# Patient Record
Sex: MALE | Race: BLACK OR AFRICAN AMERICAN | NOT HISPANIC OR LATINO | URBAN - METROPOLITAN AREA
[De-identification: names, ages, dates, MRNs, and addresses within clinical notes are randomized per-mention and may not be internally consistent; named-entity substitution may affect disease eponyms.]

---

## 2017-02-28 ENCOUNTER — OUTPATIENT (OUTPATIENT)
Dept: OUTPATIENT SERVICES | Facility: HOSPITAL | Age: 72
LOS: 1 days | Discharge: HOME | End: 2017-02-28

## 2017-02-28 DIAGNOSIS — Z98.89 OTHER SPECIFIED POSTPROCEDURAL STATES: Chronic | ICD-10-CM

## 2017-02-28 DIAGNOSIS — Z00.00 ENCOUNTER FOR GENERAL ADULT MEDICAL EXAMINATION WITHOUT ABNORMAL FINDINGS: ICD-10-CM

## 2017-02-28 LAB
ALBUMIN SERPL ELPH-MCNC: 3.9 G/DL — SIGNIFICANT CHANGE UP (ref 3.3–5)
ALP SERPL-CCNC: 82 U/L — SIGNIFICANT CHANGE UP (ref 40–120)
ALT FLD-CCNC: 25 U/L — SIGNIFICANT CHANGE UP (ref 12–78)
ANION GAP SERPL CALC-SCNC: 4 MMOL/L — LOW (ref 5–17)
AST SERPL-CCNC: 32 U/L — SIGNIFICANT CHANGE UP (ref 15–37)
BILIRUB SERPL-MCNC: 0.6 MG/DL — SIGNIFICANT CHANGE UP (ref 0.2–1.2)
BUN SERPL-MCNC: 11 MG/DL — SIGNIFICANT CHANGE UP (ref 7–23)
CALCIUM SERPL-MCNC: 8.8 MG/DL — SIGNIFICANT CHANGE UP (ref 8.5–10.1)
CHLORIDE SERPL-SCNC: 103 MMOL/L — SIGNIFICANT CHANGE UP (ref 96–108)
CHOLEST SERPL-MCNC: 315 MG/DL — HIGH (ref 10–199)
CO2 SERPL-SCNC: 31 MMOL/L — SIGNIFICANT CHANGE UP (ref 22–31)
CREAT SERPL-MCNC: 1.41 MG/DL — HIGH (ref 0.5–1.3)
GLUCOSE SERPL-MCNC: 83 MG/DL — SIGNIFICANT CHANGE UP (ref 70–99)
HBA1C BLD-MCNC: 6 % — HIGH (ref 4–5.6)
HCT VFR BLD CALC: 34.3 % — LOW (ref 39–50)
HDLC SERPL-MCNC: 100 MG/DL — SIGNIFICANT CHANGE UP (ref 40–125)
HGB BLD-MCNC: 11.9 G/DL — LOW (ref 13–17)
LIPID PNL WITH DIRECT LDL SERPL: 182 MG/DL — HIGH
MCHC RBC-ENTMCNC: 33.9 PG — SIGNIFICANT CHANGE UP (ref 27–34)
MCHC RBC-ENTMCNC: 34.8 GM/DL — SIGNIFICANT CHANGE UP (ref 32–36)
MCV RBC AUTO: 97.3 FL — SIGNIFICANT CHANGE UP (ref 80–100)
PLATELET # BLD AUTO: 172 K/UL — SIGNIFICANT CHANGE UP (ref 150–400)
POTASSIUM SERPL-MCNC: 3.9 MMOL/L — SIGNIFICANT CHANGE UP (ref 3.5–5.3)
POTASSIUM SERPL-SCNC: 3.9 MMOL/L — SIGNIFICANT CHANGE UP (ref 3.5–5.3)
PROT SERPL-MCNC: 8.1 GM/DL — SIGNIFICANT CHANGE UP (ref 6–8.3)
RBC # BLD: 3.52 M/UL — LOW (ref 4.2–5.8)
RBC # FLD: 14 % — SIGNIFICANT CHANGE UP (ref 11–15)
SODIUM SERPL-SCNC: 138 MMOL/L — SIGNIFICANT CHANGE UP (ref 135–145)
TOTAL CHOLESTEROL/HDL RATIO MEASUREMENT: 3.2 RATIO — LOW (ref 3.4–9.6)
TRIGL SERPL-MCNC: 166 MG/DL — HIGH (ref 10–149)
TSH SERPL-MCNC: 87.9 UU/ML — HIGH (ref 0.36–3.74)
WBC # BLD: 4.9 K/UL — SIGNIFICANT CHANGE UP (ref 3.8–10.5)
WBC # FLD AUTO: 4.9 K/UL — SIGNIFICANT CHANGE UP (ref 3.8–10.5)

## 2017-03-03 ENCOUNTER — OUTPATIENT (OUTPATIENT)
Dept: OUTPATIENT SERVICES | Facility: HOSPITAL | Age: 72
LOS: 1 days | Discharge: HOME | End: 2017-03-03

## 2017-03-03 DIAGNOSIS — Z98.89 OTHER SPECIFIED POSTPROCEDURAL STATES: Chronic | ICD-10-CM

## 2017-03-03 DIAGNOSIS — E04.9 NONTOXIC GOITER, UNSPECIFIED: ICD-10-CM

## 2017-03-03 LAB
T3 SERPL-MCNC: <20 NG/DL — LOW (ref 80–200)
T4 AB SER-ACNC: 0.8 UG/DL — LOW (ref 4.6–12)
TSH SERPL-MCNC: 81.6 UU/ML — HIGH (ref 0.36–3.74)

## 2017-06-09 ENCOUNTER — OUTPATIENT (OUTPATIENT)
Dept: OUTPATIENT SERVICES | Facility: HOSPITAL | Age: 72
LOS: 1 days | Discharge: HOME | End: 2017-06-09

## 2017-06-09 DIAGNOSIS — Z98.89 OTHER SPECIFIED POSTPROCEDURAL STATES: Chronic | ICD-10-CM

## 2017-06-09 DIAGNOSIS — R79.1 ABNORMAL COAGULATION PROFILE: ICD-10-CM

## 2017-06-09 LAB
HCT VFR BLD CALC: 38.4 % — LOW (ref 39–50)
HGB BLD-MCNC: 12.9 G/DL — LOW (ref 13–17)
MCHC RBC-ENTMCNC: 31.3 PG — SIGNIFICANT CHANGE UP (ref 27–34)
MCHC RBC-ENTMCNC: 33.5 GM/DL — SIGNIFICANT CHANGE UP (ref 32–36)
MCV RBC AUTO: 93.3 FL — SIGNIFICANT CHANGE UP (ref 80–100)
PLATELET # BLD AUTO: 180 K/UL — SIGNIFICANT CHANGE UP (ref 150–400)
RBC # BLD: 4.12 M/UL — LOW (ref 4.2–5.8)
RBC # FLD: 12.5 % — SIGNIFICANT CHANGE UP (ref 11–15)
T3 SERPL-MCNC: 85 NG/DL — SIGNIFICANT CHANGE UP (ref 80–200)
T4 FREE SERPL-MCNC: 1.1 NG/DL — SIGNIFICANT CHANGE UP (ref 0.9–1.8)
TSH SERPL-MCNC: 19.4 UU/ML — HIGH (ref 0.36–3.74)
WBC # BLD: 5.9 K/UL — SIGNIFICANT CHANGE UP (ref 3.8–10.5)
WBC # FLD AUTO: 5.9 K/UL — SIGNIFICANT CHANGE UP (ref 3.8–10.5)

## 2018-01-08 ENCOUNTER — EMERGENCY (EMERGENCY)
Facility: HOSPITAL | Age: 73
LOS: 0 days | Discharge: HOME | End: 2018-01-09
Attending: EMERGENCY MEDICINE
Payer: MEDICARE

## 2018-01-08 VITALS
SYSTOLIC BLOOD PRESSURE: 186 MMHG | OXYGEN SATURATION: 97 % | HEIGHT: 68 IN | DIASTOLIC BLOOD PRESSURE: 99 MMHG | RESPIRATION RATE: 20 BRPM | WEIGHT: 154.98 LBS | HEART RATE: 93 BPM | TEMPERATURE: 98 F

## 2018-01-08 DIAGNOSIS — G40.909 EPILEPSY, UNSPECIFIED, NOT INTRACTABLE, WITHOUT STATUS EPILEPTICUS: ICD-10-CM

## 2018-01-08 DIAGNOSIS — N30.90 CYSTITIS, UNSPECIFIED WITHOUT HEMATURIA: ICD-10-CM

## 2018-01-08 DIAGNOSIS — F10.10 ALCOHOL ABUSE, UNCOMPLICATED: ICD-10-CM

## 2018-01-08 DIAGNOSIS — N40.0 BENIGN PROSTATIC HYPERPLASIA WITHOUT LOWER URINARY TRACT SYMPTOMS: ICD-10-CM

## 2018-01-08 DIAGNOSIS — Z98.89 OTHER SPECIFIED POSTPROCEDURAL STATES: Chronic | ICD-10-CM

## 2018-01-08 DIAGNOSIS — R30.0 DYSURIA: ICD-10-CM

## 2018-01-08 DIAGNOSIS — Z85.46 PERSONAL HISTORY OF MALIGNANT NEOPLASM OF PROSTATE: ICD-10-CM

## 2018-01-08 DIAGNOSIS — Z79.2 LONG TERM (CURRENT) USE OF ANTIBIOTICS: ICD-10-CM

## 2018-01-08 DIAGNOSIS — R31.9 HEMATURIA, UNSPECIFIED: ICD-10-CM

## 2018-01-08 DIAGNOSIS — I10 ESSENTIAL (PRIMARY) HYPERTENSION: ICD-10-CM

## 2018-01-08 LAB
ALBUMIN SERPL ELPH-MCNC: 3.6 G/DL — SIGNIFICANT CHANGE UP (ref 3.3–5)
ALP SERPL-CCNC: 140 U/L — HIGH (ref 40–120)
ALT FLD-CCNC: 14 U/L — SIGNIFICANT CHANGE UP (ref 12–78)
ANION GAP SERPL CALC-SCNC: 8 MMOL/L — SIGNIFICANT CHANGE UP (ref 5–17)
APPEARANCE UR: CLEAR — SIGNIFICANT CHANGE UP
AST SERPL-CCNC: 14 U/L — LOW (ref 15–37)
BILIRUB SERPL-MCNC: 0.4 MG/DL — SIGNIFICANT CHANGE UP (ref 0.2–1.2)
BILIRUB UR-MCNC: NEGATIVE — SIGNIFICANT CHANGE UP
BUN SERPL-MCNC: 12 MG/DL — SIGNIFICANT CHANGE UP (ref 7–23)
CALCIUM SERPL-MCNC: 8.8 MG/DL — SIGNIFICANT CHANGE UP (ref 8.5–10.1)
CHLORIDE SERPL-SCNC: 110 MMOL/L — HIGH (ref 96–108)
CO2 SERPL-SCNC: 25 MMOL/L — SIGNIFICANT CHANGE UP (ref 22–31)
COLOR SPEC: ABNORMAL
CREAT SERPL-MCNC: 0.87 MG/DL — SIGNIFICANT CHANGE UP (ref 0.5–1.3)
DIFF PNL FLD: ABNORMAL
GLUCOSE SERPL-MCNC: 85 MG/DL — SIGNIFICANT CHANGE UP (ref 70–99)
GLUCOSE UR QL: NEGATIVE MG/DL — SIGNIFICANT CHANGE UP
HCT VFR BLD CALC: 38.3 % — LOW (ref 39–50)
HGB BLD-MCNC: 12.8 G/DL — LOW (ref 13–17)
KETONES UR-MCNC: ABNORMAL
LEUKOCYTE ESTERASE UR-ACNC: ABNORMAL
MCHC RBC-ENTMCNC: 30.5 PG — SIGNIFICANT CHANGE UP (ref 27–34)
MCHC RBC-ENTMCNC: 33.3 GM/DL — SIGNIFICANT CHANGE UP (ref 32–36)
MCV RBC AUTO: 91.5 FL — SIGNIFICANT CHANGE UP (ref 80–100)
NITRITE UR-MCNC: NEGATIVE — SIGNIFICANT CHANGE UP
PH UR: 5 — SIGNIFICANT CHANGE UP (ref 5–8)
PLATELET # BLD AUTO: 232 K/UL — SIGNIFICANT CHANGE UP (ref 150–400)
POTASSIUM SERPL-MCNC: 3.8 MMOL/L — SIGNIFICANT CHANGE UP (ref 3.5–5.3)
POTASSIUM SERPL-SCNC: 3.8 MMOL/L — SIGNIFICANT CHANGE UP (ref 3.5–5.3)
PROT SERPL-MCNC: 7.9 GM/DL — SIGNIFICANT CHANGE UP (ref 6–8.3)
PROT UR-MCNC: 30 MG/DL
RBC # BLD: 4.18 M/UL — LOW (ref 4.2–5.8)
RBC # FLD: 12.6 % — SIGNIFICANT CHANGE UP (ref 11–15)
SODIUM SERPL-SCNC: 143 MMOL/L — SIGNIFICANT CHANGE UP (ref 135–145)
SP GR SPEC: 1.01 — SIGNIFICANT CHANGE UP (ref 1.01–1.02)
UROBILINOGEN FLD QL: NEGATIVE MG/DL — SIGNIFICANT CHANGE UP
WBC # BLD: 6.4 K/UL — SIGNIFICANT CHANGE UP (ref 3.8–10.5)
WBC # FLD AUTO: 6.4 K/UL — SIGNIFICANT CHANGE UP (ref 3.8–10.5)

## 2018-01-08 PROCEDURE — 99285 EMERGENCY DEPT VISIT HI MDM: CPT

## 2018-01-08 RX ORDER — CIPROFLOXACIN LACTATE 400MG/40ML
500 VIAL (ML) INTRAVENOUS ONCE
Qty: 0 | Refills: 0 | Status: COMPLETED | OUTPATIENT
Start: 2018-01-08 | End: 2018-01-08

## 2018-01-08 RX ADMIN — Medication 500 MILLIGRAM(S): at 22:41

## 2018-01-08 NOTE — ED PROVIDER NOTE - MEDICAL DECISION MAKING DETAILS
painless hematuria, hx prostate cancer, no obstruction. will send urine, labs, ct abd/pel r/o large mass, infection, likely f/u urology.

## 2018-01-08 NOTE — ED PROVIDER NOTE - PMH
BPH (benign prostatic hyperplasia)    ETOH abuse    HTN (hypertension)    Seizure disorder  last seizure one and half year ago

## 2018-01-08 NOTE — ED PROVIDER NOTE - PROGRESS NOTE DETAILS
Results reported to patient--grossly benign, UTI, bladder thickening, likely cystitis   Pt. reports feeling better after meds, will give cipro and have pt. f/u with uro outpt.   pt. agrees to f/u with primary care and urology outpt.  pt. understands to return to ED if symptoms worsen; will d/c

## 2018-01-08 NOTE — ED PROVIDER NOTE - PHYSICAL EXAMINATION
Gen: No acute distress, non toxic  HEENT: Mucous membranes moist, pink conjunctivae, EOMI  CV: RRR, nl s1/s2.  Resp: CTAB, normal rate and effort  GI: Abdomen soft, NT, ND. No rebound, no guarding  : No CVAT  Neuro: A&O x 3, moving all 4 extremities  MSK: No spine or joint tenderness to palpation  Skin: No rashes. intact and perfused.

## 2018-01-08 NOTE — ED PROVIDER NOTE - OBJECTIVE STATEMENT
71 y/o male hx prostate cancer now in remission, htn, seizure disorder c/o 5 days of hematuria with occasional small clots. States no difficulty urinating, mild dysuria at first that resolved, no back/flank pain, no abd pain, no fever, no dizzienss or cp. Denies testicular pain or other symptoms.     ROS: No fever/chills. No eye pain/changes in vision, No ear pain/sore throat/dysphagia, No chest pain/palpitations. No SOB/cough/. No abdominal pain, N/V/D, no black/bloody bm. No dysuria/frequency/discharge, No headache. No Dizziness.    No rashes or breaks in skin. No numbness/tingling/weakness.

## 2018-01-08 NOTE — ED ADULT NURSE REASSESSMENT NOTE - NS ED NURSE REASSESS COMMENT FT1
pt made aware of waiting times, denies any changes to initial assessment, pt vital signs taken see flow sheet

## 2018-01-08 NOTE — ED ADULT NURSE REASSESSMENT NOTE - NS ED NURSE REASSESS COMMENT FT1
pt made aware of waiting time, spoke with patient and he denies any change in initial triage assessment

## 2018-01-08 NOTE — ED ADULT TRIAGE NOTE - CHIEF COMPLAINT QUOTE
pt states, " I have blood in my urine from last Wednesday , my doctor told me if it has not stop to come to hospital " denies dizziness, abdominal pain

## 2018-01-09 VITALS
TEMPERATURE: 98 F | OXYGEN SATURATION: 98 % | SYSTOLIC BLOOD PRESSURE: 157 MMHG | RESPIRATION RATE: 18 BRPM | DIASTOLIC BLOOD PRESSURE: 80 MMHG | HEART RATE: 72 BPM

## 2018-01-09 PROCEDURE — 74176 CT ABD & PELVIS W/O CONTRAST: CPT | Mod: 26

## 2018-01-09 RX ORDER — MOXIFLOXACIN HYDROCHLORIDE TABLETS, 400 MG 400 MG/1
1 TABLET, FILM COATED ORAL
Qty: 14 | Refills: 0 | OUTPATIENT
Start: 2018-01-09 | End: 2018-01-15

## 2018-01-09 RX ORDER — PHENAZOPYRIDINE HCL 100 MG
1 TABLET ORAL
Qty: 6 | Refills: 0 | OUTPATIENT
Start: 2018-01-09 | End: 2018-01-10

## 2018-01-10 LAB
CULTURE RESULTS: SIGNIFICANT CHANGE UP
SPECIMEN SOURCE: SIGNIFICANT CHANGE UP

## 2018-02-02 PROBLEM — Z00.00 ENCOUNTER FOR PREVENTIVE HEALTH EXAMINATION: Status: ACTIVE | Noted: 2018-02-02

## 2018-02-06 ENCOUNTER — APPOINTMENT (OUTPATIENT)
Dept: ULTRASOUND IMAGING | Facility: HOSPITAL | Age: 73
End: 2018-02-06

## 2018-02-06 ENCOUNTER — OUTPATIENT (OUTPATIENT)
Dept: OUTPATIENT SERVICES | Facility: HOSPITAL | Age: 73
LOS: 1 days | Discharge: HOME | End: 2018-02-06
Payer: MEDICARE

## 2018-02-06 DIAGNOSIS — N28.89 OTHER SPECIFIED DISORDERS OF KIDNEY AND URETER: ICD-10-CM

## 2018-02-06 DIAGNOSIS — Z98.89 OTHER SPECIFIED POSTPROCEDURAL STATES: Chronic | ICD-10-CM

## 2018-02-06 PROCEDURE — 76775 US EXAM ABDO BACK WALL LIM: CPT | Mod: 26

## 2018-03-23 ENCOUNTER — OUTPATIENT (OUTPATIENT)
Dept: OUTPATIENT SERVICES | Facility: HOSPITAL | Age: 73
LOS: 1 days | End: 2018-03-23
Payer: MEDICARE

## 2018-03-23 VITALS
TEMPERATURE: 97 F | RESPIRATION RATE: 16 BRPM | HEART RATE: 44 BPM | SYSTOLIC BLOOD PRESSURE: 140 MMHG | WEIGHT: 149.91 LBS | DIASTOLIC BLOOD PRESSURE: 90 MMHG | HEIGHT: 67 IN

## 2018-03-23 DIAGNOSIS — N35.9 URETHRAL STRICTURE, UNSPECIFIED: ICD-10-CM

## 2018-03-23 DIAGNOSIS — E03.9 HYPOTHYROIDISM, UNSPECIFIED: ICD-10-CM

## 2018-03-23 DIAGNOSIS — Z98.89 OTHER SPECIFIED POSTPROCEDURAL STATES: Chronic | ICD-10-CM

## 2018-03-23 DIAGNOSIS — Z85.46 PERSONAL HISTORY OF MALIGNANT NEOPLASM OF PROSTATE: Chronic | ICD-10-CM

## 2018-03-23 DIAGNOSIS — I10 ESSENTIAL (PRIMARY) HYPERTENSION: ICD-10-CM

## 2018-03-23 LAB
ALBUMIN SERPL ELPH-MCNC: 4.2 G/DL — SIGNIFICANT CHANGE UP (ref 3.3–5)
ALP SERPL-CCNC: 113 U/L — SIGNIFICANT CHANGE UP (ref 40–120)
ALT FLD-CCNC: 12 U/L — SIGNIFICANT CHANGE UP (ref 4–41)
APPEARANCE UR: SIGNIFICANT CHANGE UP
AST SERPL-CCNC: 17 U/L — SIGNIFICANT CHANGE UP (ref 4–40)
BACTERIA # UR AUTO: SIGNIFICANT CHANGE UP
BILIRUB SERPL-MCNC: 0.6 MG/DL — SIGNIFICANT CHANGE UP (ref 0.2–1.2)
BILIRUB UR-MCNC: NEGATIVE — SIGNIFICANT CHANGE UP
BLOOD UR QL VISUAL: NEGATIVE — SIGNIFICANT CHANGE UP
BUN SERPL-MCNC: 16 MG/DL — SIGNIFICANT CHANGE UP (ref 7–23)
CALCIUM SERPL-MCNC: 9.6 MG/DL — SIGNIFICANT CHANGE UP (ref 8.4–10.5)
CHLORIDE SERPL-SCNC: 105 MMOL/L — SIGNIFICANT CHANGE UP (ref 98–107)
CO2 SERPL-SCNC: 25 MMOL/L — SIGNIFICANT CHANGE UP (ref 22–31)
COLOR SPEC: SIGNIFICANT CHANGE UP
CREAT SERPL-MCNC: 1.01 MG/DL — SIGNIFICANT CHANGE UP (ref 0.5–1.3)
GLUCOSE SERPL-MCNC: 77 MG/DL — SIGNIFICANT CHANGE UP (ref 70–99)
GLUCOSE UR-MCNC: NEGATIVE — SIGNIFICANT CHANGE UP
HCT VFR BLD CALC: 41.1 % — SIGNIFICANT CHANGE UP (ref 39–50)
HGB BLD-MCNC: 13.4 G/DL — SIGNIFICANT CHANGE UP (ref 13–17)
KETONES UR-MCNC: NEGATIVE — SIGNIFICANT CHANGE UP
LEUKOCYTE ESTERASE UR-ACNC: HIGH
MCHC RBC-ENTMCNC: 29.6 PG — SIGNIFICANT CHANGE UP (ref 27–34)
MCHC RBC-ENTMCNC: 32.6 % — SIGNIFICANT CHANGE UP (ref 32–36)
MCV RBC AUTO: 90.9 FL — SIGNIFICANT CHANGE UP (ref 80–100)
MUCOUS THREADS # UR AUTO: SIGNIFICANT CHANGE UP
NITRITE UR-MCNC: POSITIVE — HIGH
NRBC # FLD: 0 — SIGNIFICANT CHANGE UP
PH UR: 6.5 — SIGNIFICANT CHANGE UP (ref 4.6–8)
PLATELET # BLD AUTO: 215 K/UL — SIGNIFICANT CHANGE UP (ref 150–400)
PMV BLD: 12.2 FL — SIGNIFICANT CHANGE UP (ref 7–13)
POTASSIUM SERPL-MCNC: 3.7 MMOL/L — SIGNIFICANT CHANGE UP (ref 3.5–5.3)
POTASSIUM SERPL-SCNC: 3.7 MMOL/L — SIGNIFICANT CHANGE UP (ref 3.5–5.3)
PROT SERPL-MCNC: 7.9 G/DL — SIGNIFICANT CHANGE UP (ref 6–8.3)
PROT UR-MCNC: 10 MG/DL — SIGNIFICANT CHANGE UP
RBC # BLD: 4.52 M/UL — SIGNIFICANT CHANGE UP (ref 4.2–5.8)
RBC # FLD: 13.4 % — SIGNIFICANT CHANGE UP (ref 10.3–14.5)
RBC CASTS # UR COMP ASSIST: HIGH (ref 0–?)
SODIUM SERPL-SCNC: 145 MMOL/L — SIGNIFICANT CHANGE UP (ref 135–145)
SP GR SPEC: 1.02 — SIGNIFICANT CHANGE UP (ref 1–1.04)
SQUAMOUS # UR AUTO: SIGNIFICANT CHANGE UP
UROBILINOGEN FLD QL: NORMAL MG/DL — SIGNIFICANT CHANGE UP
WBC # BLD: 7.42 K/UL — SIGNIFICANT CHANGE UP (ref 3.8–10.5)
WBC # FLD AUTO: 7.42 K/UL — SIGNIFICANT CHANGE UP (ref 3.8–10.5)
WBC UR QL: >50 — HIGH (ref 0–?)

## 2018-03-23 PROCEDURE — 93010 ELECTROCARDIOGRAM REPORT: CPT

## 2018-03-23 NOTE — H&P PST ADULT - PMH
BPH (benign prostatic hyperplasia)    ETOH abuse    HTN (hypertension)    Seizure disorder  last seizure 2015 BPH (benign prostatic hyperplasia)    ETOH abuse    HTN (hypertension)    Prostate CA    Seizure disorder  last seizure 2015

## 2018-03-23 NOTE — H&P PST ADULT - PROBLEM SELECTOR PLAN 1
Pt given pre-op instructions and Famotidine and verbalized understanding  Pt to see PCP for MC - confirmed with surgeon office and sister - forms given.   OR Booking notified of past hx of Seizures and STEPHANIE precautions and Jehovah Witness affiliation

## 2018-03-23 NOTE — H&P PST ADULT - HISTORY OF PRESENT ILLNESS
72 yo male for pst stating having urinaring problems- had suprapubic cath- leg bag, scheduled for green light procedure. pt blood pressure 220/104- 212/107- noncompliant with meds. sent to ER with family members at 815 .  1310- patient returned from the ER to complete his PST- B/P 140/90. [facility] not [facility]ills for the patient for his meds and the last time patient was prescribed was in july. 73 yo male scheduled for Cystoscopy [facility] Urethrotomy with Dr Cummings 4/4/18.. Pt is poor historian and seen alone today in PST. Pt has had several Cysto is past but unsure when last one was. Pt denies pain, dysuria or hematuria at present. Pt admits to ETOH abuse but has not drank for at least 2 yrs. - Past hx of seizure - none in last 3 years but pt is not sure etiology. Spoke with sister Amirah Penn who confirms pt has not had seizure since he stopped drinking over 3 yrs ago - she is not sure of past hx prior to that

## 2018-03-23 NOTE — H&P PST ADULT - NEUROLOGICAL COMMENTS
Pt denies CVA but admits to some hx of seizures - poss with ETOH abuse - states last seizure was possible 2 years ago but denies meds Pt denies CVA but admits to some hx of seizures - poss with ETOH abuse - states last seizure was possible 2-3  years ago but denies meds. Confirmed with sister

## 2018-03-23 NOTE — H&P PST ADULT - PSH
S/P hernia repair  many years ago H/O prostate cancer  10- yrs ago  S/P hernia repair  many years ago

## 2018-03-23 NOTE — H&P PST ADULT - NEGATIVE ENMT SYMPTOMS
no hearing difficulty/no vertigo/no sinus symptoms/no nasal congestion/no throat pain/no dysphagia/no ear pain/no tinnitus/no nasal discharge

## 2018-03-23 NOTE — H&P PST ADULT - GENITOURINARY COMMENTS
Hx of urethral stricture Hx of urethral stricture - past Cysto but pt not sure when - pt denies dysuria or hematuria

## 2018-03-23 NOTE — H&P PST ADULT - MALLAMPATI CLASS
Class I (easy) - visualization of the soft palate, fauces, uvula, and both anterior and posterior pillars with phonation/Class II - visualization of the soft palate, fauces, and uvula

## 2018-03-23 NOTE — H&P PST ADULT - BLOOD AVOIDANCE/RESTRICTIONS, PROFILE
Evangelical beliefs/Jehovah witness/none none/pt occasionally goes to [employer] [place] - sister not sure if he would object to transfusion - pt did not acknowledge during PST - told sister to have patient in form on DOS

## 2018-03-24 LAB — SPECIMEN SOURCE: SIGNIFICANT CHANGE UP

## 2018-03-25 LAB — BACTERIA UR CULT: SIGNIFICANT CHANGE UP

## 2018-04-03 ENCOUNTER — TRANSCRIPTION ENCOUNTER (OUTPATIENT)
Age: 73
End: 2018-04-03

## 2018-04-04 ENCOUNTER — OUTPATIENT (OUTPATIENT)
Dept: OUTPATIENT SERVICES | Facility: HOSPITAL | Age: 73
LOS: 1 days | Discharge: HOME | End: 2018-04-04

## 2018-04-04 VITALS
HEART RATE: 80 BPM | WEIGHT: 149.91 LBS | SYSTOLIC BLOOD PRESSURE: 177 MMHG | RESPIRATION RATE: 18 BRPM | OXYGEN SATURATION: 99 % | DIASTOLIC BLOOD PRESSURE: 74 MMHG | HEIGHT: 67 IN | TEMPERATURE: 98 F

## 2018-04-04 VITALS
DIASTOLIC BLOOD PRESSURE: 73 MMHG | SYSTOLIC BLOOD PRESSURE: 163 MMHG | HEART RATE: 68 BPM | TEMPERATURE: 98 F | RESPIRATION RATE: 14 BRPM | OXYGEN SATURATION: 98 %

## 2018-04-04 DIAGNOSIS — Z98.89 OTHER SPECIFIED POSTPROCEDURAL STATES: Chronic | ICD-10-CM

## 2018-04-04 DIAGNOSIS — Z85.46 PERSONAL HISTORY OF MALIGNANT NEOPLASM OF PROSTATE: Chronic | ICD-10-CM

## 2018-04-04 DIAGNOSIS — N35.9 URETHRAL STRICTURE, UNSPECIFIED: ICD-10-CM

## 2018-04-04 RX ORDER — CEFUROXIME AXETIL 250 MG
1 TABLET ORAL
Qty: 10 | Refills: 0 | OUTPATIENT
Start: 2018-04-04 | End: 2018-04-08

## 2018-04-04 NOTE — ASU DISCHARGE PLAN (ADULT/PEDIATRIC). - NOTIFY
Pain not relieved by Medications/Unable to Urinate/Bleeding that does not stop/Persistent Nausea and Vomiting/Fever greater than 101/Swelling that continues

## 2018-05-30 ENCOUNTER — OUTPATIENT (OUTPATIENT)
Dept: OUTPATIENT SERVICES | Facility: HOSPITAL | Age: 73
LOS: 1 days | End: 2018-05-30
Payer: MEDICARE

## 2018-05-30 VITALS
HEIGHT: 68 IN | SYSTOLIC BLOOD PRESSURE: 168 MMHG | OXYGEN SATURATION: 99 % | HEART RATE: 60 BPM | WEIGHT: 143.96 LBS | RESPIRATION RATE: 14 BRPM | DIASTOLIC BLOOD PRESSURE: 90 MMHG | TEMPERATURE: 97 F

## 2018-05-30 DIAGNOSIS — N35.9 URETHRAL STRICTURE, UNSPECIFIED: ICD-10-CM

## 2018-05-30 DIAGNOSIS — I10 ESSENTIAL (PRIMARY) HYPERTENSION: ICD-10-CM

## 2018-05-30 DIAGNOSIS — E03.9 HYPOTHYROIDISM, UNSPECIFIED: ICD-10-CM

## 2018-05-30 DIAGNOSIS — Z98.89 OTHER SPECIFIED POSTPROCEDURAL STATES: Chronic | ICD-10-CM

## 2018-05-30 DIAGNOSIS — Z98.49 CATARACT EXTRACTION STATUS, UNSPECIFIED EYE: Chronic | ICD-10-CM

## 2018-05-30 DIAGNOSIS — Z85.46 PERSONAL HISTORY OF MALIGNANT NEOPLASM OF PROSTATE: Chronic | ICD-10-CM

## 2018-05-30 LAB
ALBUMIN SERPL ELPH-MCNC: 4 G/DL — SIGNIFICANT CHANGE UP (ref 3.3–5)
ALP SERPL-CCNC: 113 U/L — SIGNIFICANT CHANGE UP (ref 40–120)
ALT FLD-CCNC: 7 U/L — SIGNIFICANT CHANGE UP (ref 4–41)
APPEARANCE UR: SIGNIFICANT CHANGE UP
AST SERPL-CCNC: 16 U/L — SIGNIFICANT CHANGE UP (ref 4–40)
BACTERIA # UR AUTO: SIGNIFICANT CHANGE UP
BILIRUB SERPL-MCNC: 0.4 MG/DL — SIGNIFICANT CHANGE UP (ref 0.2–1.2)
BILIRUB UR-MCNC: NEGATIVE — SIGNIFICANT CHANGE UP
BLOOD UR QL VISUAL: HIGH
BUN SERPL-MCNC: 11 MG/DL — SIGNIFICANT CHANGE UP (ref 7–23)
CALCIUM SERPL-MCNC: 9.3 MG/DL — SIGNIFICANT CHANGE UP (ref 8.4–10.5)
CHLORIDE SERPL-SCNC: 103 MMOL/L — SIGNIFICANT CHANGE UP (ref 98–107)
CO2 SERPL-SCNC: 26 MMOL/L — SIGNIFICANT CHANGE UP (ref 22–31)
COLOR SPEC: YELLOW — SIGNIFICANT CHANGE UP
CREAT SERPL-MCNC: 1.13 MG/DL — SIGNIFICANT CHANGE UP (ref 0.5–1.3)
GLUCOSE SERPL-MCNC: 77 MG/DL — SIGNIFICANT CHANGE UP (ref 70–99)
GLUCOSE UR-MCNC: NEGATIVE — SIGNIFICANT CHANGE UP
HCT VFR BLD CALC: 36.9 % — LOW (ref 39–50)
HGB BLD-MCNC: 11.7 G/DL — LOW (ref 13–17)
KETONES UR-MCNC: NEGATIVE — SIGNIFICANT CHANGE UP
LEUKOCYTE ESTERASE UR-ACNC: HIGH
MCHC RBC-ENTMCNC: 29.1 PG — SIGNIFICANT CHANGE UP (ref 27–34)
MCHC RBC-ENTMCNC: 31.7 % — LOW (ref 32–36)
MCV RBC AUTO: 91.8 FL — SIGNIFICANT CHANGE UP (ref 80–100)
NITRITE UR-MCNC: POSITIVE — HIGH
NRBC # FLD: 0 — SIGNIFICANT CHANGE UP
PH UR: 6 — SIGNIFICANT CHANGE UP (ref 4.6–8)
PLATELET # BLD AUTO: 243 K/UL — SIGNIFICANT CHANGE UP (ref 150–400)
PMV BLD: 11.4 FL — SIGNIFICANT CHANGE UP (ref 7–13)
POTASSIUM SERPL-MCNC: 4 MMOL/L — SIGNIFICANT CHANGE UP (ref 3.5–5.3)
POTASSIUM SERPL-SCNC: 4 MMOL/L — SIGNIFICANT CHANGE UP (ref 3.5–5.3)
PROT SERPL-MCNC: 7.3 G/DL — SIGNIFICANT CHANGE UP (ref 6–8.3)
PROT UR-MCNC: 20 MG/DL — SIGNIFICANT CHANGE UP
RBC # BLD: 4.02 M/UL — LOW (ref 4.2–5.8)
RBC # FLD: 13.5 % — SIGNIFICANT CHANGE UP (ref 10.3–14.5)
RBC CASTS # UR COMP ASSIST: HIGH (ref 0–?)
SODIUM SERPL-SCNC: 140 MMOL/L — SIGNIFICANT CHANGE UP (ref 135–145)
SP GR SPEC: 1.01 — SIGNIFICANT CHANGE UP (ref 1–1.04)
UROBILINOGEN FLD QL: NORMAL MG/DL — SIGNIFICANT CHANGE UP
WBC # BLD: 6.76 K/UL — SIGNIFICANT CHANGE UP (ref 3.8–10.5)
WBC # FLD AUTO: 6.76 K/UL — SIGNIFICANT CHANGE UP (ref 3.8–10.5)
WBC UR QL: >50 — HIGH (ref 0–?)

## 2018-05-30 PROCEDURE — 93010 ELECTROCARDIOGRAM REPORT: CPT

## 2018-05-30 NOTE — H&P PST ADULT - FAMILY HISTORY
Mother  Still living? Unknown  Hypertension, Age at diagnosis: Age Unknown     Sibling  Still living? Unknown  Hypertension, Age at diagnosis: Age Unknown

## 2018-05-30 NOTE — H&P PST ADULT - PROBLEM SELECTOR PLAN 1
Pt scheduled for Cystoscopy [facility] Internal Urethrotomy on 06/06/18  Preop instructions provided. Pt verbalized understanding.   Pepcid for GI prophylaxis provided   med eval from 04/2018 in chart  pt Gnosticism, surgical coordinator Janine @Dr. Cummings office notified

## 2018-05-30 NOTE — H&P PST ADULT - PMH
BPH (benign prostatic hyperplasia)    ETOH abuse  sober since 2016  HTN (hypertension)    Hyperlipidemia    Prostate CA    Seizure disorder  last seizure 2015

## 2018-05-30 NOTE — H&P PST ADULT - MUSCULOSKELETAL
details… detailed exam no joint swelling/no joint erythema/normal strength/no joint warmth/ROM intact/no calf tenderness

## 2018-05-30 NOTE — H&P PST ADULT - NEGATIVE ENMT SYMPTOMS
no nasal discharge/no hearing difficulty/no nasal congestion/no throat pain/no dysphagia/no post-nasal discharge/no sinus symptoms

## 2018-05-30 NOTE — H&P PST ADULT - HISTORY OF PRESENT ILLNESS
74 yo male with hx of HTN, HLD, hypothyroidism, presents to PST for evaluation for Cystoscopy [facility] Internal Urethrotomy on 06/06/18. Pt c/o nocturia 5-6 x night, reports incomplete bladder emptying on bladder scan, denies hematuria, dysuria or hesitancy hx of urethral stricture, pt underwent cystoscopy in April, reports symptoms persist. 74 yo male with hx of HTN, HLD, prostate cancer~2008, s/p radiation therapy, hypothyroidism, presents to PST for evaluation for Cystoscopy [facility] Internal Urethrotomy on 06/06/18. Pt c/o nocturia 5-6 x night, reports incomplete bladder emptying on bladder scan, denies hematuria, dysuria or hesitancy hx of urethral stricture, pt underwent cystoscopy in April, reports symptoms persist.

## 2018-05-30 NOTE — H&P PST ADULT - PSH
H/O prostate cancer  10- yrs ago, s/p radiation therapy  S/P hernia repair  right inguinal hernia H/O prostate cancer  10- yrs ago, s/p radiation therapy  S/P hernia repair  right inguinal hernia  Status post cataract extraction

## 2018-05-30 NOTE — H&P PST ADULT - RS GEN PE MLT RESP DETAILS PC
clear to auscultation bilaterally/good air movement/airway patent/respirations non-labored/breath sounds equal

## 2018-05-30 NOTE — H&P PST ADULT - GENERAL GENITOURINARY SYMPTOMS
hx of prostate cancer, tx with radiation; hx of urethral stricture, see HPI/increased urinary frequency/nocturia

## 2018-05-30 NOTE — H&P PST ADULT - NEGATIVE MUSCULOSKELETAL SYMPTOMS
no stiffness/no back pain/no neck pain/ambulates with cane/no arthritis/no myalgia/no joint swelling/no muscle weakness

## 2018-05-31 LAB — SPECIMEN SOURCE: SIGNIFICANT CHANGE UP

## 2018-06-01 LAB
-  AMIKACIN: SIGNIFICANT CHANGE UP
-  AMPICILLIN/SULBACTAM: SIGNIFICANT CHANGE UP
-  AMPICILLIN: SIGNIFICANT CHANGE UP
-  AZTREONAM: SIGNIFICANT CHANGE UP
-  CEFAZOLIN: SIGNIFICANT CHANGE UP
-  CEFEPIME: SIGNIFICANT CHANGE UP
-  CEFOXITIN: SIGNIFICANT CHANGE UP
-  CEFTAZIDIME: SIGNIFICANT CHANGE UP
-  CEFTRIAXONE: SIGNIFICANT CHANGE UP
-  ERTAPENEM: SIGNIFICANT CHANGE UP
-  GENTAMICIN: SIGNIFICANT CHANGE UP
-  IMIPENEM: SIGNIFICANT CHANGE UP
-  LEVOFLOXACIN: SIGNIFICANT CHANGE UP
-  MEROPENEM: SIGNIFICANT CHANGE UP
-  NITROFURANTOIN: SIGNIFICANT CHANGE UP
-  PIPERACILLIN/TAZOBACTAM: SIGNIFICANT CHANGE UP
-  TIGECYCLINE: SIGNIFICANT CHANGE UP
-  TOBRAMYCIN: SIGNIFICANT CHANGE UP
-  TRIMETHOPRIM/SULFAMETHOXAZOLE: SIGNIFICANT CHANGE UP
BACTERIA UR CULT: SIGNIFICANT CHANGE UP
METHOD TYPE: SIGNIFICANT CHANGE UP
ORGANISM # SPEC MICROSCOPIC CNT: SIGNIFICANT CHANGE UP
ORGANISM # SPEC MICROSCOPIC CNT: SIGNIFICANT CHANGE UP

## 2018-06-05 ENCOUNTER — TRANSCRIPTION ENCOUNTER (OUTPATIENT)
Age: 73
End: 2018-06-05

## 2018-06-06 ENCOUNTER — RESULT REVIEW (OUTPATIENT)
Age: 73
End: 2018-06-06

## 2018-06-06 ENCOUNTER — OUTPATIENT (OUTPATIENT)
Dept: OUTPATIENT SERVICES | Facility: HOSPITAL | Age: 73
LOS: 1 days | Discharge: HOME | End: 2018-06-06
Payer: MEDICARE

## 2018-06-06 VITALS
SYSTOLIC BLOOD PRESSURE: 159 MMHG | OXYGEN SATURATION: 100 % | TEMPERATURE: 98 F | DIASTOLIC BLOOD PRESSURE: 90 MMHG | HEART RATE: 68 BPM | RESPIRATION RATE: 18 BRPM

## 2018-06-06 VITALS
HEART RATE: 80 BPM | SYSTOLIC BLOOD PRESSURE: 167 MMHG | TEMPERATURE: 98 F | DIASTOLIC BLOOD PRESSURE: 77 MMHG | HEIGHT: 68 IN | OXYGEN SATURATION: 100 % | RESPIRATION RATE: 16 BRPM | WEIGHT: 143.96 LBS

## 2018-06-06 DIAGNOSIS — Z98.49 CATARACT EXTRACTION STATUS, UNSPECIFIED EYE: Chronic | ICD-10-CM

## 2018-06-06 DIAGNOSIS — N35.9 URETHRAL STRICTURE, UNSPECIFIED: ICD-10-CM

## 2018-06-06 DIAGNOSIS — Z85.46 PERSONAL HISTORY OF MALIGNANT NEOPLASM OF PROSTATE: Chronic | ICD-10-CM

## 2018-06-06 DIAGNOSIS — Z98.89 OTHER SPECIFIED POSTPROCEDURAL STATES: Chronic | ICD-10-CM

## 2018-06-06 PROCEDURE — 88112 CYTOPATH CELL ENHANCE TECH: CPT | Mod: 26

## 2018-06-06 NOTE — ASU DISCHARGE PLAN (ADULT/PEDIATRIC). - NOTIFY
Persistent Nausea and Vomiting/Increased Irritability or Sluggishness/Bleeding that does not stop/Fever greater than 101/Inability to Tolerate Liquids or Foods Persistent Nausea and Vomiting/Swelling that continues/Pain not relieved by Medications/Unable to Urinate/Increased Irritability or Sluggishness/Bleeding that does not stop/Inability to Tolerate Liquids or Foods/Fever greater than 101

## 2018-06-07 LAB — NON-GYNECOLOGICAL CYTOLOGY STUDY: SIGNIFICANT CHANGE UP

## 2018-07-18 ENCOUNTER — OUTPATIENT (OUTPATIENT)
Dept: OUTPATIENT SERVICES | Facility: HOSPITAL | Age: 73
LOS: 1 days | End: 2018-07-18
Payer: MEDICARE

## 2018-07-18 VITALS
WEIGHT: 143.08 LBS | OXYGEN SATURATION: 98 % | TEMPERATURE: 97 F | RESPIRATION RATE: 16 BRPM | HEIGHT: 67 IN | SYSTOLIC BLOOD PRESSURE: 164 MMHG | DIASTOLIC BLOOD PRESSURE: 90 MMHG | HEART RATE: 60 BPM

## 2018-07-18 DIAGNOSIS — Z98.890 OTHER SPECIFIED POSTPROCEDURAL STATES: Chronic | ICD-10-CM

## 2018-07-18 DIAGNOSIS — I10 ESSENTIAL (PRIMARY) HYPERTENSION: ICD-10-CM

## 2018-07-18 DIAGNOSIS — Z29.9 ENCOUNTER FOR PROPHYLACTIC MEASURES, UNSPECIFIED: ICD-10-CM

## 2018-07-18 DIAGNOSIS — N40.0 BENIGN PROSTATIC HYPERPLASIA WITHOUT LOWER URINARY TRACT SYMPTOMS: ICD-10-CM

## 2018-07-18 DIAGNOSIS — Z98.49 CATARACT EXTRACTION STATUS, UNSPECIFIED EYE: Chronic | ICD-10-CM

## 2018-07-18 DIAGNOSIS — R35.1 NOCTURIA: ICD-10-CM

## 2018-07-18 DIAGNOSIS — Z98.89 OTHER SPECIFIED POSTPROCEDURAL STATES: Chronic | ICD-10-CM

## 2018-07-18 DIAGNOSIS — Z85.46 PERSONAL HISTORY OF MALIGNANT NEOPLASM OF PROSTATE: Chronic | ICD-10-CM

## 2018-07-18 DIAGNOSIS — E78.5 HYPERLIPIDEMIA, UNSPECIFIED: ICD-10-CM

## 2018-07-18 LAB
ALBUMIN SERPL ELPH-MCNC: 4.3 G/DL — SIGNIFICANT CHANGE UP (ref 3.3–5)
ALP SERPL-CCNC: 145 U/L — HIGH (ref 40–120)
ALT FLD-CCNC: 9 U/L — SIGNIFICANT CHANGE UP (ref 4–41)
APPEARANCE UR: SIGNIFICANT CHANGE UP
APTT BLD: 30.5 SEC — SIGNIFICANT CHANGE UP (ref 27.5–37.4)
AST SERPL-CCNC: 16 U/L — SIGNIFICANT CHANGE UP (ref 4–40)
BACTERIA # UR AUTO: HIGH
BILIRUB SERPL-MCNC: 0.5 MG/DL — SIGNIFICANT CHANGE UP (ref 0.2–1.2)
BILIRUB UR-MCNC: NEGATIVE — SIGNIFICANT CHANGE UP
BLOOD UR QL VISUAL: HIGH
BUN SERPL-MCNC: 11 MG/DL — SIGNIFICANT CHANGE UP (ref 7–23)
CALCIUM SERPL-MCNC: 9.5 MG/DL — SIGNIFICANT CHANGE UP (ref 8.4–10.5)
CHLORIDE SERPL-SCNC: 103 MMOL/L — SIGNIFICANT CHANGE UP (ref 98–107)
CO2 SERPL-SCNC: 24 MMOL/L — SIGNIFICANT CHANGE UP (ref 22–31)
COLOR SPEC: SIGNIFICANT CHANGE UP
CREAT SERPL-MCNC: 1.27 MG/DL — SIGNIFICANT CHANGE UP (ref 0.5–1.3)
GLUCOSE SERPL-MCNC: 80 MG/DL — SIGNIFICANT CHANGE UP (ref 70–99)
GLUCOSE UR-MCNC: NEGATIVE — SIGNIFICANT CHANGE UP
HCT VFR BLD CALC: 41.3 % — SIGNIFICANT CHANGE UP (ref 39–50)
HGB BLD-MCNC: 12.8 G/DL — LOW (ref 13–17)
INR BLD: 0.91 — SIGNIFICANT CHANGE UP (ref 0.88–1.17)
KETONES UR-MCNC: NEGATIVE — SIGNIFICANT CHANGE UP
LEUKOCYTE ESTERASE UR-ACNC: HIGH
MCHC RBC-ENTMCNC: 28.8 PG — SIGNIFICANT CHANGE UP (ref 27–34)
MCHC RBC-ENTMCNC: 31 % — LOW (ref 32–36)
MCV RBC AUTO: 93 FL — SIGNIFICANT CHANGE UP (ref 80–100)
NITRITE UR-MCNC: NEGATIVE — SIGNIFICANT CHANGE UP
NRBC # FLD: 0 — SIGNIFICANT CHANGE UP
PH UR: 6.5 — SIGNIFICANT CHANGE UP (ref 4.6–8)
PLATELET # BLD AUTO: 271 K/UL — SIGNIFICANT CHANGE UP (ref 150–400)
PMV BLD: 11.5 FL — SIGNIFICANT CHANGE UP (ref 7–13)
POTASSIUM SERPL-MCNC: 3.4 MMOL/L — LOW (ref 3.5–5.3)
POTASSIUM SERPL-SCNC: 3.4 MMOL/L — LOW (ref 3.5–5.3)
PROT SERPL-MCNC: 8.2 G/DL — SIGNIFICANT CHANGE UP (ref 6–8.3)
PROT UR-MCNC: 100 MG/DL — SIGNIFICANT CHANGE UP
PROTHROM AB SERPL-ACNC: 10.5 SEC — SIGNIFICANT CHANGE UP (ref 9.8–13.1)
RBC # BLD: 4.44 M/UL — SIGNIFICANT CHANGE UP (ref 4.2–5.8)
RBC # FLD: 14.2 % — SIGNIFICANT CHANGE UP (ref 10.3–14.5)
RBC CASTS # UR COMP ASSIST: SIGNIFICANT CHANGE UP (ref 0–?)
SODIUM SERPL-SCNC: 141 MMOL/L — SIGNIFICANT CHANGE UP (ref 135–145)
SP GR SPEC: 1.02 — SIGNIFICANT CHANGE UP (ref 1–1.04)
SQUAMOUS # UR AUTO: SIGNIFICANT CHANGE UP
UROBILINOGEN FLD QL: NORMAL MG/DL — SIGNIFICANT CHANGE UP
WBC # BLD: 10.22 K/UL — SIGNIFICANT CHANGE UP (ref 3.8–10.5)
WBC # FLD AUTO: 10.22 K/UL — SIGNIFICANT CHANGE UP (ref 3.8–10.5)
WBC UR QL: >50 — HIGH (ref 0–?)

## 2018-07-18 PROCEDURE — 93010 ELECTROCARDIOGRAM REPORT: CPT

## 2018-07-18 RX ORDER — SODIUM CHLORIDE 9 MG/ML
1000 INJECTION, SOLUTION INTRAVENOUS
Qty: 0 | Refills: 0 | Status: DISCONTINUED | OUTPATIENT
Start: 2018-07-25 | End: 2018-08-09

## 2018-07-18 NOTE — H&P PST ADULT - NEGATIVE MUSCULOSKELETAL SYMPTOMS
no stiffness/no arthritis/no muscle weakness/no myalgia/no neck pain/ambulates with cane/no back pain/no joint swelling

## 2018-07-18 NOTE — H&P PST ADULT - GENERAL GENITOURINARY SYMPTOMS
nocturia/hx of prostate cancer, tx with radiation; hx of urethral stricture, see HPI/bladder infections/urinary hesitancy/increased urinary frequency

## 2018-07-18 NOTE — H&P PST ADULT - PROBLEM SELECTOR PLAN 1
Pt. is scheduled for greenlight laser of prostate on 7/25/18.  Preoperative instructions reviewed, pt verbalized understanding.  Preop Famotidine provided.  Lab results pending, EKG done.  Pt. instructed to obtain a medical evaluation prior to surgery to address elevated blood pressure (PMD called today re: BP, will see patient tomorrow for evaluation)

## 2018-07-18 NOTE — H&P PST ADULT - PMH
BPH (benign prostatic hyperplasia)    ETOH abuse  sober since 2016  HTN (hypertension)    Hyperlipidemia    Poor historian    Prostate CA  Dx'd 2008, s/p radiation  Seizure disorder  last seizure 2015

## 2018-07-18 NOTE — H&P PST ADULT - NEGATIVE ENMT SYMPTOMS
no nasal discharge/no sinus symptoms/no throat pain/no post-nasal discharge/no nasal congestion/no hearing difficulty/no dysphagia

## 2018-07-18 NOTE — H&P PST ADULT - PSH
H/O prostate cancer  10- yrs ago, s/p radiation therapy  H/O urethrotomy  June 2018  S/P hernia repair  right inguinal hernia  Status post cataract extraction

## 2018-07-18 NOTE — H&P PST ADULT - CARDIOVASCULAR COMMENTS
BP elevated today, 170/100, came down to 164/90.  PMD notified will see patient in office tomorrow to evaluate BP prior to surgery

## 2018-07-18 NOTE — H&P PST ADULT - HISTORY OF PRESENT ILLNESS
74 yo male with hx of HTN, HLD, prostate cancer~2008, s/p radiation therapy, hypothyroidism, presents to PST for evaluation for Cystoscopy [facility] Internal Urethrotomy on 06/06/18. Pt c/o nocturia 5-6 x night, reports incomplete bladder emptying on bladder scan, denies hematuria, dysuria or hesitancy hx of urethral stricture, pt underwent cystoscopy in April, reports symptoms persist. 74 y/o male with history of BPH presents to PAST today for presurgical evaluation.  He is a poor historian and history was obtained by patient and electronic medical record.  He has history of HTN, HLD, prostate cancer~2008, s/p radiation therapy, hypothyroidism and BPH.  He is s/p optical Internal Urethrotomy on 06/06/18, but continues to complain of nocturia and hesitancy.  He was treated with antibiotics recently and denies any hematuria, dysuria and urgency.

## 2018-07-18 NOTE — H&P PST ADULT - PROBLEM SELECTOR PLAN 2
Pt. instructed to continue Hydralazine and Losartan as directed and to take the morning of surgery with a sip of water

## 2018-07-19 PROBLEM — C61 MALIGNANT NEOPLASM OF PROSTATE: Chronic | Status: ACTIVE | Noted: 2018-03-23

## 2018-07-20 LAB
BACTERIA UR CULT: SIGNIFICANT CHANGE UP
SPECIMEN SOURCE: SIGNIFICANT CHANGE UP

## 2018-07-24 ENCOUNTER — TRANSCRIPTION ENCOUNTER (OUTPATIENT)
Age: 73
End: 2018-07-24

## 2018-07-25 ENCOUNTER — OUTPATIENT (OUTPATIENT)
Dept: OUTPATIENT SERVICES | Facility: HOSPITAL | Age: 73
LOS: 1 days | Discharge: HOME | End: 2018-07-25

## 2018-07-25 VITALS
HEART RATE: 73 BPM | TEMPERATURE: 99 F | HEIGHT: 67 IN | WEIGHT: 143.08 LBS | RESPIRATION RATE: 16 BRPM | DIASTOLIC BLOOD PRESSURE: 75 MMHG | OXYGEN SATURATION: 99 % | SYSTOLIC BLOOD PRESSURE: 146 MMHG

## 2018-07-25 VITALS
OXYGEN SATURATION: 100 % | DIASTOLIC BLOOD PRESSURE: 86 MMHG | HEART RATE: 68 BPM | SYSTOLIC BLOOD PRESSURE: 139 MMHG | RESPIRATION RATE: 16 BRPM

## 2018-07-25 DIAGNOSIS — Z98.890 OTHER SPECIFIED POSTPROCEDURAL STATES: Chronic | ICD-10-CM

## 2018-07-25 DIAGNOSIS — Z98.49 CATARACT EXTRACTION STATUS, UNSPECIFIED EYE: Chronic | ICD-10-CM

## 2018-07-25 DIAGNOSIS — Z85.46 PERSONAL HISTORY OF MALIGNANT NEOPLASM OF PROSTATE: Chronic | ICD-10-CM

## 2018-07-25 DIAGNOSIS — Z98.89 OTHER SPECIFIED POSTPROCEDURAL STATES: Chronic | ICD-10-CM

## 2018-07-25 DIAGNOSIS — R35.1 NOCTURIA: ICD-10-CM

## 2018-07-25 LAB — POTASSIUM BLDV-SCNC: 3.7 MMOL/L — SIGNIFICANT CHANGE UP (ref 3.4–4.5)

## 2018-07-25 RX ORDER — HYDRALAZINE HCL 50 MG
10 TABLET ORAL ONCE
Qty: 0 | Refills: 0 | Status: COMPLETED | OUTPATIENT
Start: 2018-07-25 | End: 2018-07-25

## 2018-07-25 RX ADMIN — Medication 10 MILLIGRAM(S): at 17:37

## 2018-07-25 RX ADMIN — SODIUM CHLORIDE 30 MILLILITER(S): 9 INJECTION, SOLUTION INTRAVENOUS at 17:37

## 2018-07-25 RX ADMIN — Medication 10 MILLIGRAM(S): at 19:24

## 2018-07-25 NOTE — BRIEF OPERATIVE NOTE - PROCEDURE
<<-----Click on this checkbox to enter Procedure Cystoscopy  07/25/2018    Active  PHI  Urethral dilation  07/25/2018    Active  ALEJANDROEAS

## 2018-07-25 NOTE — ASU DISCHARGE PLAN (ADULT/PEDIATRIC). - SPECIAL INSTRUCTIONS
Take over the counter acetaminophen (tylenol) and ibuprofen (advil) for pain      In order to better understand your post-operative pain control, please record the following for follow-up review:          (1) Date and time of pain         (2) Level of pain (Scale of 1-10)         (3) Medication and dose taken          (4) Level of pain (1-10) after medication taken   You may receive a phone call in order to assess if you are being appropriately treated or if there are medication adjustments you may need to better control your pain.

## 2018-07-25 NOTE — ASU DISCHARGE PLAN (ADULT/PEDIATRIC). - NOTIFY
Fever greater than 101/Unable to Urinate/Persistent Nausea and Vomiting Pain not relieved by Medications/Fever greater than 101/Swelling that continues/Bleeding that does not stop/Unable to Urinate/Persistent Nausea and Vomiting

## 2018-07-25 NOTE — ASU DISCHARGE PLAN (ADULT/PEDIATRIC). - MEDICATION SUMMARY - MEDICATIONS TO TAKE
I will START or STAY ON the medications listed below when I get home from the hospital:    losartan 100 mg oral tablet  -- 1 tab(s) by mouth once a day in Am  -- Indication: For HTN    Flomax 0.4 mg oral capsule  -- 1 cap(s) by mouth once a day in Am  -- Indication: For LUTS    simvastatin 20 mg oral tablet  -- 1 tab(s) by mouth once a day (at bedtime)  -- Indication: For HLD    levothyroxine 125 mcg (0.125 mg) oral tablet  -- 1 tab(s) by mouth once a day before breakefast  -- Indication: For Hypothyroid    hydrALAZINE 25 mg oral tablet  -- 1 tab(s) by mouth 2 times a day  -- Indication: For HTN

## 2018-07-25 NOTE — ASU DISCHARGE PLAN (ADULT/PEDIATRIC). - POST OP PHONE #
[phone] pt. granted permission to leave message /and or speak with whoever answers the phone.

## 2018-07-25 NOTE — BRIEF OPERATIVE NOTE - POST-OP DX
Bladder neck stricture  07/25/2018    Active  Wang Shoemaker  Urethral stricture  07/25/2018    Active  Wang Shoemaker

## 2019-05-24 PROBLEM — E78.5 HYPERLIPIDEMIA, UNSPECIFIED: Chronic | Status: ACTIVE | Noted: 2018-05-30

## 2019-05-24 PROBLEM — Z78.9 OTHER SPECIFIED HEALTH STATUS: Chronic | Status: ACTIVE | Noted: 2018-07-18

## 2019-06-12 ENCOUNTER — APPOINTMENT (OUTPATIENT)
Dept: ULTRASOUND IMAGING | Facility: HOSPITAL | Age: 74
End: 2019-06-12

## 2019-06-12 ENCOUNTER — OUTPATIENT (OUTPATIENT)
Dept: OUTPATIENT SERVICES | Facility: HOSPITAL | Age: 74
LOS: 1 days | Discharge: HOME | End: 2019-06-12
Payer: MEDICARE

## 2019-06-12 DIAGNOSIS — N28.89 OTHER SPECIFIED DISORDERS OF KIDNEY AND URETER: ICD-10-CM

## 2019-06-12 DIAGNOSIS — Z98.89 OTHER SPECIFIED POSTPROCEDURAL STATES: Chronic | ICD-10-CM

## 2019-06-12 DIAGNOSIS — Z98.890 OTHER SPECIFIED POSTPROCEDURAL STATES: Chronic | ICD-10-CM

## 2019-06-12 DIAGNOSIS — Z98.49 CATARACT EXTRACTION STATUS, UNSPECIFIED EYE: Chronic | ICD-10-CM

## 2019-06-12 DIAGNOSIS — Z85.46 PERSONAL HISTORY OF MALIGNANT NEOPLASM OF PROSTATE: Chronic | ICD-10-CM

## 2019-06-12 PROCEDURE — 76775 US EXAM ABDO BACK WALL LIM: CPT | Mod: 26

## 2020-07-19 ENCOUNTER — INPATIENT (INPATIENT)
Facility: HOSPITAL | Age: 75
LOS: 3 days | Discharge: HOME HEALTH | End: 2020-07-23
Attending: INTERNAL MEDICINE | Admitting: INTERNAL MEDICINE
Payer: MEDICARE

## 2020-07-19 VITALS
TEMPERATURE: 98 F | DIASTOLIC BLOOD PRESSURE: 59 MMHG | SYSTOLIC BLOOD PRESSURE: 121 MMHG | RESPIRATION RATE: 16 BRPM | HEIGHT: 68 IN | OXYGEN SATURATION: 98 % | HEART RATE: 51 BPM | WEIGHT: 147.93 LBS

## 2020-07-19 DIAGNOSIS — Z85.46 PERSONAL HISTORY OF MALIGNANT NEOPLASM OF PROSTATE: Chronic | ICD-10-CM

## 2020-07-19 DIAGNOSIS — E78.5 HYPERLIPIDEMIA, UNSPECIFIED: ICD-10-CM

## 2020-07-19 DIAGNOSIS — Z29.9 ENCOUNTER FOR PROPHYLACTIC MEASURES, UNSPECIFIED: ICD-10-CM

## 2020-07-19 DIAGNOSIS — N13.30 UNSPECIFIED HYDRONEPHROSIS: ICD-10-CM

## 2020-07-19 DIAGNOSIS — N40.0 BENIGN PROSTATIC HYPERPLASIA WITHOUT LOWER URINARY TRACT SYMPTOMS: ICD-10-CM

## 2020-07-19 DIAGNOSIS — Z98.49 CATARACT EXTRACTION STATUS, UNSPECIFIED EYE: Chronic | ICD-10-CM

## 2020-07-19 DIAGNOSIS — N17.9 ACUTE KIDNEY FAILURE, UNSPECIFIED: ICD-10-CM

## 2020-07-19 DIAGNOSIS — Z98.890 OTHER SPECIFIED POSTPROCEDURAL STATES: Chronic | ICD-10-CM

## 2020-07-19 DIAGNOSIS — E03.9 HYPOTHYROIDISM, UNSPECIFIED: ICD-10-CM

## 2020-07-19 DIAGNOSIS — I10 ESSENTIAL (PRIMARY) HYPERTENSION: ICD-10-CM

## 2020-07-19 DIAGNOSIS — D64.9 ANEMIA, UNSPECIFIED: ICD-10-CM

## 2020-07-19 DIAGNOSIS — C61 MALIGNANT NEOPLASM OF PROSTATE: ICD-10-CM

## 2020-07-19 DIAGNOSIS — Z98.89 OTHER SPECIFIED POSTPROCEDURAL STATES: Chronic | ICD-10-CM

## 2020-07-19 LAB
ALBUMIN SERPL ELPH-MCNC: 2.5 G/DL — LOW (ref 3.3–5)
ALP SERPL-CCNC: 86 U/L — SIGNIFICANT CHANGE UP (ref 40–120)
ALT FLD-CCNC: 51 U/L — SIGNIFICANT CHANGE UP (ref 12–78)
ANION GAP SERPL CALC-SCNC: 9 MMOL/L — SIGNIFICANT CHANGE UP (ref 5–17)
ANISOCYTOSIS BLD QL: SLIGHT — SIGNIFICANT CHANGE UP
APPEARANCE UR: ABNORMAL
APTT BLD: 28.6 SEC — SIGNIFICANT CHANGE UP (ref 27.5–35.5)
AST SERPL-CCNC: 26 U/L — SIGNIFICANT CHANGE UP (ref 15–37)
BACTERIA # UR AUTO: ABNORMAL
BASOPHILS # BLD AUTO: 0 K/UL — SIGNIFICANT CHANGE UP (ref 0–0.2)
BASOPHILS NFR BLD AUTO: 0 % — SIGNIFICANT CHANGE UP (ref 0–2)
BILIRUB SERPL-MCNC: 0.4 MG/DL — SIGNIFICANT CHANGE UP (ref 0.2–1.2)
BILIRUB UR-MCNC: NEGATIVE — SIGNIFICANT CHANGE UP
BLD GP AB SCN SERPL QL: SIGNIFICANT CHANGE UP
BUN SERPL-MCNC: 31 MG/DL — HIGH (ref 7–23)
CALCIUM SERPL-MCNC: 8.4 MG/DL — LOW (ref 8.5–10.1)
CHLORIDE SERPL-SCNC: 94 MMOL/L — LOW (ref 96–108)
CO2 SERPL-SCNC: 26 MMOL/L — SIGNIFICANT CHANGE UP (ref 22–31)
COLOR SPEC: YELLOW — SIGNIFICANT CHANGE UP
CREAT SERPL-MCNC: 1.8 MG/DL — HIGH (ref 0.5–1.3)
DIFF PNL FLD: ABNORMAL
EOSINOPHIL # BLD AUTO: 0.18 K/UL — SIGNIFICANT CHANGE UP (ref 0–0.5)
EOSINOPHIL NFR BLD AUTO: 2 % — SIGNIFICANT CHANGE UP (ref 0–6)
GLUCOSE SERPL-MCNC: 96 MG/DL — SIGNIFICANT CHANGE UP (ref 70–99)
GLUCOSE UR QL: NEGATIVE MG/DL — SIGNIFICANT CHANGE UP
HCT VFR BLD CALC: 19.7 % — CRITICAL LOW (ref 39–50)
HGB BLD-MCNC: 6.4 G/DL — CRITICAL LOW (ref 13–17)
INR BLD: 1.18 RATIO — HIGH (ref 0.88–1.16)
KETONES UR-MCNC: NEGATIVE — SIGNIFICANT CHANGE UP
LEUKOCYTE ESTERASE UR-ACNC: ABNORMAL
LYMPHOCYTES # BLD AUTO: 1.86 K/UL — SIGNIFICANT CHANGE UP (ref 1–3.3)
LYMPHOCYTES # BLD AUTO: 21 % — SIGNIFICANT CHANGE UP (ref 13–44)
MANUAL SMEAR VERIFICATION: SIGNIFICANT CHANGE UP
MCHC RBC-ENTMCNC: 25.9 PG — LOW (ref 27–34)
MCHC RBC-ENTMCNC: 32.5 GM/DL — SIGNIFICANT CHANGE UP (ref 32–36)
MCV RBC AUTO: 79.8 FL — LOW (ref 80–100)
METAMYELOCYTES # FLD: 1 % — HIGH (ref 0–0)
MICROCYTES BLD QL: SLIGHT — SIGNIFICANT CHANGE UP
MONOCYTES # BLD AUTO: 0.35 K/UL — SIGNIFICANT CHANGE UP (ref 0–0.9)
MONOCYTES NFR BLD AUTO: 4 % — SIGNIFICANT CHANGE UP (ref 2–14)
NEUTROPHILS # BLD AUTO: 6.12 K/UL — SIGNIFICANT CHANGE UP (ref 1.8–7.4)
NEUTROPHILS NFR BLD AUTO: 67 % — SIGNIFICANT CHANGE UP (ref 43–77)
NEUTS BAND # BLD: 2 % — SIGNIFICANT CHANGE UP (ref 0–8)
NITRITE UR-MCNC: NEGATIVE — SIGNIFICANT CHANGE UP
NRBC # BLD: 0 /100 — SIGNIFICANT CHANGE UP (ref 0–0)
NRBC # BLD: SIGNIFICANT CHANGE UP /100 WBCS (ref 0–0)
OB PNL STL: NEGATIVE — SIGNIFICANT CHANGE UP
PH UR: 8 — SIGNIFICANT CHANGE UP (ref 5–8)
PLAT MORPH BLD: NORMAL — SIGNIFICANT CHANGE UP
PLATELET # BLD AUTO: 339 K/UL — SIGNIFICANT CHANGE UP (ref 150–400)
POIKILOCYTOSIS BLD QL AUTO: SLIGHT — SIGNIFICANT CHANGE UP
POTASSIUM SERPL-MCNC: 4.3 MMOL/L — SIGNIFICANT CHANGE UP (ref 3.5–5.3)
POTASSIUM SERPL-SCNC: 4.3 MMOL/L — SIGNIFICANT CHANGE UP (ref 3.5–5.3)
PROT SERPL-MCNC: 7.4 GM/DL — SIGNIFICANT CHANGE UP (ref 6–8.3)
PROT UR-MCNC: 100 MG/DL
PROTHROM AB SERPL-ACNC: 13.1 SEC — SIGNIFICANT CHANGE UP (ref 10.6–13.6)
RBC # BLD: 2.47 M/UL — LOW (ref 4.2–5.8)
RBC # FLD: 14.6 % — HIGH (ref 10.3–14.5)
RBC BLD AUTO: NORMAL — SIGNIFICANT CHANGE UP
RBC CASTS # UR COMP ASSIST: SIGNIFICANT CHANGE UP /HPF (ref 0–4)
SARS-COV-2 RNA SPEC QL NAA+PROBE: SIGNIFICANT CHANGE UP
SMUDGE CELLS # BLD: PRESENT — SIGNIFICANT CHANGE UP
SODIUM SERPL-SCNC: 129 MMOL/L — LOW (ref 135–145)
SP GR SPEC: 1.01 — SIGNIFICANT CHANGE UP (ref 1.01–1.02)
UROBILINOGEN FLD QL: NEGATIVE MG/DL — SIGNIFICANT CHANGE UP
VARIANT LYMPHS # BLD: 3 % — SIGNIFICANT CHANGE UP (ref 0–6)
WBC # BLD: 8.87 K/UL — SIGNIFICANT CHANGE UP (ref 3.8–10.5)
WBC # FLD AUTO: 8.87 K/UL — SIGNIFICANT CHANGE UP (ref 3.8–10.5)
WBC UR QL: SIGNIFICANT CHANGE UP

## 2020-07-19 PROCEDURE — 76770 US EXAM ABDO BACK WALL COMP: CPT | Mod: 26

## 2020-07-19 PROCEDURE — 71045 X-RAY EXAM CHEST 1 VIEW: CPT | Mod: 26

## 2020-07-19 PROCEDURE — 99283 EMERGENCY DEPT VISIT LOW MDM: CPT

## 2020-07-19 PROCEDURE — 93010 ELECTROCARDIOGRAM REPORT: CPT

## 2020-07-19 RX ORDER — HYDRALAZINE HCL 50 MG
25 TABLET ORAL
Refills: 0 | Status: DISCONTINUED | OUTPATIENT
Start: 2020-07-19 | End: 2020-07-21

## 2020-07-19 RX ORDER — LEVOTHYROXINE SODIUM 125 MCG
125 TABLET ORAL DAILY
Refills: 0 | Status: DISCONTINUED | OUTPATIENT
Start: 2020-07-19 | End: 2020-07-21

## 2020-07-19 RX ORDER — PANTOPRAZOLE SODIUM 20 MG/1
80 TABLET, DELAYED RELEASE ORAL ONCE
Refills: 0 | Status: COMPLETED | OUTPATIENT
Start: 2020-07-19 | End: 2020-07-19

## 2020-07-19 RX ORDER — LOSARTAN POTASSIUM 100 MG/1
100 TABLET, FILM COATED ORAL DAILY
Refills: 0 | Status: DISCONTINUED | OUTPATIENT
Start: 2020-07-19 | End: 2020-07-21

## 2020-07-19 RX ORDER — TAMSULOSIN HYDROCHLORIDE 0.4 MG/1
0.4 CAPSULE ORAL AT BEDTIME
Refills: 0 | Status: DISCONTINUED | OUTPATIENT
Start: 2020-07-19 | End: 2020-07-21

## 2020-07-19 RX ORDER — METOPROLOL TARTRATE 50 MG
25 TABLET ORAL
Refills: 0 | Status: DISCONTINUED | OUTPATIENT
Start: 2020-07-19 | End: 2020-07-20

## 2020-07-19 RX ORDER — SIMVASTATIN 20 MG/1
20 TABLET, FILM COATED ORAL AT BEDTIME
Refills: 0 | Status: DISCONTINUED | OUTPATIENT
Start: 2020-07-19 | End: 2020-07-21

## 2020-07-19 RX ADMIN — Medication 25 MILLIGRAM(S): at 18:40

## 2020-07-19 RX ADMIN — LOSARTAN POTASSIUM 100 MILLIGRAM(S): 100 TABLET, FILM COATED ORAL at 18:40

## 2020-07-19 RX ADMIN — PANTOPRAZOLE SODIUM 80 MILLIGRAM(S): 20 TABLET, DELAYED RELEASE ORAL at 16:10

## 2020-07-19 NOTE — H&P ADULT - NSHPPHYSICALEXAM_GEN_ALL_CORE
Vital Signs Last 24 Hrs  T(C): 36.8 (19 Jul 2020 17:42), Max: 36.8 (19 Jul 2020 12:34)  T(F): 98.2 (19 Jul 2020 17:42), Max: 98.3 (19 Jul 2020 12:34)  HR: 52 (19 Jul 2020 17:42) (51 - 53)  BP: 165/70 (19 Jul 2020 17:42) (121/59 - 165/70)  BP(mean): --  RR: 18 (19 Jul 2020 17:42) (16 - 20)  SpO2: 98% (19 Jul 2020 17:42) (98% - 100%)

## 2020-07-19 NOTE — ED PROVIDER NOTE - PRO INTERPRETER NEED 2
PT: Pt declining PT despite extensive education. When asked to participate in supine exercises pt firmly declining. Agreeable to therapy late morning tomorrow      English

## 2020-07-19 NOTE — ED PROVIDER NOTE - OBJECTIVE STATEMENT
76 yo M hx HTN, HLD, hypothyroidism,  BPH, prostate cancer s/p radiation therapy 2008, sent in by Dr. Shaw for SADAF on outpatient labs. Patient denies dysuria, difficulty voiding, no abdominal, flank, or back pain. No fevers/chills. Denies black tarry stools or bloody stools. No cp/sob.

## 2020-07-19 NOTE — H&P ADULT - NSICDXFAMILYHX_GEN_ALL_CORE_FT
FAMILY HISTORY:  Mother  Still living? Unknown  Hypertension, Age at diagnosis: Age Unknown    Sibling  Still living? Unknown  Hypertension, Age at diagnosis: Age Unknown

## 2020-07-19 NOTE — H&P ADULT - ASSESSMENT
76 y/o M with PMHx of HTN, dyslipidemia, prostate CA s/p RT ~ 2008, hypothyroidism, BPH, s/p urethrotomy 2018 was sent to ED by Dr Shaw after outpatient labs showed severe anemia and SADAF.  Renal ultrasound shows mild-mod b/l hydronephrosis and proximal hydroureter.

## 2020-07-19 NOTE — ED ADULT NURSE REASSESSMENT NOTE - NS ED NURSE REASSESS COMMENT FT1
Received pt on spot 10, A&Ox3, pRBC infusing for correction of low H&H. Pt educated on signs and symptoms of transfusion. No transfusion rxn noted or reported at this time. Encouraged to notify staff with any changes. Sinus matt on the cardiac monitor, VS otherwise stable. Pt breathing even and unlabored. No obvious distress noted at this time. Reassured patient. Will monitor.

## 2020-07-19 NOTE — ED ADULT NURSE NOTE - ED CARDIAC RATE
normal medication teaching and assessment/observation and assessment/teaching and training/rehabilitation services

## 2020-07-19 NOTE — ED ADULT NURSE NOTE - OBJECTIVE STATEMENT
received pt sitting on stretcher alert and oriented x4 state that his doctor sent him in for abnormal labs had blood work done last Saturday denies any nausea vomiting, black stool or abdominal pain,  dizziness hx of ETOH stopped drinking about 4 year ago per pt.

## 2020-07-19 NOTE — H&P ADULT - NSICDXPASTMEDICALHX_GEN_ALL_CORE_FT
PAST MEDICAL HISTORY:  BPH (benign prostatic hyperplasia)     ETOH abuse sober since 2016    HTN (hypertension)     Hyperlipidemia     Poor historian     Prostate CA Dx'd 2008, s/p radiation    Seizure disorder last seizure 2015

## 2020-07-19 NOTE — ED ADULT NURSE REASSESSMENT NOTE - NS ED NURSE REASSESS COMMENT FT1
Pt started on PRBCs at 17:52 due to lab values of Hemoglobin 6.4 and HCT 19.7. Prior to start PRBCS verified with 2nd RN. Patient provided with education about possible reactions and to report them ASAP. Nursing care continued.

## 2020-07-19 NOTE — H&P ADULT - NSICDXPASTSURGICALHX_GEN_ALL_CORE_FT
PAST SURGICAL HISTORY:  H/O prostate cancer 10- yrs ago, s/p radiation therapy    H/O urethrotomy June 2018    S/P hernia repair right inguinal hernia    Status post cataract extraction

## 2020-07-19 NOTE — H&P ADULT - NEGATIVE GASTROINTESTINAL SYMPTOMS
no nausea/no change in bowel habits/no diarrhea/no abdominal pain/no hematochezia/no vomiting/no melena

## 2020-07-19 NOTE — ED PROVIDER NOTE - PHYSICAL EXAMINATION
VITALS: reviewed  GEN: NAD, A & O x 4  HEAD/EYES: NCAT, EOMI, anicteric sclerae, no conjunctival pallor  ENT: mucus membranes moist, oropharynx WNL, trachea midline, no JVD  RESP: lungs CTA with equal breath sounds bilaterally, chest wall nontender and atraumatic  CV: heart with reg rhythm S1, S2, no murmur; distal pulses intact and symmetric bilaterally  ABDOMEN: normoactive bowel sounds, soft, nondistended, nontender, no palpable masses  : no CVAT  MSK: extremities atraumatic and nontender, no edema, no asymmetry.   SKIN: warm, dry, no rash, no bruising, no cyanosis. color appropriate for ethnicity  NEURO: alert, mentating appropriately, no facial asymmetry. gross sensation, motor, coordination are intact  PSYCH: Affect appropriate

## 2020-07-19 NOTE — ED PROVIDER NOTE - CARE PLAN
Principal Discharge DX:	SADAF (acute kidney injury)  Secondary Diagnosis:	Hydronephrosis  Secondary Diagnosis:	Anemia

## 2020-07-19 NOTE — ED ADULT NURSE REASSESSMENT NOTE - NS ED NURSE REASSESS COMMENT FT1
Transfusion completed. No transfusion rxn reaction reported. Pt awaiting transport to the Unit. VSS. 2nd pint of blood to be started on the unit. Will communicate same to receiving RN.

## 2020-07-19 NOTE — ED ADULT NURSE NOTE - NSIMPLEMENTINTERV_GEN_ALL_ED
Implemented All Fall with Harm Risk Interventions:  [place] to call system. Call bell, personal items and telephone within reach. Instruct patient to call for assistance. Room bathroom lighting operational. Non-slip footwear when patient is off stretcher. Physically safe environment: no spills, clutter or unnecessary equipment. Stretcher in lowest position, wheels locked, appropriate side rails in place. Provide visual cue, wrist band, yellow gown, etc. Monitor gait and stability. Monitor for mental status changes and reorient to person, place, and time. Review medications for side effects contributing to fall risk. Reinforce activity limits and safety measures with patient and family. Provide visual clues: red socks.

## 2020-07-19 NOTE — ED PROVIDER NOTE - CLINICAL SUMMARY MEDICAL DECISION MAKING FREE TEXT BOX
74 yo m sent in for elevated bun/cr. will obtain baseline labs, ua, renal us. ro uti, r/o obstruction, dispo per findings

## 2020-07-19 NOTE — H&P ADULT - HISTORY OF PRESENT ILLNESS
76 y/o M with PMHx of HTN, dyslipidemia, prostate CA s/p RT ~ 2008, hypothyroidism, BPH, s/p urethrotomy 2018 was sent to ED by Dr Shaw after outpatient labs showed severe anemia and SADAF.  Patient offers no specific complaints today.  Denies h/a, dizziness, CP, SOB, palpitations, abdominal pain, melena, BRBPR, dysuria, hematuria or fever / chills.

## 2020-07-20 ENCOUNTER — TRANSCRIPTION ENCOUNTER (OUTPATIENT)
Age: 75
End: 2020-07-20

## 2020-07-20 LAB
ALBUMIN SERPL ELPH-MCNC: 2.4 G/DL — LOW (ref 3.3–5)
ALP SERPL-CCNC: 84 U/L — SIGNIFICANT CHANGE UP (ref 40–120)
ALT FLD-CCNC: 45 U/L — SIGNIFICANT CHANGE UP (ref 12–78)
ANION GAP SERPL CALC-SCNC: 7 MMOL/L — SIGNIFICANT CHANGE UP (ref 5–17)
AST SERPL-CCNC: 26 U/L — SIGNIFICANT CHANGE UP (ref 15–37)
BILIRUB SERPL-MCNC: 0.9 MG/DL — SIGNIFICANT CHANGE UP (ref 0.2–1.2)
BUN SERPL-MCNC: 28 MG/DL — HIGH (ref 7–23)
CALCIUM SERPL-MCNC: 8.9 MG/DL — SIGNIFICANT CHANGE UP (ref 8.5–10.1)
CHLORIDE SERPL-SCNC: 98 MMOL/L — SIGNIFICANT CHANGE UP (ref 96–108)
CO2 SERPL-SCNC: 24 MMOL/L — SIGNIFICANT CHANGE UP (ref 22–31)
CREAT SERPL-MCNC: 1.71 MG/DL — HIGH (ref 0.5–1.3)
GLUCOSE SERPL-MCNC: 81 MG/DL — SIGNIFICANT CHANGE UP (ref 70–99)
HCT VFR BLD CALC: 29.8 % — LOW (ref 39–50)
HGB BLD-MCNC: 9.9 G/DL — LOW (ref 13–17)
MCHC RBC-ENTMCNC: 27 PG — SIGNIFICANT CHANGE UP (ref 27–34)
MCHC RBC-ENTMCNC: 33.2 GM/DL — SIGNIFICANT CHANGE UP (ref 32–36)
MCV RBC AUTO: 81.4 FL — SIGNIFICANT CHANGE UP (ref 80–100)
NRBC # BLD: 0 /100 WBCS — SIGNIFICANT CHANGE UP (ref 0–0)
PLATELET # BLD AUTO: 338 K/UL — SIGNIFICANT CHANGE UP (ref 150–400)
POTASSIUM SERPL-MCNC: 4.5 MMOL/L — SIGNIFICANT CHANGE UP (ref 3.5–5.3)
POTASSIUM SERPL-SCNC: 4.5 MMOL/L — SIGNIFICANT CHANGE UP (ref 3.5–5.3)
PROT SERPL-MCNC: 8 GM/DL — SIGNIFICANT CHANGE UP (ref 6–8.3)
PSA FLD-MCNC: 0.28 NG/ML — SIGNIFICANT CHANGE UP (ref 0–4)
RBC # BLD: 3.66 M/UL — LOW (ref 4.2–5.8)
RBC # FLD: 13.9 % — SIGNIFICANT CHANGE UP (ref 10.3–14.5)
SARS-COV-2 IGG SERPL QL IA: NEGATIVE — SIGNIFICANT CHANGE UP
SARS-COV-2 IGM SERPL IA-ACNC: 0.26 RATIO — SIGNIFICANT CHANGE UP
SODIUM SERPL-SCNC: 129 MMOL/L — LOW (ref 135–145)
TSH SERPL-MCNC: 0.13 UIU/ML — LOW (ref 0.36–3.74)
WBC # BLD: 9.1 K/UL — SIGNIFICANT CHANGE UP (ref 3.8–10.5)
WBC # FLD AUTO: 9.1 K/UL — SIGNIFICANT CHANGE UP (ref 3.8–10.5)

## 2020-07-20 RX ORDER — SODIUM CHLORIDE 9 MG/ML
1000 INJECTION INTRAMUSCULAR; INTRAVENOUS; SUBCUTANEOUS
Refills: 0 | Status: DISCONTINUED | OUTPATIENT
Start: 2020-07-20 | End: 2020-07-21

## 2020-07-20 RX ADMIN — TAMSULOSIN HYDROCHLORIDE 0.4 MILLIGRAM(S): 0.4 CAPSULE ORAL at 00:03

## 2020-07-20 RX ADMIN — Medication 25 MILLIGRAM(S): at 06:01

## 2020-07-20 RX ADMIN — Medication 25 MILLIGRAM(S): at 17:12

## 2020-07-20 RX ADMIN — LOSARTAN POTASSIUM 100 MILLIGRAM(S): 100 TABLET, FILM COATED ORAL at 06:01

## 2020-07-20 RX ADMIN — SIMVASTATIN 20 MILLIGRAM(S): 20 TABLET, FILM COATED ORAL at 21:52

## 2020-07-20 RX ADMIN — SODIUM CHLORIDE 100 MILLILITER(S): 9 INJECTION INTRAMUSCULAR; INTRAVENOUS; SUBCUTANEOUS at 19:00

## 2020-07-20 RX ADMIN — Medication 125 MICROGRAM(S): at 06:01

## 2020-07-20 RX ADMIN — SIMVASTATIN 20 MILLIGRAM(S): 20 TABLET, FILM COATED ORAL at 00:03

## 2020-07-20 RX ADMIN — TAMSULOSIN HYDROCHLORIDE 0.4 MILLIGRAM(S): 0.4 CAPSULE ORAL at 21:52

## 2020-07-20 RX ADMIN — SODIUM CHLORIDE 100 MILLILITER(S): 9 INJECTION INTRAMUSCULAR; INTRAVENOUS; SUBCUTANEOUS at 21:54

## 2020-07-20 NOTE — DIETITIAN INITIAL EVALUATION ADULT. - PERTINENT LABORATORY DATA
07-20 Na129 mmol/L<L> Glu 81 mg/dL K+ 4.5 mmol/L Cr  1.71 mg/dL<H> BUN 28 mg/dL<H> 07-20 Alb 2.4 g/dL<L>

## 2020-07-20 NOTE — CHART NOTE - NSCHARTNOTEFT_GEN_A_CORE
Called by nurse to advise that she was unable to insert Moser Catheter..  I attempted to insert a Moser cath. I used urojet lidocaine gel & a coude cath. Unable to pass cath. Pt has a h/o prostate hx..  Dr. Avendano notified. He said to monitor pt for now.

## 2020-07-20 NOTE — CONSULT NOTE ADULT - ASSESSMENT
76 y/o M with PMHx of HTN, dyslipidemia, prostate CA s/p RT ~ 2008, hypothyroidism, BPH, s/p urethrotomy 2018 was sent to ED by Dr Shaw after outpatient labs showed severe anemia and SADAF.  S/p 2 units PRBCs 7/19.  Urology consulted for SADAF and b/l hydronephrosis.     Plan:  -f/u PVR, may need jackson catheter for high residual   -monitor I/Os, trend renal function   -follow up urine culture   -continue medical management and supportive care   -continue flomax   -follow up labs   -discussed with Dr. Avendano

## 2020-07-20 NOTE — DIETITIAN INITIAL EVALUATION ADULT. - OTHER INFO
Pt mildly [place], alert, forgetful sometime; able to answer nutrition-related questions. Pt denies N/V/D/C; some difficulty chewing c denture, no issues swallowing. Pt reported consuming 3 meals a day, but sometimes only 'picking at the food'. Pt reported doctor told him he lost 40# x 1 year. Per chart review previous admission wt 158.7# ; 72 kg 06/2016 (4 years ago). Pt reported noticing physical signs of wt loss but unable to recall exact amount. Discussed with pt nutrient- and calorie dense foods to promote wt gain/deter from LBM break down. Pt requested mechanical soft food to facilitate feedings, pt agreed to nutritional supplements to consume between meals with poor PO intake.

## 2020-07-20 NOTE — DIETITIAN INITIAL EVALUATION ADULT. - PERTINENT MEDS FT
MEDICATIONS  (STANDING):  hydrALAZINE 25 milliGRAM(s) Oral two times a day  levothyroxine 125 MICROGram(s) Oral daily  losartan 100 milliGRAM(s) Oral daily  simvastatin 20 milliGRAM(s) Oral at bedtime  tamsulosin 0.4 milliGRAM(s) Oral at bedtime    MEDICATIONS  (PRN):

## 2020-07-20 NOTE — CHART NOTE - NSCHARTNOTEFT_GEN_A_CORE
Upon Nutritional Assessment by the Registered Dietitian your patient was determined to meet criteria / has evidence of the following diagnosis/diagnoses:          [ ]  Mild Protein Calorie Malnutrition        [ ]  Moderate Protein Calorie Malnutrition        [x ] Severe Protein Calorie Malnutrition        [ ] Unspecified Protein Calorie Malnutrition        [x ] Underweight / BMI <19        [ ] Morbid Obesity / BMI > 40      Findings as based on:  •  Comprehensive nutrition assessment and consultation  •  Calorie counts (nutrient intake analysis)  •  Food acceptance and intake status from observations by staff  •  Follow up  •  Patient education  •  Intervention secondary to interdisciplinary rounds  •   concerns      Treatment:    The following diet has been recommended: Mechanical Soft, Low sodium, Ensure Pudding x 1/day (provides 170 kcal, 4 g protein) , Ensure Enlive x 2/day (provides 700 kcal, 40 g protein)       PROVIDER Section:     By signing this assessment you are acknowledging and agree with the diagnosis/diagnoses assigned by the Registered Dietitian    Comments:

## 2020-07-20 NOTE — DIETITIAN INITIAL EVALUATION ADULT. - DIET TYPE
Ensure Enlive x 2/day (provides 700 kcal, 40 g protein)/supplement (specify)/Ensure Pudding x 1/day (provides 170 kcal, 4 g protein)/dysphagia 2, mechanical soft, thin liquids/low sodium

## 2020-07-20 NOTE — DIETITIAN INITIAL EVALUATION ADULT. - PHYSICAL APPEARANCE
underweight/other (specify)/BMI 17.9; no edema noted In accordance with social distancing guidelines, unable to perform nutrition focused physical exam at this time. Via RDN observations: orbital [severe], temple [severe], interosseous [severe], clavicle [severe]

## 2020-07-20 NOTE — CONSULT NOTE ADULT - SUBJECTIVE AND OBJECTIVE BOX
UROLOGY CONSULT NOTE    Patient is a 75y old  Male who presents with a chief complaint of anemia (2020 17:57)      HPI:  74 y/o M with PMHx of HTN, dyslipidemia, prostate CA s/p RT ~ , hypothyroidism, BPH, s/p urethrotomy  was sent to ED by Dr hSaw after outpatient labs showed severe anemia and SADAF.  Patient offers no specific complaints today.  Denies h/a, dizziness, CP, SOB, palpitations, abdominal pain, melena, BRBPR, dysuria, hematuria or fever / chills. (2020 17:57)    Patient seen and examined at bedside. States he was sent to hospital by Dr. Shaw for abnormal labs. Patient offers no complaints. Denies abdominal pain or flank pain, hematuria, dysuria, or frequency. Patient admits to episodes of incontinence, approximately 3-4 times per day.   Patient states his urologist is Dr. Cummings, and follows with him every 3 months due to h/o prostate cancer s/p radiation.   Denies fever, chills, cough, chest pain, shortness of breath, melena, hematochezia.       PAST MEDICAL & SURGICAL HISTORY:  Poor historian  Hyperlipidemia  Prostate CA: Dx&#x27;d , s/p radiation  HTN (hypertension)  BPH (benign prostatic hyperplasia)  Seizure disorder: last seizure   ETOH abuse: sober since   H/O urethrotomy: 2018  Status post cataract extraction  H/O prostate cancer: 10- yrs ago, s/p radiation therapy  S/P hernia repair: right inguinal hernia      Review of Systems:  Contained within HPI.     MEDICATIONS  (STANDING):  hydrALAZINE 25 milliGRAM(s) Oral two times a day  levothyroxine 125 MICROGram(s) Oral daily  losartan 100 milliGRAM(s) Oral daily  simvastatin 20 milliGRAM(s) Oral at bedtime  tamsulosin 0.4 milliGRAM(s) Oral at bedtime    MEDICATIONS  (PRN):      Allergies    No Known Allergies    Intolerances        SOCIAL HISTORY   Lives at home with sister, ambulates with cane. Denies recent tobacco or ETOH use.     FAMILY HISTORY:  Hypertension (Mother, Sibling)      Vital Signs Last 24 Hrs  T(C): 36.4 (2020 05:30), Max: 36.8 (2020 12:34)  T(F): 97.6 (2020 05:30), Max: 98.3 (2020 12:34)  HR: 50 (2020 05:30) (43 - 58)  BP: 156/70 (2020 05:30) (121/59 - 165/70)  RR: 18 (2020 05:30) (16 - 20)  SpO2: 100% (2020 05:30) (92% - 100%)    Physical Exam:    General:  Appears stated age, well-groomed, well-nourished, no distress  Eyes : MOE  HENT:  WNL, no JVD  Chest:  clear breath sounds  Cardiovascular:  Regular rate & rhythm  Abdomen: Softly distended, nontender. No guarding or rebound tenderness  : No SP tenderness or palpable bladder. Uncircumcised phallus. Adequate meatus. No scrotal edema or tenderness  Extremities:  No calf tenderness bilaterally  Skin:  No rash  Musculoskeletal:  normal strength  Neuro/Psych:  Alert, oriented to time, place and person       LABS:                        9.9    9.10  )-----------( 338      ( 2020 07:42 )             29.8     07-20    129<L>  |  98  |  28<H>  ----------------------------<  81  4.5   |  24  |  1.71<H>    Ca    8.9      2020 07:42    TPro  8.0  /  Alb  2.4<L>  /  TBili  0.9  /  DBili  x   /  AST  26  /  ALT  45  /  AlkPhos  84  07-20    PT/INR - ( 2020 15:51 )   PT: 13.1 sec;   INR: 1.18 ratio         PTT - ( 2020 15:51 )  PTT:28.6 sec  Urinalysis Basic - ( 2020 21:41 )    Color: Yellow / Appearance: Slightly Turbid / S.010 / pH: x  Gluc: x / Ketone: Negative  / Bili: Negative / Urobili: Negative mg/dL   Blood: x / Protein: 100 mg/dL / Nitrite: Negative   Leuk Esterase: Moderate / RBC: 0-2 /HPF / WBC 0-2   Sq Epi: x / Non Sq Epi: x / Bacteria: Many    RADIOLOGY & ADDITIONAL STUDIES:  < from: US Kidney and Bladder (20 @ 14:27) >  EXAM:  US KIDNEYS AND BLADDER                            PROCEDURE DATE:  2020          INTERPRETATION:  CLINICAL INFORMATION: New one set acute renal failure. Evaluate for obstruction. Evaluate for mass. No additional information is provided.    COMPARISON: 2019    TECHNIQUE: Sonography of the kidneys and bladder.     FINDINGS:    Right kidney:  11.0 cm. Mild to moderate right hydronephrosis and proximal right hydroureter is seen    Left kidney:  9.6 cm. Mild to moderate left hydronephrosis and proximal left hydroureter is seen.    Urinary bladder: The bladder is well-distended. No focal bladder wall thickening is seen. No discrete bladder calculus is noted. Mild debris is seen within the bladder. A prevoid bladder volume of 229cc is noted. The patient was unable to void during the course of this examination.    IMPRESSION:     Mild to moderate bilateral hydronephrosis and proximal hydroureter noted. Distal obstruction cannot be excluded. Mild debris noted within the bladder.    < end of copied text >

## 2020-07-21 LAB
-  AMIKACIN: SIGNIFICANT CHANGE UP
-  AMOXICILLIN/CLAVULANIC ACID: SIGNIFICANT CHANGE UP
-  AMPICILLIN/SULBACTAM: SIGNIFICANT CHANGE UP
-  AMPICILLIN: SIGNIFICANT CHANGE UP
-  AZTREONAM: SIGNIFICANT CHANGE UP
-  CEFAZOLIN: SIGNIFICANT CHANGE UP
-  CEFEPIME: SIGNIFICANT CHANGE UP
-  CEFOXITIN: SIGNIFICANT CHANGE UP
-  CEFTRIAXONE: SIGNIFICANT CHANGE UP
-  CIPROFLOXACIN: SIGNIFICANT CHANGE UP
-  ERTAPENEM: SIGNIFICANT CHANGE UP
-  GENTAMICIN: SIGNIFICANT CHANGE UP
-  IMIPENEM: SIGNIFICANT CHANGE UP
-  LEVOFLOXACIN: SIGNIFICANT CHANGE UP
-  MEROPENEM: SIGNIFICANT CHANGE UP
-  NITROFURANTOIN: SIGNIFICANT CHANGE UP
-  PIPERACILLIN/TAZOBACTAM: SIGNIFICANT CHANGE UP
-  TIGECYCLINE: SIGNIFICANT CHANGE UP
-  TOBRAMYCIN: SIGNIFICANT CHANGE UP
-  TRIMETHOPRIM/SULFAMETHOXAZOLE: SIGNIFICANT CHANGE UP
ANION GAP SERPL CALC-SCNC: 8 MMOL/L — SIGNIFICANT CHANGE UP (ref 5–17)
APTT BLD: 27.7 SEC — SIGNIFICANT CHANGE UP (ref 27.5–35.5)
BLD GP AB SCN SERPL QL: SIGNIFICANT CHANGE UP
BUN SERPL-MCNC: 27 MG/DL — HIGH (ref 7–23)
CALCIUM SERPL-MCNC: 8.6 MG/DL — SIGNIFICANT CHANGE UP (ref 8.5–10.1)
CHLORIDE SERPL-SCNC: 104 MMOL/L — SIGNIFICANT CHANGE UP (ref 96–108)
CO2 SERPL-SCNC: 21 MMOL/L — LOW (ref 22–31)
CREAT SERPL-MCNC: 1.52 MG/DL — HIGH (ref 0.5–1.3)
CULTURE RESULTS: SIGNIFICANT CHANGE UP
GLUCOSE SERPL-MCNC: 82 MG/DL — SIGNIFICANT CHANGE UP (ref 70–99)
HCT VFR BLD CALC: 28.2 % — LOW (ref 39–50)
HGB BLD-MCNC: 9.5 G/DL — LOW (ref 13–17)
INR BLD: 1.14 RATIO — SIGNIFICANT CHANGE UP (ref 0.88–1.16)
MAGNESIUM SERPL-MCNC: 2.7 MG/DL — HIGH (ref 1.6–2.6)
MCHC RBC-ENTMCNC: 27.1 PG — SIGNIFICANT CHANGE UP (ref 27–34)
MCHC RBC-ENTMCNC: 33.7 GM/DL — SIGNIFICANT CHANGE UP (ref 32–36)
MCV RBC AUTO: 80.3 FL — SIGNIFICANT CHANGE UP (ref 80–100)
METHOD TYPE: SIGNIFICANT CHANGE UP
NRBC # BLD: 0 /100 WBCS — SIGNIFICANT CHANGE UP (ref 0–0)
ORGANISM # SPEC MICROSCOPIC CNT: SIGNIFICANT CHANGE UP
ORGANISM # SPEC MICROSCOPIC CNT: SIGNIFICANT CHANGE UP
PLATELET # BLD AUTO: 354 K/UL — SIGNIFICANT CHANGE UP (ref 150–400)
POTASSIUM SERPL-MCNC: 4.1 MMOL/L — SIGNIFICANT CHANGE UP (ref 3.5–5.3)
POTASSIUM SERPL-SCNC: 4.1 MMOL/L — SIGNIFICANT CHANGE UP (ref 3.5–5.3)
PROTHROM AB SERPL-ACNC: 12.7 SEC — SIGNIFICANT CHANGE UP (ref 10.6–13.6)
RBC # BLD: 3.51 M/UL — LOW (ref 4.2–5.8)
RBC # FLD: 14.2 % — SIGNIFICANT CHANGE UP (ref 10.3–14.5)
SODIUM SERPL-SCNC: 133 MMOL/L — LOW (ref 135–145)
SPECIMEN SOURCE: SIGNIFICANT CHANGE UP
WBC # BLD: 9.97 K/UL — SIGNIFICANT CHANGE UP (ref 3.8–10.5)
WBC # FLD AUTO: 9.97 K/UL — SIGNIFICANT CHANGE UP (ref 3.8–10.5)

## 2020-07-21 RX ORDER — SIMVASTATIN 20 MG/1
20 TABLET, FILM COATED ORAL AT BEDTIME
Refills: 0 | Status: DISCONTINUED | OUTPATIENT
Start: 2020-07-21 | End: 2020-07-23

## 2020-07-21 RX ORDER — HYDRALAZINE HCL 50 MG
25 TABLET ORAL
Refills: 0 | Status: DISCONTINUED | OUTPATIENT
Start: 2020-07-21 | End: 2020-07-23

## 2020-07-21 RX ORDER — FENTANYL CITRATE 50 UG/ML
25 INJECTION INTRAVENOUS
Refills: 0 | Status: DISCONTINUED | OUTPATIENT
Start: 2020-07-21 | End: 2020-07-22

## 2020-07-21 RX ORDER — LEVOTHYROXINE SODIUM 125 MCG
100 TABLET ORAL DAILY
Refills: 0 | Status: DISCONTINUED | OUTPATIENT
Start: 2020-07-22 | End: 2020-07-23

## 2020-07-21 RX ORDER — LOSARTAN POTASSIUM 100 MG/1
100 TABLET, FILM COATED ORAL DAILY
Refills: 0 | Status: DISCONTINUED | OUTPATIENT
Start: 2020-07-21 | End: 2020-07-23

## 2020-07-21 RX ORDER — ACETAMINOPHEN 500 MG
1000 TABLET ORAL ONCE
Refills: 0 | Status: DISCONTINUED | OUTPATIENT
Start: 2020-07-21 | End: 2020-07-22

## 2020-07-21 RX ORDER — LEVOTHYROXINE SODIUM 125 MCG
100 TABLET ORAL DAILY
Refills: 0 | Status: DISCONTINUED | OUTPATIENT
Start: 2020-07-22 | End: 2020-07-21

## 2020-07-21 RX ORDER — TAMSULOSIN HYDROCHLORIDE 0.4 MG/1
0.4 CAPSULE ORAL AT BEDTIME
Refills: 0 | Status: DISCONTINUED | OUTPATIENT
Start: 2020-07-21 | End: 2020-07-23

## 2020-07-21 RX ORDER — SODIUM CHLORIDE 9 MG/ML
1000 INJECTION, SOLUTION INTRAVENOUS
Refills: 0 | Status: DISCONTINUED | OUTPATIENT
Start: 2020-07-21 | End: 2020-07-22

## 2020-07-21 RX ADMIN — Medication 125 MICROGRAM(S): at 06:15

## 2020-07-21 RX ADMIN — Medication 1 TABLET(S): at 10:06

## 2020-07-21 RX ADMIN — SODIUM CHLORIDE 75 MILLILITER(S): 9 INJECTION, SOLUTION INTRAVENOUS at 18:17

## 2020-07-21 RX ADMIN — SIMVASTATIN 20 MILLIGRAM(S): 20 TABLET, FILM COATED ORAL at 21:40

## 2020-07-21 RX ADMIN — TAMSULOSIN HYDROCHLORIDE 0.4 MILLIGRAM(S): 0.4 CAPSULE ORAL at 21:40

## 2020-07-21 RX ADMIN — Medication 25 MILLIGRAM(S): at 06:15

## 2020-07-21 RX ADMIN — Medication 1 TABLET(S): at 21:40

## 2020-07-21 RX ADMIN — LOSARTAN POTASSIUM 100 MILLIGRAM(S): 100 TABLET, FILM COATED ORAL at 06:15

## 2020-07-21 NOTE — PHYSICAL THERAPY INITIAL EVALUATION ADULT - CRITERIA FOR SKILLED THERAPEUTIC INTERVENTIONS
1400-Report given by PACU nurse Kacy Daily RN. Patient to arrive after second TLS drain arrives. This nurse requested recent temperature, was informed that temperature would be taken prior to leaving PACU. This nurse requested that PACU nurse call prior to patient being transported to the floor. Patient respirations recorded at 6 at the time of this call. 1425-PACU called, patient on the way. Respirations 14.  1446 - Patient arrives to unit at this time. Dual skin assessment performed at this time, patient's skin WDL with no abnormal findings. Admission completed at this time. Patient is A/O x 4. IV to left AC intact and patent. SCDs  applied bilaterally. 4x4 and tape dressing to anterior neck is draining serosanguinous fluid. TLS drain does not appear to be draining at this time. Patient numbness/tingling. Pedal and radial pulses palpable. Pain 0/10. Patient reports discomfort to the back of his neck \"like you/ve been in one place too long. \"  Patient was oriented to the room to include use of call bell, meal ordering, and use of incentive spirometer patient able to get up to 1500 on IS. Patient was given explanation of \" up for dinner\" program and has verbalized understanding. Phone and call bell left within reach. Plan of care for the day addressed with patient. Educated on pain medication availability and possible side effects. TLS drain replacement left in PACU, nurse Alexus Casas assured it would be brought over when they come back to the unit. 1515-Spoke with Dr. Mirian Yan concerning pt's wound drainage and the fact the TLS drain was not draining. Per Dr. Mirian Yan, change dressing (4x4 and tape) and change out TLS drain. Dressing changed and TLS drain changed out at this time. 1710-Patient temperature running low at this time. Jn landa brought from OR and initiated for pptient to raise core temperature. 1900-Patient temperature 97.1 axillary. Jn presleyr discontinued at this time.   1940-Took telephone order with readback for chloraseptic spray to treat patient's sore throat. Shift Summary: Patient's pain well controlled this shift. IV remains intact. Dressing to anterior neck continues to show drainage, night nurse and Dr. Cydne Lennox aware, he says to continue changing dressing and is aware the TLS drain is not draining. .  SCD compression device in place. rehab potential/anticipated discharge recommendation/predicted duration of therapy intervention/therapy frequency/risk reduction/prevention/functional limitations in following categories/impairments found

## 2020-07-21 NOTE — PHYSICAL THERAPY INITIAL EVALUATION ADULT - ADDITIONAL COMMENTS
As per care coordination and corroborated with patient at bedside: patient lives in a house with 2-3 steps to enter, +B rails that are wide, no steps once inside. Patient has a home health aide 5 days/4 hours a day that he reports assists with cooking and cleaning (as per care coordination he required assistance for ADLs, patient denies). Patient reports he owns a rolling walker, does use it as he prefers using a straight cane.

## 2020-07-21 NOTE — PHYSICAL THERAPY INITIAL EVALUATION ADULT - GAIT TRAINING, PT EVAL
Patient will ambulate 500 feet with straight cane independently for community ambulation in 2 weeks. Patient will ascend/descend 3 steps with R rail up/L rail down independently in 2 weeks to safely navigate home environment.

## 2020-07-21 NOTE — PHYSICAL THERAPY INITIAL EVALUATION ADULT - BED MOBILITY TRAINING, PT EVAL
Pt will independently perform all aspects of bed mobility to help prevent pressure ulcers, by 1 week.

## 2020-07-21 NOTE — PROGRESS NOTE ADULT - SUBJECTIVE AND OBJECTIVE BOX
Patient is a 75y old  Male who presents with a chief complaint of anemia (2020 11:05)  patient clinically improved.   has bilateral hydronephrosis    notes appreciated.     INTERVAL HPI/OVERNIGHT EVENTS:  PAST MEDICAL & SURGICAL HISTORY:  Poor historian  Hyperlipidemia  Prostate CA: Dx&#x27;d , s/p radiation  HTN (hypertension)  BPH (benign prostatic hyperplasia)  Seizure disorder: last seizure   ETOH abuse: sober since   H/O urethrotomy: 2018  Status post cataract extraction  H/O prostate cancer: 10- yrs ago, s/p radiation therapy  S/P hernia repair: right inguinal hernia      MEDICATIONS  (STANDING):  hydrALAZINE 25 milliGRAM(s) Oral two times a day  levothyroxine 100 MICROGram(s) Oral daily  losartan 100 milliGRAM(s) Oral daily  simvastatin 20 milliGRAM(s) Oral at bedtime  sodium chloride 0.9%. 1000 milliLiter(s) (100 mL/Hr) IV Continuous <Continuous>  tamsulosin 0.4 milliGRAM(s) Oral at bedtime  trimethoprim  160 mG/sulfamethoxazole 800 mG 1 Tablet(s) Oral two times a day    MEDICATIONS  (PRN):      Allergies    No Known Allergies    Intolerances        REVIEW OF SYSTEMS:  CONSTITUTIONAL: No fever, weight loss, or fatigue  EYES: No eye pain, visual disturbances, or discharge  ENMT:  No difficulty hearing, tinnitus, vertigo; No sinus or throat pain  NECK: No pain or stiffness  BREASTS: No pain, masses, or nipple discharge  RESPIRATORY: No cough, wheezing, chills or hemoptysis; No shortness of breath  CARDIOVASCULAR: No chest pain, palpitations, dizziness, or leg swelling  GASTROINTESTINAL: No abdominal or epigastric pain. No nausea, vomiting, or hematemesis; No diarrhea or constipation. No melena or hematochezia.  GENITOURINARY: No dysuria, frequency, hematuria, or incontinence  NEUROLOGICAL: No headaches, memory loss, loss of strength, numbness, or tremors  SKIN: No itching, burning, rashes, or lesions   LYMPH NODES: No enlarged glands  ENDOCRINE: No heat or cold intolerance; No hair loss  MUSCULOSKELETAL: No joint pain or swelling; No muscle, back, or extremity pain  PSYCHIATRIC: No depression, anxiety, mood swings, or difficulty sleeping  HEME/LYMPH: No easy bruising, or bleeding gums  ALLERY AND IMMUNOLOGIC: No hives or eczema    Vital Signs Last 24 Hrs  T(C): 36.9 (2020 05:06), Max: 37 (2020 16:45)  T(F): 98.5 (2020 05:06), Max: 98.6 (2020 16:45)  HR: 55 (2020 05:06) (50 - 58)  BP: 170/67 (2020 05:06) (154/67 - 170/67)  BP(mean): --  RR: 18 (2020 05:06) (18 - 18)  SpO2: 96% (2020 05:06) (96% - 99%)    PHYSICAL EXAM:  GENERAL: NAD, well-groomed, well-developed  HEAD:  Atraumatic, Normocephalic  EYES: EOMI, PERRLA, conjunctiva and sclera clear  ENMT: No tonsillar erythema, exudates, or enlargement; Moist mucous membranes, Good dentition, No lesions  NECK: Supple, No JVD, Normal thyroid  NERVOUS SYSTEM:  Alert & Oriented X3, Good concentration; Motor Strength 5/5 B/L upper and lower extremities; DTRs 2+ intact and symmetric  CHEST/LUNG: Clear to percussion bilaterally; No rales, rhonchi, wheezing, or rubs  HEART: Regular rate and rhythm; No murmurs, rubs, or gallops  ABDOMEN: Soft, Nontender, Nondistended; Bowel sounds present  EXTREMITIES:  2+ Peripheral Pulses, No clubbing, cyanosis, or edema  LYMPH: No lymphadenopathy noted  SKIN: No rashes or lesions    LABS:                        9.5    9.97  )-----------( 354      ( 2020 07:11 )             28.2     07-21    133<L>  |  104  |  27<H>  ----------------------------<  82  4.1   |  21<L>  |  1.52<H>    Ca    8.6      2020 07:11  Mg     2.7     07-21    TPro  8.0  /  Alb  2.4<L>  /  TBili  0.9  /  DBili  x   /  AST  26  /  ALT  45  /  AlkPhos  84  07-20      PT/INR - ( 2020 07:11 )   PT: 12.7 sec;   INR: 1.14 ratio         PTT - ( 2020 07:11 )  PTT:27.7 sec  Urinalysis Basic - ( 2020 21:41 )    Color: Yellow / Appearance: Slightly Turbid / S.010 / pH: x  Gluc: x / Ketone: Negative  / Bili: Negative / Urobili: Negative mg/dL   Blood: x / Protein: 100 mg/dL / Nitrite: Negative   Leuk Esterase: Moderate / RBC: 0-2 /HPF / WBC 0-2   Sq Epi: x / Non Sq Epi: x / Bacteria: Many      CAPILLARY BLOOD GLUCOSE                    RADIOLOGY & ADDITIONAL TESTS:    Imaging Personally Reviewed:  [ ] YES  [ ] NO    Consultant(s) Notes Reviewed:  [ ] YES  [ ] NO    Care Discussed with Consultants/Other Providers [ ] YES  [ ] NO    Care discussed with family,         [  ]   yes  [  ]  No    imp:    stable[ ]    unstable[  ]     improving [  x ]       unchanged  [  ]                Plans:  Continue present plans  [x  ] improving will start bactrim for UTi/                 New consult [  ]   specialty  .......               order test[  ]    test name.                  Discharge Planning  [  ]

## 2020-07-21 NOTE — PROGRESS NOTE ADULT - ASSESSMENT
For jackson cath today     bactrim for UTi   hear rate has come up after discontinuing metoprolol/   dose of levothyroxine reduced.   dmentia   Hypothyroid   Bilat hydronephrosis   HTn   hx of Por[place] ca  in the remote past. hx of urethral stricture.

## 2020-07-21 NOTE — PHYSICAL THERAPY INITIAL EVALUATION ADULT - BALANCE TRAINING, PT EVAL
Patient will be normal in static and dynamic standing balance with straight cane in 2 weeks to improve safety and decrease risk of falls.

## 2020-07-21 NOTE — PHYSICAL THERAPY INITIAL EVALUATION ADULT - ASR EQUIP NEEDS DISCH PT EVAL
Patient reports he has a rolling walker, PT recommend use of rolling walker instead of straight cane for improved stability.

## 2020-07-22 LAB
ANION GAP SERPL CALC-SCNC: 7 MMOL/L — SIGNIFICANT CHANGE UP (ref 5–17)
BASOPHILS # BLD AUTO: 0.02 K/UL — SIGNIFICANT CHANGE UP (ref 0–0.2)
BASOPHILS NFR BLD AUTO: 0.2 % — SIGNIFICANT CHANGE UP (ref 0–2)
BUN SERPL-MCNC: 21 MG/DL — SIGNIFICANT CHANGE UP (ref 7–23)
CALCIUM SERPL-MCNC: 8.6 MG/DL — SIGNIFICANT CHANGE UP (ref 8.5–10.1)
CHLORIDE SERPL-SCNC: 105 MMOL/L — SIGNIFICANT CHANGE UP (ref 96–108)
CO2 SERPL-SCNC: 22 MMOL/L — SIGNIFICANT CHANGE UP (ref 22–31)
CREAT SERPL-MCNC: 1.5 MG/DL — HIGH (ref 0.5–1.3)
EOSINOPHIL # BLD AUTO: 0 K/UL — SIGNIFICANT CHANGE UP (ref 0–0.5)
EOSINOPHIL NFR BLD AUTO: 0 % — SIGNIFICANT CHANGE UP (ref 0–6)
GLUCOSE SERPL-MCNC: 97 MG/DL — SIGNIFICANT CHANGE UP (ref 70–99)
HCT VFR BLD CALC: 28.3 % — LOW (ref 39–50)
HGB BLD-MCNC: 9.2 G/DL — LOW (ref 13–17)
IMM GRANULOCYTES NFR BLD AUTO: 0.4 % — SIGNIFICANT CHANGE UP (ref 0–1.5)
LYMPHOCYTES # BLD AUTO: 1.08 K/UL — SIGNIFICANT CHANGE UP (ref 1–3.3)
LYMPHOCYTES # BLD AUTO: 9.5 % — LOW (ref 13–44)
MCHC RBC-ENTMCNC: 27.2 PG — SIGNIFICANT CHANGE UP (ref 27–34)
MCHC RBC-ENTMCNC: 32.5 GM/DL — SIGNIFICANT CHANGE UP (ref 32–36)
MCV RBC AUTO: 83.7 FL — SIGNIFICANT CHANGE UP (ref 80–100)
MONOCYTES # BLD AUTO: 0.57 K/UL — SIGNIFICANT CHANGE UP (ref 0–0.9)
MONOCYTES NFR BLD AUTO: 5 % — SIGNIFICANT CHANGE UP (ref 2–14)
NEUTROPHILS # BLD AUTO: 9.68 K/UL — HIGH (ref 1.8–7.4)
NEUTROPHILS NFR BLD AUTO: 84.9 % — HIGH (ref 43–77)
NRBC # BLD: 0 /100 WBCS — SIGNIFICANT CHANGE UP (ref 0–0)
PLATELET # BLD AUTO: 312 K/UL — SIGNIFICANT CHANGE UP (ref 150–400)
POTASSIUM SERPL-MCNC: 4.9 MMOL/L — SIGNIFICANT CHANGE UP (ref 3.5–5.3)
POTASSIUM SERPL-SCNC: 4.9 MMOL/L — SIGNIFICANT CHANGE UP (ref 3.5–5.3)
PSA FREE FLD-MCNC: 0.03 NG/ML — SIGNIFICANT CHANGE UP
PSA FREE FLD-MCNC: 11 % — SIGNIFICANT CHANGE UP
PSA SERPL-MCNC: 0.28 NG/ML — SIGNIFICANT CHANGE UP (ref 0–4)
RBC # BLD: 3.38 M/UL — LOW (ref 4.2–5.8)
RBC # FLD: 14.6 % — HIGH (ref 10.3–14.5)
SODIUM SERPL-SCNC: 134 MMOL/L — LOW (ref 135–145)
T3 SERPL-MCNC: 55 NG/DL — LOW (ref 80–200)
T4 AB SER-ACNC: 8.5 UG/DL — SIGNIFICANT CHANGE UP (ref 4.6–12)
TSH SERPL-MCNC: 0.12 UU/ML — LOW (ref 0.36–3.74)
WBC # BLD: 11.4 K/UL — HIGH (ref 3.8–10.5)
WBC # FLD AUTO: 11.4 K/UL — HIGH (ref 3.8–10.5)

## 2020-07-22 RX ORDER — POLYETHYLENE GLYCOL 3350 17 G/17G
17 POWDER, FOR SOLUTION ORAL DAILY
Refills: 0 | Status: DISCONTINUED | OUTPATIENT
Start: 2020-07-22 | End: 2020-07-23

## 2020-07-22 RX ORDER — SENNA PLUS 8.6 MG/1
2 TABLET ORAL AT BEDTIME
Refills: 0 | Status: DISCONTINUED | OUTPATIENT
Start: 2020-07-22 | End: 2020-07-23

## 2020-07-22 RX ADMIN — Medication 25 MILLIGRAM(S): at 17:45

## 2020-07-22 RX ADMIN — TAMSULOSIN HYDROCHLORIDE 0.4 MILLIGRAM(S): 0.4 CAPSULE ORAL at 21:38

## 2020-07-22 RX ADMIN — Medication 1 TABLET(S): at 17:45

## 2020-07-22 RX ADMIN — Medication 100 MICROGRAM(S): at 06:17

## 2020-07-22 RX ADMIN — Medication 1 TABLET(S): at 06:16

## 2020-07-22 RX ADMIN — Medication 25 MILLIGRAM(S): at 06:16

## 2020-07-22 RX ADMIN — SODIUM CHLORIDE 75 MILLILITER(S): 9 INJECTION, SOLUTION INTRAVENOUS at 06:17

## 2020-07-22 RX ADMIN — SENNA PLUS 2 TABLET(S): 8.6 TABLET ORAL at 21:38

## 2020-07-22 RX ADMIN — SIMVASTATIN 20 MILLIGRAM(S): 20 TABLET, FILM COATED ORAL at 21:38

## 2020-07-22 RX ADMIN — LOSARTAN POTASSIUM 100 MILLIGRAM(S): 100 TABLET, FILM COATED ORAL at 06:16

## 2020-07-22 NOTE — PROGRESS NOTE ADULT - ASSESSMENT
bladder outflow obstruction. - underwent dialtation and  jackson placement in the OR yesterday.    UTI   mild dementia   hypothyroid   HTN   Hx of ca prostrate  ca in the remote past.

## 2020-07-22 NOTE — PROGRESS NOTE ADULT - SUBJECTIVE AND OBJECTIVE BOX
Patient is a 75y old  Male who presents with a chief complaint of anemia. ckd (21 Jul 2020 10:56)  patient underwent urethral dialtation for stricture and for bladder neck obstruction. pt is s/p proctectomy   has ESBL e. coli sensitive to bactrim.    patient has indwelling jackson catheter  .   feeling better. heart rate in the 60s.    Overall improvement.    INTERVAL HPI/OVERNIGHT EVENTS:  PAST MEDICAL & SURGICAL HISTORY:  Poor historian  Hyperlipidemia  Prostate CA: Dx&#x27;d 2008, s/p radiation  HTN (hypertension)  BPH (benign prostatic hyperplasia)  Seizure disorder: last seizure 2015  ETOH abuse: sober since 2016  H/O urethrotomy: June 2018  Status post cataract extraction  H/O prostate cancer: 10- yrs ago, s/p radiation therapy  S/P hernia repair: right inguinal hernia      MEDICATIONS  (STANDING):  hydrALAZINE 25 milliGRAM(s) Oral two times a day  levothyroxine 100 MICROGram(s) Oral daily  losartan 100 milliGRAM(s) Oral daily  simvastatin 20 milliGRAM(s) Oral at bedtime  tamsulosin 0.4 milliGRAM(s) Oral at bedtime  trimethoprim  160 mG/sulfamethoxazole 800 mG 1 Tablet(s) Oral two times a day    MEDICATIONS  (PRN):      Allergies    No Known Allergies    Intolerances        REVIEW OF SYSTEMS:  CONSTITUTIONAL: No fever, weight loss, or fatigue  EYES: No eye pain, visual disturbances, or discharge  ENMT:  No difficulty hearing, tinnitus, vertigo; No sinus or throat pain  NECK: No pain or stiffness  BREASTS: No pain, masses, or nipple discharge  RESPIRATORY: No cough, wheezing, chills or hemoptysis; No shortness of breath  CARDIOVASCULAR: No chest pain, palpitations, dizziness, or leg swelling  GASTROINTESTINAL: No abdominal or epigastric pain. No nausea, vomiting, or hematemesis; No diarrhea or constipation. No melena or hematochezia.  GENITOURINARY: No dysuria, frequency, hematuria, or incontinence  NEUROLOGICAL: No headaches, memory loss, loss of strength, numbness, or tremors  SKIN: No itching, burning, rashes, or lesions   LYMPH NODES: No enlarged glands  ENDOCRINE: No heat or cold intolerance; No hair loss  MUSCULOSKELETAL: No joint pain or swelling; No muscle, back, or extremity pain  PSYCHIATRIC: No depression, anxiety, mood swings, or difficulty sleeping  HEME/LYMPH: No easy bruising, or bleeding gums  ALLERY AND IMMUNOLOGIC: No hives or eczema    Vital Signs Last 24 Hrs  T(C): 37 (22 Jul 2020 05:24), Max: 37.3 (22 Jul 2020 01:20)  T(F): 98.6 (22 Jul 2020 05:24), Max: 99.1 (22 Jul 2020 01:20)  HR: 63 (22 Jul 2020 05:24) (61 - 80)  BP: 154/69 (22 Jul 2020 05:24) (135/53 - 171/73)  BP(mean): --  RR: 18 (22 Jul 2020 05:24) (14 - 18)  SpO2: 97% (22 Jul 2020 05:24) (96% - 100%)    PHYSICAL EXAM:  GENERAL: NAD, well-groomed, well-developed  HEAD:  Atraumatic, Normocephalic  EYES: EOMI, PERRLA, conjunctiva and sclera clear  ENMT: No tonsillar erythema, exudates, or enlargement; Moist mucous membranes, Good dentition, No lesions  NECK: Supple, No JVD, Normal thyroid  NERVOUS SYSTEM:  Alert & Oriented X3, Good concentration; Motor Strength 5/5 B/L upper and lower extremities; DTRs 2+ intact and symmetric  CHEST/LUNG: Clear to percussion bilaterally; No rales, rhonchi, wheezing, or rubs  HEART: Regular rate and rhythm; No murmurs, rubs, or gallops  ABDOMEN: Soft, Nontender, Nondistended; Bowel sounds present  EXTREMITIES:  2+ Peripheral Pulses, No clubbing, cyanosis, or edema  LYMPH: No lymphadenopathy noted  SKIN: No rashes or lesions    LABS:                        9.2    11.40 )-----------( 312      ( 22 Jul 2020 06:22 )             28.3     07-22    134<L>  |  105  |  21  ----------------------------<  97  4.9   |  22  |  1.50<H>    Ca    8.6      22 Jul 2020 06:22  Mg     2.7     07-21        PT/INR - ( 21 Jul 2020 07:11 )   PT: 12.7 sec;   INR: 1.14 ratio         PTT - ( 21 Jul 2020 07:11 )  PTT:27.7 sec    CAPILLARY BLOOD GLUCOSE                    RADIOLOGY & ADDITIONAL TESTS:    Imaging Personally Reviewed:  [ ] YES  [ ] NO    Consultant(s) Notes Reviewed:  [ ] YES  [ ] NO    Care Discussed with Consultants/Other Providers [ ] YES  [ ] NO    Care discussed with family,         [  ]   yes  [  ]  No    imp:    stable[ ]    unstable[  ]     improving [  x ]       unchanged  [  ]                Plans:  Continue present plans  [x  ] as per . , continue bactrim for UTI               New consult [  ]   specialty  .......               order test[  ]    test name.                  Discharge Planning  [  ]

## 2020-07-23 ENCOUNTER — TRANSCRIPTION ENCOUNTER (OUTPATIENT)
Age: 75
End: 2020-07-23

## 2020-07-23 VITALS
DIASTOLIC BLOOD PRESSURE: 60 MMHG | RESPIRATION RATE: 19 BRPM | HEART RATE: 66 BPM | OXYGEN SATURATION: 96 % | SYSTOLIC BLOOD PRESSURE: 143 MMHG | TEMPERATURE: 98 F

## 2020-07-23 LAB
A1C WITH ESTIMATED AVERAGE GLUCOSE RESULT: 5.3 % — SIGNIFICANT CHANGE UP (ref 4–5.6)
ANION GAP SERPL CALC-SCNC: 5 MMOL/L — SIGNIFICANT CHANGE UP (ref 5–17)
BLD GP AB SCN SERPL QL: SIGNIFICANT CHANGE UP
BUN SERPL-MCNC: 26 MG/DL — HIGH (ref 7–23)
CALCIUM SERPL-MCNC: 8.8 MG/DL — SIGNIFICANT CHANGE UP (ref 8.5–10.1)
CHLORIDE SERPL-SCNC: 105 MMOL/L — SIGNIFICANT CHANGE UP (ref 96–108)
CO2 SERPL-SCNC: 25 MMOL/L — SIGNIFICANT CHANGE UP (ref 22–31)
CREAT SERPL-MCNC: 1.77 MG/DL — HIGH (ref 0.5–1.3)
ESTIMATED AVERAGE GLUCOSE: 105 MG/DL — SIGNIFICANT CHANGE UP (ref 68–114)
GLUCOSE SERPL-MCNC: 80 MG/DL — SIGNIFICANT CHANGE UP (ref 70–99)
HCT VFR BLD CALC: 25.6 % — LOW (ref 39–50)
HGB BLD-MCNC: 8 G/DL — LOW (ref 13–17)
IRON SATN MFR SERPL: 24 % — SIGNIFICANT CHANGE UP (ref 16–55)
IRON SATN MFR SERPL: 37 UG/DL — LOW (ref 45–165)
MCHC RBC-ENTMCNC: 26.8 PG — LOW (ref 27–34)
MCHC RBC-ENTMCNC: 31.3 GM/DL — LOW (ref 32–36)
MCV RBC AUTO: 85.9 FL — SIGNIFICANT CHANGE UP (ref 80–100)
NRBC # BLD: 0 /100 WBCS — SIGNIFICANT CHANGE UP (ref 0–0)
OB PNL STL: POSITIVE
PLATELET # BLD AUTO: 323 K/UL — SIGNIFICANT CHANGE UP (ref 150–400)
POTASSIUM SERPL-MCNC: 5.3 MMOL/L — SIGNIFICANT CHANGE UP (ref 3.5–5.3)
POTASSIUM SERPL-SCNC: 5.3 MMOL/L — SIGNIFICANT CHANGE UP (ref 3.5–5.3)
RBC # BLD: 2.98 M/UL — LOW (ref 4.2–5.8)
RBC # FLD: 14.9 % — HIGH (ref 10.3–14.5)
SODIUM SERPL-SCNC: 135 MMOL/L — SIGNIFICANT CHANGE UP (ref 135–145)
TIBC SERPL-MCNC: 152 UG/DL — LOW (ref 220–430)
UIBC SERPL-MCNC: 115 UG/DL — SIGNIFICANT CHANGE UP (ref 110–370)
WBC # BLD: 9.49 K/UL — SIGNIFICANT CHANGE UP (ref 3.8–10.5)
WBC # FLD AUTO: 9.49 K/UL — SIGNIFICANT CHANGE UP (ref 3.8–10.5)

## 2020-07-23 RX ORDER — PANTOPRAZOLE SODIUM 20 MG/1
1 TABLET, DELAYED RELEASE ORAL
Qty: 90 | Refills: 0
Start: 2020-07-23 | End: 2020-10-20

## 2020-07-23 RX ORDER — PANTOPRAZOLE SODIUM 20 MG/1
40 TABLET, DELAYED RELEASE ORAL
Refills: 0 | Status: DISCONTINUED | OUTPATIENT
Start: 2020-07-23 | End: 2020-07-23

## 2020-07-23 RX ORDER — METOPROLOL TARTRATE 50 MG
1 TABLET ORAL
Qty: 0 | Refills: 0 | DISCHARGE

## 2020-07-23 RX ORDER — LEVOTHYROXINE SODIUM 125 MCG
1 TABLET ORAL
Qty: 90 | Refills: 0
Start: 2020-07-23 | End: 2020-10-20

## 2020-07-23 RX ORDER — LEVOTHYROXINE SODIUM 125 MCG
1 TABLET ORAL
Qty: 0 | Refills: 0 | DISCHARGE

## 2020-07-23 RX ORDER — HYDRALAZINE HCL 50 MG
1 TABLET ORAL
Qty: 0 | Refills: 0 | DISCHARGE

## 2020-07-23 RX ADMIN — Medication 25 MILLIGRAM(S): at 06:13

## 2020-07-23 RX ADMIN — Medication 25 MILLIGRAM(S): at 17:12

## 2020-07-23 RX ADMIN — PANTOPRAZOLE SODIUM 40 MILLIGRAM(S): 20 TABLET, DELAYED RELEASE ORAL at 11:10

## 2020-07-23 RX ADMIN — LOSARTAN POTASSIUM 100 MILLIGRAM(S): 100 TABLET, FILM COATED ORAL at 06:13

## 2020-07-23 RX ADMIN — Medication 1 TABLET(S): at 17:12

## 2020-07-23 RX ADMIN — Medication 1 TABLET(S): at 06:13

## 2020-07-23 RX ADMIN — Medication 100 MICROGRAM(S): at 06:13

## 2020-07-23 NOTE — DISCHARGE NOTE PROVIDER - CARE PROVIDER_API CALL
Clif Avendano)  Urology  [address] [place] [address]  Phone: [phone]  Fax: [phone]  Follow Up Time: 1 week    Bobby Shaw  EMERGENCY MEDICINE  [address] [place] [address]  Phone: [phone]  Fax: [phone]  Follow Up Time: 1 week    Robert Mckinley   MEDICINE  [address] [address]  Phone: [phone]  Fax: [phone]  Follow Up Time: 2 weeks

## 2020-07-23 NOTE — DISCHARGE NOTE PROVIDER - CARE PROVIDERS DIRECT ADDRESSES
,DirectAddress_Unknown,[email or web address]

## 2020-07-23 NOTE — DISCHARGE NOTE NURSING/CASE MANAGEMENT/SOCIAL WORK - PATIENT PORTAL LINK FT
You can access the FollowMyHealth Patient Portal offered by [facility] by registering at the following website: [email or web address]. By joining [facility]’s FollowMyHealth portal, you will also be able to view your health information using other applications (apps) compatible with our system.

## 2020-07-23 NOTE — CHART NOTE - NSCHARTNOTEFT_GEN_A_CORE
Assessment: Pt seen for and remains with severe malnutrition. Pt admitted with SADAF, hydronephrosis, anemia s/p blood transfusion. PMH HTN, h/o prostate CA, HLD. Pt denied any GI distress. Per chart review no GI distress noted. Pt reported he enjoyed to food at [facility].     Factors impacting intake: [ ] none [ ] nausea  [ ] vomiting [ ] diarrhea [ ] constipation  [ x]chewing problems [ ] swallowing issues  [ ] other: tolerating current diet     Diet Prescription: Diet, Dysphagia 2 Mechanical Soft-Thin Liquids:   Low Sodium  Supplement Feeding Modality:  Oral  Ensure Enlive Servings Per Day:  1       Frequency:  Two Times a day  Ensure Pudding Cans or Servings Per Day:  1       Frequency:  Daily (07-21-20 @ 18:16)    Intake: ~50% of meals and supplement    Current Weight: Weight (kg): 56.5 kg (07-23) ;  54.8 kg (07-20)   % Weight Change: 3% gain     Physical appearance: no edema noted     Pertinent Medications: MEDICATIONS  (STANDING):  hydrALAZINE 25 milliGRAM(s) Oral two times a day  levothyroxine 100 MICROGram(s) Oral daily  losartan 100 milliGRAM(s) Oral daily  pantoprazole    Tablet 40 milliGRAM(s) Oral before breakfast  senna 2 Tablet(s) Oral at bedtime  simvastatin 20 milliGRAM(s) Oral at bedtime  tamsulosin 0.4 milliGRAM(s) Oral at bedtime  trimethoprim  160 mG/sulfamethoxazole 800 mG 1 Tablet(s) Oral two times a day    MEDICATIONS  (PRN):  polyethylene glycol 3350 17 Gram(s) Oral daily PRN Constipation    Pertinent Labs: 07-23 Na 135 mmol/L Glu 80 mg/dL K+ 5.3 mmol/L Cr 1.77 mg/dL<H> BUN 26 mg/dL<H> Phos n/a   Alb n/a   PAB n/a   Hgb 8.0 g/dL<L> Hct 25.6 %<L> HgA1C n/a    Glucose, Serum: 80 mg/dL   24Hr FS:  Skin: intact    Estimated Needs:   [ x ] no change since previous assessment (07-)  [ ] recalculated:     Previous Nutrition Diagnosis:   Malnutrition Severe malnutrition in the context of chronic illness.     Etiology inadequate protein-energy intake in the presence of dementia, h/o prostate CA.     Signs/Symptoms 25% wt loss x 1 year; severe subq fat loss and muscle wasting, <75% of estimated needs met >1 month.     Goal/Expected Outcome Pt to meet >75% protein-energy needs via meals/supplements during LOS. (mostly met---> continue goal)       Nutrition Diagnosis is [ x ] ongoing  [ ] resolved  [ ] improved  [ ] not applicable       New Nutrition Diagnosis: [x ] not applicable       Interventions:   Recommend  [x ] Continue: Current diet order as prescribed   [ ] Change Diet To:  [ ] Nutrition Supplement:  [ ] Nutrition Support:  [ ] Other:     Monitoring and Evaluation:   [x ] PO intake [ x ] Tolerance to diet prescription [ x ] weights [ x ] labs[ x ] follow up per protocol  [ ] other:

## 2020-07-23 NOTE — DISCHARGE NOTE PROVIDER - NSDCCPCAREPLAN_GEN_ALL_CORE_FT
PRINCIPAL DISCHARGE DIAGNOSIS  Diagnosis: SADAF (acute kidney injury)  Assessment and Plan of Treatment: resolved      SECONDARY DISCHARGE DIAGNOSES  Diagnosis: Anemia  Assessment and Plan of Treatment: has positive occult blood in stool, will be followed up as Op.  BY GI. edouard CBC in 1 week    Diagnosis: Hydronephrosis  Assessment and Plan of Treatment: resolving with jackson catheter

## 2020-07-23 NOTE — PROGRESS NOTE ADULT - SUBJECTIVE AND OBJECTIVE BOX
Postoperative Day #  2 s/p cysto, urethral dilation; insertion of Moser Catheter.    Patient seen and examined bedside resting comfortably.  No complaints offered.   Denies nausea and vomiting. Tolerating diet.  Flatus/BM. +  Denies chest pain, dyspnea, cough.    T(F): 98.3 (07-23-20 @ 05:00), Max: 98.8 (07-22-20 @ 10:32)  HR: 60 (07-23-20 @ 05:00) (60 - 105)  BP: 138/61 (07-23-20 @ 05:00) (119/75 - 170/79)  RR: 18 (07-23-20 @ 05:00) (16 - 18)  SpO2: 96% (07-23-20 @ 05:00) (95% - 98%)      PHYSICAL EXAM:  General: NAD  Neuro:  Alert & oriented x 3  Abdomen: soft, NTND. Normactive BS  : Moser cath draining clear urine. Uncircumcised penis.    LABS:                        8.0    9.49  )-----------( 323      ( 23 Jul 2020 07:43 )             25.6     07-23    135  |  105  |  26<H>  ----------------------------<  80  5.3   |  25  |  1.77<H>    Ca    8.8      23 Jul 2020 07:43        I&O's Detail    22 Jul 2020 07:01  -  23 Jul 2020 07:00  --------------------------------------------------------  IN:    lactated ringers.: 225 mL    Oral Fluid: 240 mL  Total IN: 465 mL    OUT:    Indwelling Catheter - Urethral: 1700 mL  Total OUT: 1700 mL    Total NET: -1235 mL          Impression: Postoperative Day #  2 s/p cysto, urethral dilation; insertion of Moser Catheter.  UTI  drop in H/H [no obvious source of loss]      Plan:  - per Dr. Avendano, pt is clear for discharge from  POV. Ofice f/u in a week.  - per discussion with Dr. Ferguson, he will transfuse a unit of blood prior to discharge today. He will continue Bactrim.

## 2020-07-23 NOTE — DISCHARGE NOTE PROVIDER - PROVIDER TOKENS
PROVIDER:[TOKEN:[[id number]],FOLLOWUP:[1 week]],PROVIDER:[TOKEN:[[id number]],FOLLOWUP:[1 week]],PROVIDER:[TOKEN:[[id number]],FOLLOWUP:[2 weeks]]

## 2020-07-23 NOTE — DISCHARGE NOTE PROVIDER - NSDCMRMEDTOKEN_GEN_ALL_CORE_FT
Flomax 0.4 mg oral capsule: 1 cap(s) orally once a day in Am  hydrALAZINE 25 mg oral tablet: 1 tab(s) orally 2 times a day  levothyroxine 100 mcg (0.1 mg) oral tablet: 1 tab(s) orally once a day  losartan 100 mg oral tablet: 1 tab(s) orally once a day in Am  pantoprazole 40 mg oral delayed release tablet: 1 tab(s) orally once a day (before a meal)  simvastatin 20 mg oral tablet: 1 tab(s) orally once a day (at bedtime)  sulfamethoxazole-trimethoprim 800 mg-160 mg oral tablet: 1 tab(s) orally 2 times a day

## 2020-08-04 DIAGNOSIS — R00.1 BRADYCARDIA, UNSPECIFIED: ICD-10-CM

## 2020-08-04 DIAGNOSIS — N40.1 BENIGN PROSTATIC HYPERPLASIA WITH LOWER URINARY TRACT SYMPTOMS: ICD-10-CM

## 2020-08-04 DIAGNOSIS — N13.30 UNSPECIFIED HYDRONEPHROSIS: ICD-10-CM

## 2020-08-04 DIAGNOSIS — N32.0 BLADDER-NECK OBSTRUCTION: ICD-10-CM

## 2020-08-04 DIAGNOSIS — N17.9 ACUTE KIDNEY FAILURE, UNSPECIFIED: ICD-10-CM

## 2020-08-04 DIAGNOSIS — T44.7X5A ADVERSE EFFECT OF BETA-ADRENORECEPTOR ANTAGONISTS, INITIAL ENCOUNTER: ICD-10-CM

## 2020-08-04 DIAGNOSIS — N99.114 POSTPROCEDURAL URETHRAL STRICTURE, MALE, UNSPECIFIED: ICD-10-CM

## 2020-08-04 DIAGNOSIS — Z85.46 PERSONAL HISTORY OF MALIGNANT NEOPLASM OF PROSTATE: ICD-10-CM

## 2020-08-04 DIAGNOSIS — R33.8 OTHER RETENTION OF URINE: ICD-10-CM

## 2020-08-04 DIAGNOSIS — Z92.3 PERSONAL HISTORY OF IRRADIATION: ICD-10-CM

## 2020-08-04 DIAGNOSIS — E43 UNSPECIFIED SEVERE PROTEIN-CALORIE MALNUTRITION: ICD-10-CM

## 2020-08-04 DIAGNOSIS — F03.90 UNSPECIFIED DEMENTIA WITHOUT BEHAVIORAL DISTURBANCE: ICD-10-CM

## 2020-08-04 DIAGNOSIS — B96.20 UNSPECIFIED ESCHERICHIA COLI [E. COLI] AS THE CAUSE OF DISEASES CLASSIFIED ELSEWHERE: ICD-10-CM
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
## 2020-08-04 DIAGNOSIS — Z90.79 ACQUIRED ABSENCE OF OTHER GENITAL ORGAN(S): ICD-10-CM

## 2020-08-04 DIAGNOSIS — Z98.49 CATARACT EXTRACTION STATUS, UNSPECIFIED EYE: ICD-10-CM

## 2020-08-04 DIAGNOSIS — D64.9 ANEMIA, UNSPECIFIED: ICD-10-CM
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
## 2020-08-04 DIAGNOSIS — E78.5 HYPERLIPIDEMIA, UNSPECIFIED: ICD-10-CM

## 2020-08-04 DIAGNOSIS — E03.9 HYPOTHYROIDISM, UNSPECIFIED: ICD-10-CM

## 2020-08-21 NOTE — H&P PST ADULT - VISION (WITH CORRECTIVE LENSES IF THE PATIENT USUALLY WEARS THEM):
Unable to assess due to patient's cognitive impairment Partially impaired: cannot see medication labels or newsprint, but can see obstacles in path, and the surrounding layout; can count fingers at arm's length/reading glasses

## 2022-09-03 ENCOUNTER — INPATIENT (INPATIENT)
Facility: HOSPITAL | Age: 77
LOS: 3 days | Discharge: ANOTHER HOSPITAL | End: 2022-09-07
Attending: INTERNAL MEDICINE | Admitting: INTERNAL MEDICINE

## 2022-09-03 VITALS
HEIGHT: 68 IN | DIASTOLIC BLOOD PRESSURE: 112 MMHG | OXYGEN SATURATION: 94 % | SYSTOLIC BLOOD PRESSURE: 202 MMHG | TEMPERATURE: 98 F | RESPIRATION RATE: 21 BRPM | HEART RATE: 72 BPM | WEIGHT: 139.99 LBS

## 2022-09-03 DIAGNOSIS — J44.1 CHRONIC OBSTRUCTIVE PULMONARY DISEASE WITH (ACUTE) EXACERBATION: ICD-10-CM

## 2022-09-03 DIAGNOSIS — E78.5 HYPERLIPIDEMIA, UNSPECIFIED: ICD-10-CM

## 2022-09-03 DIAGNOSIS — Z98.890 OTHER SPECIFIED POSTPROCEDURAL STATES: Chronic | ICD-10-CM

## 2022-09-03 DIAGNOSIS — E87.1 HYPO-OSMOLALITY AND HYPONATREMIA: ICD-10-CM

## 2022-09-03 DIAGNOSIS — Z53.8 PROCEDURE AND TREATMENT NOT CARRIED OUT FOR OTHER REASONS: ICD-10-CM

## 2022-09-03 DIAGNOSIS — Z98.89 OTHER SPECIFIED POSTPROCEDURAL STATES: Chronic | ICD-10-CM

## 2022-09-03 DIAGNOSIS — Z86.79 PERSONAL HISTORY OF OTHER DISEASES OF THE CIRCULATORY SYSTEM: ICD-10-CM

## 2022-09-03 DIAGNOSIS — R07.9 CHEST PAIN, UNSPECIFIED: ICD-10-CM

## 2022-09-03 DIAGNOSIS — N13.30 UNSPECIFIED HYDRONEPHROSIS: ICD-10-CM

## 2022-09-03 DIAGNOSIS — Z80.59 FAMILY HISTORY OF MALIGNANT NEOPLASM OF OTHER URINARY TRACT ORGAN: ICD-10-CM

## 2022-09-03 DIAGNOSIS — D72.829 ELEVATED WHITE BLOOD CELL COUNT, UNSPECIFIED: ICD-10-CM

## 2022-09-03 DIAGNOSIS — G40.909 EPILEPSY, UNSPECIFIED, NOT INTRACTABLE, WITHOUT STATUS EPILEPTICUS: ICD-10-CM

## 2022-09-03 DIAGNOSIS — Z92.3 PERSONAL HISTORY OF IRRADIATION: ICD-10-CM

## 2022-09-03 DIAGNOSIS — N40.0 BENIGN PROSTATIC HYPERPLASIA WITHOUT LOWER URINARY TRACT SYMPTOMS: ICD-10-CM

## 2022-09-03 DIAGNOSIS — F03.90 UNSPECIFIED DEMENTIA WITHOUT BEHAVIORAL DISTURBANCE: ICD-10-CM

## 2022-09-03 DIAGNOSIS — I31.4 CARDIAC TAMPONADE: ICD-10-CM

## 2022-09-03 DIAGNOSIS — I31.3 PERICARDIAL EFFUSION (NONINFLAMMATORY): ICD-10-CM

## 2022-09-03 DIAGNOSIS — J90 PLEURAL EFFUSION, NOT ELSEWHERE CLASSIFIED: ICD-10-CM

## 2022-09-03 DIAGNOSIS — F17.210 NICOTINE DEPENDENCE, CIGARETTES, UNCOMPLICATED: ICD-10-CM

## 2022-09-03 DIAGNOSIS — I16.0 HYPERTENSIVE URGENCY: ICD-10-CM

## 2022-09-03 DIAGNOSIS — Z98.49 CATARACT EXTRACTION STATUS, UNSPECIFIED EYE: Chronic | ICD-10-CM

## 2022-09-03 DIAGNOSIS — R79.89 OTHER SPECIFIED ABNORMAL FINDINGS OF BLOOD CHEMISTRY: ICD-10-CM

## 2022-09-03 DIAGNOSIS — N18.30 CHRONIC KIDNEY DISEASE, STAGE 3 UNSPECIFIED: ICD-10-CM

## 2022-09-03 DIAGNOSIS — Z85.46 PERSONAL HISTORY OF MALIGNANT NEOPLASM OF PROSTATE: Chronic | ICD-10-CM

## 2022-09-03 DIAGNOSIS — I12.9 HYPERTENSIVE CHRONIC KIDNEY DISEASE WITH STAGE 1 THROUGH STAGE 4 CHRONIC KIDNEY DISEASE, OR UNSPECIFIED CHRONIC KIDNEY DISEASE: ICD-10-CM

## 2022-09-03 DIAGNOSIS — E03.9 HYPOTHYROIDISM, UNSPECIFIED: ICD-10-CM

## 2022-09-03 LAB
ALBUMIN SERPL ELPH-MCNC: 3.6 G/DL — SIGNIFICANT CHANGE UP (ref 3.3–5)
ALP SERPL-CCNC: 78 U/L — SIGNIFICANT CHANGE UP (ref 40–120)
ALT FLD-CCNC: 50 U/L — SIGNIFICANT CHANGE UP (ref 12–78)
ANION GAP SERPL CALC-SCNC: 8 MMOL/L — SIGNIFICANT CHANGE UP (ref 5–17)
AST SERPL-CCNC: 48 U/L — HIGH (ref 15–37)
BASOPHILS # BLD AUTO: 0.02 K/UL — SIGNIFICANT CHANGE UP (ref 0–0.2)
BASOPHILS NFR BLD AUTO: 0.2 % — SIGNIFICANT CHANGE UP (ref 0–2)
BILIRUB SERPL-MCNC: 1 MG/DL — SIGNIFICANT CHANGE UP (ref 0.2–1.2)
BUN SERPL-MCNC: 13 MG/DL — SIGNIFICANT CHANGE UP (ref 7–23)
CALCIUM SERPL-MCNC: 8.8 MG/DL — SIGNIFICANT CHANGE UP (ref 8.5–10.1)
CHLORIDE SERPL-SCNC: 90 MMOL/L — LOW (ref 96–108)
CO2 SERPL-SCNC: 27 MMOL/L — SIGNIFICANT CHANGE UP (ref 22–31)
CREAT SERPL-MCNC: 1.59 MG/DL — HIGH (ref 0.5–1.3)
EGFR: 44 ML/MIN/1.73M2 — LOW
EOSINOPHIL # BLD AUTO: 0 K/UL — SIGNIFICANT CHANGE UP (ref 0–0.5)
EOSINOPHIL NFR BLD AUTO: 0 % — SIGNIFICANT CHANGE UP (ref 0–6)
FLUAV AG NPH QL: SIGNIFICANT CHANGE UP
FLUBV AG NPH QL: SIGNIFICANT CHANGE UP
GLUCOSE SERPL-MCNC: 96 MG/DL — SIGNIFICANT CHANGE UP (ref 70–99)
HCT VFR BLD CALC: 33.6 % — LOW (ref 39–50)
HGB BLD-MCNC: 11.2 G/DL — LOW (ref 13–17)
IMM GRANULOCYTES NFR BLD AUTO: 0.5 % — SIGNIFICANT CHANGE UP (ref 0–1.5)
LYMPHOCYTES # BLD AUTO: 0.79 K/UL — LOW (ref 1–3.3)
LYMPHOCYTES # BLD AUTO: 7 % — LOW (ref 13–44)
MCHC RBC-ENTMCNC: 30.1 PG — SIGNIFICANT CHANGE UP (ref 27–34)
MCHC RBC-ENTMCNC: 33.3 G/DL — SIGNIFICANT CHANGE UP (ref 32–36)
MCV RBC AUTO: 90.3 FL — SIGNIFICANT CHANGE UP (ref 80–100)
MONOCYTES # BLD AUTO: 0.68 K/UL — SIGNIFICANT CHANGE UP (ref 0–0.9)
MONOCYTES NFR BLD AUTO: 6 % — SIGNIFICANT CHANGE UP (ref 2–14)
NEUTROPHILS # BLD AUTO: 9.73 K/UL — HIGH (ref 1.8–7.4)
NEUTROPHILS NFR BLD AUTO: 86.3 % — HIGH (ref 43–77)
NRBC # BLD: 0 /100 WBCS — SIGNIFICANT CHANGE UP (ref 0–0)
NT-PROBNP SERPL-SCNC: 763 PG/ML — HIGH (ref 0–450)
PLATELET # BLD AUTO: 211 K/UL — SIGNIFICANT CHANGE UP (ref 150–400)
POTASSIUM SERPL-MCNC: 3.6 MMOL/L — SIGNIFICANT CHANGE UP (ref 3.5–5.3)
POTASSIUM SERPL-SCNC: 3.6 MMOL/L — SIGNIFICANT CHANGE UP (ref 3.5–5.3)
PROT SERPL-MCNC: 7.6 GM/DL — SIGNIFICANT CHANGE UP (ref 6–8.3)
RBC # BLD: 3.72 M/UL — LOW (ref 4.2–5.8)
RBC # FLD: 17.2 % — HIGH (ref 10.3–14.5)
SARS-COV-2 RNA SPEC QL NAA+PROBE: SIGNIFICANT CHANGE UP
SODIUM SERPL-SCNC: 125 MMOL/L — LOW (ref 135–145)
TROPONIN I, HIGH SENSITIVITY RESULT: 268.2 NG/L — HIGH
WBC # BLD: 11.28 K/UL — HIGH (ref 3.8–10.5)
WBC # FLD AUTO: 11.28 K/UL — HIGH (ref 3.8–10.5)

## 2022-09-03 PROCEDURE — 71045 X-RAY EXAM CHEST 1 VIEW: CPT | Mod: 26

## 2022-09-03 PROCEDURE — 93010 ELECTROCARDIOGRAM REPORT: CPT

## 2022-09-03 PROCEDURE — 99285 EMERGENCY DEPT VISIT HI MDM: CPT

## 2022-09-03 PROCEDURE — 99223 1ST HOSP IP/OBS HIGH 75: CPT

## 2022-09-03 RX ORDER — SODIUM CHLORIDE 9 MG/ML
1000 INJECTION INTRAMUSCULAR; INTRAVENOUS; SUBCUTANEOUS ONCE
Refills: 0 | Status: COMPLETED | OUTPATIENT
Start: 2022-09-03 | End: 2022-09-03

## 2022-09-03 RX ORDER — SIMVASTATIN 20 MG/1
20 TABLET, FILM COATED ORAL AT BEDTIME
Refills: 0 | Status: DISCONTINUED | OUTPATIENT
Start: 2022-09-03 | End: 2022-09-07

## 2022-09-03 RX ORDER — IPRATROPIUM/ALBUTEROL SULFATE 18-103MCG
3 AEROSOL WITH ADAPTER (GRAM) INHALATION
Refills: 0 | Status: COMPLETED | OUTPATIENT
Start: 2022-09-03 | End: 2022-09-03

## 2022-09-03 RX ORDER — LOSARTAN POTASSIUM 100 MG/1
100 TABLET, FILM COATED ORAL DAILY
Refills: 0 | Status: DISCONTINUED | OUTPATIENT
Start: 2022-09-03 | End: 2022-09-07

## 2022-09-03 RX ORDER — TAMSULOSIN HYDROCHLORIDE 0.4 MG/1
0.4 CAPSULE ORAL AT BEDTIME
Refills: 0 | Status: DISCONTINUED | OUTPATIENT
Start: 2022-09-03 | End: 2022-09-07

## 2022-09-03 RX ORDER — DEXAMETHASONE 0.5 MG/5ML
10 ELIXIR ORAL ONCE
Refills: 0 | Status: COMPLETED | OUTPATIENT
Start: 2022-09-03 | End: 2022-09-03

## 2022-09-03 RX ORDER — HYDRALAZINE HCL 50 MG
50 TABLET ORAL THREE TIMES A DAY
Refills: 0 | Status: DISCONTINUED | OUTPATIENT
Start: 2022-09-03 | End: 2022-09-04

## 2022-09-03 RX ORDER — DEXTROSE MONOHYDRATE, SODIUM CHLORIDE, AND POTASSIUM CHLORIDE 50; .745; 4.5 G/1000ML; G/1000ML; G/1000ML
1000 INJECTION, SOLUTION INTRAVENOUS
Refills: 0 | Status: DISCONTINUED | OUTPATIENT
Start: 2022-09-03 | End: 2022-09-04

## 2022-09-03 RX ORDER — TAMSULOSIN HYDROCHLORIDE 0.4 MG/1
1 CAPSULE ORAL
Qty: 0 | Refills: 0 | DISCHARGE

## 2022-09-03 RX ORDER — HEPARIN SODIUM 5000 [USP'U]/ML
5000 INJECTION INTRAVENOUS; SUBCUTANEOUS EVERY 12 HOURS
Refills: 0 | Status: DISCONTINUED | OUTPATIENT
Start: 2022-09-03 | End: 2022-09-07

## 2022-09-03 RX ORDER — IPRATROPIUM/ALBUTEROL SULFATE 18-103MCG
3 AEROSOL WITH ADAPTER (GRAM) INHALATION EVERY 6 HOURS
Refills: 0 | Status: DISCONTINUED | OUTPATIENT
Start: 2022-09-03 | End: 2022-09-07

## 2022-09-03 RX ORDER — ALBUTEROL 90 UG/1
4 AEROSOL, METERED ORAL
Refills: 0 | Status: COMPLETED | OUTPATIENT
Start: 2022-09-03 | End: 2022-09-03

## 2022-09-03 RX ORDER — BUDESONIDE AND FORMOTEROL FUMARATE DIHYDRATE 160; 4.5 UG/1; UG/1
2 AEROSOL RESPIRATORY (INHALATION)
Refills: 0 | Status: DISCONTINUED | OUTPATIENT
Start: 2022-09-03 | End: 2022-09-07

## 2022-09-03 RX ORDER — LABETALOL HCL 100 MG
10 TABLET ORAL ONCE
Refills: 0 | Status: DISCONTINUED | OUTPATIENT
Start: 2022-09-03 | End: 2022-09-03

## 2022-09-03 RX ADMIN — Medication 20 MILLIGRAM(S): at 15:52

## 2022-09-03 RX ADMIN — Medication 102 MILLIGRAM(S): at 13:07

## 2022-09-03 RX ADMIN — LOSARTAN POTASSIUM 100 MILLIGRAM(S): 100 TABLET, FILM COATED ORAL at 18:12

## 2022-09-03 RX ADMIN — ALBUTEROL 4 PUFF(S): 90 AEROSOL, METERED ORAL at 15:11

## 2022-09-03 RX ADMIN — TAMSULOSIN HYDROCHLORIDE 0.4 MILLIGRAM(S): 0.4 CAPSULE ORAL at 15:21

## 2022-09-03 RX ADMIN — Medication 3 MILLILITER(S): at 13:56

## 2022-09-03 RX ADMIN — SODIUM CHLORIDE 1000 MILLILITER(S): 9 INJECTION INTRAMUSCULAR; INTRAVENOUS; SUBCUTANEOUS at 15:22

## 2022-09-03 RX ADMIN — HEPARIN SODIUM 5000 UNIT(S): 5000 INJECTION INTRAVENOUS; SUBCUTANEOUS at 18:13

## 2022-09-03 RX ADMIN — Medication 50 MILLIGRAM(S): at 21:02

## 2022-09-03 RX ADMIN — Medication 3 MILLILITER(S): at 15:11

## 2022-09-03 RX ADMIN — Medication 3 MILLILITER(S): at 23:36

## 2022-09-03 RX ADMIN — DEXTROSE MONOHYDRATE, SODIUM CHLORIDE, AND POTASSIUM CHLORIDE 75 MILLILITER(S): 50; .745; 4.5 INJECTION, SOLUTION INTRAVENOUS at 20:22

## 2022-09-03 RX ADMIN — ALBUTEROL 4 PUFF(S): 90 AEROSOL, METERED ORAL at 12:53

## 2022-09-03 RX ADMIN — Medication 3 MILLILITER(S): at 17:13

## 2022-09-03 RX ADMIN — Medication 10 MILLIGRAM(S): at 13:45

## 2022-09-03 RX ADMIN — Medication 20 MILLIGRAM(S): at 20:22

## 2022-09-03 RX ADMIN — DEXTROSE MONOHYDRATE, SODIUM CHLORIDE, AND POTASSIUM CHLORIDE 75 MILLILITER(S): 50; .745; 4.5 INJECTION, SOLUTION INTRAVENOUS at 16:58

## 2022-09-03 RX ADMIN — BUDESONIDE AND FORMOTEROL FUMARATE DIHYDRATE 2 PUFF(S): 160; 4.5 AEROSOL RESPIRATORY (INHALATION) at 18:33

## 2022-09-03 RX ADMIN — SIMVASTATIN 20 MILLIGRAM(S): 20 TABLET, FILM COATED ORAL at 15:39

## 2022-09-03 RX ADMIN — Medication 50 MILLIGRAM(S): at 16:03

## 2022-09-03 NOTE — ED PROVIDER NOTE - CLINICAL SUMMARY MEDICAL DECISION MAKING FREE TEXT BOX
Screen and chest x-ray, albuterol inhaler, Decadron, cardiac enzymes, EKG, follow up labs, x-ray, and reassess. R/o ACS vs COPD exacerbation  NSR rate 70 non specific ST changes

## 2022-09-03 NOTE — H&P ADULT - ASSESSMENT
76 yo male w/ PMH of BPH, HTN, Prostate CA, presents to the ED w/ x3 days of SOB and non-exertional chest tightness without any associated fevers. Pt has been a heavy smoker his whole life. Denies fever, chills, cough, or congestion. No sick contacts.      Plan:   78 yo male w/ PMH of BPH, HTN, Prostate CA, presents to the ED w/ x3 days of SOB and non-exertional chest tightness without any associated fevers. Pt has been a heavy smoker his whole life. Denies fever, chills, cough, or congestion. No sick contacts.      Plan:  Admit to tele for likely COPD exacerbation and CP.  CXR is clear. Hold ABX.  Will add Duonebs every 6 hours with Solumedrol 20 mg every 6 hours. Added   Symbicort as well. Has no inhalers at home. No prior cardiac stents. EKG on arrival notes PRWP V 1-3, no ST changes. Trop high on arrival at   268, follow trend in AM.     Continue home meds: Flomax, Zocur, Cozaar, increase Hydralazine to 50 mg tid.    78 yo male w/ PMH of BPH, HTN, Prostate CA, presents to the ED w/ x3 days of SOB and non-exertional chest tightness without any associated fevers. Pt has been a heavy smoker his whole life. Denies fever, chills, cough, or congestion. No sick contacts.      Plan:  Admit to tele for likely COPD exacerbation and CP.  CXR is clear. Hold ABX.  Will add Duonebs every 6 hours with Solumedrol 20 mg every 6 hours. Added   Symbicort as well. Has no inhalers at home. No prior cardiac stents. EKG on arrival notes PRWP V 1-3, no ST changes. Trop high on arrival at   268, follow trend in AM.  Current smoker, agrees to stop.     Continue home meds: Flomax, Zocur, Cozaar, increase Hydralazine to 50 mg tid.    78 yo male w/ PMH of BPH, HTN, Prostate CA, presents to the ED w/ x3 days of SOB and non-exertional chest tightness without any associated fevers. Pt has been a heavy smoker his whole life. Denies fever, chills, cough, or congestion. No sick contacts.      Plan:  Admit to tele for likely COPD exacerbation and CP.  CXR is clear. Hold ABX.  Will add Duonebs every 6 hours with Solumedrol 20 mg every 6 hours. Added   Symbicort as well. Has no inhalers at home. No prior cardiac stents. EKG on arrival notes PRWP V 1-3, no ST changes. Trop high on arrival at   268, follow trend in AM.  Current smoker, agrees to stop. Mild wheezing bilaterally in ER.      Continue home meds: Flomax, Zocur, Cozaar, increase Hydralazine to 50 mg tid.

## 2022-09-03 NOTE — ED ADULT NURSE NOTE - OBJECTIVE STATEMENT
pt is a&ox3. Pt is with sister at bedside. Pt complains of difficulty breathing for past x2 days. Pt states feeling lightheaded when walking and unable to catch breath. Pt states seeing primary care doctor on Sunday and being diagnosed with pneumonia, pt was given antibiotic, Levofloxacin 750 mg and albuterol inhaler. Pt states only taking 3 days of the antibiotics. Pt had xray scheduled for next week. Pt denies current pain. Upon assessment, audible wheezing is noted on ausculation in upper left anterior chest wall. Denies chest pain, headache, n/v. HX HTN, smoker pt is a&ox3. Pt is with sister at bedside. Pt complains of difficulty breathing for past x2 days. Pt states feeling lightheaded when walking and unable to catch breath. Pt states seeing primary care doctor on Wednesday and being diagnosed with pneumonia, pt was given antibiotic, Levofloxacin 750 mg and albuterol inhaler. Pt had xray scheduled for next week. Pt denies current pain. Upon assessment, audible wheezing is noted on ausculation in upper left anterior chest wall. Denies chest pain, headache, n/v. HX HTN, smoker

## 2022-09-03 NOTE — H&P ADULT - HISTORY OF PRESENT ILLNESS
76 yo male w/ PMH of BPH, HTN, Prostate CA, presents to the ED w/ x3 days of SOB and non-exertional chest tightness without any associated fevers. Pt has been a heavy smoker his whole life, currently smokes 1 PPD. Denies fever, chills, cough, or congestion. No sick contacts.

## 2022-09-03 NOTE — H&P ADULT - NSICDXPASTMEDICALHX_GEN_ALL_CORE_FT
PAST MEDICAL HISTORY:  BPH (benign prostatic hyperplasia)     ETOH abuse sober since 2016    HTN (hypertension)     Hyperlipidemia     Poor historian     Prostate CA Dx'd 2008, s/p radiation    Seizure disorder last seizure 2015    
no anomalies noted

## 2022-09-03 NOTE — H&P ADULT - NSHPLABSRESULTS_GEN_ALL_CORE
LABS:                        11.2   11.28 )-----------( 211      ( 03 Sep 2022 12:31 )             33.6     09-03    125<L>  |  90<L>  |  13  ----------------------------<  96  3.6   |  27  |  1.59<H>    Ca    8.8      03 Sep 2022 12:31    TPro  7.6  /  Alb  3.6  /  TBili  1.0  /  DBili  x   /  AST  48<H>  /  ALT  50  /  AlkPhos  78  09-03            RADIOLOGY & ADDITIONAL TESTS:

## 2022-09-03 NOTE — ED PROVIDER NOTE - PHYSICAL EXAMINATION
VITAL SIGNS: I have reviewed nursing notes and confirm.  CONSTITUTIONAL: well-appearing, non-toxic, NAD  SKIN: Warm dry, normal skin turgor  HEAD: NCAT  EYES: EOMI, PERRLA, no scleral icterus  ENT: Moist mucous membranes, normal pharynx with no erythema or exudates  NECK: Supple; non tender. Full ROM. No cervical LAD  CARD: RRR, no murmurs, rubs or gallops  RESP: +diffused wheezing. Sightly increased rate of breathing.  ABD: soft, + BS, non-tender, non-distended, no rebound or guarding. No CVA tenderness  EXT: Full ROM, no bony tenderness, no pedal edema, no calf tenderness  NEURO: normal motor. normal sensory. CN II-XII intact. Cerebellar testing normal. Normal gait.  PSYCH: Cooperative, appropriate.

## 2022-09-03 NOTE — H&P ADULT - NSHPPHYSICALEXAM_GEN_ALL_CORE
PHYSICAL EXAMINATION:  Vital Signs Last 24 Hrs  T(C): 36.7 (03 Sep 2022 11:39), Max: 36.7 (03 Sep 2022 10:38)  T(F): 98 (03 Sep 2022 11:39), Max: 98 (03 Sep 2022 10:38)  HR: 65 (03 Sep 2022 11:39) (65 - 72)  BP: 168/108 (03 Sep 2022 11:39) (168/108 - 202/112)  BP(mean): --  RR: 21 (03 Sep 2022 10:38) (21 - 21)  SpO2: 98% (03 Sep 2022 11:39) (94% - 98%)    Parameters below as of 03 Sep 2022 10:38  Patient On (Oxygen Delivery Method): room air      CAPILLARY BLOOD GLUCOSE          GENERAL: NAD,   ENMT: mucous membranes moist  NECK: supple, No JVD  CHEST/LUNG: clear to auscultation bilaterally; no rales, rhonchi, or wheezing b/l  HEART: normal S1, S2  ABDOMEN: BS+, soft, ND, NT   EXTREMITIES:  pulses palpable; no clubbing, cyanosis, or edema b/l LEs  NEURO: awake, alert, interactive; moves all extremities [FreeTextEntry1] : Prior to the visit, the patient's chart including physician notes, labs and tests were reviewed.  The patient is referred for evaluation and treatment of sudden onset (one year ago) hypertension.  Noted is the history of Grave's disease, colitis, vertigo vs possible TIA and intolerance to several BP medications including Telmisartan, Lisinopril, Hydralazine, Amlodipine, Valsartan, HCTZ/Triamterene, Nadolol.\par The patient states she is very frustrated by the sudden onset of her hypertension (prior BP values were in the 130/80 range) and by physicians trying to increase the dose of the BP medications.  She is here for first determine the cause of her high BP and second find a regimen that controls the BP w/ minimal dose of medications and no side effects  \par Significant data: positive family history of HTN in parents and a sister.  Menopause at the age of 40 but did not have hypertension until she was 57 yo.  Normal Doppler US of renal artery one year ago, but has 30% stenosis of the right carotid artery and his a heavy smoker for 40 years. Borderline elevated plasma normetanephrine but normal metanephrine. Normal adrenal glands on CT scan.  Normal plasma Aldosterone w/ suppressed PRA.  PHYSICAL EXAMINATION:  Vital Signs Last 24 Hrs  T(C): 36.7 (03 Sep 2022 11:39), Max: 36.7 (03 Sep 2022 10:38)  T(F): 98 (03 Sep 2022 11:39), Max: 98 (03 Sep 2022 10:38)  HR: 65 (03 Sep 2022 11:39) (65 - 72)  BP: 168/108 (03 Sep 2022 11:39) (168/108 - 202/112)  BP(mean): --  RR: 21 (03 Sep 2022 10:38) (21 - 21)  SpO2: 98% (03 Sep 2022 11:39) (94% - 98%)    Parameters below as of 03 Sep 2022 10:38  Patient On (Oxygen Delivery Method): room air      CAPILLARY BLOOD GLUCOSE          GENERAL: NAD, seen in ER on 1-D, comfortable after Duonebs, no fevers or cough.     ENMT: mucous membranes moist  NECK: supple, No JVD  CHEST/LUNG: clear to auscultation bilaterally; no rales, rhonchi, or wheezing b/l  HEART: normal S1, S2  ABDOMEN: BS+, soft, ND, NT   EXTREMITIES:  pulses palpable; no clubbing, cyanosis, or edema b/l LEs  NEURO: awake, alert, interactive; moves all extremities PHYSICAL EXAMINATION:  Vital Signs Last 24 Hrs  T(C): 36.7 (03 Sep 2022 11:39), Max: 36.7 (03 Sep 2022 10:38)  T(F): 98 (03 Sep 2022 11:39), Max: 98 (03 Sep 2022 10:38)  HR: 65 (03 Sep 2022 11:39) (65 - 72)  BP: 168/108 (03 Sep 2022 11:39) (168/108 - 202/112)  BP(mean): --  RR: 21 (03 Sep 2022 10:38) (21 - 21)  SpO2: 98% (03 Sep 2022 11:39) (94% - 98%)    Parameters below as of 03 Sep 2022 10:38  Patient On (Oxygen Delivery Method): room air      CAPILLARY BLOOD GLUCOSE          GENERAL: NAD, seen in ER on 1-D, comfortable after Duonebs, no fevers or cough.     ENMT: mucous membranes moist  NECK: supple, No JVD  CHEST/LUNG: no rhonchi, mild wheezing b/l  HEART: normal S1, S2  ABDOMEN: BS+, soft, ND, NT   EXTREMITIES:  pulses palpable; no clubbing, cyanosis, or edema b/l LEs  NEURO: awake, alert, interactive; moves all extremities

## 2022-09-03 NOTE — ED PROVIDER NOTE - OBJECTIVE STATEMENT
78 yo male w/ PMH of BPH, HTN, Prostate CA, presents to the ED w/ x3 days of SOB and non-exertional chest tightness without any associated fevers. Pt has been a heavy smoker his whole life. Denies fever, chills, cough, or congestion. No sick contacts.

## 2022-09-04 LAB
ANION GAP SERPL CALC-SCNC: 10 MMOL/L — SIGNIFICANT CHANGE UP (ref 5–17)
BUN SERPL-MCNC: 13 MG/DL — SIGNIFICANT CHANGE UP (ref 7–23)
CALCIUM SERPL-MCNC: 8 MG/DL — LOW (ref 8.5–10.1)
CHLORIDE SERPL-SCNC: 93 MMOL/L — LOW (ref 96–108)
CO2 SERPL-SCNC: 22 MMOL/L — SIGNIFICANT CHANGE UP (ref 22–31)
CREAT SERPL-MCNC: 1.47 MG/DL — HIGH (ref 0.5–1.3)
EGFR: 48 ML/MIN/1.73M2 — LOW
GLUCOSE SERPL-MCNC: 114 MG/DL — HIGH (ref 70–99)
POTASSIUM SERPL-MCNC: 3.5 MMOL/L — SIGNIFICANT CHANGE UP (ref 3.5–5.3)
POTASSIUM SERPL-SCNC: 3.5 MMOL/L — SIGNIFICANT CHANGE UP (ref 3.5–5.3)
SODIUM SERPL-SCNC: 125 MMOL/L — LOW (ref 135–145)
TROPONIN I, HIGH SENSITIVITY RESULT: 235.1 NG/L — HIGH

## 2022-09-04 PROCEDURE — 99233 SBSQ HOSP IP/OBS HIGH 50: CPT

## 2022-09-04 RX ORDER — HYDRALAZINE HCL 50 MG
50 TABLET ORAL ONCE
Refills: 0 | Status: COMPLETED | OUTPATIENT
Start: 2022-09-04 | End: 2022-09-04

## 2022-09-04 RX ORDER — FUROSEMIDE 40 MG
40 TABLET ORAL DAILY
Refills: 0 | Status: DISCONTINUED | OUTPATIENT
Start: 2022-09-04 | End: 2022-09-06

## 2022-09-04 RX ORDER — HYDRALAZINE HCL 50 MG
10 TABLET ORAL ONCE
Refills: 0 | Status: COMPLETED | OUTPATIENT
Start: 2022-09-04 | End: 2022-09-04

## 2022-09-04 RX ORDER — HYDRALAZINE HCL 50 MG
75 TABLET ORAL THREE TIMES A DAY
Refills: 0 | Status: DISCONTINUED | OUTPATIENT
Start: 2022-09-04 | End: 2022-09-07

## 2022-09-04 RX ADMIN — BUDESONIDE AND FORMOTEROL FUMARATE DIHYDRATE 2 PUFF(S): 160; 4.5 AEROSOL RESPIRATORY (INHALATION) at 05:15

## 2022-09-04 RX ADMIN — BUDESONIDE AND FORMOTEROL FUMARATE DIHYDRATE 2 PUFF(S): 160; 4.5 AEROSOL RESPIRATORY (INHALATION) at 18:18

## 2022-09-04 RX ADMIN — LOSARTAN POTASSIUM 100 MILLIGRAM(S): 100 TABLET, FILM COATED ORAL at 05:14

## 2022-09-04 RX ADMIN — Medication 75 MILLIGRAM(S): at 21:34

## 2022-09-04 RX ADMIN — HEPARIN SODIUM 5000 UNIT(S): 5000 INJECTION INTRAVENOUS; SUBCUTANEOUS at 18:14

## 2022-09-04 RX ADMIN — Medication 75 MILLIGRAM(S): at 14:10

## 2022-09-04 RX ADMIN — Medication 50 MILLIGRAM(S): at 05:14

## 2022-09-04 RX ADMIN — Medication 20 MILLIGRAM(S): at 11:07

## 2022-09-04 RX ADMIN — Medication 3 MILLILITER(S): at 17:02

## 2022-09-04 RX ADMIN — TAMSULOSIN HYDROCHLORIDE 0.4 MILLIGRAM(S): 0.4 CAPSULE ORAL at 21:33

## 2022-09-04 RX ADMIN — Medication 3 MILLILITER(S): at 11:06

## 2022-09-04 RX ADMIN — SIMVASTATIN 20 MILLIGRAM(S): 20 TABLET, FILM COATED ORAL at 21:33

## 2022-09-04 RX ADMIN — DEXTROSE MONOHYDRATE, SODIUM CHLORIDE, AND POTASSIUM CHLORIDE 75 MILLILITER(S): 50; .745; 4.5 INJECTION, SOLUTION INTRAVENOUS at 07:35

## 2022-09-04 RX ADMIN — Medication 3 MILLILITER(S): at 05:25

## 2022-09-04 RX ADMIN — Medication 10 MILLIGRAM(S): at 18:13

## 2022-09-04 RX ADMIN — Medication 20 MILLIGRAM(S): at 03:33

## 2022-09-04 RX ADMIN — Medication 20 MILLIGRAM(S): at 18:13

## 2022-09-04 RX ADMIN — HEPARIN SODIUM 5000 UNIT(S): 5000 INJECTION INTRAVENOUS; SUBCUTANEOUS at 05:14

## 2022-09-04 RX ADMIN — Medication 50 MILLIGRAM(S): at 10:24

## 2022-09-04 NOTE — PATIENT PROFILE ADULT - FALL HARM RISK - HARM RISK INTERVENTIONS
Assistance with ambulation/Assistance OOB with selected safe patient handling equipment/Communicate Risk of Fall with Harm to all staff/Discuss with provider need for PT consult/Monitor gait and stability/Provide patient with walking aids - walker, cane, crutches/Reinforce activity limits and safety measures with patient and family/Tailored Fall Risk Interventions/Use of alarms - bed, chair and/or voice tab/Visual Cue: Yellow wristband and red socks/Bed in lowest position, wheels locked, appropriate side rails in place/Call bell, personal items and telephone in reach/Instruct patient to call for assistance before getting out of bed or chair/Non-slip footwear when patient is out of bed/[place] to call system/Physically safe environment - no spills, clutter or unnecessary equipment/Purposeful Proactive Rounding/Room/bathroom lighting operational, light cord in reach

## 2022-09-05 LAB
ALBUMIN SERPL ELPH-MCNC: 3.2 G/DL — LOW (ref 3.3–5)
ALP SERPL-CCNC: 69 U/L — SIGNIFICANT CHANGE UP (ref 40–120)
ALT FLD-CCNC: 44 U/L — SIGNIFICANT CHANGE UP (ref 12–78)
ANION GAP SERPL CALC-SCNC: 8 MMOL/L — SIGNIFICANT CHANGE UP (ref 5–17)
AST SERPL-CCNC: 61 U/L — HIGH (ref 15–37)
BILIRUB SERPL-MCNC: 0.8 MG/DL — SIGNIFICANT CHANGE UP (ref 0.2–1.2)
BUN SERPL-MCNC: 15 MG/DL — SIGNIFICANT CHANGE UP (ref 7–23)
CALCIUM SERPL-MCNC: 8.3 MG/DL — LOW (ref 8.5–10.1)
CHLORIDE SERPL-SCNC: 94 MMOL/L — LOW (ref 96–108)
CO2 SERPL-SCNC: 23 MMOL/L — SIGNIFICANT CHANGE UP (ref 22–31)
CREAT SERPL-MCNC: 1.39 MG/DL — HIGH (ref 0.5–1.3)
EGFR: 52 ML/MIN/1.73M2 — LOW
GLUCOSE SERPL-MCNC: 113 MG/DL — HIGH (ref 70–99)
HCT VFR BLD CALC: 35.3 % — LOW (ref 39–50)
HGB BLD-MCNC: 12 G/DL — LOW (ref 13–17)
MCHC RBC-ENTMCNC: 29.9 PG — SIGNIFICANT CHANGE UP (ref 27–34)
MCHC RBC-ENTMCNC: 34 G/DL — SIGNIFICANT CHANGE UP (ref 32–36)
MCV RBC AUTO: 87.8 FL — SIGNIFICANT CHANGE UP (ref 80–100)
NRBC # BLD: 0 /100 WBCS — SIGNIFICANT CHANGE UP (ref 0–0)
PLATELET # BLD AUTO: 244 K/UL — SIGNIFICANT CHANGE UP (ref 150–400)
POTASSIUM SERPL-MCNC: 3.8 MMOL/L — SIGNIFICANT CHANGE UP (ref 3.5–5.3)
POTASSIUM SERPL-SCNC: 3.8 MMOL/L — SIGNIFICANT CHANGE UP (ref 3.5–5.3)
PROT SERPL-MCNC: 7 GM/DL — SIGNIFICANT CHANGE UP (ref 6–8.3)
RBC # BLD: 4.02 M/UL — LOW (ref 4.2–5.8)
RBC # FLD: 17.4 % — HIGH (ref 10.3–14.5)
SODIUM SERPL-SCNC: 125 MMOL/L — LOW (ref 135–145)
WBC # BLD: 17.02 K/UL — HIGH (ref 3.8–10.5)
WBC # FLD AUTO: 17.02 K/UL — HIGH (ref 3.8–10.5)

## 2022-09-05 PROCEDURE — 99233 SBSQ HOSP IP/OBS HIGH 50: CPT

## 2022-09-05 RX ADMIN — Medication 75 MILLIGRAM(S): at 13:03

## 2022-09-05 RX ADMIN — BUDESONIDE AND FORMOTEROL FUMARATE DIHYDRATE 2 PUFF(S): 160; 4.5 AEROSOL RESPIRATORY (INHALATION) at 17:09

## 2022-09-05 RX ADMIN — LOSARTAN POTASSIUM 100 MILLIGRAM(S): 100 TABLET, FILM COATED ORAL at 05:19

## 2022-09-05 RX ADMIN — Medication 3 MILLILITER(S): at 17:03

## 2022-09-05 RX ADMIN — Medication 3 MILLILITER(S): at 05:38

## 2022-09-05 RX ADMIN — HEPARIN SODIUM 5000 UNIT(S): 5000 INJECTION INTRAVENOUS; SUBCUTANEOUS at 17:08

## 2022-09-05 RX ADMIN — Medication 3 MILLILITER(S): at 00:26

## 2022-09-05 RX ADMIN — Medication 20 MILLIGRAM(S): at 17:09

## 2022-09-05 RX ADMIN — HEPARIN SODIUM 5000 UNIT(S): 5000 INJECTION INTRAVENOUS; SUBCUTANEOUS at 05:18

## 2022-09-05 RX ADMIN — TAMSULOSIN HYDROCHLORIDE 0.4 MILLIGRAM(S): 0.4 CAPSULE ORAL at 23:27

## 2022-09-05 RX ADMIN — Medication 20 MILLIGRAM(S): at 00:10

## 2022-09-05 RX ADMIN — SIMVASTATIN 20 MILLIGRAM(S): 20 TABLET, FILM COATED ORAL at 23:27

## 2022-09-05 RX ADMIN — BUDESONIDE AND FORMOTEROL FUMARATE DIHYDRATE 2 PUFF(S): 160; 4.5 AEROSOL RESPIRATORY (INHALATION) at 05:20

## 2022-09-05 RX ADMIN — Medication 40 MILLIGRAM(S): at 05:19

## 2022-09-05 RX ADMIN — Medication 20 MILLIGRAM(S): at 05:18

## 2022-09-05 RX ADMIN — Medication 20 MILLIGRAM(S): at 23:27

## 2022-09-05 RX ADMIN — Medication 20 MILLIGRAM(S): at 11:23

## 2022-09-05 RX ADMIN — Medication 75 MILLIGRAM(S): at 23:26

## 2022-09-05 RX ADMIN — Medication 3 MILLILITER(S): at 11:19

## 2022-09-05 RX ADMIN — Medication 75 MILLIGRAM(S): at 05:20

## 2022-09-06 DIAGNOSIS — I10 ESSENTIAL (PRIMARY) HYPERTENSION: ICD-10-CM

## 2022-09-06 DIAGNOSIS — F03.90 UNSPECIFIED DEMENTIA WITHOUT BEHAVIORAL DISTURBANCE: ICD-10-CM

## 2022-09-06 DIAGNOSIS — R77.8 OTHER SPECIFIED ABNORMALITIES OF PLASMA PROTEINS: ICD-10-CM

## 2022-09-06 DIAGNOSIS — N18.30 CHRONIC KIDNEY DISEASE, STAGE 3 UNSPECIFIED: ICD-10-CM

## 2022-09-06 DIAGNOSIS — R06.00 DYSPNEA, UNSPECIFIED: ICD-10-CM

## 2022-09-06 DIAGNOSIS — E03.9 HYPOTHYROIDISM, UNSPECIFIED: ICD-10-CM

## 2022-09-06 LAB
ANION GAP SERPL CALC-SCNC: 9 MMOL/L — SIGNIFICANT CHANGE UP (ref 5–17)
BUN SERPL-MCNC: 20 MG/DL — SIGNIFICANT CHANGE UP (ref 7–23)
CALCIUM SERPL-MCNC: 8 MG/DL — LOW (ref 8.5–10.1)
CHLORIDE SERPL-SCNC: 92 MMOL/L — LOW (ref 96–108)
CO2 SERPL-SCNC: 24 MMOL/L — SIGNIFICANT CHANGE UP (ref 22–31)
CREAT ?TM UR-MCNC: 53 MG/DL — SIGNIFICANT CHANGE UP
CREAT SERPL-MCNC: 1.5 MG/DL — HIGH (ref 0.5–1.3)
CRP SERPL-MCNC: <3 MG/L — SIGNIFICANT CHANGE UP
EGFR: 47 ML/MIN/1.73M2 — LOW
ERYTHROCYTE [SEDIMENTATION RATE] IN BLOOD: 3 MM/HR — SIGNIFICANT CHANGE UP (ref 0–20)
GLUCOSE SERPL-MCNC: 116 MG/DL — HIGH (ref 70–99)
POTASSIUM SERPL-MCNC: 3.7 MMOL/L — SIGNIFICANT CHANGE UP (ref 3.5–5.3)
POTASSIUM SERPL-SCNC: 3.7 MMOL/L — SIGNIFICANT CHANGE UP (ref 3.5–5.3)
PROT ?TM UR-MCNC: 7 MG/DL — SIGNIFICANT CHANGE UP (ref 0–12)
PROT/CREAT UR-RTO: 0.1 RATIO — SIGNIFICANT CHANGE UP (ref 0–0.2)
SODIUM SERPL-SCNC: 125 MMOL/L — LOW (ref 135–145)
T3 SERPL-MCNC: <20 NG/DL — LOW (ref 80–200)
T4 AB SER-ACNC: 0.9 UG/DL — LOW (ref 4.6–12)
TSH SERPL-MCNC: 69.6 UU/ML — HIGH (ref 0.36–3.74)

## 2022-09-06 PROCEDURE — 93306 TTE W/DOPPLER COMPLETE: CPT | Mod: 26

## 2022-09-06 PROCEDURE — 71250 CT THORAX DX C-: CPT | Mod: 26

## 2022-09-06 PROCEDURE — 99223 1ST HOSP IP/OBS HIGH 75: CPT

## 2022-09-06 RX ORDER — DOXAZOSIN MESYLATE 4 MG
4 TABLET ORAL AT BEDTIME
Refills: 0 | Status: DISCONTINUED | OUTPATIENT
Start: 2022-09-06 | End: 2022-09-07

## 2022-09-06 RX ORDER — LEVOTHYROXINE SODIUM 125 MCG
112 TABLET ORAL DAILY
Refills: 0 | Status: DISCONTINUED | OUTPATIENT
Start: 2022-09-06 | End: 2022-09-06

## 2022-09-06 RX ORDER — REGADENOSON 0.08 MG/ML
0.4 INJECTION, SOLUTION INTRAVENOUS ONCE
Refills: 0 | Status: DISCONTINUED | OUTPATIENT
Start: 2022-09-06 | End: 2022-09-07

## 2022-09-06 RX ORDER — LEVOTHYROXINE SODIUM 125 MCG
50 TABLET ORAL AT BEDTIME
Refills: 0 | Status: DISCONTINUED | OUTPATIENT
Start: 2022-09-06 | End: 2022-09-07

## 2022-09-06 RX ADMIN — Medication 50 MICROGRAM(S): at 21:46

## 2022-09-06 RX ADMIN — BUDESONIDE AND FORMOTEROL FUMARATE DIHYDRATE 2 PUFF(S): 160; 4.5 AEROSOL RESPIRATORY (INHALATION) at 17:00

## 2022-09-06 RX ADMIN — Medication 20 MILLIGRAM(S): at 17:00

## 2022-09-06 RX ADMIN — Medication 4 MILLIGRAM(S): at 21:47

## 2022-09-06 RX ADMIN — Medication 3 MILLILITER(S): at 00:12

## 2022-09-06 RX ADMIN — Medication 75 MILLIGRAM(S): at 21:47

## 2022-09-06 RX ADMIN — Medication 20 MILLIGRAM(S): at 05:09

## 2022-09-06 RX ADMIN — BUDESONIDE AND FORMOTEROL FUMARATE DIHYDRATE 2 PUFF(S): 160; 4.5 AEROSOL RESPIRATORY (INHALATION) at 05:08

## 2022-09-06 RX ADMIN — HEPARIN SODIUM 5000 UNIT(S): 5000 INJECTION INTRAVENOUS; SUBCUTANEOUS at 17:00

## 2022-09-06 RX ADMIN — Medication 75 MILLIGRAM(S): at 05:07

## 2022-09-06 RX ADMIN — SIMVASTATIN 20 MILLIGRAM(S): 20 TABLET, FILM COATED ORAL at 21:47

## 2022-09-06 RX ADMIN — Medication 20 MILLIGRAM(S): at 11:24

## 2022-09-06 RX ADMIN — Medication 3 MILLILITER(S): at 17:03

## 2022-09-06 RX ADMIN — Medication 3 MILLILITER(S): at 23:46

## 2022-09-06 RX ADMIN — Medication 3 MILLILITER(S): at 11:19

## 2022-09-06 RX ADMIN — Medication 40 MILLIGRAM(S): at 05:08

## 2022-09-06 RX ADMIN — Medication 112 MICROGRAM(S): at 12:14

## 2022-09-06 RX ADMIN — Medication 3 MILLILITER(S): at 05:24

## 2022-09-06 RX ADMIN — Medication 75 MILLIGRAM(S): at 13:28

## 2022-09-06 RX ADMIN — HEPARIN SODIUM 5000 UNIT(S): 5000 INJECTION INTRAVENOUS; SUBCUTANEOUS at 05:07

## 2022-09-06 RX ADMIN — TAMSULOSIN HYDROCHLORIDE 0.4 MILLIGRAM(S): 0.4 CAPSULE ORAL at 21:46

## 2022-09-06 RX ADMIN — LOSARTAN POTASSIUM 100 MILLIGRAM(S): 100 TABLET, FILM COATED ORAL at 05:07

## 2022-09-06 NOTE — CONSULT NOTE ADULT - ASSESSMENT
76 yo male w/ PMH of BPH, HTN, Prostate CA, presents to the ED w/ x3 days of SOB and non-exertional chest tightness without any associated fevers. Pt has been a heavy smoker his whole life, currently smokes 1 PPD. Denies fever, chills, cough, or congestion. No sick contacts.      TSH 70... stopped synthroid a few weeks ago; similar event a few years ago that developed into severe dementia that predominantly resolved.  Echo today with large pericardial effusion with early tamponade physiology     Discussed at pericardiocentesis at length with patient... he wants his sister to make decision.  Discussed with sister Lis... [phone]  She wishes to discuss with her other sister before making a decision.  Pt currently remains HD stable... HRs 60-70s; SBPs 150-170s.  Lying flat off with O2 sats 99%.  Denied dyspnea/chest pain/lightheadedness.    If family agrees, plan to tx (likely tomorrow unless vitals/sx change) for pericardiocentesis.  Cont synthroid  Avoid bbs and diuretics
 dyspnea/ mild copd/ smoker   HTN   Hypothyroid   mild dementia   Hx pf prostrate ca in the past treated with radiation  cardiomegaly r/o pericardial effusion  HLD   ckd- chronic

## 2022-09-06 NOTE — PHYSICAL THERAPY INITIAL EVALUATION ADULT - ADDITIONAL COMMENTS
as per patient, patient lives in a house with 2 steps to enter, +B rails close together. no steps once inside. Patient has a home health aide 5 days/ 10 hours a day that he reports assists with cooking and cleaning Patient reports he owns a rollator, does use it as he prefers using a straight cane.

## 2022-09-06 NOTE — PHYSICAL THERAPY INITIAL EVALUATION ADULT - PERTINENT HX OF CURRENT PROBLEM, REHAB EVAL
This is the hx of INOCENTE. a 76 y/o male patient who was admitted to [facility] due to complications of elevated Troponin, Hypontremia affecting medical condition and with subsequent affection on functional mobility. CT chest 9/6/22: small bilateral pleural effusion

## 2022-09-06 NOTE — PHYSICAL THERAPY INITIAL EVALUATION ADULT - ASSISTIVE DEVICE FOR TRANSFER: GAIT, REHAB EVAL
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 Carotid bruit is not present. Cardiovascular: Normal rate and regular rhythm. Pulmonary/Chest: Effort normal and breath sounds normal.   Abdominal: Soft. Musculoskeletal: She exhibits edema. 1-2+ mostly both ankles   Psychiatric: She has a normal mood and affect. No current outpatient prescriptions on file prior to visit. No current facility-administered medications on file prior to visit. ASSESSMENT and PLAN  LE EDEMA: I suspect mostly multifactorial with prolonged standing position, perimenopause and possibly some venous insufficiency. She has not h/o REECE  Her ECG shows NSR RBBB and LAFB but no significant st t changes  Discussed options.   Her creatinine is normal at 0.6. tsh not done at this point  Proceed with echo for le edema and abnormal ecg  obtain doppler us le to r/o venous insufficiency  Low salt diet discussed  HTN: none  Hyperglycemia: followed by her pcp  See her back after tests rolling walker

## 2022-09-06 NOTE — PHYSICAL THERAPY INITIAL EVALUATION ADULT - GAIT TRAINING, PT EVAL
Patient will ambulate 300 feet with straight cane independently for community ambulation in 2 weeks. Patient will ascend/descend 3 steps with R rail up/L rail down independently in 2 weeks to safely navigate home environment.

## 2022-09-06 NOTE — PHYSICAL THERAPY INITIAL EVALUATION ADULT - DID THE PATIENT HAVE SURGERY?
This is the hx of ROMINA a 78 y/o male patient who was admitted to [facility] due to complications of elevated Troponin, Hypontremia affecting medical condition and with subsequent affection on functional mobility./n/a

## 2022-09-07 ENCOUNTER — INPATIENT (INPATIENT)
Facility: HOSPITAL | Age: 77
LOS: 12 days | Discharge: NURSING, REHAB OR LONG-TERM CARE | DRG: 315 | End: 2022-09-20
Attending: INTERNAL MEDICINE | Admitting: INTERNAL MEDICINE
Payer: COMMERCIAL

## 2022-09-07 ENCOUNTER — RESULT REVIEW (OUTPATIENT)
Age: 77
End: 2022-09-07

## 2022-09-07 VITALS
OXYGEN SATURATION: 95 % | HEART RATE: 67 BPM | SYSTOLIC BLOOD PRESSURE: 124 MMHG | DIASTOLIC BLOOD PRESSURE: 70 MMHG | TEMPERATURE: 97 F | RESPIRATION RATE: 17 BRPM

## 2022-09-07 VITALS
HEART RATE: 70 BPM | RESPIRATION RATE: 14 BRPM | OXYGEN SATURATION: 98 % | TEMPERATURE: 98 F | SYSTOLIC BLOOD PRESSURE: 146 MMHG | DIASTOLIC BLOOD PRESSURE: 82 MMHG

## 2022-09-07 DIAGNOSIS — Z85.46 PERSONAL HISTORY OF MALIGNANT NEOPLASM OF PROSTATE: Chronic | ICD-10-CM

## 2022-09-07 DIAGNOSIS — Z98.89 OTHER SPECIFIED POSTPROCEDURAL STATES: Chronic | ICD-10-CM

## 2022-09-07 DIAGNOSIS — Z98.49 CATARACT EXTRACTION STATUS, UNSPECIFIED EYE: Chronic | ICD-10-CM

## 2022-09-07 DIAGNOSIS — I31.3 PERICARDIAL EFFUSION (NONINFLAMMATORY): ICD-10-CM

## 2022-09-07 DIAGNOSIS — Z98.890 OTHER SPECIFIED POSTPROCEDURAL STATES: Chronic | ICD-10-CM

## 2022-09-07 LAB
ALBUMIN FLD-MCNC: 3.3 G/DL — SIGNIFICANT CHANGE UP
AMYLASE FLD-CCNC: 58 U/L — SIGNIFICANT CHANGE UP
ANION GAP SERPL CALC-SCNC: 5 MMOL/L — SIGNIFICANT CHANGE UP (ref 5–17)
B PERT IGG+IGM PNL SER: ABNORMAL
BUN SERPL-MCNC: 22 MG/DL — SIGNIFICANT CHANGE UP (ref 7–23)
CALCIUM SERPL-MCNC: 7.7 MG/DL — LOW (ref 8.5–10.1)
CHLORIDE SERPL-SCNC: 93 MMOL/L — LOW (ref 96–108)
CK SERPL-CCNC: 1090 U/L — HIGH (ref 26–308)
CO2 SERPL-SCNC: 28 MMOL/L — SIGNIFICANT CHANGE UP (ref 22–31)
COLOR FLD: SIGNIFICANT CHANGE UP
CREAT SERPL-MCNC: 1.42 MG/DL — HIGH (ref 0.5–1.3)
EGFR: 50 ML/MIN/1.73M2 — LOW
FLUAV AG NPH QL: SIGNIFICANT CHANGE UP
FLUBV AG NPH QL: SIGNIFICANT CHANGE UP
FLUID INTAKE SUBSTANCE CLASS: SIGNIFICANT CHANGE UP
GLUCOSE FLD-MCNC: 124 MG/DL — SIGNIFICANT CHANGE UP
GLUCOSE SERPL-MCNC: 115 MG/DL — HIGH (ref 70–99)
HCT VFR BLD CALC: 32.8 % — LOW (ref 39–50)
HGB BLD-MCNC: 10.9 G/DL — LOW (ref 13–17)
LDH SERPL L TO P-CCNC: 219 U/L — SIGNIFICANT CHANGE UP
LYMPHOCYTES # FLD: 91 % — SIGNIFICANT CHANGE UP
MCHC RBC-ENTMCNC: 29.4 PG — SIGNIFICANT CHANGE UP (ref 27–34)
MCHC RBC-ENTMCNC: 33.2 G/DL — SIGNIFICANT CHANGE UP (ref 32–36)
MCV RBC AUTO: 88.4 FL — SIGNIFICANT CHANGE UP (ref 80–100)
MESOTHL CELL # FLD: 1 % — SIGNIFICANT CHANGE UP
MONOS+MACROS # FLD: 8 % — SIGNIFICANT CHANGE UP
NEUTROPHILS-BODY FLUID: 0 % — SIGNIFICANT CHANGE UP
NRBC # BLD: 0 /100 WBCS — SIGNIFICANT CHANGE UP (ref 0–0)
PLATELET # BLD AUTO: 220 K/UL — SIGNIFICANT CHANGE UP (ref 150–400)
POTASSIUM SERPL-MCNC: 3.9 MMOL/L — SIGNIFICANT CHANGE UP (ref 3.5–5.3)
POTASSIUM SERPL-SCNC: 3.9 MMOL/L — SIGNIFICANT CHANGE UP (ref 3.5–5.3)
PROT FLD-MCNC: 5.1 G/DL — SIGNIFICANT CHANGE UP
RBC # BLD: 3.71 M/UL — LOW (ref 4.2–5.8)
RBC # FLD: 17.8 % — HIGH (ref 10.3–14.5)
RCV VOL RI: 6000 /UL — HIGH (ref 0–0)
SARS-COV-2 RNA SPEC QL NAA+PROBE: SIGNIFICANT CHANGE UP
SODIUM SERPL-SCNC: 126 MMOL/L — LOW (ref 135–145)
TOTAL NUCLEATED CELL COUNT, BODY FLUID: 5423 /UL — SIGNIFICANT CHANGE UP
TROPONIN I, HIGH SENSITIVITY RESULT: 180.8 NG/L — HIGH
TUBE TYPE: SIGNIFICANT CHANGE UP
WBC # BLD: 12.98 K/UL — HIGH (ref 3.8–10.5)
WBC # FLD AUTO: 12.98 K/UL — HIGH (ref 3.8–10.5)

## 2022-09-07 PROCEDURE — 88112 CYTOPATH CELL ENHANCE TECH: CPT | Mod: 26

## 2022-09-07 PROCEDURE — 93308 TTE F-UP OR LMTD: CPT | Mod: 26

## 2022-09-07 PROCEDURE — 33016 PERICARDIOCENTESIS W/IMAGING: CPT

## 2022-09-07 PROCEDURE — 99292 CRITICAL CARE ADDL 30 MIN: CPT | Mod: 25

## 2022-09-07 PROCEDURE — 88305 TISSUE EXAM BY PATHOLOGIST: CPT | Mod: 26

## 2022-09-07 PROCEDURE — 93010 ELECTROCARDIOGRAM REPORT: CPT

## 2022-09-07 PROCEDURE — 93321 DOPPLER ECHO F-UP/LMTD STD: CPT | Mod: 26

## 2022-09-07 PROCEDURE — 93306 TTE W/DOPPLER COMPLETE: CPT | Mod: 26,59

## 2022-09-07 RX ORDER — CIPROFLOXACIN LACTATE 400MG/40ML
1 VIAL (ML) INTRAVENOUS
Qty: 0 | Refills: 0 | DISCHARGE

## 2022-09-07 RX ORDER — HYDRALAZINE HCL 50 MG
50 TABLET ORAL THREE TIMES A DAY
Refills: 0 | Status: DISCONTINUED | OUTPATIENT
Start: 2022-09-07 | End: 2022-09-13

## 2022-09-07 RX ORDER — TAMSULOSIN HYDROCHLORIDE 0.4 MG/1
0.4 CAPSULE ORAL AT BEDTIME
Refills: 0 | Status: DISCONTINUED | OUTPATIENT
Start: 2022-09-07 | End: 2022-09-20

## 2022-09-07 RX ORDER — SENNA PLUS 8.6 MG/1
2 TABLET ORAL AT BEDTIME
Refills: 0 | Status: DISCONTINUED | OUTPATIENT
Start: 2022-09-07 | End: 2022-09-20

## 2022-09-07 RX ORDER — SODIUM CHLORIDE 9 MG/ML
1000 INJECTION INTRAMUSCULAR; INTRAVENOUS; SUBCUTANEOUS
Refills: 0 | Status: DISCONTINUED | OUTPATIENT
Start: 2022-09-07 | End: 2022-09-07

## 2022-09-07 RX ORDER — NICOTINE POLACRILEX 2 MG
1 GUM BUCCAL DAILY
Refills: 0 | Status: DISCONTINUED | OUTPATIENT
Start: 2022-09-07 | End: 2022-09-20

## 2022-09-07 RX ORDER — HYDRALAZINE HCL 50 MG
1 TABLET ORAL
Qty: 0 | Refills: 0 | DISCHARGE

## 2022-09-07 RX ORDER — CHLORHEXIDINE GLUCONATE 213 G/1000ML
1 SOLUTION TOPICAL
Refills: 0 | Status: DISCONTINUED | OUTPATIENT
Start: 2022-09-07 | End: 2022-09-13

## 2022-09-07 RX ORDER — HYDRALAZINE HCL 50 MG
50 TABLET ORAL ONCE
Refills: 0 | Status: COMPLETED | OUTPATIENT
Start: 2022-09-07 | End: 2022-09-07

## 2022-09-07 RX ORDER — POLYETHYLENE GLYCOL 3350 17 G/17G
17 POWDER, FOR SOLUTION ORAL DAILY
Refills: 0 | Status: DISCONTINUED | OUTPATIENT
Start: 2022-09-07 | End: 2022-09-09

## 2022-09-07 RX ORDER — LOSARTAN POTASSIUM 100 MG/1
100 TABLET, FILM COATED ORAL DAILY
Refills: 0 | Status: DISCONTINUED | OUTPATIENT
Start: 2022-09-07 | End: 2022-09-08

## 2022-09-07 RX ORDER — IPRATROPIUM/ALBUTEROL SULFATE 18-103MCG
3 AEROSOL WITH ADAPTER (GRAM) INHALATION EVERY 6 HOURS
Refills: 0 | Status: DISCONTINUED | OUTPATIENT
Start: 2022-09-07 | End: 2022-09-20

## 2022-09-07 RX ORDER — LEVOTHYROXINE SODIUM 125 MCG
112 TABLET ORAL DAILY
Refills: 0 | Status: DISCONTINUED | OUTPATIENT
Start: 2022-09-07 | End: 2022-09-20

## 2022-09-07 RX ORDER — SIMVASTATIN 20 MG/1
20 TABLET, FILM COATED ORAL AT BEDTIME
Refills: 0 | Status: DISCONTINUED | OUTPATIENT
Start: 2022-09-07 | End: 2022-09-20

## 2022-09-07 RX ADMIN — Medication 3 MILLILITER(S): at 11:09

## 2022-09-07 RX ADMIN — SODIUM CHLORIDE 50 MILLILITER(S): 9 INJECTION INTRAMUSCULAR; INTRAVENOUS; SUBCUTANEOUS at 10:31

## 2022-09-07 RX ADMIN — Medication 20 MILLIGRAM(S): at 10:04

## 2022-09-07 RX ADMIN — TAMSULOSIN HYDROCHLORIDE 0.4 MILLIGRAM(S): 0.4 CAPSULE ORAL at 21:34

## 2022-09-07 RX ADMIN — LOSARTAN POTASSIUM 100 MILLIGRAM(S): 100 TABLET, FILM COATED ORAL at 16:19

## 2022-09-07 RX ADMIN — BUDESONIDE AND FORMOTEROL FUMARATE DIHYDRATE 2 PUFF(S): 160; 4.5 AEROSOL RESPIRATORY (INHALATION) at 05:40

## 2022-09-07 RX ADMIN — POLYETHYLENE GLYCOL 3350 17 GRAM(S): 17 POWDER, FOR SOLUTION ORAL at 17:33

## 2022-09-07 RX ADMIN — Medication 50 MILLIGRAM(S): at 17:47

## 2022-09-07 RX ADMIN — SENNA PLUS 2 TABLET(S): 8.6 TABLET ORAL at 21:34

## 2022-09-07 RX ADMIN — LOSARTAN POTASSIUM 100 MILLIGRAM(S): 100 TABLET, FILM COATED ORAL at 05:41

## 2022-09-07 RX ADMIN — Medication 20 MILLIGRAM(S): at 02:27

## 2022-09-07 RX ADMIN — SIMVASTATIN 20 MILLIGRAM(S): 20 TABLET, FILM COATED ORAL at 21:35

## 2022-09-07 RX ADMIN — HEPARIN SODIUM 5000 UNIT(S): 5000 INJECTION INTRAVENOUS; SUBCUTANEOUS at 05:42

## 2022-09-07 RX ADMIN — Medication 3 MILLILITER(S): at 05:24

## 2022-09-07 RX ADMIN — Medication 50 MILLIGRAM(S): at 21:35

## 2022-09-07 NOTE — PATIENT PROFILE ADULT - NSTOBACCOCESSATIONEDU4_GEN_A_NUR
No
Smoking even a single puff increases the likelihood of a full relapse, withdrawal symptoms peak within 1-2 weeks, but can persist for months

## 2022-09-07 NOTE — PROGRESS NOTE ADULT - ASSESSMENT
76 yo male w/ PMH of BPH, HTN, Prostate CA, presents to the ED w/ x3 days of SOB and non-exertional chest tightness without any associated fevers. Pt has been a heavy smoker his whole life. Denies fever, chills, cough, or congestion. No sick contacts.        COPD exacerbation:  - Duonebs every 6 hours with Solumedrol 20 mg every 6 hours.   - Symbicort as well. Has no inhalers at home.     CP:  - Resolved now  - Trop elevated, tended down  - New cardio megaly in CXR, small effusion  - Will add Lasix   - 2D echo    HTN:  - Uncontrolled  - Increase Hydralazine to 75 mg TID, continue losartan    HLD:  - Statin    DVT ppx
76 yo male w/ PMH of BPH, HTN, Prostate CA, presents to the ED w/ x3 days of SOB and non-exertional chest tightness without any associated fevers. Pt has been a heavy smoker his whole life. Denies fever, chills, cough, or congestion. No sick contacts.      COPD exacerbation:  - Duonebs every 6 hours with Solumedrol 20 mg every 6 hours.   - Symbicort as well. Has no inhalers at home.     CP:  - Resolved now  - Trop elevated, tended down  - New cardio megaly in CXR, small effusion  - Added Lasix   - 2D echo    HTN:  - Uncontrolled  - Increased Hydralazine to 75 mg TID, continue losartan    HLD:  - Statin    CKD 3:  - CFR at baseline  - Check urine pr/cr    DVT ppx    Discussed with sister.
Pericardial effusion with mild Tamponade   Hypothyroidism  HTN   mild dementia   ckd and angelita hydronephrosis- chronic  x 2 years

## 2022-09-07 NOTE — CHART NOTE - NSCHARTNOTEFT_GEN_A_CORE
CCU Accept Note    Transfer from: [facility]    Accepting physician: Dr. Irvin     HPI:  This is a 78yo old  Male with PMH of HTN, HLD,  BPH, CKD ( creat 1.42 today), chronic angelita hydronephrosis ( x2 years), prostate ca ( radiation 10 years ago), seizure disorder ( last seizure 2015), ETOH use ( sober since 2016), hypothyroidism, and mild demenia; presents to [facility] on 9/3/2022 with chief complaint of SOB and CP x3 days.  Pt has been a heavy smoker his whole life. Denies fever, chills, cough, or congestion. No sick contacts.    Admitted to tele for likely COPD exacerbation and CP.  CXR is clear. Started on Duonebs, Symbicort and Solumedrol. No prior cardiac stents. EKG on arrival to ED notes PRWP V 1-3, no ST changes. Trop high on arrival at 268 trending down 180.8 today, proBNP 763, CK 1090. TSH 69.600 ( stopped synthroid a few weeks ago), T3 <20 T4 0.9, Na 126.  CXR revealing New cardiomegaly and CT chest with Large pericardial effusion, Echo today with large pericardial effusion with early tamponade physiology. Transferred to [facility] for pericardiocentesis. On arrival to [facility] patient awake and verbal A&OX3, denies any chest pain, dyspnea, dizziness, palpitations, N&V, Ha, orthopnea, LE edema.  VS WNL, on 2 L nasal cannula in no respiratory distress.      recs from [facility]:  home with home PT          INTERPRETATION:  CLINICAL STATEMENT: Chest Pain. Shortness of breath  TECHNIQUE: AP view of the chest.  COMPARISON: 7/19/2020    New cardiomegaly, may be exaggerated by AP technique. Suboptimal degree   of inspiration. Small left pleural effusion.    Degenerative changes of the spine, shoulders and AC joints.    IMPRESSION:    New cardiomegaly, may be exaggerated by AP technique.    Small left pleural effusion.    ECHO  Summary:   1. Left ventricular ejection fraction, by visual estimation, is 50 to   55%.   2. Mildly enlarged left atrium.   3. Maximum thickness of effusion is 2-2.5 cm.   4. Mild mitral annular calcification.   5. Mild thickening of the anterior and posterior mitral valve leaflets.   6. Trace mitral valve regurgitation.   7. Mild tricuspid regurgitation.   8. Estimated pulmonary artery systolic pressure is 37.4 mmHg assuming a   right atrial pressure of 5 mmHg, which is consistent with mild pulmonary   hypertension.   9. Findings are consistent with mild cardiac tamponade.      CT CHEST                          PROCEDURE DATE:  09/06/2022      INTERPRETATION:  HISTORY: Dyspnea on exertion. Smoker.    EXAMINATION: CT CHEST was performed without IV contrast.    COMPARISON: No CT chest comparison. Correlation with 1/9/2018 CT abdomen.    FINDINGS:    AIRWAYS, LUNGS, PLEURA: Trachea and mainstem bronchi patent. Emphysema.   Posterior left upper lobe and left basilar atelectasis. Small bilateral   pleural effusions.    MEDIASTINUM: Large circumferential pericardial effusion; depth of   pericardial fluid is 34 mm at the level of the basal inferior wall of the   left ventricle (sagittal series image 78). There is flattening of the   free wall of the right ventricle. Coronary atherosclerosis.    IMAGED ABDOMEN: Bilateral hydronephrosis. Cholelithiasis.    SOFT TISSUES: Bilateral gynecomastia.    BONES: Degenerative changes of the spine. Loss of height of L3 vertebral   body similar to 1/9/2018 CT abdomen.    IMPRESSION:.    Large pericardial effusion.    Small bilateral pleural effusions. (07 Sep 2022 13:09)      Hospital Course:    OBJECTIVE DATA:    Vital Signs Last 24 Hrs  T(C): 36.4 (07 Sep 2022 12:58), Max: 36.8 (06 Sep 2022 18:05)  T(F): 97.6 (07 Sep 2022 12:58), Max: 98.2 (06 Sep 2022 18:05)  HR: 70 (07 Sep 2022 12:58) (62 - 78)  BP: 146/82 (07 Sep 2022 12:58) (104/66 - 164/86)  BP(mean): --  RR: 14 (07 Sep 2022 12:58) (14 - 18)  SpO2: 98% (07 Sep 2022 12:58) (95% - 98%)    Parameters below as of 07 Sep 2022 12:58  Patient On (Oxygen Delivery Method): nasal cannula  O2 Flow (L/min): 2    I&O's Summary      Allergies:      MEDICATIONS  (STANDING):    MEDICATIONS  (PRN):      LABS                                            10.9                  Neurophils% (auto):   x      (09-07 @ 06:45):    12.98)-----------(220          Lymphocytes% (auto):  x                                             32.8                   Eosinphils% (auto):   x        Manual%: Neutrophils x    ; Lymphocytes x    ; Eosinophils x    ; Bands%: x    ; Blasts x                                    126    |  93     |  22                  Calcium: 7.7   / iCa: x      (09-07 @ 06:45)    ----------------------------<  115       Magnesium: x                                3.9     |  28     |  1.42             Phosphorous: x              ASSESSMENT & PLAN:     77M with PMH of HTN, HLD,  BPH, CKD ( creat 1.42 today), chronic angelita hydronephrosis ( x2 years), prostate ca ( radiation 10 years ago), seizure disorder ( last seizure 2015), ETOH use ( sober since 2016), hypothyroidism, and mild demenia p/w CP and SOB found with large pericardial effusion s/p pericardiocentesis.     #Neuro   Alert and oriented  - continue to monitor mental status as per protocol    #Resp  Comfortable on RA  - SOB on admission likely 2/2 pericardial tamponade. monitor for improvement s/p drain placement   - Monitor SpO2 with goal >94%  - 7/6 CT chest: Large pericardial effusion. Small bilateral pleural effusions. Bilateral hydronephrosis.    #CV  Cardiac Tamponade  - 9/7 TTE: EF 70%. Mod concentric LVH. Normal RV size and function. Large pericardial effusion with evidence of echo tamponade physiology.   - s/p pericardiocentesis with drain placement, 1060mL out serosanguinous.   - f/u pericardial fluid studies including cytology, gram stain, and culture   - Patient remained HD stable at [place] prior to transfer (as per documentation: HRs 60-70s; SBPs 150-170s, Lying flat off with O2 sats 99%). Continue to monitor vital signs   - trend lactate, p-BNP, and troponin (trop I downtrending at OSH prior to transfer) daily   - monitor drain output. Daily TTE while in place    HTN  - on home losartan 100mg and hydral 25mg. Will reinitiate slowly if HD stable     HLD  - c/w home simvastatin     #GI  - start diet as tolerated  - monitor for regular BM while inpatient    #  BPH  - continue home tamsulosin     CKD  - Baseline SCr looks to be around 1.5-1.7 base upon prior SCr trend in 2020. SCr this AM 1.42  - monitor and trend SCr, BUN, and lytes  - replete lytes prn with goal K 4-4.5 and Mg >2    #Endo  - f/u hgb a1c and lipid panel   Hypothyroidism   - TSH elevated with low T3 and T4  - will consult endo for recs    #Heme  Normocytic anemia  - continue to monitor and trend CBC.   - Likely 2/2 anemia of chronic disease 2/2 CKD. If continues to downtrend can consider anemia workup     #ID  Mild leukocytosis, afebrile  - continue to monitor and trend wbc and temp curve  - leukocytosis likely reactive 2/2 effusion. will monitor off abx at this time CCU Accept Note    Transfer from: [facility]    Accepting physician: Dr. Irvin     HPI:  This is a 76yo old  Male with PMH of HTN, HLD,  BPH, CKD ( creat 1.42 today), chronic angelita hydronephrosis ( x2 years), prostate ca ( radiation 10 years ago), seizure disorder ( last seizure 2015), ETOH use ( sober since 2016), hypothyroidism, and mild demenia; presents to [facility] on 9/3/2022 with chief complaint of SOB and CP x3 days.  Pt has been a heavy smoker his whole life. Denies fever, chills, cough, or congestion. No sick contacts.    Admitted to tele for likely COPD exacerbation and CP.  CXR is clear. Started on Duonebs, Symbicort and Solumedrol. No prior cardiac stents. EKG on arrival to ED notes PRWP V 1-3, no ST changes. Trop high on arrival at 268 trending down 180.8 today, proBNP 763, CK 1090. TSH 69.600 ( stopped synthroid a few weeks ago), T3 <20 T4 0.9, Na 126.  CXR revealing New cardiomegaly and CT chest with Large pericardial effusion, Echo today with large pericardial effusion with early tamponade physiology. Transferred to [facility] for pericardiocentesis. On arrival to [facility] patient awake and verbal A&OX3, denies any chest pain, dyspnea, dizziness, palpitations, N&V, Ha, orthopnea, LE edema.  VS WNL, on 2 L nasal cannula in no respiratory distress.      recs from [facility]:  home with home PT          INTERPRETATION:  CLINICAL STATEMENT: Chest Pain. Shortness of breath  TECHNIQUE: AP view of the chest.  COMPARISON: 7/19/2020    New cardiomegaly, may be exaggerated by AP technique. Suboptimal degree   of inspiration. Small left pleural effusion.    Degenerative changes of the spine, shoulders and AC joints.    IMPRESSION:    New cardiomegaly, may be exaggerated by AP technique.    Small left pleural effusion.    ECHO  Summary:   1. Left ventricular ejection fraction, by visual estimation, is 50 to   55%.   2. Mildly enlarged left atrium.   3. Maximum thickness of effusion is 2-2.5 cm.   4. Mild mitral annular calcification.   5. Mild thickening of the anterior and posterior mitral valve leaflets.   6. Trace mitral valve regurgitation.   7. Mild tricuspid regurgitation.   8. Estimated pulmonary artery systolic pressure is 37.4 mmHg assuming a   right atrial pressure of 5 mmHg, which is consistent with mild pulmonary   hypertension.   9. Findings are consistent with mild cardiac tamponade.      CT CHEST                          PROCEDURE DATE:  09/06/2022      INTERPRETATION:  HISTORY: Dyspnea on exertion. Smoker.    EXAMINATION: CT CHEST was performed without IV contrast.    COMPARISON: No CT chest comparison. Correlation with 1/9/2018 CT abdomen.    FINDINGS:    AIRWAYS, LUNGS, PLEURA: Trachea and mainstem bronchi patent. Emphysema.   Posterior left upper lobe and left basilar atelectasis. Small bilateral   pleural effusions.    MEDIASTINUM: Large circumferential pericardial effusion; depth of   pericardial fluid is 34 mm at the level of the basal inferior wall of the   left ventricle (sagittal series image 78). There is flattening of the   free wall of the right ventricle. Coronary atherosclerosis.    IMAGED ABDOMEN: Bilateral hydronephrosis. Cholelithiasis.    SOFT TISSUES: Bilateral gynecomastia.    BONES: Degenerative changes of the spine. Loss of height of L3 vertebral   body similar to 1/9/2018 CT abdomen.    IMPRESSION:.    Large pericardial effusion.    Small bilateral pleural effusions. (07 Sep 2022 13:09)      Hospital Course:    OBJECTIVE DATA:    Vital Signs Last 24 Hrs  T(C): 36.4 (07 Sep 2022 12:58), Max: 36.8 (06 Sep 2022 18:05)  T(F): 97.6 (07 Sep 2022 12:58), Max: 98.2 (06 Sep 2022 18:05)  HR: 70 (07 Sep 2022 12:58) (62 - 78)  BP: 146/82 (07 Sep 2022 12:58) (104/66 - 164/86)  BP(mean): --  RR: 14 (07 Sep 2022 12:58) (14 - 18)  SpO2: 98% (07 Sep 2022 12:58) (95% - 98%)    Parameters below as of 07 Sep 2022 12:58  Patient On (Oxygen Delivery Method): nasal cannula  O2 Flow (L/min): 2    I&O's Summary      Allergies:      MEDICATIONS  (STANDING):    MEDICATIONS  (PRN):      LABS                                            10.9                  Neurophils% (auto):   x      (09-07 @ 06:45):    12.98)-----------(220          Lymphocytes% (auto):  x                                             32.8                   Eosinphils% (auto):   x        Manual%: Neutrophils x    ; Lymphocytes x    ; Eosinophils x    ; Bands%: x    ; Blasts x                                    126    |  93     |  22                  Calcium: 7.7   / iCa: x      (09-07 @ 06:45)    ----------------------------<  115       Magnesium: x                                3.9     |  28     |  1.42             Phosphorous: x              ASSESSMENT & PLAN:     77M with PMH of HTN, HLD,  BPH, CKD ( creat 1.42 today), chronic angelita hydronephrosis ( x2 years), prostate ca ( radiation 10 years ago), seizure disorder ( last seizure 2015), ETOH use ( sober since 2016), hypothyroidism, and mild demenia p/w CP and SOB found with large pericardial effusion s/p pericardiocentesis.     #Neuro   Alert and oriented  - continue to monitor mental status as per protocol    #Resp  Comfortable on RA  - SOB on admission likely 2/2 pericardial tamponade. monitor for improvement s/p drain placement   - Monitor SpO2 with goal >94%  - 7/6 CT chest: Large pericardial effusion. Small bilateral pleural effusions. Bilateral hydronephrosis.    #CV  Cardiac Tamponade  - 9/7 TTE: EF 70%. Mod concentric LVH. Normal RV size and function. Large pericardial effusion with evidence of echo tamponade physiology.   - s/p pericardiocentesis with drain placement, 1060mL out serosanguinous.   - f/u pericardial fluid studies including cytology, gram stain, and culture   - Patient remained HD stable at [place] prior to transfer (as per documentation: HRs 60-70s; SBPs 150-170s, Lying flat off with O2 sats 99%). Continue to monitor vital signs   - trend lactate, p-BNP, and troponin (trop I downtrending at OSH prior to transfer) daily   - monitor drain output. Daily TTE while in place    HTN  - on home losartan 100mg and hydral 25mg. Will reinitiate slowly if HD stable     HLD  - c/w home simvastatin     #GI  - start diet as tolerated  - monitor for regular BM while inpatient    #  BPH  - continue home tamsulosin     CKD  - Baseline SCr looks to be around 1.5-1.7 base upon prior SCr trend in 2020. SCr this AM 1.42  - monitor and trend SCr, BUN, and lytes  - replete lytes prn with goal K 4-4.5 and Mg >2    Hydronephrosis on CT 9/6  - renal US ordered     #Endo  - f/u hgb a1c and lipid panel   Hypothyroidism   - TSH elevated with low T3 and T4  - will consult endo for recs    #Heme  Normocytic anemia  - continue to monitor and trend CBC.   - Likely 2/2 anemia of chronic disease 2/2 CKD. If continues to downtrend can consider anemia workup     #ID  Mild leukocytosis, afebrile  - continue to monitor and trend wbc and temp curve  - leukocytosis likely reactive 2/2 effusion. will monitor off abx at this time CCU Accept Note    Transfer from: [facility]    Accepting physician: Dr. Irvin     HPI:  This is a 78yo old  Male with PMH of HTN, HLD,  BPH, CKD ( creat 1.42 today), chronic angelita hydronephrosis ( x2 years), prostate ca ( radiation 10 years ago), seizure disorder ( last seizure 2015), ETOH use ( sober since 2016), hypothyroidism, and mild demenia; presents to [facility] on 9/3/2022 with chief complaint of SOB and CP x3 days.  Pt has been a heavy smoker his whole life. Denies fever, chills, cough, or congestion. No sick contacts.    Admitted to tele for likely COPD exacerbation and CP.  CXR is clear. Started on Duonebs, Symbicort and Solumedrol. No prior cardiac stents. EKG on arrival to ED notes PRWP V 1-3, no ST changes. Trop high on arrival at 268 trending down 180.8 today, proBNP 763, CK 1090. TSH 69.600 ( stopped synthroid a few weeks ago), T3 <20 T4 0.9, Na 126.  CXR revealing New cardiomegaly and CT chest with Large pericardial effusion, Echo today with large pericardial effusion with early tamponade physiology. Transferred to [facility] for pericardiocentesis. On arrival to [facility] patient awake and verbal A&OX3, denies any chest pain, dyspnea, dizziness, palpitations, N&V, Ha, orthopnea, LE edema.  VS WNL, on 2 L nasal cannula in no respiratory distress.      recs from [facility]:  home with home PT          INTERPRETATION:  CLINICAL STATEMENT: Chest Pain. Shortness of breath  TECHNIQUE: AP view of the chest.  COMPARISON: 7/19/2020    New cardiomegaly, may be exaggerated by AP technique. Suboptimal degree   of inspiration. Small left pleural effusion.    Degenerative changes of the spine, shoulders and AC joints.    IMPRESSION:    New cardiomegaly, may be exaggerated by AP technique.    Small left pleural effusion.    ECHO  Summary:   1. Left ventricular ejection fraction, by visual estimation, is 50 to   55%.   2. Mildly enlarged left atrium.   3. Maximum thickness of effusion is 2-2.5 cm.   4. Mild mitral annular calcification.   5. Mild thickening of the anterior and posterior mitral valve leaflets.   6. Trace mitral valve regurgitation.   7. Mild tricuspid regurgitation.   8. Estimated pulmonary artery systolic pressure is 37.4 mmHg assuming a   right atrial pressure of 5 mmHg, which is consistent with mild pulmonary   hypertension.   9. Findings are consistent with mild cardiac tamponade.      CT CHEST                          PROCEDURE DATE:  09/06/2022      INTERPRETATION:  HISTORY: Dyspnea on exertion. Smoker.    EXAMINATION: CT CHEST was performed without IV contrast.    COMPARISON: No CT chest comparison. Correlation with 1/9/2018 CT abdomen.    FINDINGS:    AIRWAYS, LUNGS, PLEURA: Trachea and mainstem bronchi patent. Emphysema.   Posterior left upper lobe and left basilar atelectasis. Small bilateral   pleural effusions.    MEDIASTINUM: Large circumferential pericardial effusion; depth of   pericardial fluid is 34 mm at the level of the basal inferior wall of the   left ventricle (sagittal series image 78). There is flattening of the   free wall of the right ventricle. Coronary atherosclerosis.    IMAGED ABDOMEN: Bilateral hydronephrosis. Cholelithiasis.    SOFT TISSUES: Bilateral gynecomastia.    BONES: Degenerative changes of the spine. Loss of height of L3 vertebral   body similar to 1/9/2018 CT abdomen.    IMPRESSION:.    Large pericardial effusion.    Small bilateral pleural effusions. (07 Sep 2022 13:09)      Hospital Course:    OBJECTIVE DATA:    Vital Signs Last 24 Hrs  T(C): 36.4 (07 Sep 2022 12:58), Max: 36.8 (06 Sep 2022 18:05)  T(F): 97.6 (07 Sep 2022 12:58), Max: 98.2 (06 Sep 2022 18:05)  HR: 70 (07 Sep 2022 12:58) (62 - 78)  BP: 146/82 (07 Sep 2022 12:58) (104/66 - 164/86)  BP(mean): --  RR: 14 (07 Sep 2022 12:58) (14 - 18)  SpO2: 98% (07 Sep 2022 12:58) (95% - 98%)    Parameters below as of 07 Sep 2022 12:58  Patient On (Oxygen Delivery Method): nasal cannula  O2 Flow (L/min): 2    I&O's Summary      Allergies:      MEDICATIONS  (STANDING):    MEDICATIONS  (PRN):      LABS                                            10.9                  Neurophils% (auto):   x      (09-07 @ 06:45):    12.98)-----------(220          Lymphocytes% (auto):  x                                             32.8                   Eosinphils% (auto):   x        Manual%: Neutrophils x    ; Lymphocytes x    ; Eosinophils x    ; Bands%: x    ; Blasts x                                    126    |  93     |  22                  Calcium: 7.7   / iCa: x      (09-07 @ 06:45)    ----------------------------<  115       Magnesium: x                                3.9     |  28     |  1.42             Phosphorous: x              ASSESSMENT & PLAN:     77M with PMH of HTN, HLD,  BPH, CKD ( creat 1.42 today), chronic angelita hydronephrosis ( x2 years), prostate ca ( radiation 10 years ago), seizure disorder ( last seizure 2015), ETOH use ( sober since 2016), hypothyroidism, and mild demenia p/w CP and SOB found with large pericardial effusion s/p pericardiocentesis.     #Neuro   Alert and oriented  - continue to monitor mental status as per protocol    #Resp  Comfortable on RA  - SOB on admission likely 2/2 pericardial tamponade. monitor for improvement s/p drain placement   - Monitor SpO2 with goal >94%  - 7/6 CT chest: Large pericardial effusion. Small bilateral pleural effusions. Bilateral hydronephrosis.    COPD  - duonebs prn   - started on levofloxacin outpatient for concern of exacerbation. Will d/c for now. Likely sob related to pericardial effusion (states SOB improved s/p drain placement)     #CV  Cardiac Tamponade  - 9/7 TTE: EF 70%. Mod concentric LVH. Normal RV size and function. Large pericardial effusion with evidence of echo tamponade physiology.   - s/p pericardiocentesis with drain placement, 1060mL out serosanguinous.   - f/u pericardial fluid studies including cytology, gram stain, and culture   - Patient remained HD stable at [place] prior to transfer (as per documentation: HRs 60-70s; SBPs 150-170s, Lying flat off with O2 sats 99%). Continue to monitor vital signs   - trend lactate, p-BNP, and troponin (trop I downtrending at OSH prior to transfer) daily   - monitor drain output. Daily TTE while in place    HTN  - on home losartan 100mg and hydral 25mg. Will reinitiate slowly if HD stable     HLD  - c/w home simvastatin     #GI  - start diet as tolerated  - monitor for regular BM while inpatient    #  BPH  - continue home tamsulosin     CKD  - Baseline SCr looks to be around 1.5-1.7 base upon prior SCr trend in 2020. SCr this AM 1.42  - monitor and trend SCr, BUN, and lytes  - replete lytes prn with goal K 4-4.5 and Mg >2    Hydronephrosis on CT 9/6  - renal US ordered     #Endo  - f/u hgb a1c and lipid panel     Hypothyroidism   - TSH elevated with low T3 and T4  - will consult endo for recs  - on home synthroid 112 mcg, may have missed a few doses (waiting upon refill).     #Heme  Normocytic anemia  - continue to monitor and trend CBC.   - Likely 2/2 anemia of chronic disease 2/2 CKD. If continues to downtrend can consider anemia workup     #ID  Mild leukocytosis, afebrile  - continue to monitor and trend wbc and temp curve  - leukocytosis likely reactive 2/2 effusion. will monitor off abx at this time CCU Accept Note    Transfer from: [facility]    Accepting physician: Dr. Irvin     HPI:  This is a 78yo old  Male with PMH of HTN, HLD,  BPH, CKD ( creat 1.42 today), chronic angelita hydronephrosis ( x2 years), prostate ca ( radiation 10 years ago), seizure disorder ( last seizure 2015), ETOH use ( sober since 2016), hypothyroidism, and mild demenia; presents to [facility] on 9/3/2022 with chief complaint of SOB and CP x3 days.  Pt has been a heavy smoker his whole life. Denies fever, chills, cough, or congestion. No sick contacts.    Admitted to tele for likely COPD exacerbation and CP.  CXR is clear. Started on Duonebs, Symbicort and Solumedrol. No prior cardiac stents. EKG on arrival to ED notes PRWP V 1-3, no ST changes. Trop high on arrival at 268 trending down 180.8 today, proBNP 763, CK 1090. TSH 69.600 ( stopped synthroid a few weeks ago), T3 <20 T4 0.9, Na 126.  CXR revealing New cardiomegaly and CT chest with Large pericardial effusion, Echo today with large pericardial effusion with early tamponade physiology. Transferred to [facility] for pericardiocentesis. On arrival to [facility] patient awake and verbal A&OX3, denies any chest pain, dyspnea, dizziness, palpitations, N&V, Ha, orthopnea, LE edema.  VS WNL, on 2 L nasal cannula in no respiratory distress.      recs from [facility]:  home with home PT          INTERPRETATION:  CLINICAL STATEMENT: Chest Pain. Shortness of breath  TECHNIQUE: AP view of the chest.  COMPARISON: 7/19/2020    New cardiomegaly, may be exaggerated by AP technique. Suboptimal degree   of inspiration. Small left pleural effusion.    Degenerative changes of the spine, shoulders and AC joints.    IMPRESSION:    New cardiomegaly, may be exaggerated by AP technique.    Small left pleural effusion.    ECHO  Summary:   1. Left ventricular ejection fraction, by visual estimation, is 50 to   55%.   2. Mildly enlarged left atrium.   3. Maximum thickness of effusion is 2-2.5 cm.   4. Mild mitral annular calcification.   5. Mild thickening of the anterior and posterior mitral valve leaflets.   6. Trace mitral valve regurgitation.   7. Mild tricuspid regurgitation.   8. Estimated pulmonary artery systolic pressure is 37.4 mmHg assuming a   right atrial pressure of 5 mmHg, which is consistent with mild pulmonary   hypertension.   9. Findings are consistent with mild cardiac tamponade.      CT CHEST                          PROCEDURE DATE:  09/06/2022      INTERPRETATION:  HISTORY: Dyspnea on exertion. Smoker.    EXAMINATION: CT CHEST was performed without IV contrast.    COMPARISON: No CT chest comparison. Correlation with 1/9/2018 CT abdomen.    FINDINGS:    AIRWAYS, LUNGS, PLEURA: Trachea and mainstem bronchi patent. Emphysema.   Posterior left upper lobe and left basilar atelectasis. Small bilateral   pleural effusions.    MEDIASTINUM: Large circumferential pericardial effusion; depth of   pericardial fluid is 34 mm at the level of the basal inferior wall of the   left ventricle (sagittal series image 78). There is flattening of the   free wall of the right ventricle. Coronary atherosclerosis.    IMAGED ABDOMEN: Bilateral hydronephrosis. Cholelithiasis.    SOFT TISSUES: Bilateral gynecomastia.    BONES: Degenerative changes of the spine. Loss of height of L3 vertebral   body similar to 1/9/2018 CT abdomen.    IMPRESSION:.    Large pericardial effusion.    Small bilateral pleural effusions. (07 Sep 2022 13:09)      Hospital Course:    OBJECTIVE DATA:    Vital Signs Last 24 Hrs  T(C): 36.4 (07 Sep 2022 12:58), Max: 36.8 (06 Sep 2022 18:05)  T(F): 97.6 (07 Sep 2022 12:58), Max: 98.2 (06 Sep 2022 18:05)  HR: 70 (07 Sep 2022 12:58) (62 - 78)  BP: 146/82 (07 Sep 2022 12:58) (104/66 - 164/86)  BP(mean): --  RR: 14 (07 Sep 2022 12:58) (14 - 18)  SpO2: 98% (07 Sep 2022 12:58) (95% - 98%)    Parameters below as of 07 Sep 2022 12:58  Patient On (Oxygen Delivery Method): nasal cannula  O2 Flow (L/min): 2    I&O's Summary      Allergies:      MEDICATIONS  (STANDING):    MEDICATIONS  (PRN):      LABS                                            10.9                  Neurophils% (auto):   x      (09-07 @ 06:45):    12.98)-----------(220          Lymphocytes% (auto):  x                                             32.8                   Eosinphils% (auto):   x        Manual%: Neutrophils x    ; Lymphocytes x    ; Eosinophils x    ; Bands%: x    ; Blasts x                                    126    |  93     |  22                  Calcium: 7.7   / iCa: x      (09-07 @ 06:45)    ----------------------------<  115       Magnesium: x                                3.9     |  28     |  1.42             Phosphorous: x              ASSESSMENT & PLAN:     77M with PMH of HTN, HLD,  BPH, CKD ( creat 1.42 today), chronic angelita hydronephrosis ( x2 years), prostate ca ( radiation 10 years ago), seizure disorder ( last seizure 2015), ETOH use ( sober since 2016), hypothyroidism, and mild demenia p/w CP and SOB found with large pericardial effusion s/p pericardiocentesis.     #Neuro   Alert and oriented  - continue to monitor mental status as per protocol    Seizure disorder  - No seziure meds at home    #Resp  Comfortable on RA  - SOB on admission likely 2/2 pericardial tamponade. monitor for improvement s/p drain placement   - Monitor SpO2 with goal >94%  - 7/6 CT chest: Large pericardial effusion. Small bilateral pleural effusions. Bilateral hydronephrosis.    COPD  - duonebs prn   - started on levofloxacin outpatient for concern of exacerbation. Will d/c for now. Likely sob related to pericardial effusion (states SOB improved s/p drain placement)     #CV  Cardiac Tamponade  - 9/7 TTE: EF 70%. Mod concentric LVH. Normal RV size and function. Large pericardial effusion with evidence of echo tamponade physiology.   - s/p pericardiocentesis with drain placement, 1060mL out serosanguinous.   - f/u pericardial fluid studies including cytology, gram stain, and culture   - Patient remained HD stable at [place] prior to transfer (as per documentation: HRs 60-70s; SBPs 150-170s, Lying flat off with O2 sats 99%). Continue to monitor vital signs   - trend lactate, p-BNP, and troponin (trop I downtrending at OSH prior to transfer) daily   - monitor drain output. Daily TTE while in place    HTN  - on home losartan 100mg and hydral 25mg. Will reinitiate slowly if HD stable     HLD  - c/w home simvastatin     #GI  - start diet as tolerated  - monitor for regular BM while inpatient. Last B< 4 days ago. will start bowel reg     #  BPH  - continue home tamsulosin     Remote Hx Prostate Ca  - f.u with urology, unsure of name. will f/u with sister for collateral info.   - states in remission for >20 years     CKD  - Baseline SCr looks to be around 1.5-1.7 base upon prior SCr trend in 2020. SCr this AM 1.42  - monitor and trend SCr, BUN, and lytes  - replete lytes prn with goal K 4-4.5 and Mg >2    Hydronephrosis on CT 9/6  - renal US ordered   - low threshold for jackson placement. Urinating currently with bladder scan of 120mL     #Endo  - f/u hgb a1c and lipid panel     Hypothyroidism   - TSH elevated with low T3 and T4  - will consult endo for recs  - on home synthroid 112 mcg, may have missed a few doses (waiting upon refill).     #Heme  Normocytic anemia  - continue to monitor and trend CBC.   - Likely 2/2 anemia of chronic disease 2/2 CKD. If continues to downtrend can consider anemia workup     #ID  Mild leukocytosis, afebrile  - continue to monitor and trend wbc and temp curve  - leukocytosis likely reactive 2/2 effusion. will monitor off abx at this time CCU Accept Note    Transfer from: [facility]    Accepting physician: Dr. Irvin     HPI:  This is a 78yo old  Male with PMH of HTN, HLD,  BPH, CKD ( creat 1.42 today), chronic angelita hydronephrosis ( x2 years), prostate ca ( radiation 10 years ago), seizure disorder ( last seizure 2015), ETOH use ( sober since 2016), hypothyroidism, and mild demenia; presents to [facility] on 9/3/2022 with chief complaint of SOB and CP x3 days.  Pt has been a heavy smoker his whole life. Denies fever, chills, cough, or congestion. No sick contacts.    Admitted to tele for likely COPD exacerbation and CP.  CXR is clear. Started on Duonebs, Symbicort and Solumedrol. No prior cardiac stents. EKG on arrival to ED notes PRWP V 1-3, no ST changes. Trop high on arrival at 268 trending down 180.8 today, proBNP 763, CK 1090. TSH 69.600 ( stopped synthroid a few weeks ago), T3 <20 T4 0.9, Na 126.  CXR revealing New cardiomegaly and CT chest with Large pericardial effusion, Echo today with large pericardial effusion with early tamponade physiology. Transferred to [facility] for pericardiocentesis. On arrival to [facility] patient awake and verbal A&OX3, denies any chest pain, dyspnea, dizziness, palpitations, N&V, Ha, orthopnea, LE edema.  VS WNL, on 2 L nasal cannula in no respiratory distress.      recs from [facility]:  home with home PT          INTERPRETATION:  CLINICAL STATEMENT: Chest Pain. Shortness of breath  TECHNIQUE: AP view of the chest.  COMPARISON: 7/19/2020    New cardiomegaly, may be exaggerated by AP technique. Suboptimal degree   of inspiration. Small left pleural effusion.    Degenerative changes of the spine, shoulders and AC joints.    IMPRESSION:    New cardiomegaly, may be exaggerated by AP technique.    Small left pleural effusion.    ECHO  Summary:   1. Left ventricular ejection fraction, by visual estimation, is 50 to   55%.   2. Mildly enlarged left atrium.   3. Maximum thickness of effusion is 2-2.5 cm.   4. Mild mitral annular calcification.   5. Mild thickening of the anterior and posterior mitral valve leaflets.   6. Trace mitral valve regurgitation.   7. Mild tricuspid regurgitation.   8. Estimated pulmonary artery systolic pressure is 37.4 mmHg assuming a   right atrial pressure of 5 mmHg, which is consistent with mild pulmonary   hypertension.   9. Findings are consistent with mild cardiac tamponade.      CT CHEST                          PROCEDURE DATE:  09/06/2022      INTERPRETATION:  HISTORY: Dyspnea on exertion. Smoker.    EXAMINATION: CT CHEST was performed without IV contrast.    COMPARISON: No CT chest comparison. Correlation with 1/9/2018 CT abdomen.    FINDINGS:    AIRWAYS, LUNGS, PLEURA: Trachea and mainstem bronchi patent. Emphysema.   Posterior left upper lobe and left basilar atelectasis. Small bilateral   pleural effusions.    MEDIASTINUM: Large circumferential pericardial effusion; depth of   pericardial fluid is 34 mm at the level of the basal inferior wall of the   left ventricle (sagittal series image 78). There is flattening of the   free wall of the right ventricle. Coronary atherosclerosis.    IMAGED ABDOMEN: Bilateral hydronephrosis. Cholelithiasis.    SOFT TISSUES: Bilateral gynecomastia.    BONES: Degenerative changes of the spine. Loss of height of L3 vertebral   body similar to 1/9/2018 CT abdomen.    IMPRESSION:.    Large pericardial effusion.    Small bilateral pleural effusions. (07 Sep 2022 13:09)      Hospital Course:    OBJECTIVE DATA:    Vital Signs Last 24 Hrs  T(C): 36.4 (07 Sep 2022 12:58), Max: 36.8 (06 Sep 2022 18:05)  T(F): 97.6 (07 Sep 2022 12:58), Max: 98.2 (06 Sep 2022 18:05)  HR: 70 (07 Sep 2022 12:58) (62 - 78)  BP: 146/82 (07 Sep 2022 12:58) (104/66 - 164/86)  BP(mean): --  RR: 14 (07 Sep 2022 12:58) (14 - 18)  SpO2: 98% (07 Sep 2022 12:58) (95% - 98%)    Parameters below as of 07 Sep 2022 12:58  Patient On (Oxygen Delivery Method): nasal cannula  O2 Flow (L/min): 2    I&O's Summary      Allergies:      MEDICATIONS  (STANDING):    MEDICATIONS  (PRN):      LABS                                            10.9                  Neurophils% (auto):   x      (09-07 @ 06:45):    12.98)-----------(220          Lymphocytes% (auto):  x                                             32.8                   Eosinphils% (auto):   x        Manual%: Neutrophils x    ; Lymphocytes x    ; Eosinophils x    ; Bands%: x    ; Blasts x                                    126    |  93     |  22                  Calcium: 7.7   / iCa: x      (09-07 @ 06:45)    ----------------------------<  115       Magnesium: x                                3.9     |  28     |  1.42             Phosphorous: x              ASSESSMENT & PLAN:     77M with PMH of HTN, HLD,  BPH, CKD ( creat 1.42 today), chronic angelita hydronephrosis ( x2 years), prostate ca ( radiation 10 years ago), seizure disorder ( last seizure 2015), ETOH use ( sober since 2016), hypothyroidism, and mild demenia p/w CP and SOB found with large pericardial effusion s/p pericardiocentesis.     #Neuro   Alert and oriented  - continue to monitor mental status as per protocol    Seizure disorder  - No seziure meds at home    #Resp  Comfortable on RA  - SOB on admission likely 2/2 pericardial tamponade. monitor for improvement s/p drain placement   - Monitor SpO2 with goal >94%  - 7/6 CT chest: Large pericardial effusion. Small bilateral pleural effusions. Bilateral hydronephrosis.    COPD  - duonebs prn   - started on levofloxacin outpatient for concern of exacerbation. Will d/c for now. Likely sob related to pericardial effusion (states SOB improved s/p drain placement)   - provided smoking cessation education. Start nicotine patch while inpatient (states quit last week, smokes 4-5 cig/daily)     #CV  Cardiac Tamponade  - 9/7 TTE: EF 70%. Mod concentric LVH. Normal RV size and function. Large pericardial effusion with evidence of echo tamponade physiology.   - s/p pericardiocentesis with drain placement, 1060mL out serosanguinous.   - f/u pericardial fluid studies including cytology, gram stain, and culture   - Patient remained HD stable at [place] prior to transfer (as per documentation: HRs 60-70s; SBPs 150-170s, Lying flat off with O2 sats 99%). Continue to monitor vital signs   - trend lactate, p-BNP, and troponin (trop I downtrending at OSH prior to transfer) daily   - monitor drain output. Daily TTE while in place  - Cancer screening: Last colonoscopy >10 years ago (states went last year for colonoscopy but procedure aborted 2/2 HTN outpatient and never rescheduled). Remote history of Prostate CA, follows with urologist but unsure of name. waiting for patient sister to provide collateral info     HTN  - on home losartan 100mg and hydral 25mg. Will reinitiate slowly if HD stable     HLD  - c/w home simvastatin     #GI  - start diet as tolerated  - monitor for regular BM while inpatient. Last B< 4 days ago. will start bowel reg     #  BPH  - continue home tamsulosin     Remote Hx Prostate Ca  - f.u with urology, unsure of name. will f/u with sister for collateral info.   - states in remission for >20 years     CKD  - Baseline SCr looks to be around 1.5-1.7 base upon prior SCr trend in 2020. SCr this AM 1.42  - monitor and trend SCr, BUN, and lytes  - replete lytes prn with goal K 4-4.5 and Mg >2    Hydronephrosis on CT 9/6  - renal US ordered   - low threshold for jackson placement. Urinating currently with bladder scan of 120mL     #Endo  - f/u hgb a1c and lipid panel     Hypothyroidism   - TSH elevated with low T3 and T4  - will consult endo for recs  - on home synthroid 112 mcg, may have missed a few doses (waiting upon refill).     #Heme  Normocytic anemia  - continue to monitor and trend CBC.   - Likely 2/2 anemia of chronic disease 2/2 CKD. If continues to downtrend can consider anemia workup     #ID  Mild leukocytosis, afebrile  - continue to monitor and trend wbc and temp curve  - leukocytosis likely reactive 2/2 effusion. will monitor off abx at this time CCU Accept Note    Transfer from: [facility]    Accepting physician: Dr. Irvin     HPI:  This is a 78yo old  Male with PMH of HTN, HLD,  BPH, CKD ( creat 1.42 today), chronic angelita hydronephrosis ( x2 years), prostate ca ( radiation 10 years ago), seizure disorder ( last seizure 2015), ETOH use ( sober since 2016), hypothyroidism, and mild demenia; presents to [facility] on 9/3/2022 with chief complaint of SOB and CP x3 days.  Pt has been a heavy smoker his whole life. Denies fever, chills, cough, or congestion. No sick contacts.    Admitted to tele for likely COPD exacerbation and CP.  CXR is clear. Started on Duonebs, Symbicort and Solumedrol. No prior cardiac stents. EKG on arrival to ED notes PRWP V 1-3, no ST changes. Trop high on arrival at 268 trending down 180.8 today, proBNP 763, CK 1090. TSH 69.600 ( stopped synthroid a few weeks ago), T3 <20 T4 0.9, Na 126.  CXR revealing New cardiomegaly and CT chest with Large pericardial effusion, Echo today with large pericardial effusion with early tamponade physiology. Transferred to [facility] for pericardiocentesis. On arrival to [facility] patient awake and verbal A&OX3, denies any chest pain, dyspnea, dizziness, palpitations, N&V, Ha, orthopnea, LE edema.  VS WNL, on 2 L nasal cannula in no respiratory distress.      recs from [facility]:  home with home PT          INTERPRETATION:  CLINICAL STATEMENT: Chest Pain. Shortness of breath  TECHNIQUE: AP view of the chest.  COMPARISON: 7/19/2020    New cardiomegaly, may be exaggerated by AP technique. Suboptimal degree   of inspiration. Small left pleural effusion.    Degenerative changes of the spine, shoulders and AC joints.    IMPRESSION:    New cardiomegaly, may be exaggerated by AP technique.    Small left pleural effusion.    ECHO  Summary:   1. Left ventricular ejection fraction, by visual estimation, is 50 to   55%.   2. Mildly enlarged left atrium.   3. Maximum thickness of effusion is 2-2.5 cm.   4. Mild mitral annular calcification.   5. Mild thickening of the anterior and posterior mitral valve leaflets.   6. Trace mitral valve regurgitation.   7. Mild tricuspid regurgitation.   8. Estimated pulmonary artery systolic pressure is 37.4 mmHg assuming a   right atrial pressure of 5 mmHg, which is consistent with mild pulmonary   hypertension.   9. Findings are consistent with mild cardiac tamponade.      CT CHEST                          PROCEDURE DATE:  09/06/2022      INTERPRETATION:  HISTORY: Dyspnea on exertion. Smoker.    EXAMINATION: CT CHEST was performed without IV contrast.    COMPARISON: No CT chest comparison. Correlation with 1/9/2018 CT abdomen.    FINDINGS:    AIRWAYS, LUNGS, PLEURA: Trachea and mainstem bronchi patent. Emphysema.   Posterior left upper lobe and left basilar atelectasis. Small bilateral   pleural effusions.    MEDIASTINUM: Large circumferential pericardial effusion; depth of   pericardial fluid is 34 mm at the level of the basal inferior wall of the   left ventricle (sagittal series image 78). There is flattening of the   free wall of the right ventricle. Coronary atherosclerosis.    IMAGED ABDOMEN: Bilateral hydronephrosis. Cholelithiasis.    SOFT TISSUES: Bilateral gynecomastia.    BONES: Degenerative changes of the spine. Loss of height of L3 vertebral   body similar to 1/9/2018 CT abdomen.    IMPRESSION:.    Large pericardial effusion.    Small bilateral pleural effusions. (07 Sep 2022 13:09)      Hospital Course:    OBJECTIVE DATA:    Vital Signs Last 24 Hrs  T(C): 36.4 (07 Sep 2022 12:58), Max: 36.8 (06 Sep 2022 18:05)  T(F): 97.6 (07 Sep 2022 12:58), Max: 98.2 (06 Sep 2022 18:05)  HR: 70 (07 Sep 2022 12:58) (62 - 78)  BP: 146/82 (07 Sep 2022 12:58) (104/66 - 164/86)  BP(mean): --  RR: 14 (07 Sep 2022 12:58) (14 - 18)  SpO2: 98% (07 Sep 2022 12:58) (95% - 98%)    Parameters below as of 07 Sep 2022 12:58  Patient On (Oxygen Delivery Method): nasal cannula  O2 Flow (L/min): 2    I&O's Summary      Allergies:      MEDICATIONS  (STANDING):    MEDICATIONS  (PRN):      LABS                                            10.9                  Neurophils% (auto):   x      (09-07 @ 06:45):    12.98)-----------(220          Lymphocytes% (auto):  x                                             32.8                   Eosinphils% (auto):   x        Manual%: Neutrophils x    ; Lymphocytes x    ; Eosinophils x    ; Bands%: x    ; Blasts x                                    126    |  93     |  22                  Calcium: 7.7   / iCa: x      (09-07 @ 06:45)    ----------------------------<  115       Magnesium: x                                3.9     |  28     |  1.42             Phosphorous: x              ASSESSMENT & PLAN:     77M with PMH of HTN, HLD,  BPH, CKD ( creat 1.42 today), chronic angelita hydronephrosis ( x2 years), prostate ca ( radiation 10 years ago), seizure disorder ( last seizure 2015), ETOH use ( sober since 2016), hypothyroidism, and mild demenia p/w CP and SOB found with large pericardial effusion s/p pericardiocentesis.     #Neuro   Alert and oriented  - continue to monitor mental status as per protocol    Seizure disorder  - No seziure meds at home    #Resp  Comfortable on RA  - SOB on admission likely 2/2 pericardial tamponade. monitor for improvement s/p drain placement   - Monitor SpO2 with goal >94%  - 7/6 CT chest: Large pericardial effusion. Small bilateral pleural effusions. Bilateral hydronephrosis.    COPD  - duonebs prn   - started on levofloxacin outpatient for concern of COPD exacerbation/PNA. Will d/c for now. Likely sob related to pericardial effusion (states SOB improved s/p drain placement)   - provided smoking cessation education. Start nicotine patch while inpatient (states quit last week, smokes 4-5 cig/daily)     #CV  Cardiac Tamponade  - 9/7 TTE: EF 70%. Mod concentric LVH. Normal RV size and function. Large pericardial effusion with evidence of echo tamponade physiology.   - s/p pericardiocentesis with drain placement, 1060mL out serosanguinous.   - f/u pericardial fluid studies including cytology, gram stain, and culture   - Patient remained HD stable at [place] prior to transfer (as per documentation: HRs 60-70s; SBPs 150-170s, Lying flat off with O2 sats 99%). Continue to monitor vital signs   - trend lactate, p-BNP, and troponin (trop I downtrending at OSH prior to transfer) daily   - monitor drain output. Daily TTE while in place  - Cancer screening: Last colonoscopy >10 years ago (states went last year for colonoscopy but procedure aborted 2/2 HTN outpatient and never rescheduled). Remote history of Prostate CA, follows with urologist but unsure of name. waiting for patient sister to provide collateral info     HTN  - on home losartan 100mg and hydral 25mg. Will reinitiate slowly if HD stable     HLD  - c/w home simvastatin     #GI  - start diet as tolerated  - monitor for regular BM while inpatient. Last B< 4 days ago. will start bowel reg     #  BPH  - continue home tamsulosin     Remote Hx Prostate Ca  - f.u with urology, unsure of name. will f/u with sister for collateral info.   - states in remission for >20 years     CKD  - Baseline SCr looks to be around 1.5-1.7 base upon prior SCr trend in 2020. SCr this AM 1.42  - monitor and trend SCr, BUN, and lytes  - replete lytes prn with goal K 4-4.5 and Mg >2    Hydronephrosis on CT 9/6  - renal US ordered   - low threshold for jackson placement. Urinating currently with bladder scan of 120mL     #Endo  - f/u hgb a1c and lipid panel     Hypothyroidism   - TSH elevated with low T3 and T4  - will consult endo for recs  - on home synthroid 112 mcg, may have missed a few doses (waiting upon refill). Will reinitiate this dose for now and will f/u endo recs     #Heme  Normocytic anemia  - continue to monitor and trend CBC.   - Likely 2/2 anemia of chronic disease 2/2 CKD. If continues to downtrend can consider anemia workup     #ID  Mild leukocytosis, afebrile  - continue to monitor and trend wbc and temp curve  - leukocytosis likely reactive 2/2 effusion. will monitor off abx at this time CCU Accept Note    Transfer from: [facility]    Accepting physician: Dr. Irvin     HPI:  This is a 78yo old  Male with PMH of HTN, HLD,  BPH, CKD ( creat 1.42 today), chronic angelita hydronephrosis ( x2 years), prostate ca ( radiation 10 years ago), seizure disorder ( last seizure 2015), ETOH use ( sober since 2016), hypothyroidism, and mild demenia; presents to [facility] on 9/3/2022 with chief complaint of SOB and CP x3 days.  Pt has been a heavy smoker his whole life. Denies fever, chills, cough, or congestion. No sick contacts.    Admitted to tele for likely COPD exacerbation and CP.  CXR is clear. Started on Duonebs, Symbicort and Solumedrol. No prior cardiac stents. EKG on arrival to ED notes PRWP V 1-3, no ST changes. Trop high on arrival at 268 trending down 180.8 today, proBNP 763, CK 1090. TSH 69.600 ( stopped synthroid a few weeks ago), T3 <20 T4 0.9, Na 126.  CXR revealing New cardiomegaly and CT chest with Large pericardial effusion, Echo today with large pericardial effusion with early tamponade physiology. Transferred to [facility] for pericardiocentesis. On arrival to [facility] patient awake and verbal A&OX3, denies any chest pain, dyspnea, dizziness, palpitations, N&V, Ha, orthopnea, LE edema.  VS WNL, on 2 L nasal cannula in no respiratory distress.      recs from [facility]:  home with home PT          INTERPRETATION:  CLINICAL STATEMENT: Chest Pain. Shortness of breath  TECHNIQUE: AP view of the chest.  COMPARISON: 7/19/2020    New cardiomegaly, may be exaggerated by AP technique. Suboptimal degree   of inspiration. Small left pleural effusion.    Degenerative changes of the spine, shoulders and AC joints.    IMPRESSION:    New cardiomegaly, may be exaggerated by AP technique.    Small left pleural effusion.    ECHO  Summary:   1. Left ventricular ejection fraction, by visual estimation, is 50 to   55%.   2. Mildly enlarged left atrium.   3. Maximum thickness of effusion is 2-2.5 cm.   4. Mild mitral annular calcification.   5. Mild thickening of the anterior and posterior mitral valve leaflets.   6. Trace mitral valve regurgitation.   7. Mild tricuspid regurgitation.   8. Estimated pulmonary artery systolic pressure is 37.4 mmHg assuming a   right atrial pressure of 5 mmHg, which is consistent with mild pulmonary   hypertension.   9. Findings are consistent with mild cardiac tamponade.      CT CHEST                          PROCEDURE DATE:  09/06/2022      INTERPRETATION:  HISTORY: Dyspnea on exertion. Smoker.    EXAMINATION: CT CHEST was performed without IV contrast.    COMPARISON: No CT chest comparison. Correlation with 1/9/2018 CT abdomen.    FINDINGS:    AIRWAYS, LUNGS, PLEURA: Trachea and mainstem bronchi patent. Emphysema.   Posterior left upper lobe and left basilar atelectasis. Small bilateral   pleural effusions.    MEDIASTINUM: Large circumferential pericardial effusion; depth of   pericardial fluid is 34 mm at the level of the basal inferior wall of the   left ventricle (sagittal series image 78). There is flattening of the   free wall of the right ventricle. Coronary atherosclerosis.    IMAGED ABDOMEN: Bilateral hydronephrosis. Cholelithiasis.    SOFT TISSUES: Bilateral gynecomastia.    BONES: Degenerative changes of the spine. Loss of height of L3 vertebral   body similar to 1/9/2018 CT abdomen.    IMPRESSION:.    Large pericardial effusion.    Small bilateral pleural effusions. (07 Sep 2022 13:09)      Hospital Course:    OBJECTIVE DATA:    Vital Signs Last 24 Hrs  T(C): 36.4 (07 Sep 2022 12:58), Max: 36.8 (06 Sep 2022 18:05)  T(F): 97.6 (07 Sep 2022 12:58), Max: 98.2 (06 Sep 2022 18:05)  HR: 70 (07 Sep 2022 12:58) (62 - 78)  BP: 146/82 (07 Sep 2022 12:58) (104/66 - 164/86)  BP(mean): --  RR: 14 (07 Sep 2022 12:58) (14 - 18)  SpO2: 98% (07 Sep 2022 12:58) (95% - 98%)    Parameters below as of 07 Sep 2022 12:58  Patient On (Oxygen Delivery Method): nasal cannula  O2 Flow (L/min): 2    I&O's Summary      Allergies:      MEDICATIONS  (STANDING):    MEDICATIONS  (PRN):      LABS                                            10.9                  Neurophils% (auto):   x      (09-07 @ 06:45):    12.98)-----------(220          Lymphocytes% (auto):  x                                             32.8                   Eosinphils% (auto):   x        Manual%: Neutrophils x    ; Lymphocytes x    ; Eosinophils x    ; Bands%: x    ; Blasts x                                    126    |  93     |  22                  Calcium: 7.7   / iCa: x      (09-07 @ 06:45)    ----------------------------<  115       Magnesium: x                                3.9     |  28     |  1.42             Phosphorous: x              ASSESSMENT & PLAN:     77M with PMH of HTN, HLD,  BPH, CKD ( creat 1.42 today), chronic angelita hydronephrosis ( x2 years), prostate ca ( radiation 10 years ago), seizure disorder ( last seizure 2015), ETOH use ( sober since 2016), hypothyroidism, and mild demenia p/w CP and SOB found with large pericardial effusion s/p pericardiocentesis.     #Neuro   Alert and oriented  - continue to monitor mental status as per protocol    Seizure disorder  - No seziure meds at home    #Resp  Comfortable on RA  - SOB on admission likely 2/2 pericardial tamponade. monitor for improvement s/p drain placement   - Monitor SpO2 with goal >94%  - 7/6 CT chest: Large pericardial effusion. Small bilateral pleural effusions. Bilateral hydronephrosis.    COPD  - duonebs prn   - started on levofloxacin outpatient for concern of COPD exacerbation/PNA. Will d/c for now. Likely sob related to pericardial effusion (states SOB improved s/p drain placement)   - provided smoking cessation education. Start nicotine patch while inpatient (states quit last week, smokes 4-5 cig/daily)     #CV  Cardiac Tamponade  - 9/7 TTE: EF 70%. Mod concentric LVH. Normal RV size and function. Large pericardial effusion with evidence of echo tamponade physiology.   - s/p pericardiocentesis with drain placement, 1060mL out serosanguinous.   - f/u pericardial fluid studies including cytology, gram stain, and culture   - Patient remained HD stable at [place] prior to transfer (as per documentation: HRs 60-70s; SBPs 150-170s, Lying flat off with O2 sats 99%). Continue to monitor vital signs   - trend lactate, p-BNP, and troponin (trop I downtrending at OSH prior to transfer) daily   - monitor drain output. Daily TTE while in place  - Cancer screening: Last colonoscopy >10 years ago (states went last year for colonoscopy but procedure aborted 2/2 HTN outpatient and never rescheduled). Remote history of Prostate CA, follows with urologist but unsure of name. waiting for patient sister to provide collateral info     HTN  - on home losartan 100mg and hydral 25mg. Will reinitiate slowly if HD stable     HLD  - c/w home simvastatin     #GI  - start diet as tolerated  - monitor for regular BM while inpatient. Last B< 4 days ago. will start bowel reg     #  BPH  - continue home tamsulosin     Remote Hx Prostate Ca  - f.u with urology, unsure of name. will f/u with sister for collateral info.   - states in remission for >20 years     CKD  - Baseline SCr looks to be around 1.5-1.7 base upon prior SCr trend in 2020. SCr this AM 1.42  - monitor and trend SCr, BUN, and lytes  - replete lytes prn with goal K 4-4.5 and Mg >2    Hydronephrosis on CT 9/6  - renal US ordered   - low threshold for jackson placement. Urinating currently with bladder scan of 120mL     #Endo  - f/u hgb a1c and lipid panel     Hypothyroidism   - TSH elevated with low T3 and T4  - will consult endo for recs  - on home synthroid 112 mcg, may have missed a few doses (waiting upon refill). Spoke to endo- states to resume home dose of 112mcg and repeat free T4 in 5-7 days.     #Heme  Normocytic anemia  - continue to monitor and trend CBC.   - Likely 2/2 anemia of chronic disease 2/2 CKD. If continues to downtrend can consider anemia workup     #ID  Mild leukocytosis, afebrile  - continue to monitor and trend wbc and temp curve  - leukocytosis likely reactive 2/2 effusion. will monitor off abx at this time CCU Accept Note    Transfer from: [facility]    Accepting physician: Dr. Irvin     HPI:  This is a 78yo old  Male with PMH of HTN, HLD,  BPH, CKD ( creat 1.42 today), chronic angelita hydronephrosis ( x2 years), prostate ca ( radiation 10 years ago), seizure disorder ( last seizure 2015), ETOH use ( sober since 2016), hypothyroidism, and mild demenia; presents to [facility] on 9/3/2022 with chief complaint of SOB and CP x3 days.  Pt has been a heavy smoker his whole life. Denies fever, chills, cough, or congestion. No sick contacts.    Admitted to tele for likely COPD exacerbation and CP.  CXR is clear. Started on Duonebs, Symbicort and Solumedrol. No prior cardiac stents. EKG on arrival to ED notes PRWP V 1-3, no ST changes. Trop high on arrival at 268 trending down 180.8 today, proBNP 763, CK 1090. TSH 69.600 ( stopped synthroid a few weeks ago), T3 <20 T4 0.9, Na 126.  CXR revealing New cardiomegaly and CT chest with Large pericardial effusion, Echo today with large pericardial effusion with early tamponade physiology. Transferred to [facility] for pericardiocentesis. On arrival to [facility] patient awake and verbal A&OX3, denies any chest pain, dyspnea, dizziness, palpitations, N&V, Ha, orthopnea, LE edema.  VS WNL, on 2 L nasal cannula in no respiratory distress.      recs from [facility]:  home with home PT          INTERPRETATION:  CLINICAL STATEMENT: Chest Pain. Shortness of breath  TECHNIQUE: AP view of the chest.  COMPARISON: 7/19/2020    New cardiomegaly, may be exaggerated by AP technique. Suboptimal degree   of inspiration. Small left pleural effusion.    Degenerative changes of the spine, shoulders and AC joints.    IMPRESSION:    New cardiomegaly, may be exaggerated by AP technique.    Small left pleural effusion.    ECHO  Summary:   1. Left ventricular ejection fraction, by visual estimation, is 50 to   55%.   2. Mildly enlarged left atrium.   3. Maximum thickness of effusion is 2-2.5 cm.   4. Mild mitral annular calcification.   5. Mild thickening of the anterior and posterior mitral valve leaflets.   6. Trace mitral valve regurgitation.   7. Mild tricuspid regurgitation.   8. Estimated pulmonary artery systolic pressure is 37.4 mmHg assuming a   right atrial pressure of 5 mmHg, which is consistent with mild pulmonary   hypertension.   9. Findings are consistent with mild cardiac tamponade.      CT CHEST                          PROCEDURE DATE:  09/06/2022      INTERPRETATION:  HISTORY: Dyspnea on exertion. Smoker.    EXAMINATION: CT CHEST was performed without IV contrast.    COMPARISON: No CT chest comparison. Correlation with 1/9/2018 CT abdomen.    FINDINGS:    AIRWAYS, LUNGS, PLEURA: Trachea and mainstem bronchi patent. Emphysema.   Posterior left upper lobe and left basilar atelectasis. Small bilateral   pleural effusions.    MEDIASTINUM: Large circumferential pericardial effusion; depth of   pericardial fluid is 34 mm at the level of the basal inferior wall of the   left ventricle (sagittal series image 78). There is flattening of the   free wall of the right ventricle. Coronary atherosclerosis.    IMAGED ABDOMEN: Bilateral hydronephrosis. Cholelithiasis.    SOFT TISSUES: Bilateral gynecomastia.    BONES: Degenerative changes of the spine. Loss of height of L3 vertebral   body similar to 1/9/2018 CT abdomen.    IMPRESSION:.    Large pericardial effusion.    Small bilateral pleural effusions. (07 Sep 2022 13:09)      Hospital Course:    OBJECTIVE DATA:    Vital Signs Last 24 Hrs  T(C): 36.4 (07 Sep 2022 12:58), Max: 36.8 (06 Sep 2022 18:05)  T(F): 97.6 (07 Sep 2022 12:58), Max: 98.2 (06 Sep 2022 18:05)  HR: 70 (07 Sep 2022 12:58) (62 - 78)  BP: 146/82 (07 Sep 2022 12:58) (104/66 - 164/86)  BP(mean): --  RR: 14 (07 Sep 2022 12:58) (14 - 18)  SpO2: 98% (07 Sep 2022 12:58) (95% - 98%)    Parameters below as of 07 Sep 2022 12:58  Patient On (Oxygen Delivery Method): nasal cannula  O2 Flow (L/min): 2    I&O's Summary      Allergies:      MEDICATIONS  (STANDING):    MEDICATIONS  (PRN):      LABS                                            10.9                  Neurophils% (auto):   x      (09-07 @ 06:45):    12.98)-----------(220          Lymphocytes% (auto):  x                                             32.8                   Eosinphils% (auto):   x        Manual%: Neutrophils x    ; Lymphocytes x    ; Eosinophils x    ; Bands%: x    ; Blasts x                                    126    |  93     |  22                  Calcium: 7.7   / iCa: x      (09-07 @ 06:45)    ----------------------------<  115       Magnesium: x                                3.9     |  28     |  1.42             Phosphorous: x              ASSESSMENT & PLAN:     77M with PMH of HTN, HLD,  BPH, CKD ( creat 1.42 today), chronic angelita hydronephrosis ( x2 years), prostate ca ( radiation 10 years ago), seizure disorder ( last seizure 2015), ETOH use ( sober since 2016), hypothyroidism, and mild demenia p/w CP and SOB found with large pericardial effusion s/p pericardiocentesis.     #Neuro   Alert and oriented  - continue to monitor mental status as per protocol    Seizure disorder  - No seziure meds reported. Waiting upon sister to confirm no additional medication bottles at home not reported.     #Resp  Comfortable on RA  - SOB on admission likely 2/2 pericardial tamponade. monitor for improvement s/p drain placement   - Monitor SpO2 with goal >94%  - 7/6 CT chest: Large pericardial effusion. Small bilateral pleural effusions. Bilateral hydronephrosis.    COPD  - duonebs prn   - started on levofloxacin outpatient for concern of COPD exacerbation/PNA. Will d/c for now. Likely sob related to pericardial effusion (states SOB improved s/p drain placement)   - provided smoking cessation education. Start nicotine patch while inpatient (states quit last week, smokes 4-5 cig/daily)     #CV  Cardiac Tamponade  - 9/7 TTE: EF 70%. Mod concentric LVH. Normal RV size and function. Large pericardial effusion with evidence of echo tamponade physiology.   - s/p pericardiocentesis with drain placement, 1060mL out serosanguinous.   - f/u pericardial fluid studies including cytology, gram stain, and culture   - Patient remained HD stable at [place] prior to transfer (as per documentation: HRs 60-70s; SBPs 150-170s, Lying flat off with O2 sats 99%). Continue to monitor vital signs   - trend lactate, p-BNP, and troponin (trop I downtrending at OSH prior to transfer) daily   - monitor drain output. Daily TTE while in place  - Cancer screening: Last colonoscopy >10 years ago (states went last year for colonoscopy but procedure aborted 2/2 HTN outpatient and never rescheduled). Remote history of Prostate CA, follows with urologist but unsure of name. waiting for patient sister to provide collateral info     HTN  - on home losartan 100mg and hydral 25mg. Will reinitiate slowly if HD stable     HLD  - c/w home simvastatin     #GI  - start diet as tolerated  - monitor for regular BM while inpatient. Last B< 4 days ago. will start bowel reg     #  BPH  - continue home tamsulosin     Remote Hx Prostate Ca  - f.u with urology, unsure of name. will f/u with sister for collateral info.   - states in remission for >20 years     CKD  - Baseline SCr looks to be around 1.5-1.7 base upon prior SCr trend in 2020. SCr this AM 1.42  - monitor and trend SCr, BUN, and lytes  - replete lytes prn with goal K 4-4.5 and Mg >2    Hydronephrosis on CT 9/6  - renal US ordered   - low threshold for jackson placement. Urinating currently with bladder scan of 120mL     #Endo  - f/u hgb a1c and lipid panel     Hypothyroidism   - TSH elevated with low T3 and T4  - will consult endo for recs  - on home synthroid 112 mcg, may have missed a few doses (waiting upon refill). Spoke to endo- states to resume home dose of 112mcg and repeat free T4 in 5-7 days.     #Heme  Normocytic anemia  - continue to monitor and trend CBC.   - Likely 2/2 anemia of chronic disease 2/2 CKD. If continues to downtrend can consider anemia workup     #ID  Mild leukocytosis, afebrile  - continue to monitor and trend wbc and temp curve  - leukocytosis likely reactive 2/2 effusion. will monitor off abx at this time CCU Accept Note    Transfer from: [facility]    Accepting physician: Dr. Irvin     HPI:  This is a 76yo old  Male with PMH of HTN, HLD,  BPH, CKD ( creat 1.42 today), chronic angelita hydronephrosis ( x2 years), prostate ca ( radiation 10 years ago), seizure disorder ( last seizure 2015), ETOH use ( sober since 2016), hypothyroidism, and mild demenia; presents to [facility] on 9/3/2022 with chief complaint of SOB and CP x3 days.  Pt has been a heavy smoker his whole life. Denies fever, chills, cough, or congestion. No sick contacts.    Admitted to tele for likely COPD exacerbation and CP.  CXR is clear. Started on Duonebs, Symbicort and Solumedrol. No prior cardiac stents. EKG on arrival to ED notes PRWP V 1-3, no ST changes. Trop high on arrival at 268 trending down 180.8 today, proBNP 763, CK 1090. TSH 69.600 ( stopped synthroid a few weeks ago), T3 <20 T4 0.9, Na 126.  CXR revealing New cardiomegaly and CT chest with Large pericardial effusion, Echo today with large pericardial effusion with early tamponade physiology. Transferred to [facility] for pericardiocentesis. On arrival to [facility] patient awake and verbal A&OX3, denies any chest pain, dyspnea, dizziness, palpitations, N&V, Ha, orthopnea, LE edema.  VS WNL, on 2 L nasal cannula in no respiratory distress.      recs from [facility]:  home with home PT          INTERPRETATION:  CLINICAL STATEMENT: Chest Pain. Shortness of breath  TECHNIQUE: AP view of the chest.  COMPARISON: 7/19/2020    New cardiomegaly, may be exaggerated by AP technique. Suboptimal degree   of inspiration. Small left pleural effusion.    Degenerative changes of the spine, shoulders and AC joints.    IMPRESSION:    New cardiomegaly, may be exaggerated by AP technique.    Small left pleural effusion.    ECHO  Summary:   1. Left ventricular ejection fraction, by visual estimation, is 50 to   55%.   2. Mildly enlarged left atrium.   3. Maximum thickness of effusion is 2-2.5 cm.   4. Mild mitral annular calcification.   5. Mild thickening of the anterior and posterior mitral valve leaflets.   6. Trace mitral valve regurgitation.   7. Mild tricuspid regurgitation.   8. Estimated pulmonary artery systolic pressure is 37.4 mmHg assuming a   right atrial pressure of 5 mmHg, which is consistent with mild pulmonary   hypertension.   9. Findings are consistent with mild cardiac tamponade.      CT CHEST                          PROCEDURE DATE:  09/06/2022      INTERPRETATION:  HISTORY: Dyspnea on exertion. Smoker.    EXAMINATION: CT CHEST was performed without IV contrast.    COMPARISON: No CT chest comparison. Correlation with 1/9/2018 CT abdomen.    FINDINGS:    AIRWAYS, LUNGS, PLEURA: Trachea and mainstem bronchi patent. Emphysema.   Posterior left upper lobe and left basilar atelectasis. Small bilateral   pleural effusions.    MEDIASTINUM: Large circumferential pericardial effusion; depth of   pericardial fluid is 34 mm at the level of the basal inferior wall of the   left ventricle (sagittal series image 78). There is flattening of the   free wall of the right ventricle. Coronary atherosclerosis.    IMAGED ABDOMEN: Bilateral hydronephrosis. Cholelithiasis.    SOFT TISSUES: Bilateral gynecomastia.    BONES: Degenerative changes of the spine. Loss of height of L3 vertebral   body similar to 1/9/2018 CT abdomen.    IMPRESSION:.    Large pericardial effusion.    Small bilateral pleural effusions. (07 Sep 2022 13:09)      Hospital Course:    OBJECTIVE DATA:    Vital Signs Last 24 Hrs  T(C): 36.4 (07 Sep 2022 12:58), Max: 36.8 (06 Sep 2022 18:05)  T(F): 97.6 (07 Sep 2022 12:58), Max: 98.2 (06 Sep 2022 18:05)  HR: 70 (07 Sep 2022 12:58) (62 - 78)  BP: 146/82 (07 Sep 2022 12:58) (104/66 - 164/86)  BP(mean): --  RR: 14 (07 Sep 2022 12:58) (14 - 18)  SpO2: 98% (07 Sep 2022 12:58) (95% - 98%)    Parameters below as of 07 Sep 2022 12:58  Patient On (Oxygen Delivery Method): nasal cannula  O2 Flow (L/min): 2    I&O's Summary      Allergies:      MEDICATIONS  (STANDING):    MEDICATIONS  (PRN):      LABS                                            10.9                  Neurophils% (auto):   x      (09-07 @ 06:45):    12.98)-----------(220          Lymphocytes% (auto):  x                                             32.8                   Eosinphils% (auto):   x        Manual%: Neutrophils x    ; Lymphocytes x    ; Eosinophils x    ; Bands%: x    ; Blasts x                                    126    |  93     |  22                  Calcium: 7.7   / iCa: x      (09-07 @ 06:45)    ----------------------------<  115       Magnesium: x                                3.9     |  28     |  1.42             Phosphorous: x              ASSESSMENT & PLAN:     77M with PMH of HTN, HLD,  BPH, CKD ( creat 1.42 today), chronic angelita hydronephrosis ( x2 years), prostate ca ( radiation 10 years ago), seizure disorder ( last seizure 2015), ETOH use ( sober since 2016), hypothyroidism, and mild demenia p/w CP and SOB found with large pericardial effusion s/p pericardiocentesis.     #Neuro   Alert and oriented  - continue to monitor mental status as per protocol    Seizure disorder  - No seziure meds reported. Waiting upon sister to confirm no additional medication bottles at home not reported.     #Resp  Comfortable on RA  - SOB on admission likely 2/2 pericardial tamponade. monitor for improvement s/p drain placement   - Monitor SpO2 with goal >94%  - 7/6 CT chest: Large pericardial effusion. Small bilateral pleural effusions. Bilateral hydronephrosis.    COPD  - duonebs prn   - started on levofloxacin outpatient for concern of COPD exacerbation/PNA. Will d/c for now. Likely sob related to pericardial effusion (states SOB improved s/p drain placement)   - provided smoking cessation education. Start nicotine patch while inpatient (states quit last week, smokes 4-5 cig/daily)     #CV  Cardiac Tamponade  - 9/7 TTE: EF 70%. Mod concentric LVH. Normal RV size and function. Large pericardial effusion with evidence of echo tamponade physiology.   - s/p pericardiocentesis with drain placement, 1060mL out serosanguinous.   - f/u pericardial fluid studies including cytology, gram stain, and culture   - Patient remained HD stable at [place] prior to transfer (as per documentation: HRs 60-70s; SBPs 150-170s, Lying flat off with O2 sats 99%). Continue to monitor vital signs   - trend lactate, p-BNP, and troponin (trop I downtrending at OSH prior to transfer) daily   - monitor drain output. Daily TTE while in place  - Cancer screening: Last colonoscopy >10 years ago (states went last year for colonoscopy but procedure aborted 2/2 HTN outpatient and never rescheduled). Remote history of Prostate CA, follows with urologist but unsure of name. waiting for patient sister to provide collateral info     HTN  - on home losartan 100mg and hydral 25mg. Will reinitiate slowly if HD stable     HLD  - c/w home simvastatin     #GI  - start diet as tolerated  - monitor for regular BM while inpatient. Last B< 4 days ago. will start bowel reg     #  BPH  - continue home tamsulosin     Remote Hx Prostate Ca  - f.u with urology, unsure of name. will f/u with sister for collateral info.   - states in remission for >20 years     CKD  - Baseline SCr looks to be around 1.5-1.7 base upon prior SCr trend in 2020. SCr this AM 1.42  - monitor and trend SCr, BUN, and lytes  - replete lytes prn with goal K 4-4.5 and Mg >2    Hydronephrosis on CT 9/6  - renal US ordered   - Urinating currently with bladder scan of 120mL. Also with b/l hydronephrosis back in 2020 with history of chronic hydronephrosis. SCr at baseline.    #Endo  - f/u hgb a1c and lipid panel     Hypothyroidism   - TSH elevated with low T3 and T4  - will consult endo for recs  - on home synthroid 112 mcg, may have missed a few doses (waiting upon refill). Spoke to endo- states to resume home dose of 112mcg and repeat free T4 in 5-7 days.     #Heme  Normocytic anemia  - continue to monitor and trend CBC.   - Likely 2/2 anemia of chronic disease 2/2 CKD. If continues to downtrend can consider anemia workup     #ID  Mild leukocytosis, afebrile  - continue to monitor and trend wbc and temp curve  - leukocytosis likely reactive 2/2 effusion. will monitor off abx at this time CCU Accept Note    Transfer from: [facility]    Accepting physician: Dr. Irvin     HPI:  This is a 76yo old  Male with PMH of HTN, HLD,  BPH, CKD ( creat 1.42 today), chronic angelita hydronephrosis ( x2 years), prostate ca ( radiation 10 years ago), seizure disorder ( last seizure 2015), ETOH use ( sober since 2016), hypothyroidism, and mild demenia; presents to [facility] on 9/3/2022 with chief complaint of SOB and CP x3 days.  Pt has been a heavy smoker his whole life. Denies fever, chills, cough, or congestion. No sick contacts.    Admitted to tele for likely COPD exacerbation and CP.  CXR is clear. Started on Duonebs, Symbicort and Solumedrol. No prior cardiac stents. EKG on arrival to ED notes PRWP V 1-3, no ST changes. Trop high on arrival at 268 trending down 180.8 today, proBNP 763, CK 1090. TSH 69.600 ( stopped synthroid a few weeks ago), T3 <20 T4 0.9, Na 126.  CXR revealing New cardiomegaly and CT chest with Large pericardial effusion, Echo today with large pericardial effusion with early tamponade physiology. Transferred to [facility] for pericardiocent[facility]. On arrival to [facility] patient awake and verbal A&OX3, denies any chest pain, dyspnea, dizziness, palpitations, N&V, Ha, orthopnea, LE edema.  VS WNL, on 2 L nasal cannula in no respiratory distress.      recs from [facility]:  home with home PT          INTERPRETATION:  CLINICAL STATEMENT: Chest Pain. Shortness of breath  TECHNIQUE: AP view of the chest.  COMPARISON: 7/19/2020    New cardiomegaly, may be exaggerated by AP technique. Suboptimal degree   of inspiration. Small left pleural effusion.    Degenerative changes of the spine, shoulders and AC joints.    IMPRESSION:    New cardiomegaly, may be exaggerated by AP technique.    Small left pleural effusion.    ECHO  Summary:   1. Left ventricular ejection fraction, by visual estimation, is 50 to   55%.   2. Mildly enlarged left atrium.   3. Maximum thickness of effusion is 2-2.5 cm.   4. Mild mitral annular calcification.   5. Mild thickening of the anterior and posterior mitral valve leaflets.   6. Trace mitral valve regurgitation.   7. Mild tricuspid regurgitation.   8. Estimated pulmonary artery systolic pressure is 37.4 mmHg assuming a   right atrial pressure of 5 mmHg, which is consistent with mild pulmonary   hypertension.   9. Findings are consistent with mild cardiac tamponade.      CT CHEST                          PROCEDURE DATE:  09/06/2022      INTERPRETATION:  HISTORY: Dyspnea on exertion. Smoker.    EXAMINATION: CT CHEST was performed without IV contrast.    COMPARISON: No CT chest comparison. Correlation with 1/9/2018 CT abdomen.    FINDINGS:    AIRWAYS, LUNGS, PLEURA: Trachea and mainstem bronchi patent. Emphysema.   Posterior left upper lobe and left basilar atelectasis. Small bilateral   pleural effusions.    MEDIASTINUM: Large circumferential pericardial effusion; depth of   pericardial fluid is 34 mm at the level of the basal inferior wall of the   left ventricle (sagittal series image 76). There is flattening of the   free wall of the right ventricle. Coronary atherosclerosis.    IMAGED ABDOMEN: Bilateral hydronephrosis. Cholelithiasis.    SOFT TISSUES: Bilateral gynecomastia.    BONES: Degenerative changes of the spine. Loss of height of L3 vertebral   body similar to 1/9/2018 CT abdomen.    IMPRESSION:.    Large pericardial effusion.    Small bilateral pleural effusions. (07 Sep 2022 13:09)      Hospital Course:    OBJECTIVE DATA:    Vital Signs Last 24 Hrs  T(C): 36.4 (07 Sep 2022 12:58), Max: 36.8 (06 Sep 2022 18:05)  T(F): 97.6 (07 Sep 2022 12:58), Max: 98.2 (06 Sep 2022 18:05)  HR: 70 (07 Sep 2022 12:58) (62 - 78)  BP: 146/82 (07 Sep 2022 12:58) (104/66 - 164/86)  BP(mean): --  RR: 14 (07 Sep 2022 12:58) (14 - 18)  SpO2: 98% (07 Sep 2022 12:58) (95% - 98%)    Parameters below as of 07 Sep 2022 12:58  Patient On (Oxygen Delivery Method): nasal cannula  O2 Flow (L/min): 2    I&O's Summary      Allergies:      MEDICATIONS  (STANDING):    MEDICATIONS  (PRN):      LABS                                            10.9                  Neurophils% (auto):   x      (09-07 @ 06:45):    12.98)-----------(220          Lymphocytes% (auto):  x                                             32.8                   Eosinphils% (auto):   x        Manual%: Neutrophils x    ; Lymphocytes x    ; Eosinophils x    ; Bands%: x    ; Blasts x                                    126    |  93     |  22                  Calcium: 7.7   / iCa: x      (09-07 @ 06:45)    ----------------------------<  115       Magnesium: x                                3.9     |  28     |  1.42             Phosphorous: x              ASSESSMENT & PLAN:     77M with PMH of HTN, HLD,  BPH, CKD ( creat 1.42 today), chronic angelita hydronephrosis ( x2 years), prostate ca ( radiation 10 years ago), seizure disorder ( last seizure 2015), ETOH use ( sober since 2016), hypothyroidism, and mild demenia p/w CP and SOB found with large pericardial effusion s/p pericardiocentesis.     #Neuro   Alert and oriented  - continue to monitor mental status as per protocol    Seizure disorder  - No seziure meds reported. Waiting upon sister to confirm no additional medication bottles at home not reported.     #Resp  Comfortable on RA  - SOB on admission likely 2/2 pericardial tamponade. monitor for improvement s/p drain placement   - Monitor SpO2 with goal >94%  - 7/6 CT chest: Large pericardial effusion. Small bilateral pleural effusions. Bilateral hydronephrosis.    COPD  - duonebs prn   - started on levofloxacin outpatient for concern of COPD exacerbation/PNA. Will d/c for now. Likely sob related to pericardial effusion (states SOB improved s/p drain placement)   - provided smoking cessation education. Start nicotine patch while inpatient (states quit last week, smokes 4-5 cig/daily)     #CV  Cardiac Tamponade  - 9/7 TTE: EF 70%. Mod concentric LVH. Normal RV size and function. Large pericardial effusion with evidence of echo tamponade physiology.   - s/p pericardiocentesis with drain placement, 1060mL out serosanguinous.   - as per lights criteria- meets criteria of exudative fluid. f/u pericardial fluid studies including cytology, gram stain, and culture   - Patient remained HD stable at [place] prior to transfer (as per documentation: HRs 60-70s; SBPs 150-170s, Lying flat off with O2 sats 99%). Continue to monitor vital signs   - trend lactate, p-BNP, and troponin (trop I downtrending at OSH prior to transfer) daily   - monitor drain output. Daily TTE while in place  - Cancer screening: Last colonoscopy >10 years ago (states went last year for colonoscopy but procedure aborted 2/2 HTN outpatient and never rescheduled). Remote history of Prostate CA, follows with urologist but unsure of name. waiting for patient sister to provide collateral info     HTN  - on home losartan 100mg and hydral 25mg. Will reinitiate slowly if HD stable     HLD  - c/w home simvastatin     #GI  - start diet as tolerated  - monitor for regular BM while inpatient. Last B< 4 days ago. will start bowel reg     #  BPH  - continue home tamsulosin     Remote Hx Prostate Ca  - f.u with urology, unsure of name. will f/u with sister for collateral info.   - states in remission for >20 years     CKD  - Baseline SCr looks to be around 1.5-1.7 base upon prior SCr trend in 2020. SCr this AM 1.42  - monitor and trend SCr, BUN, and lytes  - replete lytes prn with goal K 4-4.5 and Mg >2    Hydronephrosis on CT 9/6  - renal US ordered   - Urinating currently with bladder scan of 120mL. Also with b/l hydronephrosis back in 2020 with history of chronic hydronephrosis. SCr at baseline.    #Endo  - f/u hgb a1c and lipid panel     Hypothyroidism   - TSH elevated with low T3 and T4  - will consult endo for recs  - on home synthroid 112 mcg, may have missed a few doses (waiting upon refill). Spoke to endo- states to resume home dose of 112mcg and repeat free T4 in 5-7 days.     #Heme  Normocytic anemia  - continue to monitor and trend CBC.   - Likely 2/2 anemia of chronic disease 2/2 CKD. If continues to downtrend can consider anemia workup     #ID  Mild leukocytosis, afebrile  - continue to monitor and trend wbc and temp curve  - leukocytosis likely reactive 2/2 effusion. will monitor off abx at this time CCU Accept Note    Transfer from: [facility]    Accepting physician: Dr. Irvin     HPI:  This is a 78yo old  Male with PMH of HTN, HLD,  BPH, CKD ( creat 1.42 today), chronic angelita hydronephrosis ( x2 years), prostate ca ( radiation 10 years ago), seizure disorder ( last seizure 2015), ETOH use ( sober since 2016), hypothyroidism, and mild demenia; presents to [facility] on 9/3/2022 with chief complaint of SOB and CP x3 days.  Pt has been a heavy smoker his whole life. Denies fever, chills, cough, or congestion. No sick contacts.    Admitted to tele for likely COPD exacerbation and CP.  CXR is clear. Started on Duonebs, Symbicort and Solumedrol. No prior cardiac stents. EKG on arrival to ED notes PRWP V 1-3, no ST changes. Trop high on arrival at 268 trending down 180.8 today, proBNP 763, CK 1090. TSH 69.600 ( stopped synthroid a few weeks ago), T3 <20 T4 0.9, Na 126.  CXR revealing New cardiomegaly and CT chest with Large pericardial effusion, Echo today with large pericardial effusion with early tamponade physiology. Transferred to [facility] for pericardiocentesis. On arrival to [facility] patient awake and verbal A&OX3, denies any chest pain, dyspnea, dizziness, palpitations, N&V, Ha, orthopnea, LE edema.  VS WNL, on 2 L nasal cannula in no respiratory distress.      recs from [facility]:  home with home PT          INTERPRETATION:  CLINICAL STATEMENT: Chest Pain. Shortness of breath  TECHNIQUE: AP view of the chest.  COMPARISON: 7/19/2020    New cardiomegaly, may be exaggerated by AP technique. Suboptimal degree   of inspiration. Small left pleural effusion.    Degenerative changes of the spine, shoulders and AC joints.    IMPRESSION:    New cardiomegaly, may be exaggerated by AP technique.    Small left pleural effusion.    ECHO  Summary:   1. Left ventricular ejection fraction, by visual estimation, is 50 to   55%.   2. Mildly enlarged left atrium.   3. Maximum thickness of effusion is 2-2.5 cm.   4. Mild mitral annular calcification.   5. Mild thickening of the anterior and posterior mitral valve leaflets.   6. Trace mitral valve regurgitation.   7. Mild tricuspid regurgitation.   8. Estimated pulmonary artery systolic pressure is 37.4 mmHg assuming a   right atrial pressure of 5 mmHg, which is consistent with mild pulmonary   hypertension.   9. Findings are consistent with mild cardiac tamponade.      CT CHEST                          PROCEDURE DATE:  09/06/2022      INTERPRETATION:  HISTORY: Dyspnea on exertion. Smoker.    EXAMINATION: CT CHEST was performed without IV contrast.    COMPARISON: No CT chest comparison. Correlation with 1/9/2018 CT abdomen.    FINDINGS:    AIRWAYS, LUNGS, PLEURA: Trachea and mainstem bronchi patent. Emphysema.   Posterior left upper lobe and left basilar atelectasis. Small bilateral   pleural effusions.    MEDIASTINUM: Large circumferential pericardial effusion; depth of   pericardial fluid is 34 mm at the level of the basal inferior wall of the   left ventricle (sagittal series image 78). There is flattening of the   free wall of the right ventricle. Coronary atherosclerosis.    IMAGED ABDOMEN: Bilateral hydronephrosis. Cholelithiasis.    SOFT TISSUES: Bilateral gynecomastia.    BONES: Degenerative changes of the spine. Loss of height of L3 vertebral   body similar to 1/9/2018 CT abdomen.    IMPRESSION:.    Large pericardial effusion.    Small bilateral pleural effusions. (07 Sep 2022 13:09)      Hospital Course:    OBJECTIVE DATA:    Vital Signs Last 24 Hrs  T(C): 36.4 (07 Sep 2022 12:58), Max: 36.8 (06 Sep 2022 18:05)  T(F): 97.6 (07 Sep 2022 12:58), Max: 98.2 (06 Sep 2022 18:05)  HR: 70 (07 Sep 2022 12:58) (62 - 78)  BP: 146/82 (07 Sep 2022 12:58) (104/66 - 164/86)  BP(mean): --  RR: 14 (07 Sep 2022 12:58) (14 - 18)  SpO2: 98% (07 Sep 2022 12:58) (95% - 98%)    Parameters below as of 07 Sep 2022 12:58  Patient On (Oxygen Delivery Method): nasal cannula  O2 Flow (L/min): 2    I&O's Summary      Allergies:      MEDICATIONS  (STANDING):    MEDICATIONS  (PRN):      LABS                                            10.9                  Neurophils% (auto):   x      (09-07 @ 06:45):    12.98)-----------(220          Lymphocytes% (auto):  x                                             32.8                   Eosinphils% (auto):   x        Manual%: Neutrophils x    ; Lymphocytes x    ; Eosinophils x    ; Bands%: x    ; Blasts x                                    126    |  93     |  22                  Calcium: 7.7   / iCa: x      (09-07 @ 06:45)    ----------------------------<  115       Magnesium: x                                3.9     |  28     |  1.42             Phosphorous: x              ASSESSMENT & PLAN:     77M with PMH of HTN, HLD,  BPH, CKD ( creat 1.42 today), chronic angelita hydronephrosis ( x2 years), prostate ca ( radiation 10 years ago), seizure disorder ( last seizure 2015), ETOH use ( sober since 2016), hypothyroidism, and mild demenia p/w CP and SOB found with large pericardial effusion s/p pericardiocentesis.     #Neuro   Alert and oriented  - continue to monitor mental status as per protocol    Seizure disorder  - No seziure meds reported. Waiting upon sister to confirm no additional medication bottles at home not reported.     #Resp  Comfortable on RA  - SOB on admission likely 2/2 pericardial tamponade. monitor for improvement s/p drain placement   - Monitor SpO2 with goal >94%  - 7/6 CT chest: Large pericardial effusion. Small bilateral pleural effusions. Bilateral hydronephrosis.    COPD  - duonebs prn   - started on levofloxacin outpatient for concern of COPD exacerbation/PNA. Will d/c for now. Likely sob related to pericardial effusion (states SOB improved s/p drain placement)   - provided smoking cessation education. Start nicotine patch while inpatient (states quit last week, smokes 4-5 cig/daily)     #CV  Cardiac Tamponade  - 9/7 TTE: EF 70%. Mod concentric LVH. Normal RV size and function. Large pericardial effusion with evidence of echo tamponade physiology.   - s/p pericardiocentesis with drain placement, 1060mL out serosanguinous.   - as per lights criteria- meets criteria of exudative fluid. f/u pericardial fluid studies including cytology, gram stain, and culture   - Patient remained HD stable at [place] prior to transfer (as per documentation: HRs 60-70s; SBPs 150-170s, Lying flat off with O2 sats 99%). Continue to monitor vital signs   - trend lactate, p-BNP, and troponin (trop I downtrending at OSH prior to transfer) daily   - monitor drain output. Daily TTE while in place  - Cancer screening: Last colonoscopy >10 years ago (states went last year for colonoscopy but procedure aborted 2/2 HTN outpatient and never rescheduled). Remote history of Prostate CA, follows with urologist but unsure of name. waiting for patient sister to provide collateral info     HTN  - on home losartan 100mg and hydral 25mg. Will reinitiate slowly if HD stable     HLD  - c/w home simvastatin     #GI  - start diet as tolerated  - monitor for regular BM while inpatient. Last B< 4 days ago. will start bowel reg     #  BPH  - continue home tamsulosin     Remote Hx Prostate Ca  - f.u with urology, patient unsure of name. will f/u with sister for collateral info.   - states in remission for >20 years     CKD  - Baseline SCr looks to be around 1.5-1.7 base upon prior SCr trend in 2020. SCr this AM 1.42  - monitor and trend SCr, BUN, and lytes  - replete lytes prn with goal K 4-4.5 and Mg >2    Hydronephrosis on CT 9/6  - renal US ordered   - Urinating currently with bladder scan of 120mL. Also with b/l hydronephrosis back in 2020 with history of chronic hydronephrosis. SCr at baseline. Will also get collateral info from outpatient urologist once patient sister updates with info     #Endo  - f/u hgb a1c and lipid panel     Hypothyroidism   - TSH elevated with low T3 and T4  - will consult endo for recs  - on home synthroid 112 mcg, may have missed a few doses (waiting upon refill). Spoke to endo- states to resume home dose of 112mcg and repeat free T4 in 5-7 days.     #Heme  Normocytic anemia  - continue to monitor and trend CBC.   - Likely 2/2 anemia of chronic disease 2/2 CKD. If continues to downtrend can consider anemia workup     #ID  Mild leukocytosis, afebrile  - continue to monitor and trend wbc and temp curve  - leukocytosis likely reactive 2/2 effusion. will monitor off abx at this time CCU Accept Note    Transfer from: [facility]    Accepting physician: Dr. Irvin     HPI:  This is a 76yo old  Male with PMH of HTN, HLD,  BPH, CKD ( creat 1.42 today), chronic angelita hydronephrosis ( x2 years), prostate ca ( radiation 10 years ago), seizure disorder ( last seizure 2015), ETOH use ( sober since 2016), hypothyroidism, and mild demenia; presents to [facility] on 9/3/2022 with chief complaint of SOB and CP x3 days.  Pt has been a heavy smoker his whole life. Denies fever, chills, cough, or congestion. No sick contacts.    Admitted to tele for likely COPD exacerbation and CP.  CXR is clear. Started on Duonebs, Symbicort and Solumedrol. No prior cardiac stents. EKG on arrival to ED notes PRWP V 1-3, no ST changes. Trop high on arrival at 268 trending down 180.8 today, proBNP 763, CK 1090. TSH 69.600 ( stopped synthroid a few weeks ago), T3 <20 T4 0.9, Na 126.  CXR revealing New cardiomegaly and CT chest with Large pericardial effusion, Echo today with large pericardial effusion with early tamponade physiology. Transferred to [facility] for pericardiocentesis. On arrival to [facility] patient awake and verbal A&OX3, denies any chest pain, dyspnea, dizziness, palpitations, N&V, Ha, orthopnea, LE edema.  VS WNL, on 2 L nasal cannula in no respiratory distress.      recs from [facility]:  home with home PT          INTERPRETATION:  CLINICAL STATEMENT: Chest Pain. Shortness of breath  TECHNIQUE: AP view of the chest.  COMPARISON: 7/19/2020    New cardiomegaly, may be exaggerated by AP technique. Suboptimal degree   of inspiration. Small left pleural effusion.    Degenerative changes of the spine, shoulders and AC joints.    IMPRESSION:    New cardiomegaly, may be exaggerated by AP technique.    Small left pleural effusion.    ECHO  Summary:   1. Left ventricular ejection fraction, by visual estimation, is 50 to   55%.   2. Mildly enlarged left atrium.   3. Maximum thickness of effusion is 2-2.5 cm.   4. Mild mitral annular calcification.   5. Mild thickening of the anterior and posterior mitral valve leaflets.   6. Trace mitral valve regurgitation.   7. Mild tricuspid regurgitation.   8. Estimated pulmonary artery systolic pressure is 37.4 mmHg assuming a   right atrial pressure of 5 mmHg, which is consistent with mild pulmonary   hypertension.   9. Findings are consistent with mild cardiac tamponade.      CT CHEST                          PROCEDURE DATE:  09/06/2022      INTERPRETATION:  HISTORY: Dyspnea on exertion. Smoker.    EXAMINATION: CT CHEST was performed without IV contrast.    COMPARISON: No CT chest comparison. Correlation with 1/9/2018 CT abdomen.    FINDINGS:    AIRWAYS, LUNGS, PLEURA: Trachea and mainstem bronchi patent. Emphysema.   Posterior left upper lobe and left basilar atelectasis. Small bilateral   pleural effusions.    MEDIASTINUM: Large circumferential pericardial effusion; depth of   pericardial fluid is 34 mm at the level of the basal inferior wall of the   left ventricle (sagittal series image 78). There is flattening of the   free wall of the right ventricle. Coronary atherosclerosis.    IMAGED ABDOMEN: Bilateral hydronephrosis. Cholelithiasis.    SOFT TISSUES: Bilateral gynecomastia.    BONES: Degenerative changes of the spine. Loss of height of L3 vertebral   body similar to 1/9/2018 CT abdomen.    IMPRESSION:.    Large pericardial effusion.    Small bilateral pleural effusions. (07 Sep 2022 13:09)      Hospital Course:    OBJECTIVE DATA:    Vital Signs Last 24 Hrs  T(C): 36.4 (07 Sep 2022 12:58), Max: 36.8 (06 Sep 2022 18:05)  T(F): 97.6 (07 Sep 2022 12:58), Max: 98.2 (06 Sep 2022 18:05)  HR: 70 (07 Sep 2022 12:58) (62 - 78)  BP: 146/82 (07 Sep 2022 12:58) (104/66 - 164/86)  BP(mean): --  RR: 14 (07 Sep 2022 12:58) (14 - 18)  SpO2: 98% (07 Sep 2022 12:58) (95% - 98%)    Parameters below as of 07 Sep 2022 12:58  Patient On (Oxygen Delivery Method): nasal cannula  O2 Flow (L/min): 2    I&O's Summary      Allergies:      MEDICATIONS  (STANDING):    MEDICATIONS  (PRN):      LABS                                            10.9                  Neurophils% (auto):   x      (09-07 @ 06:45):    12.98)-----------(220          Lymphocytes% (auto):  x                                             32.8                   Eosinphils% (auto):   x        Manual%: Neutrophils x    ; Lymphocytes x    ; Eosinophils x    ; Bands%: x    ; Blasts x                                    126    |  93     |  22                  Calcium: 7.7   / iCa: x      (09-07 @ 06:45)    ----------------------------<  115       Magnesium: x                                3.9     |  28     |  1.42             Phosphorous: x              ASSESSMENT & PLAN:     77M with PMH of HTN, HLD,  BPH, CKD ( creat 1.42 today), chronic angelita hydronephrosis ( x2 years), prostate ca ( radiation 10 years ago), seizure disorder ( last seizure 2015), ETOH use ( sober since 2016), hypothyroidism, and mild demenia p/w CP and SOB found with large pericardial effusion s/p pericardiocentesis.     #Neuro   Alert and oriented  - continue to monitor mental status as per protocol    Seizure disorder  - No seziure meds reported. Waiting upon sister to confirm no additional medication bottles at home not reported.     #Resp  Comfortable on RA  - SOB on admission likely 2/2 pericardial tamponade. monitor for improvement s/p drain placement   - Monitor SpO2 with goal >94%  - 7/6 CT chest: Large pericardial effusion. Small bilateral pleural effusions. Bilateral hydronephrosis.    COPD  - duonebs prn   - started on levofloxacin outpatient for concern of COPD exacerbation/PNA. Will d/c for now. Likely sob related to pericardial effusion (states SOB improved s/p drain placement)   - provided smoking cessation education. Start nicotine patch while inpatient (states quit last week, smokes 4-5 cig/daily)     #CV  Cardiac Tamponade  - 9/7 TTE: EF 70%. Mod concentric LVH. Normal RV size and function. Large pericardial effusion with evidence of echo tamponade physiology.   - s/p pericardiocentesis with drain placement, 1060mL out serosanguinous.   - as per lights criteria- meets criteria of exudative fluid. f/u pericardial fluid studies including cytology, gram stain, and culture   - Patient remained HD stable at [place] prior to transfer (as per documentation: HRs 60-70s; SBPs 150-170s, Lying flat off with O2 sats 99%). Continue to monitor vital signs   - trend lactate, p-BNP, and troponin (trop I downtrending at OSH prior to transfer) daily   - monitor drain output. Daily TTE while in place  - Cancer screening: Last colonoscopy >10 years ago (states went last year for colonoscopy but procedure aborted 2/2 HTN outpatient and never rescheduled). Remote history of Prostate CA, follows with urologist but unsure of name. waiting for patient sister to provide collateral info     HTN  - on home losartan 100mg and hydral 25mg. Will reinitiate slowly if HD stable     HLD  - c/w home simvastatin     #GI  - start diet as tolerated  - monitor for regular BM while inpatient. Last BM 4 days ago. will start bowel reg     #  BPH  - continue home tamsulosin     Remote Hx Prostate Ca  - f.u with urology, patient unsure of name. will f/u with sister for collateral info.   - states in remission for >20 years     CKD  - Baseline SCr looks to be around 1.5-1.7 base upon prior SCr trend in 2020. SCr this AM 1.42  - monitor and trend SCr, BUN, and lytes  - replete lytes prn with goal K 4-4.5 and Mg >2    Hydronephrosis on CT 9/6  - renal US ordered   - Urinating currently with bladder scan of 120mL. Also with b/l hydronephrosis back in 2020 with history of chronic hydronephrosis. SCr at baseline. Will also get collateral info from outpatient urologist once patient sister updates with info     #Endo  - f/u hgb a1c and lipid panel     Hypothyroidism   - TSH elevated with low T3 and T4  - will consult endo for recs  - on home synthroid 112 mcg, may have missed a few doses (waiting upon refill). Spoke to endo- states to resume home dose of 112mcg and repeat free T4 in 5-7 days.     #Heme  Normocytic anemia  - continue to monitor and trend CBC.   - Likely 2/2 anemia of chronic disease 2/2 CKD. If continues to downtrend can consider anemia workup     #ID  Mild leukocytosis, afebrile  - continue to monitor and trend wbc and temp curve  - leukocytosis likely reactive 2/2 effusion. will monitor off abx at this time CCU Accept Note    Transfer from: [facility]    Accepting physician: Dr. Irvin     HPI:  This is a 78yo old  Male with PMH of HTN, HLD,  BPH, CKD ( creat 1.42 today), chronic angelita hydronephrosis ( x2 years), prostate ca ( radiation 10 years ago), seizure disorder ( last seizure 2015), ETOH use ( sober since 2016), hypothyroidism, and mild demenia; presents to [facility] on 9/3/2022 with chief complaint of SOB and CP x3 days.  Pt has been a heavy smoker his whole life. Denies fever, chills, cough, or congestion. No sick contacts.    Admitted to tele for likely COPD exacerbation and CP.  CXR is clear. Started on Duonebs, Symbicort and Solumedrol. No prior cardiac stents. EKG on arrival to ED notes PRWP V 1-3, no ST changes. Trop high on arrival at 268 trending down 180.8 today, proBNP 763, CK 1090. TSH 69.600 ( stopped synthroid a few weeks ago), T3 <20 T4 0.9, Na 126.  CXR revealing New cardiomegaly and CT chest with Large pericardial effusion, Echo today with large pericardial effusion with early tamponade physiology. Transferred to [facility] for pericardiocentesis. On arrival to [facility] patient awake and verbal A&OX3, denies any chest pain, dyspnea, dizziness, palpitations, N&V, Ha, orthopnea, LE edema.  VS WNL, on 2 L nasal cannula in no respiratory distress.      recs from [facility]:  home with home PT          INTERPRETATION:  CLINICAL STATEMENT: Chest Pain. Shortness of breath  TECHNIQUE: AP view of the chest.  COMPARISON: 7/19/2020    New cardiomegaly, may be exaggerated by AP technique. Suboptimal degree   of inspiration. Small left pleural effusion.    Degenerative changes of the spine, shoulders and AC joints.    IMPRESSION:    New cardiomegaly, may be exaggerated by AP technique.    Small left pleural effusion.    ECHO  Summary:   1. Left ventricular ejection fraction, by visual estimation, is 50 to   55%.   2. Mildly enlarged left atrium.   3. Maximum thickness of effusion is 2-2.5 cm.   4. Mild mitral annular calcification.   5. Mild thickening of the anterior and posterior mitral valve leaflets.   6. Trace mitral valve regurgitation.   7. Mild tricuspid regurgitation.   8. Estimated pulmonary artery systolic pressure is 37.4 mmHg assuming a   right atrial pressure of 5 mmHg, which is consistent with mild pulmonary   hypertension.   9. Findings are consistent with mild cardiac tamponade.      CT CHEST                          PROCEDURE DATE:  09/06/2022      INTERPRETATION:  HISTORY: Dyspnea on exertion. Smoker.    EXAMINATION: CT CHEST was performed without IV contrast.    COMPARISON: No CT chest comparison. Correlation with 1/9/2018 CT abdomen.    FINDINGS:    AIRWAYS, LUNGS, PLEURA: Trachea and mainstem bronchi patent. Emphysema.   Posterior left upper lobe and left basilar atelectasis. Small bilateral   pleural effusions.    MEDIASTINUM: Large circumferential pericardial effusion; depth of   pericardial fluid is 34 mm at the level of the basal inferior wall of the   left ventricle (sagittal series image 78). There is flattening of the   free wall of the right ventricle. Coronary atherosclerosis.    IMAGED ABDOMEN: Bilateral hydronephrosis. Cholelithiasis.    SOFT TISSUES: Bilateral gynecomastia.    BONES: Degenerative changes of the spine. Loss of height of L3 vertebral   body similar to 1/9/2018 CT abdomen.    IMPRESSION:.    Large pericardial effusion.    Small bilateral pleural effusions. (07 Sep 2022 13:09)      Hospital Course:    OBJECTIVE DATA:    Vital Signs Last 24 Hrs  T(C): 36.4 (07 Sep 2022 12:58), Max: 36.8 (06 Sep 2022 18:05)  T(F): 97.6 (07 Sep 2022 12:58), Max: 98.2 (06 Sep 2022 18:05)  HR: 70 (07 Sep 2022 12:58) (62 - 78)  BP: 146/82 (07 Sep 2022 12:58) (104/66 - 164/86)  BP(mean): --  RR: 14 (07 Sep 2022 12:58) (14 - 18)  SpO2: 98% (07 Sep 2022 12:58) (95% - 98%)    Parameters below as of 07 Sep 2022 12:58  Patient On (Oxygen Delivery Method): nasal cannula  O2 Flow (L/min): 2    I&O's Summary      Allergies:      MEDICATIONS  (STANDING):    MEDICATIONS  (PRN):      LABS                                            10.9                  Neurophils% (auto):   x      (09-07 @ 06:45):    12.98)-----------(220          Lymphocytes% (auto):  x                                             32.8                   Eosinphils% (auto):   x        Manual%: Neutrophils x    ; Lymphocytes x    ; Eosinophils x    ; Bands%: x    ; Blasts x                                    126    |  93     |  22                  Calcium: 7.7   / iCa: x      (09-07 @ 06:45)    ----------------------------<  115       Magnesium: x                                3.9     |  28     |  1.42             Phosphorous: x              ASSESSMENT & PLAN:     77M with PMH of HTN, HLD,  BPH, CKD ( creat 1.42 today), chronic angelita hydronephrosis ( x2 years), prostate ca ( radiation 10 years ago), seizure disorder ( last seizure 2015), ETOH use ( sober since 2016), hypothyroidism, and mild demenia p/w CP and SOB found with large pericardial effusion s/p pericardiocentesis.     #Neuro   Alert and oriented  - continue to monitor mental status as per protocol    Seizure disorder  - No seziure meds reported. Waiting upon sister to confirm no additional medication bottles at home not reported.     #Resp  Comfortable on RA  - SOB on admission likely 2/2 pericardial tamponade. monitor for improvement s/p drain placement   - Monitor SpO2 with goal >94%  - 7/6 CT chest: Large pericardial effusion. Small bilateral pleural effusions. Bilateral hydronephrosis.    COPD  - duonebs prn   - started on levofloxacin outpatient for concern of COPD exacerbation/PNA. Will d/c for now. Likely sob related to pericardial effusion (states SOB improved s/p drain placement)   - provided smoking cessation education. Start nicotine patch while inpatient (states quit last week, smokes 4-5 cig/daily)     #CV  Cardiac Tamponade  - 9/7 TTE: EF 70%. Mod concentric LVH. Normal RV size and function. Large pericardial effusion with evidence of echo tamponade physiology.   - s/p pericardiocentesis with drain placement, 1060mL out serosanguinous.   - as per lights criteria- meets criteria of exudative fluid. f/u pericardial fluid studies including cytology, gram stain, and culture   - Patient remained HD stable at [facility] prior to transfer (as per documentation: HRs 60-70s; SBPs 150-170s, Lying flat off with O2 sats 99%). Continue to monitor vital signs   - trop I downtrending at OSH prior to transfer  - monitor drain output. Daily TTE while in place  - Cancer screening: Last colonoscopy >10 years ago (states went last year for colonoscopy but procedure aborted 2/2 HTN outpatient and never rescheduled). Remote history of Prostate CA, follows with urologist but unsure of name. waiting for patient sister to provide collateral info     HTN  - on home losartan 100mg and hydral 25mg. Will reinitiate slowly if HD stable     HLD  - c/w home simvastatin     #GI  - start diet as tolerated  - monitor for regular BM while inpatient. Last BM 4 days ago. will start bowel reg     #  BPH  - continue home tamsulosin     Remote Hx Prostate Ca  - f.u with urology, patient unsure of name. will f/u with sister for collateral info.   - states in remission for >20 years     CKD  - Baseline SCr looks to be around 1.5-1.7 base upon prior SCr trend in 2020. SCr this AM 1.42  - monitor and trend SCr, BUN, and lytes  - replete lytes prn with goal K 4-4.5 and Mg >2    Hydronephrosis on CT 9/6  - renal US ordered   - Urinating currently with bladder scan of 120mL. Also with b/l hydronephrosis back in 2020 with history of chronic hydronephrosis. SCr at baseline. Will also get collateral info from outpatient urologist once patient sister updates with info     #Endo  - f/u hgb a1c and lipid panel     Hypothyroidism   - TSH elevated with low T3 and T4  - will consult endo for recs  - on home synthroid 112 mcg, may have missed a few doses (waiting upon refill). Spoke to endo- states to resume home dose of 112mcg and repeat free T4 in 5-7 days.     #Heme  Normocytic anemia  - continue to monitor and trend CBC.   - Likely 2/2 anemia of chronic disease 2/2 CKD. If continues to downtrend can consider anemia workup     #ID  Mild leukocytosis, afebrile  - continue to monitor and trend wbc and temp curve  - leukocytosis likely reactive 2/2 effusion. will monitor off abx at this time

## 2022-09-07 NOTE — PROGRESS NOTE ADULT - SUBJECTIVE AND OBJECTIVE BOX
Patient is a 77y old  Male who presents with a chief complaint of SOB and CP (03 Sep 2022 14:54)      INTERVAL HPI/OVERNIGHT EVENTS:  Pt was seen and examined, no acute events.      MEDICATIONS  (STANDING):  albuterol/ipratropium for Nebulization 3 milliLiter(s) Nebulizer every 6 hours  budesonide 160 MICROgram(s)/formoterol 4.5 MICROgram(s) Inhaler 2 Puff(s) Inhalation two times a day  heparin   Injectable 5000 Unit(s) SubCutaneous every 12 hours  hydrALAZINE 75 milliGRAM(s) Oral three times a day  losartan 100 milliGRAM(s) Oral daily  methylPREDNISolone sodium succinate Injectable 20 milliGRAM(s) IV Push every 6 hours  simvastatin 20 milliGRAM(s) Oral at bedtime  tamsulosin 0.4 milliGRAM(s) Oral at bedtime    MEDICATIONS  (PRN):      Allergies  No Known Allergies        Vital Signs Last 24 Hrs  T(C): 36.4 (04 Sep 2022 16:33), Max: 37.1 (03 Sep 2022 23:30)  T(F): 97.5 (04 Sep 2022 16:33), Max: 98.7 (03 Sep 2022 23:30)  HR: 76 (04 Sep 2022 19:11) (55 - 77)  BP: 177/95 (04 Sep 2022 19:11) (143/94 - 210/95)  BP(mean): --  RR: 19 (04 Sep 2022 16:33) (18 - 19)  SpO2: 99% (04 Sep 2022 17:15) (95% - 100%)    Parameters below as of 04 Sep 2022 17:15  Patient On (Oxygen Delivery Method): nasal cannula        PHYSICAL EXAM:  GENERAL: NAD  HEAD:  Atraumatic  EYES: PERRLA  ENMT: Mouth moist  NECK: Supple, no JVD  NERVOUS SYSTEM:  Awake, alert  CHEST/LUNG: Diminished on NC  HEART: RRR, S1, S2  ABDOMEN: Soft, non tender  EXTREMITIES:  no edema  SKIN: no rash        LABS:                        11.2   11.28 )-----------( 211      ( 03 Sep 2022 12:31 )             33.6     09-04    125<L>  |  93<L>  |  13  ----------------------------<  114<H>  3.5   |  22  |  1.47<H>    Ca    8.0<L>      04 Sep 2022 06:06    TPro  7.6  /  Alb  3.6  /  TBili  1.0  /  DBili  x   /  AST  48<H>  /  ALT  50  /  AlkPhos  78  09-03        CAPILLARY BLOOD GLUCOSE        RADIOLOGY & ADDITIONAL TESTS:    Imaging Personally Reviewed:  [ ] YES  [ ] NO    Consultant(s) Notes Reviewed:  [ ] YES  [ ] NO    Care Discussed with Consultants/Other Providers [ ] YES  [ ] NO
Patient is a 77y old  Male who presents with a chief complaint of SOB and CP (04 Sep 2022 20:35)    INTERVAL HPI/OVERNIGHT EVENTS:  Pt was seen and examined, no acute events.    MEDICATIONS  (STANDING):  albuterol/ipratropium for Nebulization 3 milliLiter(s) Nebulizer every 6 hours  budesonide 160 MICROgram(s)/formoterol 4.5 MICROgram(s) Inhaler 2 Puff(s) Inhalation two times a day  furosemide    Tablet 40 milliGRAM(s) Oral daily  heparin   Injectable 5000 Unit(s) SubCutaneous every 12 hours  hydrALAZINE 75 milliGRAM(s) Oral three times a day  losartan 100 milliGRAM(s) Oral daily  methylPREDNISolone sodium succinate Injectable 20 milliGRAM(s) IV Push every 6 hours  simvastatin 20 milliGRAM(s) Oral at bedtime  tamsulosin 0.4 milliGRAM(s) Oral at bedtime    MEDICATIONS  (PRN):      Allergies  No Known Allergies        Vital Signs Last 24 Hrs  T(C): 36.4 (05 Sep 2022 16:15), Max: 36.4 (05 Sep 2022 10:33)  T(F): 97.6 (05 Sep 2022 16:15), Max: 97.6 (05 Sep 2022 16:15)  HR: 71 (05 Sep 2022 17:36) (63 - 95)  BP: 168/91 (05 Sep 2022 16:15) (157/100 - 182/89)  BP(mean): --  RR: 18 (05 Sep 2022 16:15) (18 - 20)  SpO2: 98% (05 Sep 2022 17:36) (93% - 99%)    Parameters below as of 05 Sep 2022 17:36  Patient On (Oxygen Delivery Method): nasal cannula,2L      PHYSICAL EXAM:  GENERAL: NAD  HEAD:  Atraumatic  EYES: PERRLA  ENMT: Mouth moist  NECK: Supple  NERVOUS SYSTEM:  Awake, alert  CHEST/LUNG: Diminished   HEART: RRR, S1, S2  ABDOMEN: Soft, non tender  EXTREMITIES:  no edema  SKIN: no rash      LABS:                        12.0   17.02 )-----------( 244      ( 05 Sep 2022 06:15 )             35.3     09-05    125<L>  |  94<L>  |  15  ----------------------------<  113<H>  3.8   |  23  |  1.39<H>    Ca    8.3<L>      05 Sep 2022 06:15    TPro  7.0  /  Alb  3.2<L>  /  TBili  0.8  /  DBili  x   /  AST  61<H>  /  ALT  44  /  AlkPhos  69  09-05        CAPILLARY BLOOD GLUCOSE          RADIOLOGY & ADDITIONAL TESTS:    Imaging Personally Reviewed:  [ ] YES  [ ] NO    Consultant(s) Notes Reviewed:  [ ] YES  [ ] NO    Care Discussed with Consultants/Other Providers [ ] YES  [ ] NO
Patient is a 77y old  Male who presents with a chief complaint of SOB and CP (06 Sep 2022 21:25)  aleret. no distress   no dyspnea at rest   no chest pains   vitals stable  labs reviewed cpk elevated  Trop trending down.   PUL and cardiology notes appreciated    INTERVAL HPI/OVERNIGHT EVENTS:  PAST MEDICAL & SURGICAL HISTORY:  ETOH abuse  sober since 2016  severe hypothyroidism    Seizure disorder  last seizure 2015      BPH (benign prostatic hyperplasia)      HTN (hypertension)      Prostate CA  Dx&#x27;d 2008, s/p radiation      Hyperlipidemia      Poor historian      S/P hernia repair  right inguinal hernia      H/O prostate cancer  10- yrs ago, s/p radiation therapy      Status post cataract extraction      H/O urethrotomy  June 2018          MEDICATIONS  (STANDING):  albuterol/ipratropium for Nebulization 3 milliLiter(s) Nebulizer every 6 hours  budesonide 160 MICROgram(s)/formoterol 4.5 MICROgram(s) Inhaler 2 Puff(s) Inhalation two times a day  doxazosin 4 milliGRAM(s) Oral at bedtime  heparin   Injectable 5000 Unit(s) SubCutaneous every 12 hours  hydrALAZINE 75 milliGRAM(s) Oral three times a day  levothyroxine Injectable 50 MICROGram(s) IV Push at bedtime  losartan 100 milliGRAM(s) Oral daily  methylPREDNISolone sodium succinate Injectable 20 milliGRAM(s) IV Push every 8 hours  simvastatin 20 milliGRAM(s) Oral at bedtime  tamsulosin 0.4 milliGRAM(s) Oral at bedtime    MEDICATIONS  (PRN):      Allergies    No Known Allergies    Intolerances        REVIEW OF SYSTEMS:  CONSTITUTIONAL: No fever, weight loss, or fatigue  EYES: No eye pain, visual disturbances, or discharge  ENMT:  No difficulty hearing, tinnitus, vertigo; No sinus or throat pain  NECK: No pain or stiffness  BREASTS: No pain, masses, or nipple discharge  RESPIRATORY: No cough, wheezing, chills or hemoptysis; No shortness of breath  CARDIOVASCULAR: No chest pain, palpitations, dizziness, or leg swelling  GASTROINTESTINAL: No abdominal or epigastric pain. No nausea, vomiting, or hematemesis; No diarrhea or constipation. No melena or hematochezia.  GENITOURINARY: No dysuria, frequency, hematuria, or incontinence  NEUROLOGICAL: No headaches, memory loss, loss of strength, numbness, or tremors  SKIN: No itching, burning, rashes, or lesions   LYMPH NODES: No enlarged glands  ENDOCRINE: No heat or cold intolerance; No hair loss  MUSCULOSKELETAL: No joint pain or swelling; No muscle, back, or extremity pain  PSYCHIATRIC: No depression, anxiety, mood swings, or difficulty sleeping  HEME/LYMPH: No easy bruising, or bleeding gums  ALLERY AND IMMUNOLOGIC: No hives or eczema    Vital Signs Last 24 Hrs  T(C): 36.5 (07 Sep 2022 04:18), Max: 36.8 (06 Sep 2022 18:05)  T(F): 97.7 (07 Sep 2022 04:18), Max: 98.2 (06 Sep 2022 18:05)  HR: 76 (07 Sep 2022 06:09) (62 - 79)  BP: 104/66 (07 Sep 2022 04:18) (104/66 - 167/84)  BP(mean): --  RR: 18 (07 Sep 2022 04:18) (18 - 19)  SpO2: 97% (07 Sep 2022 06:09) (97% - 99%)    Parameters below as of 07 Sep 2022 06:09  Patient On (Oxygen Delivery Method): room air        PHYSICAL EXAM:  GENERAL: NAD, well-groomed, well-developed  HEAD:  Atraumatic, Normocephalic  EYES: EOMI, PERRLA, conjunctiva and sclera clear  ENMT: No tonsillar erythema, exudates, or enlargement; Moist mucous membranes, Good dentition, No lesions  NECK: Supple, No JVD, Normal thyroid  NERVOUS SYSTEM:  Alert & Oriented X3, Good concentration; Motor Strength 5/5 B/L upper and lower extremities; DTRs 2+ intact and symmetric  CHEST/LUNG: Clear to percussion bilaterally; No rales, rhonchi, wheezing, or rubs  HEART: Regular rate and rhythm; No murmurs, rubs, or gallops. JVD elevated  ABDOMEN: Soft, Nontender, Nondistended; Bowel sounds present  EXTREMITIES:  2+ Peripheral Pulses, No clubbing, cyanosis, or edema  LYMPH: No lymphadenopathy noted  SKIN: No rashes or lesions    LABS:                        10.9   12.98 )-----------( 220      ( 07 Sep 2022 06:45 )             32.8     09-07    126<L>  |  93<L>  |  22  ----------------------------<  115<H>  3.9   |  28  |  1.42<H>    Ca    7.7<L>      07 Sep 2022 06:45    < from: CT Chest No Cont (09.06.22 @ 14:05) >  ACC: [id number] EXAM:  CT CHEST                          PROCEDURE DATE:  09/06/2022          INTERPRETATION:  HISTORY: Dyspnea on exertion. Smoker.    EXAMINATION: CT CHEST was performed without IV contrast.    COMPARISON: No CT chest comparison. Correlation with 1/9/2018 CT abdomen.    FINDINGS:    AIRWAYS, LUNGS, PLEURA: Trachea and mainstem bronchi patent. Emphysema.   Posterior left upper lobe and left basilar atelectasis. Small bilateral   pleural effusions.    MEDIASTINUM: Large circumferential pericardial effusion; depth of   pericardial fluid is 34 mm at the level of the basal inferior wall of the   left ventricle (sagittal series image 78). There is flattening of the   free wall of the right ventricle. Coronary atherosclerosis.    IMAGED ABDOMEN: Bilateral hydronephrosis. Cholelithiasis.    SOFT TISSUES: Bilateral gynecomastia.    BONES: Degenerative changes of the spine. Loss of height of L3 vertebral   body similar to 1/9/2018 CT abdomen.    IMPRESSION:.    Large pericardial effusion.    Small bilateral pleural effusions.    Bilateral hydronephrosis.    Findings discussed with Dr. Shaw on 9/6/2022 at 2:43 PM.    < end of copied text >          CAPILLARY BLOOD GLUCOSE          CARDIAC MARKERS ( 07 Sep 2022 06:45 )  x     / x     / 1090 U/L / x     / x                RADIOLOGY & ADDITIONAL TESTS:    Imaging Personally Reviewed:  [ ] YES  [ ] NO    Consultant(s) Notes Reviewed:  [ ] YES  [ ] NO    Care Discussed with Consultants/Other Providers [ ] YES  [ ] NO    Care discussed with family,         [  ]   yes  [  ]  No    imp:    stable[ ]    unstable[  ]     improving [  x ]       unchanged  [  ]                Plans:  Continue present plans  [x  ] continue Iv Levothyroxine daily,  cardiology follow up. reduce steroids,  plan for transfer to [facility] as per cardiology                 New consult [  ]   specialty  .......               order test[  ]    test name.                  Discharge Planning  [  ]

## 2022-09-07 NOTE — PROGRESS NOTE ADULT - SUBJECTIVE AND OBJECTIVE BOX
AMBAR PICKARD  MRN-[id number]  Patient is a 77y old  Male who presents with a chief complaint of   HPI:  This is a 78yo old  Male with PMH of HTN, HLD,  BPH, CKD ( creat 1.42 today), chronic angelita hydronephrosis ( x2 years), prostate ca ( radiation 10 years ago), seizure disorder ( last seizure 2015), ETOH use ( sober since 2016), hypothyroidism, and mild demenia; presents to [facility] on 9/3/2022 with chief complaint of SOB and CP x3 days.  Pt has been a heavy smoker his whole life. Denies fever, chills, cough, or congestion. No sick contacts.    Admitted to tele for likely COPD exacerbation and CP.  CXR is clear. Started on Duonebs, Symbicort and Solumedrol. No prior cardiac stents. EKG on arrival to ED notes PRWP V 1-3, no ST changes. Trop high on arrival at 268 trending down 180.8 today, proBNP 763, CK 1090. TSH 69.600 ( stopped synthroid a few weeks ago), T3 <20 T4 0.9, Na 126.  CXR revealing New cardiomegaly and CT chest with Large pericardial effusion, Echo today with large pericardial effusion with early tamponade physiology. Transferred to [facility] for pericardiocentesis. On arrival to [facility] patient awake and verbal A&OX3, denies any chest pain, dyspnea, dizziness, palpitations, N&V, Ha, orthopnea, LE edema.  VS WNL, on 2 L nasal cannula in no respiratory distress.        Hospital Course:    24 HOUR EVENTS:    REVIEW OF SYSTEMS:    CONSTITUTIONAL: No weakness, fevers or chills  EYES/ENT: No visual changes;  No vertigo or throat pain   NECK: No pain or stiffness  RESPIRATORY: No cough, wheezing, hemoptysis; No shortness of breath  CARDIOVASCULAR: No chest pain or palpitations  GASTROINTESTINAL: No abdominal or epigastric pain. No nausea, vomiting, or hematemesis; No diarrhea or constipation. No melena or hematochezia.  GENITOURINARY: No dysuria, frequency or hematuria  NEUROLOGICAL: No numbness or weakness  SKIN: No itching, rashes      ICU Vital Signs Last 24 Hrs  T(C): 36.6 (07 Sep 2022 15:25), Max: 36.6 (07 Sep 2022 15:25)  T(F): 97.8 (07 Sep 2022 15:25), Max: 97.8 (07 Sep 2022 15:25)  HR: 68 (07 Sep 2022 20:00) (66 - 78)  BP: 114/71 (07 Sep 2022 20:00) (104/66 - 180/98)  BP(mean): 88 (07 Sep 2022 20:00) (84 - 131)  ABP: --  ABP(mean): --  RR: 13 (07 Sep 2022 20:00) (12 - 18)  SpO2: 100% (07 Sep 2022 20:00) (95% - 100%)    O2 Parameters below as of 07 Sep 2022 20:00  Patient On (Oxygen Delivery Method): room air  07 Sep 2022 07:01  -  07 Sep 2022 22:04  --------------------------------------------------------  IN: 120 mL / OUT: 140 mL / NET: -20 mL    CAPILLARY BLOOD GLUCOSE    PHYSICAL EXAM:  GENERAL: No acute distress, well-developed  HEAD:  Atraumatic, Normocephalic  EYES: EOMI, PERRLA, conjunctiva and sclera clear  NECK: Supple, no lymphadenopathy, no JVD  CHEST/LUNG: CTAB; No wheezes, rales, or rhonchi  HEART: Regular rate and rhythm. Normal S1/S2. No murmurs, rubs, or gallops  ABDOMEN: Soft, non-tender, non-distended; normal bowel sounds, no organomegaly  EXTREMITIES:  2+ peripheral pulses b/l, No clubbing, cyanosis, or edema  NEUROLOGY: A&O x 3, no focal deficits  SKIN: No rashes or lesions    ============================I/O===========================   I&O's Detail    07 Sep 2022 07:01  -  07 Sep 2022 22:04  --------------------------------------------------------  IN:    Oral Fluid: 120 mL  Total IN: 120 mL    OUT:    Drain (mL): 40 mL    Incontinent per Condom Catheter (mL): 100 mL  Total OUT: 140 mL    Total NET: -20 mL    ============================ LABS =========================                        10.9   12.98 )-----------( 220      ( 07 Sep 2022 06:45 )             32.8     09-07    126<L>  |  93<L>  |  22  ----------------------------<  115<H>  3.9   |  28  |  1.42<H>    Ca    7.7<L>      07 Sep 2022 06:45          Creatine Kinase, Serum: 1090 U/L (09-07-22 @ 06:45)    ======================Micro/Rad/Cardio=================  Telemetry: Reviewed   EKG: Reviewed  CXR: Reviewed  Culture: Reviewed   Echo: Limited Transthoracic Echo:   Patient name: AMBAR PICKARD  YOB: 1945   Age: 77 (M)   MR#: [id number]  Study Date: 9/7/2022  [facility] RDCS  Study quality: Technically fair  Referring Physician: Marta Ortez MD  Blood Pressure: 166/87 mmHg  Height: 173 cm  Weight: 66 kg  BSA: 1.8 m2  ------------------------------------------------------------------------  PROCEDURE: Limited transthoracic echocardiogram with 2-D  imaging.  INDICATION: Pericardial effusion (noninflammatory) (I31.3)  ------------------------------------------------------------------------  Conclusions:  Limited study:  Normal left ventricular size and systolic function.  Large pericardial effusion evaculated via  pericardiocentesis. Images obtained post-procedure  demonstrate a trace residual effusion.  ------------------------------------------------------------------------  Confirmed on  9/7/2022 - 16:50:27 by Roberto Bauer MD, FASE  ------------------------------------------------------------------------ (09-07-22 @ 13:44)    Cath:   ======================================================  PAST MEDICAL & SURGICAL HISTORY:  ETOH abuse  sober since 2016      Seizure disorder  last seizure 2015      BPH (benign prostatic hyperplasia)      HTN (hypertension)      Prostate CA  Dx&#x27;d 2008, s/p radiation      Hyperlipidemia      Poor historian      S/P hernia repair  right inguinal hernia      H/O prostate cancer  10- yrs ago, s/p radiation therapy      Status post cataract extraction      H/O urethrotomy  June 2018        ====================ASSESSMENT ==============  77M with PMH of HTN, HLD,  BPH, CKD ( creat 1.42 today), chronic angelita hydronephrosis ( x2 years), prostate ca ( radiation 10 years ago), seizure disorder ( last seizure 2015), ETOH use ( sober since 2016), hypothyroidism, and mild demenia p/w CP and SOB found with large pericardial effusion s/p pericardiocentesis.    Plan:  ====================== NEUROLOGY=====================  Alert and oriented  - continue to monitor mental status as per protocol    Seizure disorder  - No seziure meds reported. Waiting upon sister to confirm no additional medication bottles at home not reported.   ==================== RESPIRATORY======================  Comfortable on RA  - SOB on admission likely 2/2 pericardial tamponade. monitor for improvement s/p drain placement   - Monitor SpO2 with goal >94%  - 7/6 CT chest: Large pericardial effusion. Small bilateral pleural effusions. Bilateral hydronephrosis.    COPD  - duonebs prn   - started on levofloxacin outpatient for concern of COPD exacerbation/PNA. Will d/c for now. Likely sob related to pericardial effusion (states SOB improved s/p drain placement)   - provided smoking cessation education. Start nicotine patch while inpatient (states quit last week, smokes 4-5 cig/daily)   ====================CARDIOVASCULAR==================  Cardiac Tamponade  - 9/7 TTE: EF 70%. Mod concentric LVH. Normal RV size and function. Large pericardial effusion with evidence of echo tamponade physiology.   - s/p pericardiocentesis with drain placement, 1060mL out serosanguinous.   - as per lights criteria- meets criteria of exudative fluid. f/u pericardial fluid studies including cytology, gram stain, and culture   - Patient remained HD stable at [place] prior to transfer (as per documentation: HRs 60-70s; SBPs 150-170s, Lying flat off with O2 sats 99%). Continue to monitor vital signs   - trop I downtrending at OSH prior to transfer  - monitor drain output. Daily TTE while in place  - Cancer screening: Last colonoscopy >10 years ago (states went last year for colonoscopy but procedure aborted 2/2 HTN outpatient and never rescheduled). Remote history of Prostate CA, follows with urologist but unsure of name. waiting for patient sister to provide collateral info     HTN  - on home losartan 100mg and hydral 25mg. Will reinitiate slowly if HD stable     HLD  - c/w home simvastatin       ===================HEMATOLOGIC/ONC ===================  Normocytic anemia  - continue to monitor and trend CBC.   - Likely 2/2 anemia of chronic disease 2/2 CKD. If continues to downtrend can consider anemia workup     ===================== RENAL =========================  BPH  - continue home tamsulosin     Remote Hx Prostate Ca  - f.u with urology, patient unsure of name. will f/u with sister for collateral info.   - states in remission for >20 years     CKD  - Baseline SCr looks to be around 1.5-1.7 base upon prior SCr trend in 2020. SCr this AM 1.42  - monitor and trend SCr, BUN, and lytes  - replete lytes prn with goal K 4-4.5 and Mg >2    Hydronephrosis on CT 9/6  - renal US ordered   - Urinating currently with bladder scan of 120mL. Also with b/l hydronephrosis back in 2020 with history of chronic hydronephrosis. SCr at baseline. Will also get collateral info from outpatient urologist once patient sister updates with info       ==================== GASTROINTESTINAL===================  - start diet as tolerated  - monitor for regular BM while inpatient. Last BM 4 days ago. will start bowel reg   =======================    ENDOCRINE  =====================  - f/u hgb a1c and lipid panel     Hypothyroidism   - TSH elevated with low T3 and T4  - will consult endo for recs  - on home synthroid 112 mcg, may have missed a few doses (waiting upon refill). Spoke to endo- states to resume home dose of 112mcg and repeat free T4 in 5-7 days.   ========================INFECTIOUS DISEASE================  Mild leukocytosis, afebrile  - continue to monitor and trend wbc and temp curve  - leukocytosis likely reactive 2/2 effusion. will monitor off abx at this time    Patient requires continuous monitoring with bedside rhythm monitoring, pulse ox monitoring, and intermittent blood gas analysis. Care plan discussed with ICU care team. Patient remained critical and at risk for life threatening decompensation.  Patient seen, examined and plan discussed with CCU team during rounds.     I have personally provided ____ minutes of critical care time excluding time spent on separate procedures, in addition to initial critical care time provided by the CICU Attending, Dr. Irvin     By signing my name below, I, Raven Glasgow, attest that this documentation has been prepared under the direction and in the presence of Merissa Quevedo)  Electronically signed: Jesus Singleton, 09-07-22 @ 22:04    ISae Jing (PA) , personally performed the services described in this documentation. all medical record entries made by the scribe were at my direction and in my presence. I have reviewed the chart and agree that the record reflects my personal performance and is accurate and complete  Electronically signed: Merissa Quevedo)       AMBAR PICKARD  MRN-[id number]  Patient is a 77y old  Male who presents with a chief complaint of   HPI:  This is a 76yo old  Male with PMH of HTN, HLD,  BPH, CKD ( creat 1.42 today), chronic angelita hydronephrosis ( x2 years), prostate ca ( radiation 10 years ago), seizure disorder ( last seizure 2015), ETOH use ( sober since 2016), hypothyroidism, and mild demenia; presents to [facility] on 9/3/2022 with chief complaint of SOB and CP x3 days.  Pt has been a heavy smoker his whole life. Denies fever, chills, cough, or congestion. No sick contacts.    Admitted to tele for likely COPD exacerbation and CP.  CXR is clear. Started on Duonebs, Symbicort and Solumedrol. No prior cardiac stents. EKG on arrival to ED notes PRWP V 1-3, no ST changes. Trop high on arrival at 268 trending down 180.8 today, proBNP 763, CK 1090. TSH 69.600 ( stopped synthroid a few weeks ago), T3 <20 T4 0.9, Na 126.  CXR revealing New cardiomegaly and CT chest with Large pericardial effusion, Echo today with large pericardial effusion with early tamponade physiology. Transferred to [facility] for pericardiocentesis. On arrival to [facility] patient awake and verbal A&OX3, denies any chest pain, dyspnea, dizziness, palpitations, N&V, Ha, orthopnea, LE edema.  VS WNL, on 2 L nasal cannula in no respiratory distress.        Hospital Course:    24 HOUR EVENTS:  s/p pericardiocentesis, 1.06L  serosanguinous removed    REVIEW OF SYSTEMS:    CONSTITUTIONAL: No weakness, fevers or chills  EYES/ENT: No visual changes;  No vertigo or throat pain   NECK: No pain or stiffness  RESPIRATORY: No cough, wheezing, hemoptysis; No shortness of breath  CARDIOVASCULAR: No chest pain or palpitations  GASTROINTESTINAL: No abdominal or epigastric pain. No nausea, vomiting, or hematemesis; No diarrhea or constipation. No melena or hematochezia.  GENITOURINARY: No dysuria, frequency or hematuria  NEUROLOGICAL: No numbness or weakness  SKIN: No itching, rashes      ICU Vital Signs Last 24 Hrs  T(C): 36.6 (07 Sep 2022 15:25), Max: 36.6 (07 Sep 2022 15:25)  T(F): 97.8 (07 Sep 2022 15:25), Max: 97.8 (07 Sep 2022 15:25)  HR: 68 (07 Sep 2022 20:00) (66 - 78)  BP: 114/71 (07 Sep 2022 20:00) (104/66 - 180/98)  BP(mean): 88 (07 Sep 2022 20:00) (84 - 131)  ABP: --  ABP(mean): --  RR: 13 (07 Sep 2022 20:00) (12 - 18)  SpO2: 100% (07 Sep 2022 20:00) (95% - 100%)    O2 Parameters below as of 07 Sep 2022 20:00  Patient On (Oxygen Delivery Method): room air  07 Sep 2022 07:01  -  07 Sep 2022 22:04  --------------------------------------------------------  IN: 120 mL / OUT: 140 mL / NET: -20 mL    CAPILLARY BLOOD GLUCOSE    PHYSICAL EXAM:  GENERAL: No acute distress, well-developed  HEAD:  Atraumatic, Normocephalic  EYES: EOMI, PERRLA, conjunctiva and sclera clear  NECK: Supple, no lymphadenopathy, no JVD  CHEST/LUNG: CTAB; No wheezes, rales, or rhonchi  HEART: Regular rate and rhythm. Normal S1/S2. No murmurs, rubs, or gallops  ABDOMEN: Soft, non-tender, non-distended; normal bowel sounds, no organomegaly  EXTREMITIES:  2+ peripheral pulses b/l, No clubbing, cyanosis, or edema  NEUROLOGY: A&O x 3, no focal deficits  SKIN: No rashes or lesions    ============================I/O===========================   I&O's Detail    07 Sep 2022 07:01  -  07 Sep 2022 22:04  --------------------------------------------------------  IN:    Oral Fluid: 120 mL  Total IN: 120 mL    OUT:    Drain (mL): 40 mL    Incontinent per Condom Catheter (mL): 100 mL  Total OUT: 140 mL    Total NET: -20 mL    ============================ LABS =========================                        10.9   12.98 )-----------( 220      ( 07 Sep 2022 06:45 )             32.8     09-07    126<L>  |  93<L>  |  22  ----------------------------<  115<H>  3.9   |  28  |  1.42<H>    Ca    7.7<L>      07 Sep 2022 06:45          Creatine Kinase, Serum: 1090 U/L (09-07-22 @ 06:45)    ======================Micro/Rad/Cardio=================  Telemetry: Reviewed   EKG: Reviewed  CXR: Reviewed  Culture: Reviewed   Echo: Limited Transthoracic Echo:   Patient name: AMBAR PICKARD  YOB: 1945   Age: 77 (M)   MR#: [id number]  Study Date: 9/7/2022  [facility]CS  Study quality: Technically fair  Referring Physician: Marta Ortez MD  Blood Pressure: 166/87 mmHg  Height: 173 cm  Weight: 66 kg  BSA: 1.8 m2  ------------------------------------------------------------------------  PROCEDURE: Limited transthoracic echocardiogram with 2-D  imaging.  INDICATION: Pericardial effusion (noninflammatory) (I31.3)  ------------------------------------------------------------------------  Conclusions:  Limited study:  Normal left ventricular size and systolic function.  Large pericardial effusion evaculated via  pericardiocentesis. Images obtained post-procedure  demonstrate a trace residual effusion.  ------------------------------------------------------------------------  Confirmed on  9/7/2022 - 16:50:27 by Roberto Bauer MD, FASE  ------------------------------------------------------------------------ (09-07-22 @ 13:44)    Cath:   ======================================================  PAST MEDICAL & SURGICAL HISTORY:  ETOH abuse  sober since 2016      Seizure disorder  last seizure 2015      BPH (benign prostatic hyperplasia)      HTN (hypertension)      Prostate CA  Dx&#x27;d 2008, s/p radiation      Hyperlipidemia      Poor historian      S/P hernia repair  right inguinal hernia      H/O prostate cancer  10- yrs ago, s/p radiation therapy      Status post cataract extraction      H/O urethrotomy  June 2018        ====================ASSESSMENT ==============  77M with PMH of HTN, HLD,  BPH, CKD ( creat 1.42 today), chronic angelita hydronephrosis ( x2 years), prostate ca ( radiation 10 years ago), seizure disorder ( last seizure 2015), ETOH use ( sober since 2016), hypothyroidism, and mild demenia p/w CP and SOB found with large pericardial effusion s/p pericardiocentesis.    Plan:  ====================== NEUROLOGY=====================  Alert and oriented  - continue to monitor mental status as per protocol    Seizure disorder  - No seziure meds reported. Waiting upon sister to confirm no additional medication bottles at home not reported.   ==================== RESPIRATORY======================  Comfortable on RA  - SOB on admission likely 2/2 pericardial tamponade. monitor for improvement s/p drain placement   - Monitor SpO2 with goal >94%  - 7/6 CT chest: Large pericardial effusion. Small bilateral pleural effusions. Bilateral hydronephrosis.    COPD  - duonebs prn   - started on levofloxacin outpatient for concern of COPD exacerbation/PNA. Will d/c for now. Likely sob related to pericardial effusion (states SOB improved s/p drain placement)   - provided smoking cessation education. Start nicotine patch while inpatient (states quit last week, smokes 4-5 cig/daily)   ====================CARDIOVASCULAR==================  Cardiac Tamponade  - 9/7 TTE: EF 70%. Mod concentric LVH. Normal RV size and function. Large pericardial effusion with evidence of echo tamponade physiology.   - s/p pericardiocentesis with drain placement, 1060mL out serosanguinous.   - as per lights criteria- meets criteria of exudative fluid. f/u pericardial fluid studies including cytology, gram stain, and culture   - Patient remained HD stable at [place] prior to transfer (as per documentation: HRs 60-70s; SBPs 150-170s, Lying flat off with O2 sats 99%). Continue to monitor vital signs   - trop I downtrending at OSH prior to transfer  - monitor drain output. Daily TTE while in place  - Cancer screening: Last colonoscopy >10 years ago (states went last year for colonoscopy but procedure aborted 2/2 HTN outpatient and never rescheduled). Remote history of Prostate CA, follows with urologist but unsure of name. waiting for patient sister to provide collateral info     HTN  - on home losartan 100mg and hydral 25mg. Will reinitiate slowly if HD stable     HLD  - c/w home simvastatin       ===================HEMATOLOGIC/ONC ===================  Normocytic anemia  - continue to monitor and trend CBC.   - Likely 2/2 anemia of chronic disease 2/2 CKD. If continues to downtrend can consider anemia workup     ===================== RENAL =========================  BPH  - continue home tamsulosin     Remote Hx Prostate Ca  - f.u with urology, patient unsure of name. will f/u with sister for collateral info.   - states in remission for >20 years     CKD  - Baseline SCr looks to be around 1.5-1.7 base upon prior SCr trend in 2020. SCr this AM 1.42  - monitor and trend SCr, BUN, and lytes  - replete lytes prn with goal K 4-4.5 and Mg >2    Hydronephrosis on CT 9/6  - renal US ordered   - Urinating currently with bladder scan of 120mL. Also with b/l hydronephrosis back in 2020 with history of chronic hydronephrosis. SCr at baseline. Will also get collateral info from outpatient urologist once patient sister updates with info       ==================== GASTROINTESTINAL===================  - start diet as tolerated  - monitor for regular BM while inpatient. Last BM 4 days ago. will start bowel reg   =======================    ENDOCRINE  =====================  - f/u hgb a1c and lipid panel     Hypothyroidism   - TSH elevated with low T3 and T4  - will consult endo for recs  - on home synthroid 112 mcg, may have missed a few doses (waiting upon refill). Spoke to endo- states to resume home dose of 112mcg and repeat free T4 in 5-7 days.   ========================INFECTIOUS DISEASE================  Mild leukocytosis, afebrile  - continue to monitor and trend wbc and temp curve  - leukocytosis likely reactive 2/2 effusion. will monitor off abx at this time    Patient requires continuous monitoring with bedside rhythm monitoring, pulse ox monitoring, and intermittent blood gas analysis. Care plan discussed with ICU care team. Patient remained critical and at risk for life threatening decompensation.  Patient seen, examined and plan discussed with CCU team during rounds.     I have personally provided __30__ minutes of critical care time excluding time spent on separate procedures, in addition to initial critical care time provided by the CICU Attending, Dr. Irvin     By signing my name below, I, Raven Glasgow, attest that this documentation has been prepared under the direction and in the presence of Merissa Quevedo (PA)  Electronically signed: Jesus Singleton, 09-07-22 @ 22:04    ISae Jing (PA) , personally performed the services described in this documentation. all medical record entries made by the scribe were at my direction and in my presence. I have reviewed the chart and agree that the record reflects my personal performance and is accurate and complete  Electronically signed: Merissa Quevedo (PA)

## 2022-09-07 NOTE — CHART NOTE - NSCHARTNOTEFT_GEN_A_CORE
AMBAR IPCKARD IS A 77y man with history of hypothyroidism, on home LT4 112mcg daily. Admitted with pericardial effusion, endocrine consulted for hypothyroidism with elevated TSH level.  Unclear if patient is taking medication consistently.  Review of prior TSH level in our system shows many elevated TSH, may be due to med non-adherence.     Weight based LT4 dosing approximately 106mcg daily, therefore, I believe LT4 112mcg PO is an appropriate does for now.  Recommend to repeat free T4 in one week to ensure it's up-trending.  TSH will takes a 6-8 weeks to normalize.      Full consult to follow in the AM.     Thyroid Stimulating Hormone, Serum: 69.600 uU/mL (09.06.22 @ 06:46)   Thyroid Stimulating Hormone, Serum: 0.118 uU/mL (07.22.20 @ 10:50)   Thyroid Stimulating Hormone, Serum: 0.133 uIU/mL (07.20.20 @ 07:42)   Thyroid Stimulating Hormone, Serum: 19.400 uU/mL (06.09.17 @ 08:34)   Thyroid Stimulating Hormone, Serum: 81.600 uU/mL (03.03.17 @ 09:45)   Thyroid Stimulating Hormone, Serum: 87.900 uU/mL (02.28.17 @ 08:57)

## 2022-09-07 NOTE — H&P CARDIOLOGY - GASTROINTESTINAL DETAILS
no masses palpable/bowel sounds normal/no bruit/no rebound tenderness/no guarding/no rigidity/no organomegaly

## 2022-09-07 NOTE — PATIENT PROFILE ADULT - FALL HARM RISK - HARM RISK INTERVENTIONS

## 2022-09-07 NOTE — H&P CARDIOLOGY - HISTORY OF PRESENT ILLNESS
This is a 78yo old  Male with PMH of HTN, HLD,  BPH, CKD ( creat 1.42 today), chronic angelita hydronephrosis ( x2 years), prostate ca ( radiation 10 years ago), seizure disorder ( last seizure 2015), ETOH use ( sober since 2016), hypothyroidism, and mild demenia; presents to [facility] on 9/3/2022 with chief complaint of SOB and CP x3 days.  Pt has been a heavy smoker his whole life. Denies fever, chills, cough, or congestion. No sick contacts.    Admitted to tele for likely COPD exacerbation and CP.  CXR is clear. Started on Duonebs, Symbicort and Solumedrol. No prior cardiac stents. EKG on arrival to ED notes PRWP V 1-3, no ST changes. Trop high on arrival at 268 trending down 180.8 today, proBNP 763, CK 1090. TSH 69.600 ( stopped synthroid a few weeks ago), T3 <20 T4 0.9, Na 126.  CXR revealing New cardiomegaly and CT chest with Large pericardial effusion, Echo today with large pericardial effusion with early tamponade physiology. Transferred to [facility] for pericardiocentesis. On arrival to [facility] patient awake and verbal A&OX3, denies any chest pain, dyspnea, dizziness, palpitations, N&V, Ha, orthopnea, LE edema.  VS WNL, on 2 L nasal cannula in no respiratory distress.      recs from [facility]:  home with home PT          INTERPRETATION:  CLINICAL STATEMENT: Chest Pain. Shortness of breath  TECHNIQUE: AP view of the chest.  COMPARISON: 7/19/2020    New cardiomegaly, may be exaggerated by AP technique. Suboptimal degree   of inspiration. Small left pleural effusion.    Degenerative changes of the spine, shoulders and AC joints.    IMPRESSION:    New cardiomegaly, may be exaggerated by AP technique.    Small left pleural effusion.    ECHO  Summary:   1. Left ventricular ejection fraction, by visual estimation, is 50 to   55%.   2. Mildly enlarged left atrium.   3. Maximum thickness of effusion is 2-2.5 cm.   4. Mild mitral annular calcification.   5. Mild thickening of the anterior and posterior mitral valve leaflets.   6. Trace mitral valve regurgitation.   7. Mild tricuspid regurgitation.   8. Estimated pulmonary artery systolic pressure is 37.4 mmHg assuming a   right atrial pressure of 5 mmHg, which is consistent with mild pulmonary   hypertension.   9. Findings are consistent with mild cardiac tamponade.      CT CHEST                          PROCEDURE DATE:  09/06/2022      INTERPRETATION:  HISTORY: Dyspnea on exertion. Smoker.    EXAMINATION: CT CHEST was performed without IV contrast.    COMPARISON: No CT chest comparison. Correlation with 1/9/2018 CT abdomen.    FINDINGS:    AIRWAYS, LUNGS, PLEURA: Trachea and mainstem bronchi patent. Emphysema.   Posterior left upper lobe and left basilar atelectasis. Small bilateral   pleural effusions.    MEDIASTINUM: Large circumferential pericardial effusion; depth of   pericardial fluid is 34 mm at the level of the basal inferior wall of the   left ventricle (sagittal series image 78). There is flattening of the   free wall of the right ventricle. Coronary atherosclerosis.    IMAGED ABDOMEN: Bilateral hydronephrosis. Cholelithiasis.    SOFT TISSUES: Bilateral gynecomastia.    BONES: Degenerative changes of the spine. Loss of height of L3 vertebral   body similar to 1/9/2018 CT abdomen.    IMPRESSION:.    Large pericardial effusion.    Small bilateral pleural effusions.

## 2022-09-07 NOTE — PROGRESS NOTE ADULT - TIME BILLING
clinical eval, review labs, Ct scan reports. discuss with patient. sister, Lis and team, and cardiologist

## 2022-09-08 LAB
A1C WITH ESTIMATED AVERAGE GLUCOSE RESULT: 5.7 % — HIGH (ref 4–5.6)
ALBUMIN SERPL ELPH-MCNC: 2.9 G/DL — LOW (ref 3.3–5)
ALBUMIN SERPL ELPH-MCNC: 3.4 G/DL — SIGNIFICANT CHANGE UP (ref 3.3–5)
ALP SERPL-CCNC: 71 U/L — SIGNIFICANT CHANGE UP (ref 40–120)
ALP SERPL-CCNC: 71 U/L — SIGNIFICANT CHANGE UP (ref 40–120)
ALT FLD-CCNC: 27 U/L — SIGNIFICANT CHANGE UP (ref 10–45)
ALT FLD-CCNC: 30 U/L — SIGNIFICANT CHANGE UP (ref 10–45)
ANION GAP SERPL CALC-SCNC: 10 MMOL/L — SIGNIFICANT CHANGE UP (ref 5–17)
ANION GAP SERPL CALC-SCNC: 10 MMOL/L — SIGNIFICANT CHANGE UP (ref 5–17)
AST SERPL-CCNC: 42 U/L — HIGH (ref 10–40)
AST SERPL-CCNC: 44 U/L — HIGH (ref 10–40)
BASOPHILS # BLD AUTO: 0.01 K/UL — SIGNIFICANT CHANGE UP (ref 0–0.2)
BASOPHILS # BLD AUTO: 0.01 K/UL — SIGNIFICANT CHANGE UP (ref 0–0.2)
BASOPHILS NFR BLD AUTO: 0.1 % — SIGNIFICANT CHANGE UP (ref 0–2)
BASOPHILS NFR BLD AUTO: 0.1 % — SIGNIFICANT CHANGE UP (ref 0–2)
BILIRUB SERPL-MCNC: 0.6 MG/DL — SIGNIFICANT CHANGE UP (ref 0.2–1.2)
BILIRUB SERPL-MCNC: 0.9 MG/DL — SIGNIFICANT CHANGE UP (ref 0.2–1.2)
BLD GP AB SCN SERPL QL: NEGATIVE — SIGNIFICANT CHANGE UP
BUN SERPL-MCNC: 18 MG/DL — SIGNIFICANT CHANGE UP (ref 7–23)
BUN SERPL-MCNC: 21 MG/DL — SIGNIFICANT CHANGE UP (ref 7–23)
CALCIUM SERPL-MCNC: 7.8 MG/DL — LOW (ref 8.4–10.5)
CALCIUM SERPL-MCNC: 8.5 MG/DL — SIGNIFICANT CHANGE UP (ref 8.4–10.5)
CHLORIDE SERPL-SCNC: 90 MMOL/L — LOW (ref 96–108)
CHLORIDE SERPL-SCNC: 93 MMOL/L — LOW (ref 96–108)
CHOLEST SERPL-MCNC: 159 MG/DL — SIGNIFICANT CHANGE UP
CO2 SERPL-SCNC: 22 MMOL/L — SIGNIFICANT CHANGE UP (ref 22–31)
CO2 SERPL-SCNC: 24 MMOL/L — SIGNIFICANT CHANGE UP (ref 22–31)
CREAT SERPL-MCNC: 1.33 MG/DL — HIGH (ref 0.5–1.3)
CREAT SERPL-MCNC: 1.45 MG/DL — HIGH (ref 0.5–1.3)
EGFR: 49 ML/MIN/1.73M2 — LOW
EGFR: 54 ML/MIN/1.73M2 — LOW
EOSINOPHIL # BLD AUTO: 0.01 K/UL — SIGNIFICANT CHANGE UP (ref 0–0.5)
EOSINOPHIL # BLD AUTO: 0.02 K/UL — SIGNIFICANT CHANGE UP (ref 0–0.5)
EOSINOPHIL NFR BLD AUTO: 0.1 % — SIGNIFICANT CHANGE UP (ref 0–6)
EOSINOPHIL NFR BLD AUTO: 0.2 % — SIGNIFICANT CHANGE UP (ref 0–6)
ESTIMATED AVERAGE GLUCOSE: 117 MG/DL — HIGH (ref 68–114)
GLUCOSE SERPL-MCNC: 107 MG/DL — HIGH (ref 70–99)
GLUCOSE SERPL-MCNC: 99 MG/DL — SIGNIFICANT CHANGE UP (ref 70–99)
GRAM STN FLD: SIGNIFICANT CHANGE UP
HCT VFR BLD CALC: 36.7 % — LOW (ref 39–50)
HCT VFR BLD CALC: 41.5 % — SIGNIFICANT CHANGE UP (ref 39–50)
HDLC SERPL-MCNC: 99 MG/DL — SIGNIFICANT CHANGE UP
HGB BLD-MCNC: 12.2 G/DL — LOW (ref 13–17)
HGB BLD-MCNC: 13.9 G/DL — SIGNIFICANT CHANGE UP (ref 13–17)
IMM GRANULOCYTES NFR BLD AUTO: 0.7 % — SIGNIFICANT CHANGE UP (ref 0–1.5)
IMM GRANULOCYTES NFR BLD AUTO: 0.9 % — SIGNIFICANT CHANGE UP (ref 0–1.5)
INR BLD: 1.03 RATIO — SIGNIFICANT CHANGE UP (ref 0.88–1.16)
LDH SERPL L TO P-CCNC: 417 U/L — HIGH (ref 50–242)
LIPID PNL WITH DIRECT LDL SERPL: 51 MG/DL — SIGNIFICANT CHANGE UP
LYMPHOCYTES # BLD AUTO: 0.62 K/UL — LOW (ref 1–3.3)
LYMPHOCYTES # BLD AUTO: 0.74 K/UL — LOW (ref 1–3.3)
LYMPHOCYTES # BLD AUTO: 5.2 % — LOW (ref 13–44)
LYMPHOCYTES # BLD AUTO: 7.7 % — LOW (ref 13–44)
MAGNESIUM SERPL-MCNC: 1.9 MG/DL — SIGNIFICANT CHANGE UP (ref 1.6–2.6)
MAGNESIUM SERPL-MCNC: 2.4 MG/DL — SIGNIFICANT CHANGE UP (ref 1.6–2.6)
MCHC RBC-ENTMCNC: 29.7 PG — SIGNIFICANT CHANGE UP (ref 27–34)
MCHC RBC-ENTMCNC: 30.3 PG — SIGNIFICANT CHANGE UP (ref 27–34)
MCHC RBC-ENTMCNC: 33.2 GM/DL — SIGNIFICANT CHANGE UP (ref 32–36)
MCHC RBC-ENTMCNC: 33.5 GM/DL — SIGNIFICANT CHANGE UP (ref 32–36)
MCV RBC AUTO: 88.7 FL — SIGNIFICANT CHANGE UP (ref 80–100)
MCV RBC AUTO: 91.3 FL — SIGNIFICANT CHANGE UP (ref 80–100)
MONOCYTES # BLD AUTO: 0.55 K/UL — SIGNIFICANT CHANGE UP (ref 0–0.9)
MONOCYTES # BLD AUTO: 0.65 K/UL — SIGNIFICANT CHANGE UP (ref 0–0.9)
MONOCYTES NFR BLD AUTO: 5.5 % — SIGNIFICANT CHANGE UP (ref 2–14)
MONOCYTES NFR BLD AUTO: 5.7 % — SIGNIFICANT CHANGE UP (ref 2–14)
NEUTROPHILS # BLD AUTO: 10.48 K/UL — HIGH (ref 1.8–7.4)
NEUTROPHILS # BLD AUTO: 8.2 K/UL — HIGH (ref 1.8–7.4)
NEUTROPHILS NFR BLD AUTO: 85.6 % — HIGH (ref 43–77)
NEUTROPHILS NFR BLD AUTO: 88.2 % — HIGH (ref 43–77)
NIGHT BLUE STAIN TISS: SIGNIFICANT CHANGE UP
NON HDL CHOLESTEROL: 61 MG/DL — SIGNIFICANT CHANGE UP
NRBC # BLD: 0 /100 WBCS — SIGNIFICANT CHANGE UP (ref 0–0)
NRBC # BLD: 0 /100 WBCS — SIGNIFICANT CHANGE UP (ref 0–0)
OSMOLALITY SERPL: 259 MOSMOL/KG — LOW (ref 280–301)
PHOSPHATE SERPL-MCNC: 2.9 MG/DL — SIGNIFICANT CHANGE UP (ref 2.5–4.5)
PHOSPHATE SERPL-MCNC: 3.1 MG/DL — SIGNIFICANT CHANGE UP (ref 2.5–4.5)
PLATELET # BLD AUTO: 221 K/UL — SIGNIFICANT CHANGE UP (ref 150–400)
PLATELET # BLD AUTO: 231 K/UL — SIGNIFICANT CHANGE UP (ref 150–400)
POTASSIUM SERPL-MCNC: 3.4 MMOL/L — LOW (ref 3.5–5.3)
POTASSIUM SERPL-MCNC: 4.6 MMOL/L — SIGNIFICANT CHANGE UP (ref 3.5–5.3)
POTASSIUM SERPL-SCNC: 3.4 MMOL/L — LOW (ref 3.5–5.3)
POTASSIUM SERPL-SCNC: 4.6 MMOL/L — SIGNIFICANT CHANGE UP (ref 3.5–5.3)
PROT SERPL-MCNC: 5.3 G/DL — LOW (ref 6–8.3)
PROT SERPL-MCNC: 6.3 G/DL — SIGNIFICANT CHANGE UP (ref 6–8.3)
PROTHROM AB SERPL-ACNC: 12 SEC — SIGNIFICANT CHANGE UP (ref 10.5–13.4)
RBC # BLD: 4.02 M/UL — LOW (ref 4.2–5.8)
RBC # BLD: 4.68 M/UL — SIGNIFICANT CHANGE UP (ref 4.2–5.8)
RBC # FLD: 17.5 % — HIGH (ref 10.3–14.5)
RBC # FLD: 17.6 % — HIGH (ref 10.3–14.5)
RH IG SCN BLD-IMP: POSITIVE — SIGNIFICANT CHANGE UP
SODIUM SERPL-SCNC: 124 MMOL/L — LOW (ref 135–145)
SODIUM SERPL-SCNC: 125 MMOL/L — LOW (ref 135–145)
SPECIMEN SOURCE: SIGNIFICANT CHANGE UP
SPECIMEN SOURCE: SIGNIFICANT CHANGE UP
T3FREE SERPL-MCNC: 0.41 PG/ML — LOW (ref 2–4.4)
T4 FREE SERPL-MCNC: 0.2 NG/DL — LOW (ref 0.9–1.8)
TRIGL SERPL-MCNC: 51 MG/DL — SIGNIFICANT CHANGE UP
WBC # BLD: 11.88 K/UL — HIGH (ref 3.8–10.5)
WBC # BLD: 9.59 K/UL — SIGNIFICANT CHANGE UP (ref 3.8–10.5)
WBC # FLD AUTO: 11.88 K/UL — HIGH (ref 3.8–10.5)
WBC # FLD AUTO: 9.59 K/UL — SIGNIFICANT CHANGE UP (ref 3.8–10.5)

## 2022-09-08 PROCEDURE — 99291 CRITICAL CARE FIRST HOUR: CPT

## 2022-09-08 PROCEDURE — 93010 ELECTROCARDIOGRAM REPORT: CPT

## 2022-09-08 PROCEDURE — 99292 CRITICAL CARE ADDL 30 MIN: CPT

## 2022-09-08 PROCEDURE — 76775 US EXAM ABDO BACK WALL LIM: CPT | Mod: 26

## 2022-09-08 PROCEDURE — 93308 TTE F-UP OR LMTD: CPT | Mod: 26

## 2022-09-08 PROCEDURE — 99292 CRITICAL CARE ADDL 30 MIN: CPT | Mod: 25

## 2022-09-08 PROCEDURE — 93321 DOPPLER ECHO F-UP/LMTD STD: CPT | Mod: 26

## 2022-09-08 RX ORDER — POTASSIUM CHLORIDE 20 MEQ
40 PACKET (EA) ORAL EVERY 4 HOURS
Refills: 0 | Status: COMPLETED | OUTPATIENT
Start: 2022-09-08 | End: 2022-09-08

## 2022-09-08 RX ORDER — LACTULOSE 10 G/15ML
10 SOLUTION ORAL ONCE
Refills: 0 | Status: COMPLETED | OUTPATIENT
Start: 2022-09-08 | End: 2022-09-08

## 2022-09-08 RX ORDER — LOSARTAN POTASSIUM 100 MG/1
50 TABLET, FILM COATED ORAL DAILY
Refills: 0 | Status: DISCONTINUED | OUTPATIENT
Start: 2022-09-08 | End: 2022-09-13

## 2022-09-08 RX ORDER — LEVOTHYROXINE SODIUM 125 MCG
50 TABLET ORAL
Qty: 0 | Refills: 0 | DISCHARGE

## 2022-09-08 RX ORDER — MAGNESIUM SULFATE 500 MG/ML
1 VIAL (ML) INJECTION ONCE
Refills: 0 | Status: COMPLETED | OUTPATIENT
Start: 2022-09-08 | End: 2022-09-08

## 2022-09-08 RX ADMIN — LACTULOSE 10 GRAM(S): 10 SOLUTION ORAL at 21:06

## 2022-09-08 RX ADMIN — CHLORHEXIDINE GLUCONATE 1 APPLICATION(S): 213 SOLUTION TOPICAL at 06:12

## 2022-09-08 RX ADMIN — Medication 40 MILLIEQUIVALENT(S): at 09:04

## 2022-09-08 RX ADMIN — Medication 40 MILLIEQUIVALENT(S): at 05:43

## 2022-09-08 RX ADMIN — TAMSULOSIN HYDROCHLORIDE 0.4 MILLIGRAM(S): 0.4 CAPSULE ORAL at 21:06

## 2022-09-08 RX ADMIN — Medication 1 PATCH: at 20:02

## 2022-09-08 RX ADMIN — SENNA PLUS 2 TABLET(S): 8.6 TABLET ORAL at 21:06

## 2022-09-08 RX ADMIN — SIMVASTATIN 20 MILLIGRAM(S): 20 TABLET, FILM COATED ORAL at 21:06

## 2022-09-08 RX ADMIN — Medication 1 PATCH: at 13:08

## 2022-09-08 RX ADMIN — LOSARTAN POTASSIUM 50 MILLIGRAM(S): 100 TABLET, FILM COATED ORAL at 17:36

## 2022-09-08 RX ADMIN — Medication 50 MILLIGRAM(S): at 13:08

## 2022-09-08 RX ADMIN — Medication 100 GRAM(S): at 05:43

## 2022-09-08 RX ADMIN — Medication 50 MILLIGRAM(S): at 05:42

## 2022-09-08 RX ADMIN — Medication 112 MICROGRAM(S): at 05:43

## 2022-09-08 RX ADMIN — LACTULOSE 10 GRAM(S): 10 SOLUTION ORAL at 10:14

## 2022-09-08 RX ADMIN — POLYETHYLENE GLYCOL 3350 17 GRAM(S): 17 POWDER, FOR SOLUTION ORAL at 13:08

## 2022-09-08 RX ADMIN — Medication 50 MILLIGRAM(S): at 21:06

## 2022-09-08 NOTE — PHYSICAL THERAPY INITIAL EVALUATION ADULT - PERTINENT HX OF CURRENT PROBLEM, REHAB EVAL
78 yo M h/o mild dementia, presents to [facility] on 9/3/2022 with chief complaint of SOB and CP x3 days. Admitted to tele for likely COPD exacerbation and CP.  CXR is clear. Started on Duonebs, Symbicort and Solumedrol. No prior cardiac stents. EKG on arrival to ED notes PRWP V 1-3, no ST changes. Trop high on arrival at 268 trending down 180.8 today, CXR 9/3 revealing New cardiomegaly and CT chest with Large pericardial effusion, Echo with large pericardial effusion with early tamponade physiology. Transferred to [facility] for pericardiocentesis. CT Chest 9/6: Large pericardial effusion. Small bilateral pleural effusions. Bilateral hydronephrosis.

## 2022-09-08 NOTE — PROGRESS NOTE ADULT - SUBJECTIVE AND OBJECTIVE BOX
AMBAR PICKARD  MRN-[id number]  Patient is a 77y old  Male who presents with a chief complaint of SOB and CP x 3 days (08 Sep 2022 15:36)    HPI:  This is a 76yo old  Male with PMH of HTN, HLD,  BPH, CKD ( creat 1.42 today), chronic angelita hydronephrosis ( x2 years), prostate ca ( radiation 10 years ago), seizure disorder ( last seizure 2015), ETOH use ( sober since 2016), hypothyroidism, and mild demenia; presents to [facility] on 9/3/2022 with chief complaint of SOB and CP x3 days.  Pt has been a heavy smoker his whole life. Denies fever, chills, cough, or congestion. No sick contacts.    Admitted to tele for likely COPD exacerbation and CP.  CXR is clear. Started on Duonebs, Symbicort and Solumedrol. No prior cardiac stents. EKG on arrival to ED notes PRWP V 1-3, no ST changes. Trop high on arrival at 268 trending down 180.8 today, proBNP 763, CK 1090. TSH 69.600 ( stopped synthroid a few weeks ago), T3 <20 T4 0.9, Na 126.  CXR revealing New cardiomegaly and CT chest with Large pericardial effusion, Echo today with large pericardial effusion with early tamponade physiology. Transferred to [facility] for pericardiocentesis. On arrival to [facility] patient awake and verbal A&OX3, denies any chest pain, dyspnea, dizziness, palpitations, N&V, Ha, orthopnea, LE edema.  VS WNL, on 2 L nasal cannula in no respiratory distress.       (07 Sep 2022 13:09)      Hospital Course:  9/7 Transferred to CCU for further management    24 HOUR EVENTS:    REVIEW OF SYSTEMS:    CONSTITUTIONAL: No weakness, fevers or chills  EYES/ENT: No visual changes;  No vertigo or throat pain   NECK: No pain or stiffness  RESPIRATORY: No cough, wheezing, hemoptysis; No shortness of breath  CARDIOVASCULAR: No chest pain or palpitations  GASTROINTESTINAL: No abdominal or epigastric pain. No nausea, vomiting, or hematemesis; No diarrhea or constipation. No melena or hematochezia.  GENITOURINARY: No dysuria, frequency or hematuria  NEUROLOGICAL: No numbness or weakness  SKIN: No itching, rashes    ICU Vital Signs Last 24 Hrs  T(C): 36.4 (08 Sep 2022 19:00), Max: 36.7 (08 Sep 2022 16:00)  T(F): 97.5 (08 Sep 2022 19:00), Max: 98 (08 Sep 2022 16:00)  HR: 60 (08 Sep 2022 22:00) (55 - 75)  BP: 135/75 (08 Sep 2022 22:00) (90/63 - 145/73)  BP(mean): 97 (08 Sep 2022 22:00) (69 - 102)  ABP: --  ABP(mean): --  RR: 16 (08 Sep 2022 22:00) (11 - 22)  SpO2: 97% (08 Sep 2022 22:00) (89% - 100%)    O2 Parameters below as of 08 Sep 2022 22:00  Patient On (Oxygen Delivery Method): room air    07 Sep 2022 07:01  -  08 Sep 2022 07:00  --------------------------------------------------------  IN: 240 mL / OUT: 1090 mL / NET: -850 mL    08 Sep 2022 07:01  -  08 Sep 2022 22:35  --------------------------------------------------------  IN: 420 mL / OUT: 300 mL / NET: 120 mL    CAPILLARY BLOOD GLUCOSE    CAPILLARY BLOOD GLUCOSE    PHYSICAL EXAM:  Constitutional: Patient laying in bed, NAD  Head: Atraumatic, normocephalic  Eyes: No scleral icterus. PERRLA. EOMI  ENMT: Moist mucous membrane. Uvula midline  Neck: Supple, No JVD  Respiratory: CTA B/L, intermittent expiratory wheezes b/l. No rales or rhonchi. + pericardial drain in place- dressing c/d/i  Cardiovascular: S1/S2. No murmurs, rubs, or gallops.  Gastrointestinal: Nondistended, BSx4, soft, nontender.  Extremities: Moves all extremities. Warm, no edema, nontender  Vascular: 2+ DP/PT pulses B/L   Neurological: A&Ox3. Follows comm    ============================I/O===========================   I&O's Detail    07 Sep 2022 07:01  -  08 Sep 2022 07:00  --------------------------------------------------------  IN:    Oral Fluid: 240 mL  Total IN: 240 mL    OUT:    Drain (mL): 540 mL    Incontinent per Condom Catheter (mL): 550 mL  Total OUT: 1090 mL    Total NET: -850 mL      08 Sep 2022 07:01  -  08 Sep 2022 22:35  --------------------------------------------------------  IN:    Oral Fluid: 420 mL  Total IN: 420 mL    OUT:    Incontinent per Condom Catheter (mL): 150 mL    Voided (mL): 150 mL  Total OUT: 300 mL    Total NET: 120 mL    ============================ LABS =========================                        13.9   11.88 )-----------( 231      ( 08 Sep 2022 12:53 )             41.5     09-08    124<L>  |  90<L>  |  21  ----------------------------<  107<H>  4.6   |  24  |  1.45<H>    Ca    8.5      08 Sep 2022 12:54  Phos  3.1     09-08  Mg     2.4     09-08    TPro  6.3  /  Alb  3.4  /  TBili  0.9  /  DBili  x   /  AST  44<H>  /  ALT  30  /  AlkPhos  71  09-08  Creatine Kinase, Serum: 1090 U/L (09-07-22 @ 06:45)    LIVER FUNCTIONS - ( 08 Sep 2022 12:54 )  Alb: 3.4 g/dL / Pro: 6.3 g/dL / ALK PHOS: 71 U/L / ALT: 30 U/L / AST: 44 U/L / GGT: x           PT/INR - ( 08 Sep 2022 02:59 )   PT: 12.0 sec;   INR: 1.03 ratio      ======================Micro/Rad/Cardio=================  Telemtry: Reviewed   EKG: Reviewed  CXR: Reviewed  Culture: Reviewed   Echo: Limited Transthoracic Echo:   Blood Pressure: 90/63 mmHg  Height: 173 cm  Weight: 69 kg  BSA: 1.8 m2  Heart Rate: 61 mmHg  ------------------------------------------------------------------------  PROCEDURE: Limited transthoracic echocardiogram with 2-D.  M-Mode and spectral and color flow Doppler.  INDICATION: Pericardial effusion (noninflammatory) (I31.3)  ------------------------------------------------------------------------  Dimensions:    Normal Values:  LA:            2.0 - 4.0 cm  Ao:            2.0 - 3.8 cm  SEPTUM:        0.6 - 1.2 cm  PWT:           0.6 - 1.1 cm  LVIDd:         3.0 - 5.6 cm  LVIDs:         1.8 - 4.0 cm  EF (Visual Estimate): NA %  ------------------------------------------------------------------------  Observations:  Mitral Valve: Mitral annular calcification. No mitral  regurgitation seen.  Aortic Valve/Aorta: Calcified trileaflet aortic valve with  normal opening. No aortic valve regurgitation seen.  Normal aortic root, aortic arch and descending thoracic  aorta.  Left Ventricle: Hyperdynamic left ventricular systolic  function. Normal left ventricular internal dimensions and  wall thicknesses.  Right Heart: Normal right atrium. Normal right ventricular  size and function. Normal tricuspid valve. Minimal  tricuspid regurgitation. Normal pulmonic valve.  Pericardium/Pleura: Trace pericardial effusion.  Left pleural effusion.  Hemodynamic: Estimated right ventricular systolic pressure  equals 17 mm Hg, assuming right atrial pressure equals 3 mm  Hg, consistent with normal pulmonary pressures.  ------------------------------------------------------------------------  Conclusions:  This is a limited study for evaluation of pericardial  effusion.  1. Trace pericardial effusion.  2. Left pleural effusion.  *** Compared with echocardiogramof 9/7/2022, no  significant changes noted.  ------------------------------------------------------------------------ (09-08-22 @ 06:12)  Limited Transthoracic Echo:   Blood Pressure: 166/87 mmHg  Height: 173 cm  Weight: 66 kg  BSA: 1.8 m2  ------------------------------------------------------------------------  PROCEDURE: Limited transthoracic echocardiogram with 2-D  imaging.  INDICATION: Pericardial effusion (noninflammatory) (I31.3)  ------------------------------------------------------------------------  Observations:  Mitral Valve: Mitral annular calcification.  Aortic Valve/Aorta:  Left Ventricle: Normal left ventricular size and systolic  function.  Right Heart: Normal right atrium. Normal right ventricular  size and function.  Pericardium/Pleura: Pericardiocentesis: see conclusion.  Hemodynamic:  ------------------------------------------------------------------------  Conclusions:  Limited study:  Normal left ventricular size and systolic function.  Large pericardial effusion evaculated via  pericardiocentesis. Images obtained post-procedure  demonstrate a trace residual effusion.  ------------------------------------------------------------------------- (09-07-22 @ 13:44)  Transthoracic Echocardiogram:   Blood Pressure: 146/82 mmHg  Height: 173 cm  Weight: 69 kg  BSA: 1.8 m2  ------------------------------------------------------------------------  PROCEDURE: Transthoracic echocardiogram with 2-D, M-Mode  and complete spectral and color flow Doppler.  INDICATION: Cardiac tamponade (I31.4)  ------------------------------------------------------------------------  Dimensions:    Normal Values:  LA:     3.8    2.0 - 4.0 cm  Ao:     3.9    2.0 - 3.8 cm  SEPTUM: 1.2    0.6 - 1.2 cm  PWT:    1.2    0.6- 1.1 cm  LVIDd:  4.8    3.0 - 5.6 cm  LVIDs:  3.1    1.8 - 4.0 cm  Derived variables:  LVMI: 120 g/m2  RWT: 0.50  Fractional short: 35 %  EF (Visual Estimate): 70 %  ------------------------------------------------------------------------  Observations:  Mitral Valve: Mitral annular calcification, otherwise  normal mitral valve.  Aortic Valve/Aorta: Normal trileaflet aortic valve.  Aortic Root: 3.9 cm.  Left Atrium: Mildly dilated left atrium.  LA volume index =  40 cc/m2.  Left Ventricle: Hyperdynamic left ventricular systolic  function. Moderate concentric left ventricular hypertrophy.  Right Heart: Normal right atrium. Normal right ventricular  size and function. Tricuspid valve not well visualized,  probably normal. Pulmonic valve not well visualized.  Pericardium/Pleura: Large pericardial effusion 2.2cm at  subcostal images.  Echocardiographic evidence of  pericardial tamponade with RA collapse and RV intermittant  diastolic collapse.  Hemodynamic: Estimated right atrial pressure is8 mm Hg.  ------------------------------------------------------------------------  Conclusions:  1. Moderate concentric left ventricular hypertrophy.  2. Hyperdynamic left ventricular systolic function.  3. Normal right ventricular size and function.  4. Large pericardial effusion 2.2cm at subcostal images.  Echocardiographic evidence of pericardial tamponade with RA  collapse and RV intermittant diastolic collapse.  *** No previous Echo exam.  Arun to  Dr. FREDI Ortez    Cath:   ======================================================  PAST MEDICAL & SURGICAL HISTORY:  ETOH abuse  sober since 2016  Seizure disorder  last seizure 2015  BPH (benign prostatic hyperplasia)  HTN (hypertension)  Prostate CA  Dx&#x27;d 2008, s/p radiation  Hyperlipidemia  Poor historian  S/P hernia repair  right inguinal hernia  H/O prostate cancer  10- yrs ago, s/p radiation therapy  Status post cataract extraction  H/O urethrotomy  June 2018  ====================ASSESSMENT ==============  77M with PMH of HTN, HLD,  BPH, CKD ( creat 1.42 today), chronic angelita hydronephrosis ( x2 years), prostate ca ( radiation 10 years ago), seizure disorder ( last seizure 2015), ETOH use ( sober since 2016), hypothyroidism, and mild demenia p/w CP and SOB found with large pericardial effusion s/p pericardiocentesis.     Plan:  ====================== NEUROLOGY=====================  A&Ox3  - continue to monitor mental status as per protocol    Seizure disorder  - No seziure meds reported. Waiting upon sister to confirm no additional medication bottles at home not reported earlier during med rec.     ==================== RESPIRATORY======================  Comfortable on RA  - SOB on admission likely 2/2 pericardial tamponade. Patient feels improvement s/p drain placement   - Monitor SpO2 with goal >94%  - 7/6 CT chest: Large pericardial effusion. Small bilateral pleural effusions. Bilateral hydronephrosis.    COPD  - duonebs prn   - started on levofloxacin outpatient for concern of COPD exacerbation/PNA. Will d/c for now. Likely sob related to pericardial effusion (states SOB improved s/p drain placement)   - provided smoking cessation education. Start nicotine patch while inpatient (states quit last week, smokes 4-5 cig/daily)   - C/w bronchodilators     albuterol/ipratropium for Nebulization 3 milliLiter(s) Nebulizer every 6 hours PRN Shortness of Breath and/or Wheezing    ====================CARDIOVASCULAR==================  Cardiac Tamponade  - 9/7 TTE: EF 70%. Mod concentric LVH. Normal RV size and function. Large pericardial effusion with evidence of echo tamponade physiology.   - s/p pericardiocentesis with drain placement, 1060mL out serosanguinous.   - as per lights criteria- meets criteria of exudative fluid. f/u pericardial fluid studies including cytology. Gram stain negative for organism   - Patient remained HD stable at [facility] prior to transfer (as per documentation: HRs 60-70s; SBPs 150-170s, Lying flat off with O2 sats 99%). Continue to monitor vital signs   - trop I downtrending at OSH prior to transfer  - monitor drain output. Daily TTE while in place  - Cancer screening: Last colonoscopy >10 years ago (states went last year for colonoscopy but procedure aborted 2/2 HTN outpatient and never rescheduled). Remote history of Prostate CA, follows with urologist but unsure of name. waiting for patient sister to provide collateral info     HTN  - on home losartan 100mg and hydral 50mg.  - reinitiated on home meds yesterday as hypertensive upon presentation to CICU. This morning patient with softer BP (asymptomatic). Will decrease losartan dose 100mg-> 50mg     HLD  - c/w home simvastatin     hydrALAZINE 50 milliGRAM(s) Oral three times a day  losartan 50 milliGRAM(s) Oral daily  simvastatin 20 milliGRAM(s) Oral at bedtime    ===================HEMATOLOGIC/ONC ===================  Normocytic anemia  - continue to monitor and trend CBC.   - Likely 2/2 anemia of chronic disease 2/2 CKD. If continues to downtrend can consider anemia workup     ===================== RENAL =========================  BPH  - continue home tamsulosin     Remote Hx Prostate Ca  - f.u with urology, patient unsure of name. will f/u with sister for collateral info.   - states in remission for >20 years     Hyponatremia  - Na on presentation 125, remains at 125 today. Will send urine lytes/workup   - Patient mentating appropriately   - Euvolemic appearing on exam. Could be 2/2 hypothyroidism     CKD  - Baseline SCr looks to be around 1.5-1.7 base upon prior SCr trend in 2020. SCr this AM 1.42  - monitor and trend SCr, BUN, and lytes  - replete lytes prn with goal K 4-4.5 and Mg >2    Hydronephrosis on CT 9/6  - renal US ordered   - Urinating currently with bladder scan of 120mL. Also with b/l hydronephrosis back in 2020 with history of chronic hydronephrosis. SCr at baseline. Will also get collateral info from outpatient urologist once patient sister updates with info     tamsulosin 0.4 milliGRAM(s) Oral at bedtime  ==================== GASTROINTESTINAL===================  - Tolerating PO DASH/TLC diet.  - monitor for regular BM while inpatient. Last BM 4 days ago.   -Bowel regimen with Miralax and Senna     bisacodyl 5 milliGRAM(s) Oral every 12 hours PRN Constipation  polyethylene glycol 3350 17 Gram(s) Oral daily  senna 2 Tablet(s) Oral at bedtime    =======================    ENDOCRINE  =====================  - f/u hgb a1c and lipid panel     Hypothyroidism   - TSH elevated with low T3 and T4  - will consult endo for recs  - on home synthroid 112 mcg, may have missed a few doses (waiting upon refill). Spoke to endo- states to resume home dose of 112mcg and repeat free T4 in 5-7 days.     levothyroxine 112 MICROGram(s) Oral daily  ========================INFECTIOUS DISEASE================  No leukocytosis, afebrile  - continue to monitor and trend wbc and temp curve      Patient requires continuous monitoring with bedside rhythm monitoring, pulse ox monitoring, and intermittent blood gas analysis. Care plan discussed with ICU care team. Patient remained critical and at risk for life threatening decompensation.  Patient seen, examined and plan discussed with CCU team during rounds.     I have personally provided ____ minutes of critical care time excluding time spent on separate procedures, in addition to initial critical care time provided by the CICU Attending, Dr. Irvin     By signing my name below, I, Tierra Zepeda, attest that this documentation has been prepared under the direction and in the presence of Merissa Quevedo NP  Electronically signed: Jesus Veliz, 09-08-22 @ 22:35    I, Merissa Quevedo NP, personally performed the services described in this documentation. all medical record entries made by the scribe were at my direction and in my presence. I have reviewed the chart and agree that the record reflects my personal performance and is accurate and complete  Electronically signed: Merissa Quevedo NP         AMBAR PICKARD  MRN-[id number]  Patient is a 77y old  Male who presents with a chief complaint of SOB and CP x 3 days (08 Sep 2022 15:36)    HPI:  This is a 78yo old  Male with PMH of HTN, HLD,  BPH, CKD ( creat 1.42 today), chronic angelita hydronephrosis ( x2 years), prostate ca ( radiation 10 years ago), seizure disorder ( last seizure 2015), ETOH use ( sober since 2016), hypothyroidism, and mild demenia; presents to [facility] on 9/3/2022 with chief complaint of SOB and CP x3 days.  Pt has been a heavy smoker his whole life. Denies fever, chills, cough, or congestion. No sick contacts.    Admitted to tele for likely COPD exacerbation and CP.  CXR is clear. Started on Duonebs, Symbicort and Solumedrol. No prior cardiac stents. EKG on arrival to ED notes PRWP V 1-3, no ST changes. Trop high on arrival at 268 trending down 180.8 today, proBNP 763, CK 1090. TSH 69.600 ( stopped synthroid a few weeks ago), T3 <20 T4 0.9, Na 126.  CXR revealing New cardiomegaly and CT chest with Large pericardial effusion, Echo today with large pericardial effusion with early tamponade physiology. Transferred to [facility] for pericardiocent[facility]. On arrival to [facility] patient awake and verbal A&OX3, denies any chest pain, dyspnea, dizziness, palpitations, N&V, Ha, orthopnea, LE edema.  VS WNL, on 2 L nasal cannula in no respiratory distress.       (07 Sep 2022 13:09)      Hospital Course:  9/7 Transferred to CCU for further management    24 HOUR EVENTS:    REVIEW OF SYSTEMS:    CONSTITUTIONAL: No weakness, fevers or chills  EYES/ENT: No visual changes;  No vertigo or throat pain   NECK: No pain or stiffness  RESPIRATORY: No cough, wheezing, hemoptysis; No shortness of breath  CARDIOVASCULAR: No chest pain or palpitations  GASTROINTESTINAL: No abdominal or epigastric pain. No nausea, vomiting, or hematemesis; No diarrhea or constipation. No melena or hematochezia.  GENITOURINARY: No dysuria, frequency or hematuria  NEUROLOGICAL: No numbness or weakness  SKIN: No itching, rashes    ICU Vital Signs Last 24 Hrs  T(C): 36.4 (08 Sep 2022 19:00), Max: 36.7 (08 Sep 2022 16:00)  T(F): 97.5 (08 Sep 2022 19:00), Max: 98 (08 Sep 2022 16:00)  HR: 60 (08 Sep 2022 22:00) (55 - 75)  BP: 135/75 (08 Sep 2022 22:00) (90/63 - 145/73)  BP(mean): 97 (08 Sep 2022 22:00) (69 - 102)  ABP: --  ABP(mean): --  RR: 16 (08 Sep 2022 22:00) (11 - 22)  SpO2: 97% (08 Sep 2022 22:00) (89% - 100%)    O2 Parameters below as of 08 Sep 2022 22:00  Patient On (Oxygen Delivery Method): room air    07 Sep 2022 07:01  -  08 Sep 2022 07:00  --------------------------------------------------------  IN: 240 mL / OUT: 1090 mL / NET: -850 mL    08 Sep 2022 07:01  -  08 Sep 2022 22:35  --------------------------------------------------------  IN: 420 mL / OUT: 300 mL / NET: 120 mL    CAPILLARY BLOOD GLUCOSE    CAPILLARY BLOOD GLUCOSE    PHYSICAL EXAM:  Constitutional: Patient laying in bed, NAD  Head: Atraumatic, normocephalic  Eyes: No scleral icterus. PERRLA. EOMI  ENMT: Moist mucous membrane. Uvula midline  Neck: Supple, No JVD  Respiratory: CTA B/L, intermittent expiratory wheezes b/l. No rales or rhonchi. + pericardial drain in place- dressing c/d/i  Cardiovascular: S1/S2. No murmurs, rubs, or gallops.  Gastrointestinal: Nondistended, BSx4, soft, nontender.  Extremities: Moves all extremities. Warm, no edema, nontender  Vascular: 2+ DP/PT pulses B/L   Neurological: A&Ox3. Follows comm    ============================I/O===========================   I&O's Detail    07 Sep 2022 07:01  -  08 Sep 2022 07:00  --------------------------------------------------------  IN:    Oral Fluid: 240 mL  Total IN: 240 mL    OUT:    Drain (mL): 540 mL    Incontinent per Condom Catheter (mL): 550 mL  Total OUT: 1090 mL    Total NET: -850 mL      08 Sep 2022 07:01  -  08 Sep 2022 22:35  --------------------------------------------------------  IN:    Oral Fluid: 420 mL  Total IN: 420 mL    OUT:    Incontinent per Condom Catheter (mL): 150 mL    Voided (mL): 150 mL  Total OUT: 300 mL    Total NET: 120 mL    ============================ LABS =========================                        13.9   11.88 )-----------( 231      ( 08 Sep 2022 12:53 )             41.5     09-08    124<L>  |  90<L>  |  21  ----------------------------<  107<H>  4.6   |  24  |  1.45<H>    Ca    8.5      08 Sep 2022 12:54  Phos  3.1     09-08  Mg     2.4     09-08    TPro  6.3  /  Alb  3.4  /  TBili  0.9  /  DBili  x   /  AST  44<H>  /  ALT  30  /  AlkPhos  71  09-08  Creatine Kinase, Serum: 1090 U/L (09-07-22 @ 06:45)    LIVER FUNCTIONS - ( 08 Sep 2022 12:54 )  Alb: 3.4 g/dL / Pro: 6.3 g/dL / ALK PHOS: 71 U/L / ALT: 30 U/L / AST: 44 U/L / GGT: x           PT/INR - ( 08 Sep 2022 02:59 )   PT: 12.0 sec;   INR: 1.03 ratio      ======================Micro/Rad/Cardio=================  Telemtry: Reviewed   EKG: Reviewed  CXR: Reviewed  Culture: Reviewed   Echo: Limited Transthoracic Echo:   Blood Pressure: 90/63 mmHg  Height: 173 cm  Weight: 69 kg  BSA: 1.8 m2  Heart Rate: 61 mmHg  ------------------------------------------------------------------------  PROCEDURE: Limited transthoracic echocardiogram with 2-D.  M-Mode and spectral and color flow Doppler.  INDICATION: Pericardial effusion (noninflammatory) (I31.3)  ------------------------------------------------------------------------  Dimensions:    Normal Values:  LA:            2.0 - 4.0 cm  Ao:            2.0 - 3.8 cm  SEPTUM:        0.6 - 1.2 cm  PWT:           0.6 - 1.1 cm  LVIDd:         3.0 - 5.6 cm  LVIDs:         1.8 - 4.0 cm  EF (Visual Estimate): NA %  ------------------------------------------------------------------------  Observations:  Mitral Valve: Mitral annular calcification. No mitral  regurgitation seen.  Aortic Valve/Aorta: Calcified trileaflet aortic valve with  normal opening. No aortic valve regurgitation seen.  Normal aortic root, aortic arch and descending thoracic  aorta.  Left Ventricle: Hyperdynamic left ventricular systolic  function. Normal left ventricular internal dimensions and  wall thicknesses.  Right Heart: Normal right atrium. Normal right ventricular  size and function. Normal tricuspid valve. Minimal  tricuspid regurgitation. Normal pulmonic valve.  Pericardium/Pleura: Trace pericardial effusion.  Left pleural effusion.  Hemodynamic: Estimated right ventricular systolic pressure  equals 17 mm Hg, assuming right atrial pressure equals 3 mm  Hg, consistent with normal pulmonary pressures.  ------------------------------------------------------------------------  Conclusions:  This is a limited study for evaluation of pericardial  effusion.  1. Trace pericardial effusion.  2. Left pleural effusion.  *** Compared with echocardiogramof 9/7/2022, no  significant changes noted.  ------------------------------------------------------------------------ (09-08-22 @ 06:12)  Limited Transthoracic Echo:   Blood Pressure: 166/87 mmHg  Height: 173 cm  Weight: 66 kg  BSA: 1.8 m2  ------------------------------------------------------------------------  PROCEDURE: Limited transthoracic echocardiogram with 2-D  imaging.  INDICATION: Pericardial effusion (noninflammatory) (I31.3)  ------------------------------------------------------------------------  Observations:  Mitral Valve: Mitral annular calcification.  Aortic Valve/Aorta:  Left Ventricle: Normal left ventricular size and systolic  function.  Right Heart: Normal right atrium. Normal right ventricular  size and function.  Pericardium/Pleura: Pericardiocentesis: see conclusion.  Hemodynamic:  ------------------------------------------------------------------------  Conclusions:  Limited study:  Normal left ventricular size and systolic function.  Large pericardial effusion evaculated via  pericardiocentesis. Images obtained post-procedure  demonstrate a trace residual effusion.  ------------------------------------------------------------------------- (09-07-22 @ 13:44)  Transthoracic Echocardiogram:   Blood Pressure: 146/82 mmHg  Height: 173 cm  Weight: 69 kg  BSA: 1.8 m2  ------------------------------------------------------------------------  PROCEDURE: Transthoracic echocardiogram with 2-D, M-Mode  and complete spectral and color flow Doppler.  INDICATION: Cardiac tamponade (I31.4)  ------------------------------------------------------------------------  Dimensions:    Normal Values:  LA:     3.8    2.0 - 4.0 cm  Ao:     3.9    2.0 - 3.8 cm  SEPTUM: 1.2    0.6 - 1.2 cm  PWT:    1.2    0.6- 1.1 cm  LVIDd:  4.8    3.0 - 5.6 cm  LVIDs:  3.1    1.8 - 4.0 cm  Derived variables:  LVMI: 120 g/m2  RWT: 0.50  Fractional short: 35 %  EF (Visual Estimate): 70 %  ------------------------------------------------------------------------  Observations:  Mitral Valve: Mitral annular calcification, otherwise  normal mitral valve.  Aortic Valve/Aorta: Normal trileaflet aortic valve.  Aortic Root: 3.9 cm.  Left Atrium: Mildly dilated left atrium.  LA volume index =  40 cc/m2.  Left Ventricle: Hyperdynamic left ventricular systolic  function. Moderate concentric left ventricular hypertrophy.  Right Heart: Normal right atrium. Normal right ventricular  size and function. Tricuspid valve not well visualized,  probably normal. Pulmonic valve not well visualized.  Pericardium/Pleura: Large pericardial effusion 2.2cm at  subcostal images.  Echocardiographic evidence of  pericardial tamponade with RA collapse and RV intermittant  diastolic collapse.  Hemodynamic: Estimated right atrial pressure is8 mm Hg.  ------------------------------------------------------------------------  Conclusions:  1. Moderate concentric left ventricular hypertrophy.  2. Hyperdynamic left ventricular systolic function.  3. Normal right ventricular size and function.  4. Large pericardial effusion 2.2cm at subcostal images.  Echocardiographic evidence of pericardial tamponade with RA  collapse and RV intermittant diastolic collapse.  *** No previous Echo exam.  Arun to  Dr. FREDI Ortez    Cath:   ======================================================  PAST MEDICAL & SURGICAL HISTORY:  ETOH abuse  sober since 2016  Seizure disorder  last seizure 2015  BPH (benign prostatic hyperplasia)  HTN (hypertension)  Prostate CA  Dx&#x27;d 2008, s/p radiation  Hyperlipidemia  Poor historian  S/P hernia repair  right inguinal hernia  H/O prostate cancer  10- yrs ago, s/p radiation therapy  Status post cataract extraction  H/O urethrotomy  June 2018  ====================ASSESSMENT ==============  77M with PMH of HTN, HLD,  BPH, CKD ( creat 1.42 today), chronic angelita hydronephrosis ( x2 years), prostate ca ( radiation 10 years ago), seizure disorder ( last seizure 2015), ETOH use ( sober since 2016), hypothyroidism, and mild demenia p/w CP and SOB found with large pericardial effusion s/p pericardiocentesis.     Plan:  ====================== NEUROLOGY=====================  A&Ox3  - continue to monitor mental status as per protocol    Seizure disorder  - No seziure meds reported.  sister confirmed patient not on any seizure meds    ==================== RESPIRATORY======================  Comfortable on RA  - SOB on admission likely 2/2 pericardial tamponade. Patient feels improvement s/p drain placement   - Monitor SpO2 with goal >94%  - 7/6 CT chest: Large pericardial effusion. Small bilateral pleural effusions. Bilateral hydronephrosis.    COPD  - duonebs prn   - started on levofloxacin outpatient for concern of COPD exacerbation/PNA. Will d/c for now. Likely sob related to pericardial effusion (states SOB improved s/p drain placement)   - provided smoking cessation education. Start nicotine patch while inpatient (states quit last week, smokes 4-5 cig/daily)   - C/w bronchodilators     albuterol/ipratropium for Nebulization 3 milliLiter(s) Nebulizer every 6 hours PRN Shortness of Breath and/or Wheezing    ====================CARDIOVASCULAR==================  Cardiac Tamponade  - 9/7 TTE: EF 70%. Mod concentric LVH. Normal RV size and function. Large pericardial effusion with evidence of echo tamponade physiology.   - s/p pericardiocentesis with drain placement, 1060mL out serosanguinous.   - as per lights criteria- meets criteria of exudative fluid. f/u pericardial fluid studies including cytology. Gram stain negative for organism   - Patient remained HD stable at [place] prior to transfer (as per documentation: HRs 60-70s; SBPs 150-170s, Lying flat off with O2 sats 99%). Continue to monitor vital signs   - trop I downtrending at OSH prior to transfer  - monitor drain output. Daily TTE while in place  - Cancer screening: Last colonoscopy >10 years ago (states went last year for colonoscopy but procedure aborted 2/2 HTN outpatient and never rescheduled). Remote history of Prostate CA, follows with urologist but unsure of name. waiting for patient sister to provide collateral info     HTN  - on home losartan 100mg and hydral 50mg.  - reinitiated on home meds yesterday as hypertensive upon presentation to CICU. This morning patient with softer BP (asymptomatic). Will decrease losartan dose 100mg-> 50mg     HLD  - c/w home simvastatin     hydrALAZINE 50 milliGRAM(s) Oral three times a day  losartan 50 milliGRAM(s) Oral daily  simvastatin 20 milliGRAM(s) Oral at bedtime    ===================HEMATOLOGIC/ONC ===================  Normocytic anemia  - continue to monitor and trend CBC.   - Likely 2/2 anemia of chronic disease 2/2 CKD. If continues to downtrend can consider anemia workup     ===================== RENAL =========================  BPH  - continue home tamsulosin     Remote Hx Prostate Ca  - f.u with urology, patient unsure of name. will f/u with sister for collateral info.   - states in remission for >20 years     Hyponatremia  - Na on presentation 125, remains at 125 today. Will send urine lytes/workup   - Patient mentating appropriately   - Euvolemic appearing on exam. Could be 2/2 hypothyroidism     CKD  - Baseline SCr looks to be around 1.5-1.7 base upon prior SCr trend in 2020. SCr this AM 1.42  - monitor and trend SCr, BUN, and lytes  - replete lytes prn with goal K 4-4.5 and Mg >2    Hydronephrosis on CT 9/6  - renal US ordered   - Urinating currently with bladder scan of 120mL. Also with b/l hydronephrosis back in 2020 with history of chronic hydronephrosis. SCr at baseline. Will also get collateral info from outpatient urologist once patient sister updates with info     tamsulosin 0.4 milliGRAM(s) Oral at bedtime  ==================== GASTROINTESTINAL===================  - Tolerating PO DASH/TLC diet.  - monitor for regular BM while inpatient. Last BM 4 days ago.   -Bowel regimen with Miralax and Senna     bisacodyl 5 milliGRAM(s) Oral every 12 hours PRN Constipation  polyethylene glycol 3350 17 Gram(s) Oral daily  senna 2 Tablet(s) Oral at bedtime    =======================    ENDOCRINE  =====================  - f/u hgb a1c and lipid panel     Hypothyroidism   - TSH elevated with low T3 and T4  - will consult endo for recs  - on home synthroid 112 mcg, may have missed a few doses (waiting upon refill). Spoke to endo- states to resume home dose of 112mcg and repeat free T4 in 5-7 days.     levothyroxine 112 MICROGram(s) Oral daily  ========================INFECTIOUS DISEASE================  No leukocytosis, afebrile  - continue to monitor and trend wbc and temp curve      Patient requires continuous monitoring with bedside rhythm monitoring, pulse ox monitoring, and intermittent blood gas analysis. Care plan discussed with ICU care team. Patient remained critical and at risk for life threatening decompensation.  Patient seen, examined and plan discussed with CCU team during rounds.     I have personally provided __30__ minutes of critical care time excluding time spent on separate procedures, in addition to initial critical care time provided by the CICU Attending, Dr. Irvin     By signing my name below, I, Tierra Zepeda, attest that this documentation has been prepared under the direction and in the presence of Merissa Quevedo NP  Electronically signed: Jesus Veliz, 09-08-22 @ 22:35    I, Merissa Quevedo NP, personally performed the services described in this documentation. all medical record entries made by the scribe were at my direction and in my presence. I have reviewed the chart and agree that the record reflects my personal performance and is accurate and complete  Electronically signed: Merissa Quevedo NP

## 2022-09-08 NOTE — PROGRESS NOTE ADULT - SUBJECTIVE AND OBJECTIVE BOX
AMBAR PICKARD  MRN-[id number]  Patient is a 77y old  Male who presents with a chief complaint of Pericardial Effusion (07 Sep 2022 22:03)    HPI:  This is a 78yo old  Male with PMH of HTN, HLD,  BPH, CKD ( creat 1.42 today), chronic angelita hydronephrosis ( x2 years), prostate ca ( radiation 10 years ago), seizure disorder ( last seizure 2015), ETOH use ( sober since 2016), hypothyroidism, and mild demenia; presents to [facility] on 9/3/2022 with chief complaint of SOB and CP x3 days.  Pt has been a heavy smoker his whole life. Denies fever, chills, cough, or congestion. No sick contacts.    Admitted to tele for likely COPD exacerbation and CP.  CXR is clear. Started on Duonebs, Symbicort and Solumedrol. No prior cardiac stents. EKG on arrival to ED notes PRWP V 1-3, no ST changes. Trop high on arrival at 268 trending down 180.8 today, proBNP 763, CK 1090. TSH 69.600 ( stopped synthroid a few weeks ago), T3 <20 T4 0.9, Na 126.  CXR revealing New cardiomegaly and CT chest with Large pericardial effusion, Echo today with large pericardial effusion with early tamponade physiology. Transferred to [facility] for pericardiocentesis. On arrival to [facility] patient awake and verbal A&OX3, denies any chest pain, dyspnea, dizziness, palpitations, N&V, Ha, orthopnea, LE edema.  VS WNL, on 2 L nasal cannula in no respiratory distress.      recs from [facility]:  home with home PT          INTERPRETATION:  CLINICAL STATEMENT: Chest Pain. Shortness of breath  TECHNIQUE: AP view of the chest.  COMPARISON: 7/19/2020    New cardiomegaly, may be exaggerated by AP technique. Suboptimal degree   of inspiration. Small left pleural effusion.    Degenerative changes of the spine, shoulders and AC joints.    IMPRESSION:    New cardiomegaly, may be exaggerated by AP technique.    Small left pleural effusion.    ECHO  Summary:   1. Left ventricular ejection fraction, by visual estimation, is 50 to   55%.   2. Mildly enlarged left atrium.   3. Maximum thickness of effusion is 2-2.5 cm.   4. Mild mitral annular calcification.   5. Mild thickening of the anterior and posterior mitral valve leaflets.   6. Trace mitral valve regurgitation.   7. Mild tricuspid regurgitation.   8. Estimated pulmonary artery systolic pressure is 37.4 mmHg assuming a   right atrial pressure of 5 mmHg, which is consistent with mild pulmonary   hypertension.   9. Findings are consistent with mild cardiac tamponade.      CT CHEST                          PROCEDURE DATE:  09/06/2022      INTERPRETATION:  HISTORY: Dyspnea on exertion. Smoker.    EXAMINATION: CT CHEST was performed without IV contrast.    COMPARISON: No CT chest comparison. Correlation with 1/9/2018 CT abdomen.    FINDINGS:    AIRWAYS, LUNGS, PLEURA: Trachea and mainstem bronchi patent. Emphysema.   Posterior left upper lobe and left basilar atelectasis. Small bilateral   pleural effusions.    MEDIASTINUM: Large circumferential pericardial effusion; depth of   pericardial fluid is 34 mm at the level of the basal inferior wall of the   left ventricle (sagittal series image 78). There is flattening of the   free wall of the right ventricle. Coronary atherosclerosis.    IMAGED ABDOMEN: Bilateral hydronephrosis. Cholelithiasis.    SOFT TISSUES: Bilateral gynecomastia.    BONES: Degenerative changes of the spine. Loss of height of L3 vertebral   body similar to 1/9/2018 CT abdomen.    IMPRESSION:.    Large pericardial effusion.    Small bilateral pleural effusions. (07 Sep 2022 13:09)      Hospital course:    Today:    Physical Exam:  Vital Signs Last 24 Hrs  T(C): 36.4 (08 Sep 2022 04:00), Max: 36.6 (07 Sep 2022 15:25)  T(F): 97.6 (08 Sep 2022 04:00), Max: 97.8 (07 Sep 2022 15:25)  HR: 58 (08 Sep 2022 06:00) (58 - 76)  BP: 90/63 (08 Sep 2022 06:00) (90/63 - 180/98)  BP(mean): 72 (08 Sep 2022 06:00) (72 - 131)  RR: 11 (08 Sep 2022 06:00) (11 - 21)  SpO2: 100% (08 Sep 2022 06:00) (89% - 100%)    Parameters below as of 08 Sep 2022 06:00  Patient On (Oxygen Delivery Method): room air        PHYSICAL EXAM:  Constitutional: Patient laying in bed, NAD  Head: Atraumatic, normocephalic  Eyes: No scleral icterus. PERRLA. EOMI  ENMT: Moist mucous membrane. Uvula midline  Neck: Supple, No JVD  Respiratory: CTA B/L. No wheezes, rales or rhonchi   Cardiovascular: S1/S2. No murmurs, rubs, or gallops.  Gastrointestinal: Nondistended, BSx4, soft, nontender.  Extremities: Moves all extremities. Warm, no edema, nontender  Vascular: 2+ DP/PT pulses B/L   Neurological: A&Ox3. Follows commands. No focal deficits.   Skin: No rashes on exposed skin     ============================I/O===========================   I&O's Detail    07 Sep 2022 07:01  -  08 Sep 2022 06:41  --------------------------------------------------------  IN:    Oral Fluid: 240 mL  Total IN: 240 mL    OUT:    Drain (mL): 540 mL    Incontinent per Condom Catheter (mL): 550 mL  Total OUT: 1090 mL    Total NET: -850 mL        ============================ LABS =========================                        12.2   9.59  )-----------( 221      ( 08 Sep 2022 02:59 )             36.7     09-08    125<L>  |  93<L>  |  18  ----------------------------<  99  3.4<L>   |  22  |  1.33<H>    Ca    7.8<L>      08 Sep 2022 02:59  Phos  2.9     09-08  Mg     1.9     09-08    TPro  5.3<L>  /  Alb  2.9<L>  /  TBili  0.6  /  DBili  x   /  AST  42<H>  /  ALT  27  /  AlkPhos  71  09-08    LIVER FUNCTIONS - ( 08 Sep 2022 02:59 )  Alb: 2.9 g/dL / Pro: 5.3 g/dL / ALK PHOS: 71 U/L / ALT: 27 U/L / AST: 42 U/L / GGT: x           PT/INR - ( 08 Sep 2022 02:59 )   PT: 12.0 sec;   INR: 1.03 ratio               ======================Micro/Rad/Cardio=================  Culture: Reviewed   CXR: Reviewed  Echo:Reviewed  ======================================================  PAST MEDICAL & SURGICAL HISTORY:  ETOH abuse  sober since 2016      Seizure disorder  last seizure 2015      BPH (benign prostatic hyperplasia)      HTN (hypertension)      Prostate CA  Dx&#x27;d 2008, s/p radiation      Hyperlipidemia      Poor historian      S/P hernia repair  right inguinal hernia      H/O prostate cancer  10- yrs ago, s/p radiation therapy      Status post cataract extraction      H/O urethrotomy  June 2018        ====================ASSESSMENT ==============    77M with PMH of HTN, HLD,  BPH, CKD ( creat 1.42 today), chronic angelita hydronephrosis ( x2 years), prostate ca ( radiation 10 years ago), seizure disorder ( last seizure 2015), ETOH use ( sober since 2016), hypothyroidism, and mild demenia p/w CP and SOB found with large pericardial effusion s/p pericardiocentesis.     ====================Plan ==============  #Neuro   A&Ox3  - continue to monitor mental status as per protocol    Seizure disorder  - No seziure meds reported. Waiting upon sister to confirm no additional medication bottles at home not reported earlier during med rec.     #Resp  Comfortable on RA  - SOB on admission likely 2/2 pericardial tamponade. Patient feels improvement s/p drain placement   - Monitor SpO2 with goal >94%  - 7/6 CT chest: Large pericardial effusion. Small bilateral pleural effusions. Bilateral hydronephrosis.    COPD  - duonebs prn   - started on levofloxacin outpatient for concern of COPD exacerbation/PNA. Will d/c for now. Likely sob related to pericardial effusion (states SOB improved s/p drain placement)   - provided smoking cessation education. Start nicotine patch while inpatient (states quit last week, smokes 4-5 cig/daily)     #CV  Cardiac Tamponade  - 9/7 TTE: EF 70%. Mod concentric LVH. Normal RV size and function. Large pericardial effusion with evidence of echo tamponade physiology.   - s/p pericardiocentesis with drain placement, 1060mL out serosanguinous.   - as per lights criteria- meets criteria of exudative fluid. f/u pericardial fluid studies including cytology, gram stain, and culture   - Patient remained HD stable at [place] prior to transfer (as per documentation: HRs 60-70s; SBPs 150-170s, Lying flat off with O2 sats 99%). Continue to monitor vital signs   - trop I downtrending at OSH prior to transfer  - monitor drain output. Daily TTE while in place  - Cancer screening: Last colonoscopy >10 years ago (states went last year for colonoscopy but procedure aborted 2/2 HTN outpatient and never rescheduled). Remote history of Prostate CA, follows with urologist but unsure of name. waiting for patient sister to provide collateral info     HTN  - on home losartan 100mg and hydral 25mg. Will reinitiate slowly if HD stable     HLD  - c/w home simvastatin     #GI  - start diet as tolerated  - monitor for regular BM while inpatient. Last BM 4 days ago. will start bowel reg     #  BPH  - continue home tamsulosin     Remote Hx Prostate Ca  - f.u with urology, patient unsure of name. will f/u with sister for collateral info.   - states in remission for >20 years     Hyponatremia  - Na on presentation 125, remains at 125 today. Will send urine lytes/workup   - Euvolemic appearing on exam. Could be 2/2 hypothyroidism     CKD  - Baseline SCr looks to be around 1.5-1.7 base upon prior SCr trend in 2020. SCr this AM 1.42  - monitor and trend SCr, BUN, and lytes  - replete lytes prn with goal K 4-4.5 and Mg >2    Hydronephrosis on CT 9/6  - renal US ordered   - Urinating currently with bladder scan of 120mL. Also with b/l hydronephrosis back in 2020 with history of chronic hydronephrosis. SCr at baseline. Will also get collateral info from outpatient urologist once patient sister updates with info     #Endo  - f/u hgb a1c and lipid panel     Hypothyroidism   - TSH elevated with low T3 and T4  - will consult endo for recs  - on home synthroid 112 mcg, may have missed a few doses (waiting upon refill). Spoke to endo- states to resume home dose of 112mcg and repeat free T4 in 5-7 days.     #Heme  Normocytic anemia  - continue to monitor and trend CBC.   - Likely 2/2 anemia of chronic disease 2/2 CKD. If continues to downtrend can consider anemia workup     #ID  Mild leukocytosis, afebrile  - continue to monitor and trend wbc and temp curve  - leukocytosis likely reactive 2/2 effusion. will monitor off abx at this time.      Patient requires continuous monitoring with bedside rhythm monitoring, arterial line, pulse oximetry, ventilator monitoring and intermittent blood gas analysis.  Care plan discussed with ICU care team.  Patient remained critical; required more than usual post op care; I have spent 35 minutes providing non-routine post op care, in addition to intial critical time provided by CICU attending _____ , re-evaluated multiple times during the day.         AMBAR PICKARD  MRN-[id number]  Patient is a 77y old  Male who presents with a chief complaint of Pericardial Effusion (07 Sep 2022 22:03)    HPI:  This is a 76yo old  Male with PMH of HTN, HLD,  BPH, CKD ( creat 1.42 today), chronic angelita hydronephrosis ( x2 years), prostate ca ( radiation 10 years ago), seizure disorder ( last seizure 2015), ETOH use ( sober since 2016), hypothyroidism, and mild demenia; presents to [facility] on 9/3/2022 with chief complaint of SOB and CP x3 days.  Pt has been a heavy smoker his whole life. Denies fever, chills, cough, or congestion. No sick contacts.    Admitted to tele for likely COPD exacerbation and CP.  CXR is clear. Started on Duonebs, Symbicort and Solumedrol. No prior cardiac stents. EKG on arrival to ED notes PRWP V 1-3, no ST changes. Trop high on arrival at 268 trending down 180.8 today, proBNP 763, CK 1090. TSH 69.600 ( stopped synthroid a few weeks ago), T3 <20 T4 0.9, Na 126.  CXR revealing New cardiomegaly and CT chest with Large pericardial effusion, Echo today with large pericardial effusion with early tamponade physiology. Transferred to [facility] for pericardiocentesis. On arrival to [facility] patient awake and verbal A&OX3, denies any chest pain, dyspnea, dizziness, palpitations, N&V, Ha, orthopnea, LE edema.  VS WNL, on 2 L nasal cannula in no respiratory distress.      recs from [facility]:  home with home PT          INTERPRETATION:  CLINICAL STATEMENT: Chest Pain. Shortness of breath  TECHNIQUE: AP view of the chest.  COMPARISON: 7/19/2020    New cardiomegaly, may be exaggerated by AP technique. Suboptimal degree   of inspiration. Small left pleural effusion.    Degenerative changes of the spine, shoulders and AC joints.    IMPRESSION:    New cardiomegaly, may be exaggerated by AP technique.    Small left pleural effusion.    ECHO  Summary:   1. Left ventricular ejection fraction, by visual estimation, is 50 to   55%.   2. Mildly enlarged left atrium.   3. Maximum thickness of effusion is 2-2.5 cm.   4. Mild mitral annular calcification.   5. Mild thickening of the anterior and posterior mitral valve leaflets.   6. Trace mitral valve regurgitation.   7. Mild tricuspid regurgitation.   8. Estimated pulmonary artery systolic pressure is 37.4 mmHg assuming a   right atrial pressure of 5 mmHg, which is consistent with mild pulmonary   hypertension.   9. Findings are consistent with mild cardiac tamponade.      CT CHEST                          PROCEDURE DATE:  09/06/2022      INTERPRETATION:  HISTORY: Dyspnea on exertion. Smoker.    EXAMINATION: CT CHEST was performed without IV contrast.    COMPARISON: No CT chest comparison. Correlation with 1/9/2018 CT abdomen.    FINDINGS:    AIRWAYS, LUNGS, PLEURA: Trachea and mainstem bronchi patent. Emphysema.   Posterior left upper lobe and left basilar atelectasis. Small bilateral   pleural effusions.    MEDIASTINUM: Large circumferential pericardial effusion; depth of   pericardial fluid is 34 mm at the level of the basal inferior wall of the   left ventricle (sagittal series image 78). There is flattening of the   free wall of the right ventricle. Coronary atherosclerosis.    IMAGED ABDOMEN: Bilateral hydronephrosis. Cholelithiasis.    SOFT TISSUES: Bilateral gynecomastia.    BONES: Degenerative changes of the spine. Loss of height of L3 vertebral   body similar to 1/9/2018 CT abdomen.    IMPRESSION:.    Large pericardial effusion.    Small bilateral pleural effusions. (07 Sep 2022 13:09)    Physical Exam:  Vital Signs Last 24 Hrs  T(C): 36.4 (08 Sep 2022 04:00), Max: 36.6 (07 Sep 2022 15:25)  T(F): 97.6 (08 Sep 2022 04:00), Max: 97.8 (07 Sep 2022 15:25)  HR: 58 (08 Sep 2022 06:00) (58 - 76)  BP: 90/63 (08 Sep 2022 06:00) (90/63 - 180/98)  BP(mean): 72 (08 Sep 2022 06:00) (72 - 131)  RR: 11 (08 Sep 2022 06:00) (11 - 21)  SpO2: 100% (08 Sep 2022 06:00) (89% - 100%)    Parameters below as of 08 Sep 2022 06:00  Patient On (Oxygen Delivery Method): room air        PHYSICAL EXAM:  Constitutional: Patient laying in bed, NAD  Head: Atraumatic, normocephalic  Eyes: No scleral icterus. PERRLA. EOMI  ENMT: Moist mucous membrane. Uvula midline  Neck: Supple, No JVD  Respiratory: CTA B/L. No wheezes, rales or rhonchi. + pericardial drain in place- dressing c/d/i  Cardiovascular: S1/S2. No murmurs, rubs, or gallops.  Gastrointestinal: Nondistended, BSx4, soft, nontender.  Extremities: Moves all extremities. Warm, no edema, nontender  Vascular: 2+ DP/PT pulses B/L   Neurological: A&Ox3. Follows commands. No focal deficits.   Skin: No rashes on exposed skin     ============================I/O===========================   I&O's Detail    07 Sep 2022 07:01  -  08 Sep 2022 06:41  --------------------------------------------------------  IN:    Oral Fluid: 240 mL  Total IN: 240 mL    OUT:    Drain (mL): 540 mL    Incontinent per Condom Catheter (mL): 550 mL  Total OUT: 1090 mL    Total NET: -850 mL        ============================ LABS =========================                        12.2   9.59  )-----------( 221      ( 08 Sep 2022 02:59 )             36.7     09-08    125<L>  |  93<L>  |  18  ----------------------------<  99  3.4<L>   |  22  |  1.33<H>    Ca    7.8<L>      08 Sep 2022 02:59  Phos  2.9     09-08  Mg     1.9     09-08    TPro  5.3<L>  /  Alb  2.9<L>  /  TBili  0.6  /  DBili  x   /  AST  42<H>  /  ALT  27  /  AlkPhos  71  09-08    LIVER FUNCTIONS - ( 08 Sep 2022 02:59 )  Alb: 2.9 g/dL / Pro: 5.3 g/dL / ALK PHOS: 71 U/L / ALT: 27 U/L / AST: 42 U/L / GGT: x           PT/INR - ( 08 Sep 2022 02:59 )   PT: 12.0 sec;   INR: 1.03 ratio               ======================Micro/Rad/Cardio=================  Culture: Reviewed   CXR: Reviewed  Echo:Reviewed  ======================================================  PAST MEDICAL & SURGICAL HISTORY:  ETOH abuse  sober since 2016      Seizure disorder  last seizure 2015      BPH (benign prostatic hyperplasia)      HTN (hypertension)      Prostate CA  Dx&#x27;d 2008, s/p radiation      Hyperlipidemia      Poor historian      S/P hernia repair  right inguinal hernia      H/O prostate cancer  10- yrs ago, s/p radiation therapy      Status post cataract extraction      H/O urethrotomy  June 2018        ====================ASSESSMENT ==============    77M with PMH of HTN, HLD,  BPH, CKD ( creat 1.42 today), chronic angelita hydronephrosis ( x2 years), prostate ca ( radiation 10 years ago), seizure disorder ( last seizure 2015), ETOH use ( sober since 2016), hypothyroidism, and mild demenia p/w CP and SOB found with large pericardial effusion s/p pericardiocentesis.     ====================Plan ==============  #Neuro   A&Ox3  - continue to monitor mental status as per protocol    Seizure disorder  - No seziure meds reported. Waiting upon sister to confirm no additional medication bottles at home not reported earlier during med rec.     #Resp  Comfortable on RA  - SOB on admission likely 2/2 pericardial tamponade. Patient feels improvement s/p drain placement   - Monitor SpO2 with goal >94%  - 7/6 CT chest: Large pericardial effusion. Small bilateral pleural effusions. Bilateral hydronephrosis.    COPD  - duonebs prn   - started on levofloxacin outpatient for concern of COPD exacerbation/PNA. Will d/c for now. Likely sob related to pericardial effusion (states SOB improved s/p drain placement)   - provided smoking cessation education. Start nicotine patch while inpatient (states quit last week, smokes 4-5 cig/daily)     #CV  Cardiac Tamponade  - 9/7 TTE: EF 70%. Mod concentric LVH. Normal RV size and function. Large pericardial effusion with evidence of echo tamponade physiology.   - s/p pericardiocentesis with drain placement, 1060mL out serosanguinous.   - as per lights criteria- meets criteria of exudative fluid. f/u pericardial fluid studies including cytology, gram stain, and culture   - Patient remained HD stable at [place] prior to transfer (as per documentation: HRs 60-70s; SBPs 150-170s, Lying flat off with O2 sats 99%). Continue to monitor vital signs   - trop I downtrending at OSH prior to transfer  - monitor drain output. Daily TTE while in place  - Cancer screening: Last colonoscopy >10 years ago (states went last year for colonoscopy but procedure aborted 2/2 HTN outpatient and never rescheduled). Remote history of Prostate CA, follows with urologist but unsure of name. waiting for patient sister to provide collateral info     HTN  - on home losartan 100mg and hydral 50mg.  - reinitiated on home meds yesterday as hypertensive upon presentation to CICU. This morning patient with softer BP (asymptomatic). Will decrease losartan dose 100mg-> 50mg     HLD  - c/w home simvastatin     #GI  - start diet as tolerated  - monitor for regular BM while inpatient. Last BM 4 days ago. will start bowel reg     #  BPH  - continue home tamsulosin     Remote Hx Prostate Ca  - f.u with urology, patient unsure of name. will f/u with sister for collateral info.   - states in remission for >20 years     Hyponatremia  - Na on presentation 125, remains at 125 today. Will send urine lytes/workup   - Patient mentating appropriately   - Euvolemic appearing on exam. Could be 2/2 hypothyroidism     CKD  - Baseline SCr looks to be around 1.5-1.7 base upon prior SCr trend in 2020. SCr this AM 1.42  - monitor and trend SCr, BUN, and lytes  - replete lytes prn with goal K 4-4.5 and Mg >2    Hydronephrosis on CT 9/6  - renal US ordered   - Urinating currently with bladder scan of 120mL. Also with b/l hydronephrosis back in 2020 with history of chronic hydronephrosis. SCr at baseline. Will also get collateral info from outpatient urologist once patient sister updates with info     #Endo  - f/u hgb a1c and lipid panel     Hypothyroidism   - TSH elevated with low T3 and T4  - will consult endo for recs  - on home synthroid 112 mcg, may have missed a few doses (waiting upon refill). Spoke to endo- states to resume home dose of 112mcg and repeat free T4 in 5-7 days.     #Heme  Normocytic anemia  - continue to monitor and trend CBC.   - Likely 2/2 anemia of chronic disease 2/2 CKD. If continues to downtrend can consider anemia workup     #ID  Mild leukocytosis, afebrile  - continue to monitor and trend wbc and temp curve  - leukocytosis likely reactive 2/2 effusion. will monitor off abx at this time.      Patient requires continuous monitoring with bedside rhythm monitoring, arterial line, pulse oximetry, ventilator monitoring and intermittent blood gas analysis.  Care plan discussed with ICU care team.  Patient remained critical; required more than usual post op care; I have spent 35 minutes providing non-routine post op care, in addition to intial critical time provided by CICU attending _____ , re-evaluated multiple times during the day.         AMBAR PICKARD  MRN-[id number]  Patient is a 77y old  Male who presents with a chief complaint of Pericardial Effusion (07 Sep 2022 22:03)    HPI:  This is a 76yo old  Male with PMH of HTN, HLD,  BPH, CKD ( creat 1.42 today), chronic angelita hydronephrosis ( x2 years), prostate ca ( radiation 10 years ago), seizure disorder ( last seizure 2015), ETOH use ( sober since 2016), hypothyroidism, and mild demenia; presents to [facility] on 9/3/2022 with chief complaint of SOB and CP x3 days.  Pt has been a heavy smoker his whole life. Denies fever, chills, cough, or congestion. No sick contacts.    Admitted to tele for likely COPD exacerbation and CP.  CXR is clear. Started on Duonebs, Symbicort and Solumedrol. No prior cardiac stents. EKG on arrival to ED notes PRWP V 1-3, no ST changes. Trop high on arrival at 268 trending down 180.8 today, proBNP 763, CK 1090. TSH 69.600 ( stopped synthroid a few weeks ago), T3 <20 T4 0.9, Na 126.  CXR revealing New cardiomegaly and CT chest with Large pericardial effusion, Echo today with large pericardial effusion with early tamponade physiology. Transferred to [facility] for pericardiocentesis. On arrival to [facility] patient awake and verbal A&OX3, denies any chest pain, dyspnea, dizziness, palpitations, N&V, Ha, orthopnea, LE edema.  VS WNL, on 2 L nasal cannula in no respiratory distress.      recs from [facility]:  home with home PT          INTERPRETATION:  CLINICAL STATEMENT: Chest Pain. Shortness of breath  TECHNIQUE: AP view of the chest.  COMPARISON: 7/19/2020    New cardiomegaly, may be exaggerated by AP technique. Suboptimal degree   of inspiration. Small left pleural effusion.    Degenerative changes of the spine, shoulders and AC joints.    IMPRESSION:    New cardiomegaly, may be exaggerated by AP technique.    Small left pleural effusion.    ECHO  Summary:   1. Left ventricular ejection fraction, by visual estimation, is 50 to   55%.   2. Mildly enlarged left atrium.   3. Maximum thickness of effusion is 2-2.5 cm.   4. Mild mitral annular calcification.   5. Mild thickening of the anterior and posterior mitral valve leaflets.   6. Trace mitral valve regurgitation.   7. Mild tricuspid regurgitation.   8. Estimated pulmonary artery systolic pressure is 37.4 mmHg assuming a   right atrial pressure of 5 mmHg, which is consistent with mild pulmonary   hypertension.   9. Findings are consistent with mild cardiac tamponade.      CT CHEST                          PROCEDURE DATE:  09/06/2022      INTERPRETATION:  HISTORY: Dyspnea on exertion. Smoker.    EXAMINATION: CT CHEST was performed without IV contrast.    COMPARISON: No CT chest comparison. Correlation with 1/9/2018 CT abdomen.    FINDINGS:    AIRWAYS, LUNGS, PLEURA: Trachea and mainstem bronchi patent. Emphysema.   Posterior left upper lobe and left basilar atelectasis. Small bilateral   pleural effusions.    MEDIASTINUM: Large circumferential pericardial effusion; depth of   pericardial fluid is 34 mm at the level of the basal inferior wall of the   left ventricle (sagittal series image 78). There is flattening of the   free wall of the right ventricle. Coronary atherosclerosis.    IMAGED ABDOMEN: Bilateral hydronephrosis. Cholelithiasis.    SOFT TISSUES: Bilateral gynecomastia.    BONES: Degenerative changes of the spine. Loss of height of L3 vertebral   body similar to 1/9/2018 CT abdomen.    IMPRESSION:.    Large pericardial effusion.    Small bilateral pleural effusions. (07 Sep 2022 13:09)    Physical Exam:  Vital Signs Last 24 Hrs  T(C): 36.4 (08 Sep 2022 04:00), Max: 36.6 (07 Sep 2022 15:25)  T(F): 97.6 (08 Sep 2022 04:00), Max: 97.8 (07 Sep 2022 15:25)  HR: 58 (08 Sep 2022 06:00) (58 - 76)  BP: 90/63 (08 Sep 2022 06:00) (90/63 - 180/98)  BP(mean): 72 (08 Sep 2022 06:00) (72 - 131)  RR: 11 (08 Sep 2022 06:00) (11 - 21)  SpO2: 100% (08 Sep 2022 06:00) (89% - 100%)    Parameters below as of 08 Sep 2022 06:00  Patient On (Oxygen Delivery Method): room air        PHYSICAL EXAM:  Constitutional: Patient laying in bed, NAD  Head: Atraumatic, normocephalic  Eyes: No scleral icterus. PERRLA. EOMI  ENMT: Moist mucous membrane. Uvula midline  Neck: Supple, No JVD  Respiratory: CTA B/L. No wheezes, rales or rhonchi. + pericardial drain in place- dressing c/d/i  Cardiovascular: S1/S2. No murmurs, rubs, or gallops.  Gastrointestinal: Nondistended, BSx4, soft, nontender.  Extremities: Moves all extremities. Warm, no edema, nontender  Vascular: 2+ DP/PT pulses B/L   Neurological: A&Ox3. Follows commands. No focal deficits.   Skin: No rashes on exposed skin     ============================I/O===========================   I&O's Detail    07 Sep 2022 07:01  -  08 Sep 2022 06:41  --------------------------------------------------------  IN:    Oral Fluid: 240 mL  Total IN: 240 mL    OUT:    Drain (mL): 540 mL    Incontinent per Condom Catheter (mL): 550 mL  Total OUT: 1090 mL    Total NET: -850 mL        ============================ LABS =========================                        12.2   9.59  )-----------( 221      ( 08 Sep 2022 02:59 )             36.7     09-08    125<L>  |  93<L>  |  18  ----------------------------<  99  3.4<L>   |  22  |  1.33<H>    Ca    7.8<L>      08 Sep 2022 02:59  Phos  2.9     09-08  Mg     1.9     09-08    TPro  5.3<L>  /  Alb  2.9<L>  /  TBili  0.6  /  DBili  x   /  AST  42<H>  /  ALT  27  /  AlkPhos  71  09-08    LIVER FUNCTIONS - ( 08 Sep 2022 02:59 )  Alb: 2.9 g/dL / Pro: 5.3 g/dL / ALK PHOS: 71 U/L / ALT: 27 U/L / AST: 42 U/L / GGT: x           PT/INR - ( 08 Sep 2022 02:59 )   PT: 12.0 sec;   INR: 1.03 ratio               ======================Micro/Rad/Cardio=================  Culture: Reviewed   CXR: Reviewed  Echo:Reviewed  ======================================================  PAST MEDICAL & SURGICAL HISTORY:  ETOH abuse  sober since 2016      Seizure disorder  last seizure 2015      BPH (benign prostatic hyperplasia)      HTN (hypertension)      Prostate CA  Dx&#x27;d 2008, s/p radiation      Hyperlipidemia      Poor historian      S/P hernia repair  right inguinal hernia      H/O prostate cancer  10- yrs ago, s/p radiation therapy      Status post cataract extraction      H/O urethrotomy  June 2018        ====================ASSESSMENT ==============    77M with PMH of HTN, HLD,  BPH, CKD ( creat 1.42 today), chronic angelita hydronephrosis ( x2 years), prostate ca ( radiation 10 years ago), seizure disorder ( last seizure 2015), ETOH use ( sober since 2016), hypothyroidism, and mild demenia p/w CP and SOB found with large pericardial effusion s/p pericardiocentesis.     ====================Plan ==============  #Neuro   A&Ox3  - continue to monitor mental status as per protocol    Seizure disorder  - No seziure meds reported. Waiting upon sister to confirm no additional medication bottles at home not reported earlier during med rec.     #Resp  Comfortable on RA  - SOB on admission likely 2/2 pericardial tamponade. Patient feels improvement s/p drain placement   - Monitor SpO2 with goal >94%  - 7/6 CT chest: Large pericardial effusion. Small bilateral pleural effusions. Bilateral hydronephrosis.    COPD  - duonebs prn   - started on levofloxacin outpatient for concern of COPD exacerbation/PNA. Will d/c for now. Likely sob related to pericardial effusion (states SOB improved s/p drain placement)   - provided smoking cessation education. Start nicotine patch while inpatient (states quit last week, smokes 4-5 cig/daily)     #CV  Cardiac Tamponade  - 9/7 TTE: EF 70%. Mod concentric LVH. Normal RV size and function. Large pericardial effusion with evidence of echo tamponade physiology.   - s/p pericardiocentesis with drain placement, 1060mL out serosanguinous.   - as per lights criteria- meets criteria of exudative fluid. f/u pericardial fluid studies including cytology, gram stain, and culture   - Patient remained HD stable at [place] prior to transfer (as per documentation: HRs 60-70s; SBPs 150-170s, Lying flat off with O2 sats 99%). Continue to monitor vital signs   - trop I downtrending at OSH prior to transfer  - monitor drain output. Daily TTE while in place  - Cancer screening: Last colonoscopy >10 years ago (states went last year for colonoscopy but procedure aborted 2/2 HTN outpatient and never rescheduled). Remote history of Prostate CA, follows with urologist but unsure of name. waiting for patient sister to provide collateral info     HTN  - on home losartan 100mg and hydral 50mg.  - reinitiated on home meds yesterday as hypertensive upon presentation to CICU. This morning patient with softer BP (asymptomatic). Will decrease losartan dose 100mg-> 50mg     HLD  - c/w home simvastatin     #GI  - start diet as tolerated  - monitor for regular BM while inpatient. Last BM 4 days ago. will start bowel reg     #  BPH  - continue home tamsulosin     Remote Hx Prostate Ca  - f.u with urology, patient unsure of name. will f/u with sister for collateral info.   - states in remission for >20 years     Hyponatremia  - Na on presentation 125, remains at 125 today. Will send urine lytes/workup   - Patient mentating appropriately   - Euvolemic appearing on exam. Could be 2/2 hypothyroidism     CKD  - Baseline SCr looks to be around 1.5-1.7 base upon prior SCr trend in 2020. SCr this AM 1.42  - monitor and trend SCr, BUN, and lytes  - replete lytes prn with goal K 4-4.5 and Mg >2    Hydronephrosis on CT 9/6  - renal US ordered   - Urinating currently with bladder scan of 120mL. Also with b/l hydronephrosis back in 2020 with history of chronic hydronephrosis. SCr at baseline. Will also get collateral info from outpatient urologist once patient sister updates with info     #Endo  - f/u hgb a1c and lipid panel     Hypothyroidism   - TSH elevated with low T3 and T4  - will consult endo for recs  - on home synthroid 112 mcg, may have missed a few doses (waiting upon refill). Spoke to endo- states to resume home dose of 112mcg and repeat free T4 in 5-7 days.     #Heme  Normocytic anemia  - continue to monitor and trend CBC.   - Likely 2/2 anemia of chronic disease 2/2 CKD. If continues to downtrend can consider anemia workup     #ID  No leukocytosis, afebrile  - continue to monitor and trend wbc and temp curve    Patient requires continuous monitoring with bedside rhythm monitoring, arterial line, pulse oximetry, ventilator monitoring and intermittent blood gas analysis.  Care plan discussed with ICU care team.  Patient remained critical; required more than usual post op care; I have spent 35 minutes providing non-routine post op care, in addition to intial critical time provided by CICU attending Dr. Irvin, re-evaluated multiple times during the day.         AMBAR PICKARD  MRN-[id number]  Patient is a 77y old  Male who presents with a chief complaint of Pericardial Effusion (07 Sep 2022 22:03)    HPI:  This is a 76yo old  Male with PMH of HTN, HLD,  BPH, CKD ( creat 1.42 today), chronic angelita hydronephrosis ( x2 years), prostate ca ( radiation 10 years ago), seizure disorder ( last seizure 2015), ETOH use ( sober since 2016), hypothyroidism, and mild demenia; presents to [facility] on 9/3/2022 with chief complaint of SOB and CP x3 days.  Pt has been a heavy smoker his whole life. Denies fever, chills, cough, or congestion. No sick contacts.    Admitted to tele for likely COPD exacerbation and CP.  CXR is clear. Started on Duonebs, Symbicort and Solumedrol. No prior cardiac stents. EKG on arrival to ED notes PRWP V 1-3, no ST changes. Trop high on arrival at 268 trending down 180.8 today, proBNP 763, CK 1090. TSH 69.600 ( stopped synthroid a few weeks ago), T3 <20 T4 0.9, Na 126.  CXR revealing New cardiomegaly and CT chest with Large pericardial effusion, Echo today with large pericardial effusion with early tamponade physiology. Transferred to [facility] for pericardiocentesis. On arrival to [facility] patient awake and verbal A&OX3, denies any chest pain, dyspnea, dizziness, palpitations, N&V, Ha, orthopnea, LE edema.  VS WNL, on 2 L nasal cannula in no respiratory distress.      recs from [facility]:  home with home PT          INTERPRETATION:  CLINICAL STATEMENT: Chest Pain. Shortness of breath  TECHNIQUE: AP view of the chest.  COMPARISON: 7/19/2020    New cardiomegaly, may be exaggerated by AP technique. Suboptimal degree   of inspiration. Small left pleural effusion.    Degenerative changes of the spine, shoulders and AC joints.    IMPRESSION:    New cardiomegaly, may be exaggerated by AP technique.    Small left pleural effusion.    ECHO  Summary:   1. Left ventricular ejection fraction, by visual estimation, is 50 to   55%.   2. Mildly enlarged left atrium.   3. Maximum thickness of effusion is 2-2.5 cm.   4. Mild mitral annular calcification.   5. Mild thickening of the anterior and posterior mitral valve leaflets.   6. Trace mitral valve regurgitation.   7. Mild tricuspid regurgitation.   8. Estimated pulmonary artery systolic pressure is 37.4 mmHg assuming a   right atrial pressure of 5 mmHg, which is consistent with mild pulmonary   hypertension.   9. Findings are consistent with mild cardiac tamponade.      CT CHEST                          PROCEDURE DATE:  09/06/2022      INTERPRETATION:  HISTORY: Dyspnea on exertion. Smoker.    EXAMINATION: CT CHEST was performed without IV contrast.    COMPARISON: No CT chest comparison. Correlation with 1/9/2018 CT abdomen.    FINDINGS:    AIRWAYS, LUNGS, PLEURA: Trachea and mainstem bronchi patent. Emphysema.   Posterior left upper lobe and left basilar atelectasis. Small bilateral   pleural effusions.    MEDIASTINUM: Large circumferential pericardial effusion; depth of   pericardial fluid is 34 mm at the level of the basal inferior wall of the   left ventricle (sagittal series image 78). There is flattening of the   free wall of the right ventricle. Coronary atherosclerosis.    IMAGED ABDOMEN: Bilateral hydronephrosis. Cholelithiasis.    SOFT TISSUES: Bilateral gynecomastia.    BONES: Degenerative changes of the spine. Loss of height of L3 vertebral   body similar to 1/9/2018 CT abdomen.    IMPRESSION:.    Large pericardial effusion.    Small bilateral pleural effusions. (07 Sep 2022 13:09)    Physical Exam:  Vital Signs Last 24 Hrs  T(C): 36.4 (08 Sep 2022 04:00), Max: 36.6 (07 Sep 2022 15:25)  T(F): 97.6 (08 Sep 2022 04:00), Max: 97.8 (07 Sep 2022 15:25)  HR: 58 (08 Sep 2022 06:00) (58 - 76)  BP: 90/63 (08 Sep 2022 06:00) (90/63 - 180/98)  BP(mean): 72 (08 Sep 2022 06:00) (72 - 131)  RR: 11 (08 Sep 2022 06:00) (11 - 21)  SpO2: 100% (08 Sep 2022 06:00) (89% - 100%)    Parameters below as of 08 Sep 2022 06:00  Patient On (Oxygen Delivery Method): room air        PHYSICAL EXAM:  Constitutional: Patient laying in bed, NAD  Head: Atraumatic, normocephalic  Eyes: No scleral icterus. PERRLA. EOMI  ENMT: Moist mucous membrane. Uvula midline  Neck: Supple, No JVD  Respiratory: CTA B/L, intermittent expiratory wheezes b/l. No rales or rhonchi. + pericardial drain in place- dressing c/d/i  Cardiovascular: S1/S2. No murmurs, rubs, or gallops.  Gastrointestinal: Nondistended, BSx4, soft, nontender.  Extremities: Moves all extremities. Warm, no edema, nontender  Vascular: 2+ DP/PT pulses B/L   Neurological: A&Ox3. Follows commands. No focal deficits.   Skin: No rashes on exposed skin     ============================I/O===========================   I&O's Detail    07 Sep 2022 07:01  -  08 Sep 2022 06:41  --------------------------------------------------------  IN:    Oral Fluid: 240 mL  Total IN: 240 mL    OUT:    Drain (mL): 540 mL    Incontinent per Condom Catheter (mL): 550 mL  Total OUT: 1090 mL    Total NET: -850 mL        ============================ LABS =========================                        12.2   9.59  )-----------( 221      ( 08 Sep 2022 02:59 )             36.7     09-08    125<L>  |  93<L>  |  18  ----------------------------<  99  3.4<L>   |  22  |  1.33<H>    Ca    7.8<L>      08 Sep 2022 02:59  Phos  2.9     09-08  Mg     1.9     09-08    TPro  5.3<L>  /  Alb  2.9<L>  /  TBili  0.6  /  DBili  x   /  AST  42<H>  /  ALT  27  /  AlkPhos  71  09-08    LIVER FUNCTIONS - ( 08 Sep 2022 02:59 )  Alb: 2.9 g/dL / Pro: 5.3 g/dL / ALK PHOS: 71 U/L / ALT: 27 U/L / AST: 42 U/L / GGT: x           PT/INR - ( 08 Sep 2022 02:59 )   PT: 12.0 sec;   INR: 1.03 ratio               ======================Micro/Rad/Cardio=================  Culture: Reviewed   CXR: Reviewed  Echo:Reviewed  ======================================================  PAST MEDICAL & SURGICAL HISTORY:  ETOH abuse  sober since 2016      Seizure disorder  last seizure 2015      BPH (benign prostatic hyperplasia)      HTN (hypertension)      Prostate CA  Dx&#x27;d 2008, s/p radiation      Hyperlipidemia      Poor historian      S/P hernia repair  right inguinal hernia      H/O prostate cancer  10- yrs ago, s/p radiation therapy      Status post cataract extraction      H/O urethrotomy  June 2018        ====================ASSESSMENT ==============    77M with PMH of HTN, HLD,  BPH, CKD ( creat 1.42 today), chronic angelita hydronephrosis ( x2 years), prostate ca ( radiation 10 years ago), seizure disorder ( last seizure 2015), ETOH use ( sober since 2016), hypothyroidism, and mild demenia p/w CP and SOB found with large pericardial effusion s/p pericardiocentesis.     ====================Plan ==============  #Neuro   A&Ox3  - continue to monitor mental status as per protocol    Seizure disorder  - No seziure meds reported. Waiting upon sister to confirm no additional medication bottles at home not reported earlier during med rec.     #Resp  Comfortable on RA  - SOB on admission likely 2/2 pericardial tamponade. Patient feels improvement s/p drain placement   - Monitor SpO2 with goal >94%  - 7/6 CT chest: Large pericardial effusion. Small bilateral pleural effusions. Bilateral hydronephrosis.    COPD  - duonebs prn   - started on levofloxacin outpatient for concern of COPD exacerbation/PNA. Will d/c for now. Likely sob related to pericardial effusion (states SOB improved s/p drain placement)   - provided smoking cessation education. Start nicotine patch while inpatient (states quit last week, smokes 4-5 cig/daily)     #CV  Cardiac Tamponade  - 9/7 TTE: EF 70%. Mod concentric LVH. Normal RV size and function. Large pericardial effusion with evidence of echo tamponade physiology.   - s/p pericardiocentesis with drain placement, 1060mL out serosanguinous.   - as per lights criteria- meets criteria of exudative fluid. f/u pericardial fluid studies including cytology, gram stain, and culture   - Patient remained HD stable at [place] prior to transfer (as per documentation: HRs 60-70s; SBPs 150-170s, Lying flat off with O2 sats 99%). Continue to monitor vital signs   - trop I downtrending at OSH prior to transfer  - monitor drain output. Daily TTE while in place  - Cancer screening: Last colonoscopy >10 years ago (states went last year for colonoscopy but procedure aborted 2/2 HTN outpatient and never rescheduled). Remote history of Prostate CA, follows with urologist but unsure of name. waiting for patient sister to provide collateral info     HTN  - on home losartan 100mg and hydral 50mg.  - reinitiated on home meds yesterday as hypertensive upon presentation to CICU. This morning patient with softer BP (asymptomatic). Will decrease losartan dose 100mg-> 50mg     HLD  - c/w home simvastatin     #GI  - start diet as tolerated  - monitor for regular BM while inpatient. Last BM 4 days ago. will start bowel reg     #  BPH  - continue home tamsulosin     Remote Hx Prostate Ca  - f.u with urology, patient unsure of name. will f/u with sister for collateral info.   - states in remission for >20 years     Hyponatremia  - Na on presentation 125, remains at 125 today. Will send urine lytes/workup   - Patient mentating appropriately   - Euvolemic appearing on exam. Could be 2/2 hypothyroidism     CKD  - Baseline SCr looks to be around 1.5-1.7 base upon prior SCr trend in 2020. SCr this AM 1.42  - monitor and trend SCr, BUN, and lytes  - replete lytes prn with goal K 4-4.5 and Mg >2    Hydronephrosis on CT 9/6  - renal US ordered   - Urinating currently with bladder scan of 120mL. Also with b/l hydronephrosis back in 2020 with history of chronic hydronephrosis. SCr at baseline. Will also get collateral info from outpatient urologist once patient sister updates with info     #Endo  - f/u hgb a1c and lipid panel     Hypothyroidism   - TSH elevated with low T3 and T4  - will consult endo for recs  - on home synthroid 112 mcg, may have missed a few doses (waiting upon refill). Spoke to endo- states to resume home dose of 112mcg and repeat free T4 in 5-7 days.     #Heme  Normocytic anemia  - continue to monitor and trend CBC.   - Likely 2/2 anemia of chronic disease 2/2 CKD. If continues to downtrend can consider anemia workup     #ID  No leukocytosis, afebrile  - continue to monitor and trend wbc and temp curve    Patient requires continuous monitoring with bedside rhythm monitoring, arterial line, pulse oximetry, ventilator monitoring and intermittent blood gas analysis.  Care plan discussed with ICU care team.  Patient remained critical; required more than usual post op care; I have spent 35 minutes providing non-routine post op care, in addition to intial critical time provided by CICU attending Dr. Irvin, re-evaluated multiple times during the day.         AMBAR PICKARD  MRN-[id number]  Patient is a 77y old  Male who presents with a chief complaint of Pericardial Effusion (07 Sep 2022 22:03)    HPI:  This is a 78yo old  Male with PMH of HTN, HLD,  BPH, CKD ( creat 1.42 today), chronic angelita hydronephrosis ( x2 years), prostate ca ( radiation 10 years ago), seizure disorder ( last seizure 2015), ETOH use ( sober since 2016), hypothyroidism, and mild demenia; presents to [facility] on 9/3/2022 with chief complaint of SOB and CP x3 days.  Pt has been a heavy smoker his whole life. Denies fever, chills, cough, or congestion. No sick contacts.    Admitted to tele for likely COPD exacerbation and CP.  CXR is clear. Started on Duonebs, Symbicort and Solumedrol. No prior cardiac stents. EKG on arrival to ED notes PRWP V 1-3, no ST changes. Trop high on arrival at 268 trending down 180.8 today, proBNP 763, CK 1090. TSH 69.600 ( stopped synthroid a few weeks ago), T3 <20 T4 0.9, Na 126.  CXR revealing New cardiomegaly and CT chest with Large pericardial effusion, Echo today with large pericardial effusion with early tamponade physiology. Transferred to [facility] for pericardiocentesis. On arrival to [facility] patient awake and verbal A&OX3, denies any chest pain, dyspnea, dizziness, palpitations, N&V, Ha, orthopnea, LE edema.  VS WNL, on 2 L nasal cannula in no respiratory distress.      recs from [facility]:  home with home PT          INTERPRETATION:  CLINICAL STATEMENT: Chest Pain. Shortness of breath  TECHNIQUE: AP view of the chest.  COMPARISON: 7/19/2020    New cardiomegaly, may be exaggerated by AP technique. Suboptimal degree   of inspiration. Small left pleural effusion.    Degenerative changes of the spine, shoulders and AC joints.    IMPRESSION:    New cardiomegaly, may be exaggerated by AP technique.    Small left pleural effusion.    ECHO  Summary:   1. Left ventricular ejection fraction, by visual estimation, is 50 to   55%.   2. Mildly enlarged left atrium.   3. Maximum thickness of effusion is 2-2.5 cm.   4. Mild mitral annular calcification.   5. Mild thickening of the anterior and posterior mitral valve leaflets.   6. Trace mitral valve regurgitation.   7. Mild tricuspid regurgitation.   8. Estimated pulmonary artery systolic pressure is 37.4 mmHg assuming a   right atrial pressure of 5 mmHg, which is consistent with mild pulmonary   hypertension.   9. Findings are consistent with mild cardiac tamponade.      CT CHEST                          PROCEDURE DATE:  09/06/2022      INTERPRETATION:  HISTORY: Dyspnea on exertion. Smoker.    EXAMINATION: CT CHEST was performed without IV contrast.    COMPARISON: No CT chest comparison. Correlation with 1/9/2018 CT abdomen.    FINDINGS:    AIRWAYS, LUNGS, PLEURA: Trachea and mainstem bronchi patent. Emphysema.   Posterior left upper lobe and left basilar atelectasis. Small bilateral   pleural effusions.    MEDIASTINUM: Large circumferential pericardial effusion; depth of   pericardial fluid is 34 mm at the level of the basal inferior wall of the   left ventricle (sagittal series image 78). There is flattening of the   free wall of the right ventricle. Coronary atherosclerosis.    IMAGED ABDOMEN: Bilateral hydronephrosis. Cholelithiasis.    SOFT TISSUES: Bilateral gynecomastia.    BONES: Degenerative changes of the spine. Loss of height of L3 vertebral   body similar to 1/9/2018 CT abdomen.    IMPRESSION:.    Large pericardial effusion.    Small bilateral pleural effusions. (07 Sep 2022 13:09)    Physical Exam:  Vital Signs Last 24 Hrs  T(C): 36.4 (08 Sep 2022 04:00), Max: 36.6 (07 Sep 2022 15:25)  T(F): 97.6 (08 Sep 2022 04:00), Max: 97.8 (07 Sep 2022 15:25)  HR: 58 (08 Sep 2022 06:00) (58 - 76)  BP: 90/63 (08 Sep 2022 06:00) (90/63 - 180/98)  BP(mean): 72 (08 Sep 2022 06:00) (72 - 131)  RR: 11 (08 Sep 2022 06:00) (11 - 21)  SpO2: 100% (08 Sep 2022 06:00) (89% - 100%)    Parameters below as of 08 Sep 2022 06:00  Patient On (Oxygen Delivery Method): room air        PHYSICAL EXAM:  Constitutional: Patient laying in bed, NAD  Head: Atraumatic, normocephalic  Eyes: No scleral icterus. PERRLA. EOMI  ENMT: Moist mucous membrane. Uvula midline  Neck: Supple, No JVD  Respiratory: CTA B/L, intermittent expiratory wheezes b/l. No rales or rhonchi. + pericardial drain in place- dressing c/d/i  Cardiovascular: S1/S2. No murmurs, rubs, or gallops.  Gastrointestinal: Nondistended, BSx4, soft, nontender.  Extremities: Moves all extremities. Warm, no edema, nontender  Vascular: 2+ DP/PT pulses B/L   Neurological: A&Ox3. Follows commands. No focal deficits.   Skin: No rashes on exposed skin     ============================I/O===========================   I&O's Detail    07 Sep 2022 07:01  -  08 Sep 2022 06:41  --------------------------------------------------------  IN:    Oral Fluid: 240 mL  Total IN: 240 mL    OUT:    Drain (mL): 540 mL    Incontinent per Condom Catheter (mL): 550 mL  Total OUT: 1090 mL    Total NET: -850 mL        ============================ LABS =========================                        12.2   9.59  )-----------( 221      ( 08 Sep 2022 02:59 )             36.7     09-08    125<L>  |  93<L>  |  18  ----------------------------<  99  3.4<L>   |  22  |  1.33<H>    Ca    7.8<L>      08 Sep 2022 02:59  Phos  2.9     09-08  Mg     1.9     09-08    TPro  5.3<L>  /  Alb  2.9<L>  /  TBili  0.6  /  DBili  x   /  AST  42<H>  /  ALT  27  /  AlkPhos  71  09-08    LIVER FUNCTIONS - ( 08 Sep 2022 02:59 )  Alb: 2.9 g/dL / Pro: 5.3 g/dL / ALK PHOS: 71 U/L / ALT: 27 U/L / AST: 42 U/L / GGT: x           PT/INR - ( 08 Sep 2022 02:59 )   PT: 12.0 sec;   INR: 1.03 ratio               ======================Micro/Rad/Cardio=================  Culture: Reviewed   CXR: Reviewed  Echo:Reviewed  ======================================================  PAST MEDICAL & SURGICAL HISTORY:  ETOH abuse  sober since 2016      Seizure disorder  last seizure 2015      BPH (benign prostatic hyperplasia)      HTN (hypertension)      Prostate CA  Dx&#x27;d 2008, s/p radiation      Hyperlipidemia      Poor historian      S/P hernia repair  right inguinal hernia      H/O prostate cancer  10- yrs ago, s/p radiation therapy      Status post cataract extraction      H/O urethrotomy  June 2018        ====================ASSESSMENT ==============    77M with PMH of HTN, HLD,  BPH, CKD ( creat 1.42 today), chronic angelita hydronephrosis ( x2 years), prostate ca ( radiation 10 years ago), seizure disorder ( last seizure 2015), ETOH use ( sober since 2016), hypothyroidism, and mild demenia p/w CP and SOB found with large pericardial effusion s/p pericardiocentesis.     ====================Plan ==============  #Neuro   A&Ox3  - continue to monitor mental status as per protocol    Seizure disorder  - No seziure meds reported. Waiting upon sister to confirm no additional medication bottles at home not reported earlier during med rec.     #Resp  Comfortable on RA  - SOB on admission likely 2/2 pericardial tamponade. Patient feels improvement s/p drain placement   - Monitor SpO2 with goal >94%  - 7/6 CT chest: Large pericardial effusion. Small bilateral pleural effusions. Bilateral hydronephrosis.    COPD  - duonebs prn   - started on levofloxacin outpatient for concern of COPD exacerbation/PNA. Will d/c for now. Likely sob related to pericardial effusion (states SOB improved s/p drain placement)   - provided smoking cessation education. Start nicotine patch while inpatient (states quit last week, smokes 4-5 cig/daily)     #CV  Cardiac Tamponade  - 9/7 TTE: EF 70%. Mod concentric LVH. Normal RV size and function. Large pericardial effusion with evidence of echo tamponade physiology.   - s/p pericardiocentesis with drain placement, 1060mL out serosanguinous.   - as per lights criteria- meets criteria of exudative fluid. f/u pericardial fluid studies including cytology. Gram stain negative for organism   - Patient remained HD stable at [facility] prior to transfer (as per documentation: HRs 60-70s; SBPs 150-170s, Lying flat off with O2 sats 99%). Continue to monitor vital signs   - trop I downtrending at OSH prior to transfer  - monitor drain output. Daily TTE while in place  - Cancer screening: Last colonoscopy >10 years ago (states went last year for colonoscopy but procedure aborted 2/2 HTN outpatient and never rescheduled). Remote history of Prostate CA, follows with urologist but unsure of name. waiting for patient sister to provide collateral info     HTN  - on home losartan 100mg and hydral 50mg.  - reinitiated on home meds yesterday as hypertensive upon presentation to CICU. This morning patient with softer BP (asymptomatic). Will decrease losartan dose 100mg-> 50mg     HLD  - c/w home simvastatin     #GI  - start diet as tolerated  - monitor for regular BM while inpatient. Last BM 4 days ago. will start bowel reg     #  BPH  - continue home tamsulosin     Remote Hx Prostate Ca  - f.u with urology, patient unsure of name. will f/u with sister for collateral info.   - states in remission for >20 years     Hyponatremia  - Na on presentation 125, remains at 125 today. Will send urine lytes/workup   - Patient mentating appropriately   - Euvolemic appearing on exam. Could be 2/2 hypothyroidism     CKD  - Baseline SCr looks to be around 1.5-1.7 base upon prior SCr trend in 2020. SCr this AM 1.42  - monitor and trend SCr, BUN, and lytes  - replete lytes prn with goal K 4-4.5 and Mg >2    Hydronephrosis on CT 9/6  - renal US ordered   - Urinating currently with bladder scan of 120mL. Also with b/l hydronephrosis back in 2020 with history of chronic hydronephrosis. SCr at baseline. Will also get collateral info from outpatient urologist once patient sister updates with info     #Endo  - f/u hgb a1c and lipid panel     Hypothyroidism   - TSH elevated with low T3 and T4  - will consult endo for recs  - on home synthroid 112 mcg, may have missed a few doses (waiting upon refill). Spoke to endo- states to resume home dose of 112mcg and repeat free T4 in 5-7 days.     #Heme  Normocytic anemia  - continue to monitor and trend CBC.   - Likely 2/2 anemia of chronic disease 2/2 CKD. If continues to downtrend can consider anemia workup     #ID  No leukocytosis, afebrile  - continue to monitor and trend wbc and temp curve    Patient requires continuous monitoring with bedside rhythm monitoring, arterial line, pulse oximetry, ventilator monitoring and intermittent blood gas analysis.  Care plan discussed with ICU care team.  Patient remained critical; required more than usual post op care; I have spent 35 minutes providing non-routine post op care, in addition to intial critical time provided by CICU attending Dr. Irvin, re-evaluated multiple times during the day.

## 2022-09-08 NOTE — PROGRESS NOTE ADULT - CRITICAL CARE ATTENDING COMMENT
76yo old  Male with PMH of HTN, HLD,  BPH, CKD ( creat 1.42 today), chronic angelita hydronephrosis ( x2 years), prostate ca ( radiation 10 years ago), seizure disorder ( last seizure 2015), ETOH use ( sober since 2016), hypothyroidism, and mild demenia; presents to [facility] on 9/3/2022 with chief complaint of SOB and CP x3 days.  Pt has been a heavy smoker his whole life. Denies fever, chills, cough, or congestion. No sick contacts.      Admitted to tele at [facility] for likely COPD exacerbation and CP.  CXR is clear. Started on Duonebs, Symbicort and Solumedrol. No prior cardiac stents. EKG on arrival to ED notes PRWP V 1-3, no ST changes. Trop high on arrival at 268 trending down 180.8 today, proBNP 763, CK 1090. TSH 69.600 ( stopped synthroid a few weeks ago), T3 <20 T4 0.9, Na 126.    CXR revealing New cardiomegaly and CT chest with Large pericardial effusion, Echo today with large pericardial effusion with early tamponade physiology.   - Transferred  for pericardiocentesis. removed serosanguinous fluid  -  On arrival to CICU patient awake and verbal A&OX3  - overnight drained 500 cc  - repeat TTE  - follow up on fluid studies  - pt is not up to date with his preventative malignant work up  - h/o prostate cancer, chronic smoker, etc    Patient is critically ill, requiring critical care services. Despite the current condition, the patient is at a high risk of clinical and hemodynamic demise 78yo old  Male with PMH of HTN, HLD,  BPH, CKD ( creat 1.42 today), chronic angelita hydronephrosis ( x2 years), prostate ca ( radiation 10 years ago), seizure disorder ( last seizure 2015), ETOH use ( sober since 2016), hypothyroidism, and mild dementia; presents to [facility] on 9/3/2022 with chief complaint of SOB and CP x3 days.  Pt has been a heavy smoker his whole life. Denies fever, chills, cough, or congestion. No sick contacts.      Admitted to tele at [facility] for likely COPD exacerbation and CP.  CXR is clear. Started on Duonebs, Symbicort and Solumedrol. No prior cardiac stents. EKG on arrival to ED notes PRWP V 1-3, no ST changes. Trop high on arrival at 268 trending down 180.8 today, proBNP 763, CK 1090. TSH 69.600 ( stopped synthroid a few weeks ago), T3 <20 T4 0.9, Na 126.    CXR revealing New cardiomegaly and CT chest with Large pericardial effusion, Echo today with large pericardial effusion with early tamponade physiology.   - Transferred  for pericardiocentesis. removed serosanguinous fluid  -  On arrival to CICU patient awake and verbal A&OX3  - overnight drained 500 cc  - repeat TTE  - follow up on fluid studies  - pt is not up to date with his preventative malignant work up  - h/o prostate cancer, chronic smoker, etc    Patient is critically ill, requiring critical care services. Despite the current condition, the patient is at a high risk of clinical and hemodynamic demise

## 2022-09-08 NOTE — PHYSICAL THERAPY INITIAL EVALUATION ADULT - ADDITIONAL COMMENTS
pt lives in private home, 2 steps to enter, first floor set up. Pt uses a straight cane for ambulation. Pt endorses HHA, hours unclear.

## 2022-09-08 NOTE — PHYSICAL THERAPY INITIAL EVALUATION ADULT - GAIT DEVIATIONS NOTED, PT EVAL
decreased belinda/increased time in double stance/decreased velocity of limb motion/decreased stride length/decreased weight-shifting ability

## 2022-09-08 NOTE — PHYSICAL THERAPY INITIAL EVALUATION ADULT - NSPTDMEREC_GEN_A_CORE
Alert-The patient is alert, awake and responds to voice. The patient is oriented to time, place, and person. The triage nurse is able to obtain subjective information. TBD

## 2022-09-08 NOTE — CHART NOTE - NSCHARTNOTEFT_GEN_A_CORE
Endocrine consulted for hypothyroidism. Chart note placed yesterday. Agree with continuation of 112 mcg daily for likely nonadherence given TFT pattern. Repeat TFTs in 6-8 weeks.       Hosea Covarrubias D.O  [phone]

## 2022-09-09 DIAGNOSIS — N18.30 CHRONIC KIDNEY DISEASE, STAGE 3 UNSPECIFIED: ICD-10-CM

## 2022-09-09 DIAGNOSIS — E87.1 HYPO-OSMOLALITY AND HYPONATREMIA: ICD-10-CM

## 2022-09-09 LAB
ALBUMIN SERPL ELPH-MCNC: 2.9 G/DL — LOW (ref 3.3–5)
ALP SERPL-CCNC: 61 U/L — SIGNIFICANT CHANGE UP (ref 40–120)
ALT FLD-CCNC: 22 U/L — SIGNIFICANT CHANGE UP (ref 10–45)
ANION GAP SERPL CALC-SCNC: 8 MMOL/L — SIGNIFICANT CHANGE UP (ref 5–17)
APPEARANCE UR: CLEAR — SIGNIFICANT CHANGE UP
APTT BLD: 27.5 SEC — SIGNIFICANT CHANGE UP (ref 27.5–35.5)
AST SERPL-CCNC: 28 U/L — SIGNIFICANT CHANGE UP (ref 10–40)
BACTERIA # UR AUTO: NEGATIVE — SIGNIFICANT CHANGE UP
BASOPHILS # BLD AUTO: 0.01 K/UL — SIGNIFICANT CHANGE UP (ref 0–0.2)
BASOPHILS NFR BLD AUTO: 0.1 % — SIGNIFICANT CHANGE UP (ref 0–2)
BILIRUB SERPL-MCNC: 0.7 MG/DL — SIGNIFICANT CHANGE UP (ref 0.2–1.2)
BILIRUB UR-MCNC: NEGATIVE — SIGNIFICANT CHANGE UP
BUN SERPL-MCNC: 21 MG/DL — SIGNIFICANT CHANGE UP (ref 7–23)
CALCIUM SERPL-MCNC: 8.2 MG/DL — LOW (ref 8.4–10.5)
CHLORIDE SERPL-SCNC: 93 MMOL/L — LOW (ref 96–108)
CO2 SERPL-SCNC: 23 MMOL/L — SIGNIFICANT CHANGE UP (ref 22–31)
COLOR SPEC: YELLOW — SIGNIFICANT CHANGE UP
COMMENT - URINE: SIGNIFICANT CHANGE UP
CREAT SERPL-MCNC: 1.5 MG/DL — HIGH (ref 0.5–1.3)
DIFF PNL FLD: NEGATIVE — SIGNIFICANT CHANGE UP
EGFR: 47 ML/MIN/1.73M2 — LOW
EOSINOPHIL # BLD AUTO: 0.03 K/UL — SIGNIFICANT CHANGE UP (ref 0–0.5)
EOSINOPHIL NFR BLD AUTO: 0.3 % — SIGNIFICANT CHANGE UP (ref 0–6)
EPI CELLS # UR: 4 /HPF — SIGNIFICANT CHANGE UP
GLUCOSE SERPL-MCNC: 102 MG/DL — HIGH (ref 70–99)
GLUCOSE UR QL: NEGATIVE — SIGNIFICANT CHANGE UP
HCT VFR BLD CALC: 38.5 % — LOW (ref 39–50)
HGB BLD-MCNC: 12.9 G/DL — LOW (ref 13–17)
HYALINE CASTS # UR AUTO: 1 /LPF — SIGNIFICANT CHANGE UP (ref 0–2)
IMM GRANULOCYTES NFR BLD AUTO: 0.8 % — SIGNIFICANT CHANGE UP (ref 0–1.5)
INR BLD: 1.04 RATIO — SIGNIFICANT CHANGE UP (ref 0.88–1.16)
KETONES UR-MCNC: SIGNIFICANT CHANGE UP
LEUKOCYTE ESTERASE UR-ACNC: ABNORMAL
LYMPHOCYTES # BLD AUTO: 0.88 K/UL — LOW (ref 1–3.3)
LYMPHOCYTES # BLD AUTO: 7.5 % — LOW (ref 13–44)
MAGNESIUM SERPL-MCNC: 2.3 MG/DL — SIGNIFICANT CHANGE UP (ref 1.6–2.6)
MCHC RBC-ENTMCNC: 30.4 PG — SIGNIFICANT CHANGE UP (ref 27–34)
MCHC RBC-ENTMCNC: 33.5 GM/DL — SIGNIFICANT CHANGE UP (ref 32–36)
MCV RBC AUTO: 90.8 FL — SIGNIFICANT CHANGE UP (ref 80–100)
MONOCYTES # BLD AUTO: 0.8 K/UL — SIGNIFICANT CHANGE UP (ref 0–0.9)
MONOCYTES NFR BLD AUTO: 6.8 % — SIGNIFICANT CHANGE UP (ref 2–14)
NEUTROPHILS # BLD AUTO: 9.9 K/UL — HIGH (ref 1.8–7.4)
NEUTROPHILS NFR BLD AUTO: 84.5 % — HIGH (ref 43–77)
NITRITE UR-MCNC: NEGATIVE — SIGNIFICANT CHANGE UP
NRBC # BLD: 0 /100 WBCS — SIGNIFICANT CHANGE UP (ref 0–0)
PH UR: 6 — SIGNIFICANT CHANGE UP (ref 5–8)
PHOSPHATE SERPL-MCNC: 2.5 MG/DL — SIGNIFICANT CHANGE UP (ref 2.5–4.5)
PLATELET # BLD AUTO: 210 K/UL — SIGNIFICANT CHANGE UP (ref 150–400)
POTASSIUM SERPL-MCNC: 4.5 MMOL/L — SIGNIFICANT CHANGE UP (ref 3.5–5.3)
POTASSIUM SERPL-SCNC: 4.5 MMOL/L — SIGNIFICANT CHANGE UP (ref 3.5–5.3)
PROT SERPL-MCNC: 5.6 G/DL — LOW (ref 6–8.3)
PROT UR-MCNC: ABNORMAL
PROTHROM AB SERPL-ACNC: 12 SEC — SIGNIFICANT CHANGE UP (ref 10.5–13.4)
RBC # BLD: 4.24 M/UL — SIGNIFICANT CHANGE UP (ref 4.2–5.8)
RBC # FLD: 17.8 % — HIGH (ref 10.3–14.5)
RBC CASTS # UR COMP ASSIST: 1 /HPF — SIGNIFICANT CHANGE UP (ref 0–4)
SODIUM SERPL-SCNC: 124 MMOL/L — LOW (ref 135–145)
SP GR SPEC: 1.02 — SIGNIFICANT CHANGE UP (ref 1.01–1.02)
UROBILINOGEN FLD QL: NEGATIVE — SIGNIFICANT CHANGE UP
WBC # BLD: 11.71 K/UL — HIGH (ref 3.8–10.5)
WBC # FLD AUTO: 11.71 K/UL — HIGH (ref 3.8–10.5)
WBC UR QL: 11 /HPF — HIGH (ref 0–5)

## 2022-09-09 PROCEDURE — 93308 TTE F-UP OR LMTD: CPT | Mod: 26

## 2022-09-09 PROCEDURE — 99292 CRITICAL CARE ADDL 30 MIN: CPT

## 2022-09-09 PROCEDURE — 99222 1ST HOSP IP/OBS MODERATE 55: CPT | Mod: GC

## 2022-09-09 PROCEDURE — 93010 ELECTROCARDIOGRAM REPORT: CPT

## 2022-09-09 PROCEDURE — 93321 DOPPLER ECHO F-UP/LMTD STD: CPT | Mod: 26

## 2022-09-09 PROCEDURE — 99291 CRITICAL CARE FIRST HOUR: CPT

## 2022-09-09 PROCEDURE — 99292 CRITICAL CARE ADDL 30 MIN: CPT | Mod: 25

## 2022-09-09 RX ORDER — SIMETHICONE 80 MG/1
80 TABLET, CHEWABLE ORAL ONCE
Refills: 0 | Status: COMPLETED | OUTPATIENT
Start: 2022-09-09 | End: 2022-09-09

## 2022-09-09 RX ORDER — POLYETHYLENE GLYCOL 3350 17 G/17G
17 POWDER, FOR SOLUTION ORAL
Refills: 0 | Status: DISCONTINUED | OUTPATIENT
Start: 2022-09-09 | End: 2022-09-13

## 2022-09-09 RX ORDER — HEPARIN SODIUM 5000 [USP'U]/ML
5000 INJECTION INTRAVENOUS; SUBCUTANEOUS EVERY 8 HOURS
Refills: 0 | Status: DISCONTINUED | OUTPATIENT
Start: 2022-09-09 | End: 2022-09-13

## 2022-09-09 RX ORDER — UREA 15 G
15 POWDER IN PACKET (EA) ORAL DAILY
Refills: 0 | Status: DISCONTINUED | OUTPATIENT
Start: 2022-09-09 | End: 2022-09-10

## 2022-09-09 RX ORDER — LACTULOSE 10 G/15ML
10 SOLUTION ORAL EVERY 6 HOURS
Refills: 0 | Status: DISCONTINUED | OUTPATIENT
Start: 2022-09-09 | End: 2022-09-09

## 2022-09-09 RX ADMIN — Medication 50 MILLIGRAM(S): at 21:21

## 2022-09-09 RX ADMIN — TAMSULOSIN HYDROCHLORIDE 0.4 MILLIGRAM(S): 0.4 CAPSULE ORAL at 21:21

## 2022-09-09 RX ADMIN — Medication 5 MILLIGRAM(S): at 05:23

## 2022-09-09 RX ADMIN — Medication 1 PATCH: at 11:46

## 2022-09-09 RX ADMIN — LOSARTAN POTASSIUM 50 MILLIGRAM(S): 100 TABLET, FILM COATED ORAL at 05:23

## 2022-09-09 RX ADMIN — Medication 50 MILLIGRAM(S): at 05:23

## 2022-09-09 RX ADMIN — SIMVASTATIN 20 MILLIGRAM(S): 20 TABLET, FILM COATED ORAL at 21:21

## 2022-09-09 RX ADMIN — POLYETHYLENE GLYCOL 3350 17 GRAM(S): 17 POWDER, FOR SOLUTION ORAL at 17:35

## 2022-09-09 RX ADMIN — Medication 1 ENEMA: at 08:42

## 2022-09-09 RX ADMIN — SENNA PLUS 2 TABLET(S): 8.6 TABLET ORAL at 21:21

## 2022-09-09 RX ADMIN — Medication 1 PATCH: at 19:28

## 2022-09-09 RX ADMIN — CHLORHEXIDINE GLUCONATE 1 APPLICATION(S): 213 SOLUTION TOPICAL at 05:34

## 2022-09-09 RX ADMIN — SIMETHICONE 80 MILLIGRAM(S): 80 TABLET, CHEWABLE ORAL at 11:46

## 2022-09-09 RX ADMIN — Medication 50 MILLIGRAM(S): at 14:14

## 2022-09-09 RX ADMIN — Medication 1 PATCH: at 07:39

## 2022-09-09 RX ADMIN — HEPARIN SODIUM 5000 UNIT(S): 5000 INJECTION INTRAVENOUS; SUBCUTANEOUS at 14:14

## 2022-09-09 RX ADMIN — Medication 112 MICROGRAM(S): at 05:24

## 2022-09-09 RX ADMIN — HEPARIN SODIUM 5000 UNIT(S): 5000 INJECTION INTRAVENOUS; SUBCUTANEOUS at 21:21

## 2022-09-09 RX ADMIN — Medication 15 GRAM(S): at 18:55

## 2022-09-09 NOTE — CONSULT NOTE ADULT - PROBLEM SELECTOR RECOMMENDATION 9
Pt with euvolemic hyponatremia in the setting of pericardial effusion and significant hypothyroidism. Upon review of [facility], SNa was 143 on 3/10/22. SNa was 125 on 9/3, and has remained low but stable at 124 today. Hyponatremia likely ADH mediated and/or due to hypothyroidism. Requires further workup. Obtain UA, serum osm, urine osm, and urine sodium. Recommend water restriction to <1L/day and start urena 15 gm qd. Monitor SNa. Avoid overcorrection. Goal for SNa increase is 6-8 mEq/L within the first 24 hours and 12-14 mEq/L within the first 48 hours.

## 2022-09-09 NOTE — CONSULT NOTE ADULT - ATTENDING COMMENTS
Hyponatremia    stable for 6 days  Urine studies ordered  CKD / old age limit free water excretion  Hypothryroid  Restrict water intake  UreNa 15 g    Missael Beaver MD

## 2022-09-09 NOTE — PROGRESS NOTE ADULT - SUBJECTIVE AND OBJECTIVE BOX
AMBAR PICKARD  MRN-[id number]  Patient is a 77y old  Male who presents with a chief complaint of tamponade (08 Sep 2022 22:35)    HPI:  This is a 78yo old  Male with PMH of HTN, HLD,  BPH, CKD ( creat 1.42 today), chronic angelita hydronephrosis ( x2 years), prostate ca ( radiation 10 years ago), seizure disorder ( last seizure 2015), ETOH use ( sober since 2016), hypothyroidism, and mild demenia; presents to [facility] on 9/3/2022 with chief complaint of SOB and CP x3 days.  Pt has been a heavy smoker his whole life. Denies fever, chills, cough, or congestion. No sick contacts.    Admitted to tele for likely COPD exacerbation and CP.  CXR is clear. Started on Duonebs, Symbicort and Solumedrol. No prior cardiac stents. EKG on arrival to ED notes PRWP V 1-3, no ST changes. Trop high on arrival at 268 trending down 180.8 today, proBNP 763, CK 1090. TSH 69.600 ( stopped synthroid a few weeks ago), T3 <20 T4 0.9, Na 126.  CXR revealing New cardiomegaly and CT chest with Large pericardial effusion, Echo today with large pericardial effusion with early tamponade physiology. Transferred to [facility] for pericardiocentesis. On arrival to [facility] patient awake and verbal A&OX3, denies any chest pain, dyspnea, dizziness, palpitations, N&V, Ha, orthopnea, LE edema.  VS WNL, on 2 L nasal cannula in no respiratory distress.      recs from [facility]:  home with home PT          INTERPRETATION:  CLINICAL STATEMENT: Chest Pain. Shortness of breath  TECHNIQUE: AP view of the chest.  COMPARISON: 7/19/2020    New cardiomegaly, may be exaggerated by AP technique. Suboptimal degree   of inspiration. Small left pleural effusion.    Degenerative changes of the spine, shoulders and AC joints.    IMPRESSION:    New cardiomegaly, may be exaggerated by AP technique.    Small left pleural effusion.    ECHO  Summary:   1. Left ventricular ejection fraction, by visual estimation, is 50 to   55%.   2. Mildly enlarged left atrium.   3. Maximum thickness of effusion is 2-2.5 cm.   4. Mild mitral annular calcification.   5. Mild thickening of the anterior and posterior mitral valve leaflets.   6. Trace mitral valve regurgitation.   7. Mild tricuspid regurgitation.   8. Estimated pulmonary artery systolic pressure is 37.4 mmHg assuming a   right atrial pressure of 5 mmHg, which is consistent with mild pulmonary   hypertension.   9. Findings are consistent with mild cardiac tamponade.      CT CHEST                          PROCEDURE DATE:  09/06/2022      INTERPRETATION:  HISTORY: Dyspnea on exertion. Smoker.    EXAMINATION: CT CHEST was performed without IV contrast.    COMPARISON: No CT chest comparison. Correlation with 1/9/2018 CT abdomen.    FINDINGS:    AIRWAYS, LUNGS, PLEURA: Trachea and mainstem bronchi patent. Emphysema.   Posterior left upper lobe and left basilar atelectasis. Small bilateral   pleural effusions.    MEDIASTINUM: Large circumferential pericardial effusion; depth of   pericardial fluid is 34 mm at the level of the basal inferior wall of the   left ventricle (sagittal series image 78). There is flattening of the   free wall of the right ventricle. Coronary atherosclerosis.    IMAGED ABDOMEN: Bilateral hydronephrosis. Cholelithiasis.    SOFT TISSUES: Bilateral gynecomastia.    BONES: Degenerative changes of the spine. Loss of height of L3 vertebral   body similar to 1/9/2018 CT abdomen.    IMPRESSION:.    Large pericardial effusion.    Small bilateral pleural effusions. (07 Sep 2022 13:09)      Hospital course:    Today:    Physical Exam:  Vital Signs Last 24 Hrs  T(C): 36.4 (09 Sep 2022 05:00), Max: 36.7 (08 Sep 2022 16:00)  T(F): 97.6 (09 Sep 2022 05:00), Max: 98 (08 Sep 2022 16:00)  HR: 64 (09 Sep 2022 05:00) (55 - 73)  BP: 147/61 (09 Sep 2022 05:00) (91/63 - 147/61)  BP(mean): 87 (09 Sep 2022 05:00) (69 - 102)  RR: 15 (09 Sep 2022 05:00) (11 - 22)  SpO2: 96% (09 Sep 2022 05:00) (94% - 100%)    Parameters below as of 09 Sep 2022 00:00  Patient On (Oxygen Delivery Method): room air        PHYSICAL EXAM:  Constitutional: Patient laying in bed, NAD  Head: Atraumatic, normocephalic  Eyes: No scleral icterus. PERRLA. EOMI  ENMT: Moist mucous membrane. Uvula midline  Neck: Supple, No JVD  Respiratory: CTA B/L. No wheezes, rales or rhonchi   Cardiovascular: S1/S2. No murmurs, rubs, or gallops.  Gastrointestinal: protuberant BSx4, soft, nontender.  Extremities: Moves all extremities. Warm, no edema, nontender  Vascular: 2+ DP/PT pulses B/L   Neurological: A&Ox3. Follows commands. No focal deficits.   Skin: No rashes on exposed skin     ============================I/O===========================   I&O's Detail    08 Sep 2022 07:01  -  09 Sep 2022 07:00  --------------------------------------------------------  IN:    Oral Fluid: 660 mL  Total IN: 660 mL    OUT:    Drain (mL): 50 mL    Incontinent per Condom Catheter (mL): 150 mL    Voided (mL): 250 mL  Total OUT: 450 mL    Total NET: 210 mL        ============================ LABS =========================                        12.9   11.71 )-----------( 210      ( 09 Sep 2022 04:09 )             38.5     09-09    124<L>  |  93<L>  |  21  ----------------------------<  102<H>  4.5   |  23  |  1.50<H>    Ca    8.2<L>      09 Sep 2022 04:09  Phos  2.5     09-09  Mg     2.3     09-09    TPro  5.6<L>  /  Alb  2.9<L>  /  TBili  0.7  /  DBili  x   /  AST  28  /  ALT  22  /  AlkPhos  61  09-09    LIVER FUNCTIONS - ( 09 Sep 2022 04:09 )  Alb: 2.9 g/dL / Pro: 5.6 g/dL / ALK PHOS: 61 U/L / ALT: 22 U/L / AST: 28 U/L / GGT: x           PT/INR - ( 09 Sep 2022 04:09 )   PT: 12.0 sec;   INR: 1.04 ratio         PTT - ( 09 Sep 2022 04:09 )  PTT:27.5 sec      ======================Micro/Rad/Cardio=================  Culture: Reviewed   CXR: Reviewed  Echo:Reviewed  ======================================================  PAST MEDICAL & SURGICAL HISTORY:  ETOH abuse  sober since 2016      Seizure disorder  last seizure 2015      BPH (benign prostatic hyperplasia)      HTN (hypertension)      Prostate CA  Dx&#x27;d 2008, s/p radiation      Hyperlipidemia      Poor historian      S/P hernia repair  right inguinal hernia      H/O prostate cancer  10- yrs ago, s/p radiation therapy      Status post cataract extraction      H/O urethrotomy  June 2018        ====================ASSESSMENT ==============    77M with PMH of HTN, HLD,  BPH, CKD ( creat 1.42 today), chronic angelita hydronephrosis ( x2 years), prostate ca ( radiation 10 years ago), seizure disorder ( last seizure 2015), ETOH use ( sober since 2016), hypothyroidism, and mild demenia p/w CP and SOB found with large pericardial effusion s/p pericardiocentesis.     ====================Plan ==============  #Neuro   A&Ox3  - continue to monitor mental status as per protocol    Seizure disorder  - No seziure meds reported. Waiting upon sister to confirm no additional medication bottles at home not reported earlier during med rec. (left two voicemail messages for sister with no return of phone call)    #Resp  Comfortable on RA  - SOB on admission likely 2/2 pericardial tamponade. Patient feels improvement s/p drain placement   - Monitor SpO2 with goal >94%  - 7/6 CT chest: Large pericardial effusion. Small bilateral pleural effusions. Bilateral hydronephrosis.    COPD  - duonebs prn   - started on levofloxacin outpatient for concern of COPD exacerbation/PNA. Will d/c for now. Likely sob related to pericardial effusion (states SOB improved s/p drain placement)   - provided smoking cessation education. Start nicotine patch while inpatient (states quit 1 week prior to admission, smokes 4-5 cig/daily)     #CV  Cardiac Tamponade  - 9/7 TTE: EF 70%. Mod concentric LVH. Normal RV size and function. Large pericardial effusion with evidence of echo tamponade physiology.   - s/p pericardiocentesis with drain placement, 1060mL out serosanguinous.   - as per lights criteria- meets criteria of exudative fluid. f/u pericardial fluid studies including cytology. Gram stain negative for organism   - Patient remained HD stable at [place] prior to transfer (as per documentation: HRs 60-70s; SBPs 150-170s, Lying flat off with O2 sats 99%). Continue to monitor vital signs   - trop I downtrending at OSH prior to transfer  - monitor drain output. Daily TTE while in place  - Cancer screening: Last colonoscopy >10 years ago (states went last year for colonoscopy but procedure aborted 2/2 HTN outpatient and never rescheduled). Remote history of Prostate CA, follows with urologist but unsure of name. waiting for patient sister to provide collateral info     HTN  - on home losartan 100mg and hydral 50mg.  - reinitiated on home meds yesterday as hypertensive upon presentation to CICU. This morning patient with softer BP (asymptomatic). Will decrease losartan dose 100mg-> 50mg     HLD  - c/w home simvastatin     #GI  - start diet as tolerated  - monitor for regular BM while inpatient. Last BM 5 days ago  - c/w miralax, senna, dulcolax   - Will start laculose q6hrs until BM     #  BPH  - continue home tamsulosin     Remote Hx Prostate Ca  - f.u with urology, patient unsure of name. will f/u with sister for collateral info.   - states in remission for >20 years     Hyponatremia  - Na on presentation 125, remains at 125 today. Will send urine lytes/workup   - Patient mentating appropriately   - Euvolemic appearing on exam. Could be 2/2 hypothyroidism     CKD  - Baseline SCr looks to be around 1.5-1.7 base upon prior SCr trend in 2020. SCr this AM 1.42  - monitor and trend SCr, BUN, and lytes  - replete lytes prn with goal K 4-4.5 and Mg >2    Hydronephrosis on CT 9/6  - renal US: Mild right hydronephrosis, improved since 9/6/2022. Poor visualization of the left kidney. No gross left hydronephrosis, which has improved or resolved since 9/6/2022. Urinary bladder volume of 136 mL. The patient had no urge to void during the exam.  - Urinating currently with bladder scan of 120mL. Also with b/l hydronephrosis back in 2020 with history of chronic hydronephrosis. SCr at baseline. Will also get collateral info from outpatient urologist once patient sister updates with info (left two voicemail messages for sister with no return of phone call)    #Endo  - hgb a1c 5.7    Hypothyroidism   - TSH elevated with low T3 and T4  - will consult endo for recs  - on home synthroid 112 mcg, may have missed a few doses (waiting upon refill). As per endo- resume home dose of 112mcg and repeat free T4 in 5-7 days.     #Heme  Normocytic anemia  - continue to monitor and trend CBC.   - Likely 2/2 anemia of chronic disease 2/2 CKD. If downtrends, can consider anemia workup     #ID  No leukocytosis, afebrile  - continue to monitor and trend wbc and temp curve          Patient requires continuous monitoring with bedside rhythm monitoring, arterial line, pulse oximetry, ventilator monitoring and intermittent blood gas analysis.  Care plan discussed with ICU care team.  Patient remained critical; required more than usual post op care; I have spent 35 minutes providing non-routine post op care, in addition to initial critical time provided by CICU attending Dr. Irvin, re-evaluated multiple times during the day.         AMBAR PICKARD  MRN-[id number]  Patient is a 77y old  Male who presents with a chief complaint of tamponade (08 Sep 2022 22:35)    HPI:  This is a 76yo old  Male with PMH of HTN, HLD,  BPH, CKD ( creat 1.42 today), chronic angelita hydronephrosis ( x2 years), prostate ca ( radiation 10 years ago), seizure disorder ( last seizure 2015), ETOH use ( sober since 2016), hypothyroidism, and mild demenia; presents to [facility] on 9/3/2022 with chief complaint of SOB and CP x3 days.  Pt has been a heavy smoker his whole life. Denies fever, chills, cough, or congestion. No sick contacts.    Admitted to tele for likely COPD exacerbation and CP.  CXR is clear. Started on Duonebs, Symbicort and Solumedrol. No prior cardiac stents. EKG on arrival to ED notes PRWP V 1-3, no ST changes. Trop high on arrival at 268 trending down 180.8 today, proBNP 763, CK 1090. TSH 69.600 ( stopped synthroid a few weeks ago), T3 <20 T4 0.9, Na 126.  CXR revealing New cardiomegaly and CT chest with Large pericardial effusion, Echo today with large pericardial effusion with early tamponade physiology. Transferred to [facility] for pericardiocentesis. On arrival to [facility] patient awake and verbal A&OX3, denies any chest pain, dyspnea, dizziness, palpitations, N&V, Ha, orthopnea, LE edema.  VS WNL, on 2 L nasal cannula in no respiratory distress.      recs from [facility]:  home with home PT          INTERPRETATION:  CLINICAL STATEMENT: Chest Pain. Shortness of breath  TECHNIQUE: AP view of the chest.  COMPARISON: 7/19/2020    New cardiomegaly, may be exaggerated by AP technique. Suboptimal degree   of inspiration. Small left pleural effusion.    Degenerative changes of the spine, shoulders and AC joints.    IMPRESSION:    New cardiomegaly, may be exaggerated by AP technique.    Small left pleural effusion.    ECHO  Summary:   1. Left ventricular ejection fraction, by visual estimation, is 50 to   55%.   2. Mildly enlarged left atrium.   3. Maximum thickness of effusion is 2-2.5 cm.   4. Mild mitral annular calcification.   5. Mild thickening of the anterior and posterior mitral valve leaflets.   6. Trace mitral valve regurgitation.   7. Mild tricuspid regurgitation.   8. Estimated pulmonary artery systolic pressure is 37.4 mmHg assuming a   right atrial pressure of 5 mmHg, which is consistent with mild pulmonary   hypertension.   9. Findings are consistent with mild cardiac tamponade.      CT CHEST                          PROCEDURE DATE:  09/06/2022      INTERPRETATION:  HISTORY: Dyspnea on exertion. Smoker.    EXAMINATION: CT CHEST was performed without IV contrast.    COMPARISON: No CT chest comparison. Correlation with 1/9/2018 CT abdomen.    FINDINGS:    AIRWAYS, LUNGS, PLEURA: Trachea and mainstem bronchi patent. Emphysema.   Posterior left upper lobe and left basilar atelectasis. Small bilateral   pleural effusions.    MEDIASTINUM: Large circumferential pericardial effusion; depth of   pericardial fluid is 34 mm at the level of the basal inferior wall of the   left ventricle (sagittal series image 78). There is flattening of the   free wall of the right ventricle. Coronary atherosclerosis.    IMAGED ABDOMEN: Bilateral hydronephrosis. Cholelithiasis.    SOFT TISSUES: Bilateral gynecomastia.    BONES: Degenerative changes of the spine. Loss of height of L3 vertebral   body similar to 1/9/2018 CT abdomen.    IMPRESSION:.    Large pericardial effusion.    Small bilateral pleural effusions. (07 Sep 2022 13:09)      Hospital course:    Today:    Physical Exam:  Vital Signs Last 24 Hrs  T(C): 36.4 (09 Sep 2022 05:00), Max: 36.7 (08 Sep 2022 16:00)  T(F): 97.6 (09 Sep 2022 05:00), Max: 98 (08 Sep 2022 16:00)  HR: 64 (09 Sep 2022 05:00) (55 - 73)  BP: 147/61 (09 Sep 2022 05:00) (91/63 - 147/61)  BP(mean): 87 (09 Sep 2022 05:00) (69 - 102)  RR: 15 (09 Sep 2022 05:00) (11 - 22)  SpO2: 96% (09 Sep 2022 05:00) (94% - 100%)    Parameters below as of 09 Sep 2022 00:00  Patient On (Oxygen Delivery Method): room air        PHYSICAL EXAM:  Constitutional: Patient laying in bed, NAD  Head: Atraumatic, normocephalic  Eyes: No scleral icterus. PERRLA. EOMI  ENMT: Moist mucous membrane. Uvula midline  Neck: Supple, No JVD  Respiratory: CTA B/L, intermittent b/l expiratory wheezes. No rales or rhonchi   Cardiovascular: S1/S2. No murmurs, rubs, or gallops.  Gastrointestinal: protuberant BSx4, soft, nontender.  Extremities: Moves all extremities. Warm, no edema, nontender  Vascular: 2+ DP/PT pulses B/L   Neurological: A&Ox3. Follows commands. No focal deficits.   Skin: No rashes on exposed skin     ============================I/O===========================   I&O's Detail    08 Sep 2022 07:01  -  09 Sep 2022 07:00  --------------------------------------------------------  IN:    Oral Fluid: 660 mL  Total IN: 660 mL    OUT:    Drain (mL): 50 mL    Incontinent per Condom Catheter (mL): 150 mL    Voided (mL): 250 mL  Total OUT: 450 mL    Total NET: 210 mL        ============================ LABS =========================                        12.9   11.71 )-----------( 210      ( 09 Sep 2022 04:09 )             38.5     09-09    124<L>  |  93<L>  |  21  ----------------------------<  102<H>  4.5   |  23  |  1.50<H>    Ca    8.2<L>      09 Sep 2022 04:09  Phos  2.5     09-09  Mg     2.3     09-09    TPro  5.6<L>  /  Alb  2.9<L>  /  TBili  0.7  /  DBili  x   /  AST  28  /  ALT  22  /  AlkPhos  61  09-09    LIVER FUNCTIONS - ( 09 Sep 2022 04:09 )  Alb: 2.9 g/dL / Pro: 5.6 g/dL / ALK PHOS: 61 U/L / ALT: 22 U/L / AST: 28 U/L / GGT: x           PT/INR - ( 09 Sep 2022 04:09 )   PT: 12.0 sec;   INR: 1.04 ratio         PTT - ( 09 Sep 2022 04:09 )  PTT:27.5 sec      ======================Micro/Rad/Cardio=================  Culture: Reviewed   CXR: Reviewed  Echo:Reviewed  ======================================================  PAST MEDICAL & SURGICAL HISTORY:  ETOH abuse  sober since 2016      Seizure disorder  last seizure 2015      BPH (benign prostatic hyperplasia)      HTN (hypertension)      Prostate CA  Dx&#x27;d 2008, s/p radiation      Hyperlipidemia      Poor historian      S/P hernia repair  right inguinal hernia      H/O prostate cancer  10- yrs ago, s/p radiation therapy      Status post cataract extraction      H/O urethrotomy  June 2018        ====================ASSESSMENT ==============    77M with PMH of HTN, HLD,  BPH, CKD ( creat 1.42 today), chronic angelita hydronephrosis ( x2 years), prostate ca ( radiation 10 years ago), seizure disorder ( last seizure 2015), ETOH use ( sober since 2016), hypothyroidism, and mild demenia p/w CP and SOB found with large pericardial effusion s/p pericardiocentesis.     ====================Plan ==============  #Neuro   A&Ox3  - continue to monitor mental status as per protocol    Seizure disorder  - No seziure meds reported. Waiting upon sister to confirm no additional medication bottles at home not reported earlier during med rec. (left two voicemail messages for sister with no return of phone call)    #Resp  Comfortable on RA  - SOB on admission likely 2/2 pericardial tamponade. Patient feels improvement s/p drain placement   - Monitor SpO2 with goal >94%  - 7/6 CT chest: Large pericardial effusion. Small bilateral pleural effusions. Bilateral hydronephrosis.    COPD  - duonebs prn   - started on levofloxacin outpatient for concern of COPD exacerbation/PNA. Will d/c for now. Likely sob related to pericardial effusion (states SOB improved s/p drain placement)   - provided smoking cessation education. Start nicotine patch while inpatient (states quit 1 week prior to admission, smokes 4-5 cig/daily)     #CV  Cardiac Tamponade  - 9/7 TTE: EF 70%. Mod concentric LVH. Normal RV size and function. Large pericardial effusion with evidence of echo tamponade physiology.   - s/p pericardiocentesis with drain placement, 1060mL out serosanguinous.   - as per lights criteria- meets criteria of exudative fluid. f/u pericardial fluid studies including cytology. Gram stain negative for organism   - Patient remained HD stable at [place] prior to transfer (as per documentation: HRs 60-70s; SBPs 150-170s, Lying flat off with O2 sats 99%). Continue to monitor vital signs   - trop I downtrending at OSH prior to transfer  - monitor drain output. Daily TTE while in place  - Cancer screening: Last colonoscopy >10 years ago (states went last year for colonoscopy but procedure aborted 2/2 HTN outpatient and never rescheduled). Remote history of Prostate CA, follows with urologist but unsure of name. waiting for patient sister to provide collateral info     HTN  - on home losartan 100mg and hydral 50mg.  - reinitiated on home meds yesterday as hypertensive upon presentation to CICU. This morning patient with softer BP (asymptomatic). Will decrease losartan dose 100mg-> 50mg     HLD  - c/w home simvastatin     #GI  - start diet as tolerated  - monitor for regular BM while inpatient. Last BM 5 days ago  - c/w miralax, senna, dulcolax   - Will start laculose q6hrs until BM     #  BPH  - continue home tamsulosin     Remote Hx Prostate Ca  - f.u with urology, patient unsure of name. will f/u with sister for collateral info.   - states in remission for >20 years     Hyponatremia  - Na on presentation 125, remains at 125 today. Will send urine lytes/workup   - Patient mentating appropriately   - Euvolemic appearing on exam. Could be 2/2 hypothyroidism     CKD  - Baseline SCr looks to be around 1.5-1.7 base upon prior SCr trend in 2020. SCr this AM 1.42  - monitor and trend SCr, BUN, and lytes  - replete lytes prn with goal K 4-4.5 and Mg >2    Hydronephrosis on CT 9/6  - renal US: Mild right hydronephrosis, improved since 9/6/2022. Poor visualization of the left kidney. No gross left hydronephrosis, which has improved or resolved since 9/6/2022. Urinary bladder volume of 136 mL. The patient had no urge to void during the exam.  - Urinating currently with bladder scan of 120mL. Also with b/l hydronephrosis back in 2020 with history of chronic hydronephrosis. SCr at baseline. Will also get collateral info from outpatient urologist once patient sister updates with info (left two voicemail messages for sister with no return of phone call)    #Endo  - hgb a1c 5.7    Hypothyroidism   - TSH elevated with low T3 and T4  - will consult endo for recs  - on home synthroid 112 mcg, may have missed a few doses (waiting upon refill). As per endo- resume home dose of 112mcg and repeat free T4 in 5-7 days.     #Heme  Normocytic anemia  - continue to monitor and trend CBC.   - Likely 2/2 anemia of chronic disease 2/2 CKD. If downtrends, can consider anemia workup     #ID  No leukocytosis, afebrile  - continue to monitor and trend wbc and temp curve          Patient requires continuous monitoring with bedside rhythm monitoring, arterial line, pulse oximetry, ventilator monitoring and intermittent blood gas analysis.  Care plan discussed with ICU care team.  Patient remained critical; required more than usual post op care; I have spent 35 minutes providing non-routine post op care, in addition to initial critical time provided by CICU attending Dr. Irvin, re-evaluated multiple times during the day.         AMBAR PICKARD  MRN-[id number]  Patient is a 77y old  Male who presents with a chief complaint of tamponade (08 Sep 2022 22:35)    HPI:  This is a 76yo old  Male with PMH of HTN, HLD,  BPH, CKD ( creat 1.42 today), chronic angelita hydronephrosis ( x2 years), prostate ca ( radiation 10 years ago), seizure disorder ( last seizure 2015), ETOH use ( sober since 2016), hypothyroidism, and mild demenia; presents to [facility] on 9/3/2022 with chief complaint of SOB and CP x3 days.  Pt has been a heavy smoker his whole life. Denies fever, chills, cough, or congestion. No sick contacts.    Admitted to tele for likely COPD exacerbation and CP.  CXR is clear. Started on Duonebs, Symbicort and Solumedrol. No prior cardiac stents. EKG on arrival to ED notes PRWP V 1-3, no ST changes. Trop high on arrival at 268 trending down 180.8 today, proBNP 763, CK 1090. TSH 69.600 ( stopped synthroid a few weeks ago), T3 <20 T4 0.9, Na 126.  CXR revealing New cardiomegaly and CT chest with Large pericardial effusion, Echo today with large pericardial effusion with early tamponade physiology. Transferred to [facility] for pericardiocentesis. On arrival to [facility] patient awake and verbal A&OX3, denies any chest pain, dyspnea, dizziness, palpitations, N&V, Ha, orthopnea, LE edema.  VS WNL, on 2 L nasal cannula in no respiratory distress.      recs from [facility]:  home with home PT          INTERPRETATION:  CLINICAL STATEMENT: Chest Pain. Shortness of breath  TECHNIQUE: AP view of the chest.  COMPARISON: 7/19/2020    New cardiomegaly, may be exaggerated by AP technique. Suboptimal degree   of inspiration. Small left pleural effusion.    Degenerative changes of the spine, shoulders and AC joints.    IMPRESSION:    New cardiomegaly, may be exaggerated by AP technique.    Small left pleural effusion.    ECHO  Summary:   1. Left ventricular ejection fraction, by visual estimation, is 50 to   55%.   2. Mildly enlarged left atrium.   3. Maximum thickness of effusion is 2-2.5 cm.   4. Mild mitral annular calcification.   5. Mild thickening of the anterior and posterior mitral valve leaflets.   6. Trace mitral valve regurgitation.   7. Mild tricuspid regurgitation.   8. Estimated pulmonary artery systolic pressure is 37.4 mmHg assuming a   right atrial pressure of 5 mmHg, which is consistent with mild pulmonary   hypertension.   9. Findings are consistent with mild cardiac tamponade.      CT CHEST                          PROCEDURE DATE:  09/06/2022      INTERPRETATION:  HISTORY: Dyspnea on exertion. Smoker.    EXAMINATION: CT CHEST was performed without IV contrast.    COMPARISON: No CT chest comparison. Correlation with 1/9/2018 CT abdomen.    FINDINGS:    AIRWAYS, LUNGS, PLEURA: Trachea and mainstem bronchi patent. Emphysema.   Posterior left upper lobe and left basilar atelectasis. Small bilateral   pleural effusions.    MEDIASTINUM: Large circumferential pericardial effusion; depth of   pericardial fluid is 34 mm at the level of the basal inferior wall of the   left ventricle (sagittal series image 78). There is flattening of the   free wall of the right ventricle. Coronary atherosclerosis.    IMAGED ABDOMEN: Bilateral hydronephrosis. Cholelithiasis.    SOFT TISSUES: Bilateral gynecomastia.    BONES: Degenerative changes of the spine. Loss of height of L3 vertebral   body similar to 1/9/2018 CT abdomen.    IMPRESSION:.    Large pericardial effusion.    Small bilateral pleural effusions. (07 Sep 2022 13:09)      Hospital course:    Today:    Physical Exam:  Vital Signs Last 24 Hrs  T(C): 36.4 (09 Sep 2022 05:00), Max: 36.7 (08 Sep 2022 16:00)  T(F): 97.6 (09 Sep 2022 05:00), Max: 98 (08 Sep 2022 16:00)  HR: 64 (09 Sep 2022 05:00) (55 - 73)  BP: 147/61 (09 Sep 2022 05:00) (91/63 - 147/61)  BP(mean): 87 (09 Sep 2022 05:00) (69 - 102)  RR: 15 (09 Sep 2022 05:00) (11 - 22)  SpO2: 96% (09 Sep 2022 05:00) (94% - 100%)    Parameters below as of 09 Sep 2022 00:00  Patient On (Oxygen Delivery Method): room air        PHYSICAL EXAM:  Constitutional: Patient laying in bed, NAD  Head: Atraumatic, normocephalic  Eyes: No scleral icterus. PERRLA. EOMI  ENMT: Moist mucous membrane. Uvula midline  Neck: Supple, No JVD  Respiratory: CTA B/L, intermittent b/l expiratory wheezes. No rales or rhonchi   Cardiovascular: S1/S2. No murmurs, rubs, or gallops.  Gastrointestinal: protuberant BSx4, soft, nontender.  Extremities: Moves all extremities. Warm, no edema, nontender  Vascular: 2+ DP/PT pulses B/L   Neurological: A&Ox3. Follows commands. No focal deficits.   Skin: No rashes on exposed skin     ============================I/O===========================   I&O's Detail    08 Sep 2022 07:01  -  09 Sep 2022 07:00  --------------------------------------------------------  IN:    Oral Fluid: 660 mL  Total IN: 660 mL    OUT:    Drain (mL): 50 mL    Incontinent per Condom Catheter (mL): 150 mL    Voided (mL): 250 mL  Total OUT: 450 mL    Total NET: 210 mL        ============================ LABS =========================                        12.9   11.71 )-----------( 210      ( 09 Sep 2022 04:09 )             38.5     09-09    124<L>  |  93<L>  |  21  ----------------------------<  102<H>  4.5   |  23  |  1.50<H>    Ca    8.2<L>      09 Sep 2022 04:09  Phos  2.5     09-09  Mg     2.3     09-09    TPro  5.6<L>  /  Alb  2.9<L>  /  TBili  0.7  /  DBili  x   /  AST  28  /  ALT  22  /  AlkPhos  61  09-09    LIVER FUNCTIONS - ( 09 Sep 2022 04:09 )  Alb: 2.9 g/dL / Pro: 5.6 g/dL / ALK PHOS: 61 U/L / ALT: 22 U/L / AST: 28 U/L / GGT: x           PT/INR - ( 09 Sep 2022 04:09 )   PT: 12.0 sec;   INR: 1.04 ratio         PTT - ( 09 Sep 2022 04:09 )  PTT:27.5 sec      ======================Micro/Rad/Cardio=================  Culture: Reviewed   CXR: Reviewed  Echo:Reviewed  ======================================================  PAST MEDICAL & SURGICAL HISTORY:  ETOH abuse  sober since 2016      Seizure disorder  last seizure 2015      BPH (benign prostatic hyperplasia)      HTN (hypertension)      Prostate CA  Dx&#x27;d 2008, s/p radiation      Hyperlipidemia      Poor historian      S/P hernia repair  right inguinal hernia      H/O prostate cancer  10- yrs ago, s/p radiation therapy      Status post cataract extraction      H/O urethrotomy  June 2018        ====================ASSESSMENT ==============    77M with PMH of HTN, HLD,  BPH, CKD ( creat 1.42 today), chronic angelita hydronephrosis ( x2 years), prostate ca ( radiation 10 years ago), seizure disorder ( last seizure 2015), ETOH use ( sober since 2016), hypothyroidism, and mild demenia p/w CP and SOB found with large pericardial effusion s/p pericardiocentesis.     ====================Plan ==============  #Neuro   A&Ox3  - continue to monitor mental status as per protocol    Seizure disorder  - No seziure meds reported. sister confirmed no additional medication bottles at home     #Resp  Comfortable on RA  - SOB on admission likely 2/2 pericardial tamponade. Patient feels improvement s/p drain placement   - Monitor SpO2 with goal >94%  - 7/6 CT chest: Large pericardial effusion. Small bilateral pleural effusions. Bilateral hydronephrosis.    COPD  - duonebs prn   - started on levofloxacin outpatient for concern of COPD exacerbation/PNA. Will d/c for now. Likely sob related to pericardial effusion (states SOB improved s/p drain placement)   - provided smoking cessation education. Start nicotine patch while inpatient (states quit 1 week prior to admission, smokes 4-5 cig/daily)     #CV  Cardiac Tamponade  - 9/7 TTE: EF 70%. Mod concentric LVH. Normal RV size and function. Large pericardial effusion with evidence of echo tamponade physiology.   - s/p pericardiocentesis with drain placement, 1060mL out serosanguinous.   - as per lights criteria- meets criteria of exudative fluid. f/u pericardial fluid studies including cytology. Gram stain negative for organism   - Patient remained HD stable at [place] prior to transfer (as per documentation: HRs 60-70s; SBPs 150-170s, Lying flat off with O2 sats 99%). Continue to monitor vital signs   - trop I downtrending at OSH prior to transfer  - monitor drain output. Daily TTE while in place  - Cancer screening: Last colonoscopy >10 years ago (states went last year for colonoscopy but procedure aborted 2/2 HTN outpatient and never rescheduled). Remote history of Prostate CA, follows with urologist but unsure of name. waiting for patient sister to provide collateral info     HTN  - on home losartan 100mg and hydral 50mg.  - reinitiated on home meds yesterday as hypertensive upon presentation to CICU. This morning patient with softer BP (asymptomatic). Will decrease losartan dose 100mg-> 50mg     HLD  - c/w home simvastatin     #GI  - start diet as tolerated  - monitor for regular BM while inpatient. Last BM 5 days ago  - c/w miralax, senna, dulcolax   - Will start laculose q6hrs until BM     #  BPH  - continue home tamsulosin     Remote Hx Prostate Ca  - f.u with urology, patient unsure of name. will f/u with sister for collateral info.   - states in remission for >20 years     Hyponatremia  - Na on presentation 125, remains at 125 today. Will send urine lytes/workup   - Patient mentating appropriately   - Euvolemic appearing on exam. Could be 2/2 hypothyroidism     CKD  - Baseline SCr looks to be around 1.5-1.7 base upon prior SCr trend in 2020. SCr this AM 1.42  - monitor and trend SCr, BUN, and lytes  - replete lytes prn with goal K 4-4.5 and Mg >2    Hydronephrosis on CT 9/6  - renal US: Mild right hydronephrosis, improved since 9/6/2022. Poor visualization of the left kidney. No gross left hydronephrosis, which has improved or resolved since 9/6/2022. Urinary bladder volume of 136 mL. The patient had no urge to void during the exam.  - Urinating currently with bladder scan of 120mL. Also with b/l hydronephrosis back in 2020 with history of chronic hydronephrosis. SCr at baseline. Will also get collateral info from outpatient urologist once patient sister updates with info (left two voicemail messages for sister with no return of phone call)    #Endo  - hgb a1c 5.7    Hypothyroidism   - TSH elevated with low T3 and T4  - will consult endo for recs  - on home synthroid 112 mcg, may have missed a few doses (waiting upon refill). As per endo- resume home dose of 112mcg and repeat free T4 in 5-7 days.     #Heme  Normocytic anemia  - continue to monitor and trend CBC.   - Likely 2/2 anemia of chronic disease 2/2 CKD. If downtrends, can consider anemia workup     #ID  No leukocytosis, afebrile  - continue to monitor and trend wbc and temp curve          Patient requires continuous monitoring with bedside rhythm monitoring, arterial line, pulse oximetry, ventilator monitoring and intermittent blood gas analysis.  Care plan discussed with ICU care team.  Patient remained critical; required more than usual post op care; I have spent 35 minutes providing non-routine post op care, in addition to initial critical time provided by CICU attending Dr. Irvin, re-evaluated multiple times during the day.         AMBAR PICKARD  MRN-[id number]  Patient is a 77y old  Male who presents with a chief complaint of tamponade (08 Sep 2022 22:35)    HPI:  This is a 78yo old  Male with PMH of HTN, HLD,  BPH, CKD ( creat 1.42 today), chronic angelita hydronephrosis ( x2 years), prostate ca ( radiation 10 years ago), seizure disorder ( last seizure 2015), ETOH use ( sober since 2016), hypothyroidism, and mild demenia; presents to [facility] on 9/3/2022 with chief complaint of SOB and CP x3 days.  Pt has been a heavy smoker his whole life. Denies fever, chills, cough, or congestion. No sick contacts.    Admitted to tele for likely COPD exacerbation and CP.  CXR is clear. Started on Duonebs, Symbicort and Solumedrol. No prior cardiac stents. EKG on arrival to ED notes PRWP V 1-3, no ST changes. Trop high on arrival at 268 trending down 180.8 today, proBNP 763, CK 1090. TSH 69.600 ( stopped synthroid a few weeks ago), T3 <20 T4 0.9, Na 126.  CXR revealing New cardiomegaly and CT chest with Large pericardial effusion, Echo today with large pericardial effusion with early tamponade physiology. Transferred to [facility] for pericardiocentesis. On arrival to [facility] patient awake and verbal A&OX3, denies any chest pain, dyspnea, dizziness, palpitations, N&V, Ha, orthopnea, LE edema.  VS WNL, on 2 L nasal cannula in no respiratory distress.      recs from [facility]:  home with home PT          INTERPRETATION:  CLINICAL STATEMENT: Chest Pain. Shortness of breath  TECHNIQUE: AP view of the chest.  COMPARISON: 7/19/2020    New cardiomegaly, may be exaggerated by AP technique. Suboptimal degree   of inspiration. Small left pleural effusion.    Degenerative changes of the spine, shoulders and AC joints.    IMPRESSION:    New cardiomegaly, may be exaggerated by AP technique.    Small left pleural effusion.    ECHO  Summary:   1. Left ventricular ejection fraction, by visual estimation, is 50 to   55%.   2. Mildly enlarged left atrium.   3. Maximum thickness of effusion is 2-2.5 cm.   4. Mild mitral annular calcification.   5. Mild thickening of the anterior and posterior mitral valve leaflets.   6. Trace mitral valve regurgitation.   7. Mild tricuspid regurgitation.   8. Estimated pulmonary artery systolic pressure is 37.4 mmHg assuming a   right atrial pressure of 5 mmHg, which is consistent with mild pulmonary   hypertension.   9. Findings are consistent with mild cardiac tamponade.      CT CHEST                          PROCEDURE DATE:  09/06/2022      INTERPRETATION:  HISTORY: Dyspnea on exertion. Smoker.    EXAMINATION: CT CHEST was performed without IV contrast.    COMPARISON: No CT chest comparison. Correlation with 1/9/2018 CT abdomen.    FINDINGS:    AIRWAYS, LUNGS, PLEURA: Trachea and mainstem bronchi patent. Emphysema.   Posterior left upper lobe and left basilar atelectasis. Small bilateral   pleural effusions.    MEDIASTINUM: Large circumferential pericardial effusion; depth of   pericardial fluid is 34 mm at the level of the basal inferior wall of the   left ventricle (sagittal series image 78). There is flattening of the   free wall of the right ventricle. Coronary atherosclerosis.    IMAGED ABDOMEN: Bilateral hydronephrosis. Cholelithiasis.    SOFT TISSUES: Bilateral gynecomastia.    BONES: Degenerative changes of the spine. Loss of height of L3 vertebral   body similar to 1/9/2018 CT abdomen.    IMPRESSION:.    Large pericardial effusion.    Small bilateral pleural effusions. (07 Sep 2022 13:09)      Hospital course:    Today:    Physical Exam:  Vital Signs Last 24 Hrs  T(C): 36.4 (09 Sep 2022 05:00), Max: 36.7 (08 Sep 2022 16:00)  T(F): 97.6 (09 Sep 2022 05:00), Max: 98 (08 Sep 2022 16:00)  HR: 64 (09 Sep 2022 05:00) (55 - 73)  BP: 147/61 (09 Sep 2022 05:00) (91/63 - 147/61)  BP(mean): 87 (09 Sep 2022 05:00) (69 - 102)  RR: 15 (09 Sep 2022 05:00) (11 - 22)  SpO2: 96% (09 Sep 2022 05:00) (94% - 100%)    Parameters below as of 09 Sep 2022 00:00  Patient On (Oxygen Delivery Method): room air        PHYSICAL EXAM:  Constitutional: Patient laying in bed, NAD  Head: Atraumatic, normocephalic  Eyes: No scleral icterus. PERRLA. EOMI  ENMT: Moist mucous membrane. Uvula midline  Neck: Supple, No JVD  Respiratory: CTA B/L, intermittent b/l expiratory wheezes. No rales or rhonchi   Cardiovascular: S1/S2. No murmurs, rubs, or gallops.  Gastrointestinal: protuberant BSx4, soft, nontender.  Extremities: Moves all extremities. Warm, no edema, nontender  Vascular: 2+ DP/PT pulses B/L   Neurological: A&Ox3. Follows commands. No focal deficits.   Skin: No rashes on exposed skin     ============================I/O===========================   I&O's Detail    08 Sep 2022 07:01  -  09 Sep 2022 07:00  --------------------------------------------------------  IN:    Oral Fluid: 660 mL  Total IN: 660 mL    OUT:    Drain (mL): 50 mL    Incontinent per Condom Catheter (mL): 150 mL    Voided (mL): 250 mL  Total OUT: 450 mL    Total NET: 210 mL        ============================ LABS =========================                        12.9   11.71 )-----------( 210      ( 09 Sep 2022 04:09 )             38.5     09-09    124<L>  |  93<L>  |  21  ----------------------------<  102<H>  4.5   |  23  |  1.50<H>    Ca    8.2<L>      09 Sep 2022 04:09  Phos  2.5     09-09  Mg     2.3     09-09    TPro  5.6<L>  /  Alb  2.9<L>  /  TBili  0.7  /  DBili  x   /  AST  28  /  ALT  22  /  AlkPhos  61  09-09    LIVER FUNCTIONS - ( 09 Sep 2022 04:09 )  Alb: 2.9 g/dL / Pro: 5.6 g/dL / ALK PHOS: 61 U/L / ALT: 22 U/L / AST: 28 U/L / GGT: x           PT/INR - ( 09 Sep 2022 04:09 )   PT: 12.0 sec;   INR: 1.04 ratio         PTT - ( 09 Sep 2022 04:09 )  PTT:27.5 sec      ======================Micro/Rad/Cardio=================  Culture: Reviewed   CXR: Reviewed  Echo:Reviewed  ======================================================  PAST MEDICAL & SURGICAL HISTORY:  ETOH abuse  sober since 2016      Seizure disorder  last seizure 2015      BPH (benign prostatic hyperplasia)      HTN (hypertension)      Prostate CA  Dx&#x27;d 2008, s/p radiation      Hyperlipidemia      Poor historian      S/P hernia repair  right inguinal hernia      H/O prostate cancer  10- yrs ago, s/p radiation therapy      Status post cataract extraction      H/O urethrotomy  June 2018        ====================ASSESSMENT ==============  77M with PMH of HTN, HLD,  BPH, CKD ( creat 1.42 today), chronic angelita hydronephrosis ( x2 years), prostate ca ( radiation 10 years ago), seizure disorder ( last seizure 2015), ETOH use ( sober since 2016), hypothyroidism, and mild demenia p/w CP and SOB found with large pericardial effusion s/p pericardiocentesis.     ====================Plan ==============  #Neuro   A&Ox3  - continue to monitor mental status as per protocol    Seizure disorder  - No seziure meds reported. sister confirmed no additional medication bottles at home     #Resp  Comfortable on RA  - SOB on admission likely 2/2 pericardial tamponade. Patient feels improvement s/p drain placement   - Monitor SpO2 with goal >94%  - 7/6 CT chest: Large pericardial effusion. Small bilateral pleural effusions. Bilateral hydronephrosis.    COPD  - duonebs prn   - started on levofloxacin outpatient for concern of COPD exacerbation/PNA. Will d/c for now. Likely sob related to pericardial effusion (states SOB improved s/p drain placement)   - provided smoking cessation education. Start nicotine patch while inpatient (states quit 1 week prior to admission, smokes 4-5 cig/daily)     #CV  Cardiac Tamponade  - 9/7 TTE: EF 70%. Mod concentric LVH. Normal RV size and function. Large pericardial effusion with evidence of echo tamponade physiology.   - s/p pericardiocentesis with drain placement, 1060mL out serosanguinous.   - as per lights criteria- meets criteria of exudative fluid. f/u pericardial fluid studies including cytology. Gram stain negative for organism   - Patient remained HD stable at [place] prior to transfer (as per documentation: HRs 60-70s; SBPs 150-170s, Lying flat off with O2 sats 99%). Continue to monitor vital signs   - trop I downtrending at OSH prior to transfer  - monitor drain output. Daily TTE while in place  - Cancer screening: Last colonoscopy >10 years ago (states went last year for colonoscopy but procedure aborted 2/2 HTN outpatient and never rescheduled). Remote history of Prostate CA, follows with urologist but unsure of name. waiting for patient sister to provide collateral info     HTN  - on home losartan 100mg and hydral 50mg.  - reinitiated on home meds yesterday as hypertensive upon presentation to CICU. This morning patient with softer BP (asymptomatic). Will decrease losartan dose 100mg-> 50mg     HLD  - c/w home simvastatin     #GI  - start diet as tolerated  - monitor for regular BM while inpatient. Last BM 5 days ago  - c/w miralax, senna, dulcolax   - Will start laculose q6hrs until BM     #  BPH  - continue home tamsulosin     Remote Hx Prostate Ca  - f.u with urology, patient unsure of name. will f/u with sister for collateral info.   - states in remission for >20 years     Hyponatremia  - Na on presentation 125, remains at 125 today. Will send urine lytes/workup   - Patient mentating appropriately   - Euvolemic appearing on exam. Likely 2/2 hypothyroidism   - will consult nephro     CKD  - Baseline SCr looks to be around 1.5-1.7 base upon prior SCr trend in 2020. SCr this AM 1.42  - monitor and trend SCr, BUN, and lytes  - replete lytes prn with goal K 4-4.5 and Mg >2    Hydronephrosis on CT 9/6  - renal US: Mild right hydronephrosis, improved since 9/6/2022. Poor visualization of the left kidney. No gross left hydronephrosis, which has improved or resolved since 9/6/2022. Urinary bladder volume of 136 mL. The patient had no urge to void during the exam.  - Urinating currently with bladder scan of 120mL. Also with b/l hydronephrosis back in 2020 with history of chronic hydronephrosis. SCr at baseline. Will also get collateral info from outpatient urologist once patient sister updates with info (left two voicemail messages for sister with no return of phone call)    #Endo  - hgb a1c 5.7    Hypothyroidism   - TSH elevated with low T3 and T4  - will consult endo for recs  - on home synthroid 112 mcg, may have missed a few doses (waiting upon refill). As per endo- resume home dose of 112mcg and repeat free T4 in 5-7 days.     #Heme  Normocytic anemia  - continue to monitor and trend CBC.   - Likely 2/2 anemia of chronic disease 2/2 CKD. If downtrends, can consider anemia workup     #ID  No leukocytosis, afebrile  - continue to monitor and trend wbc and temp curve          Patient requires continuous monitoring with bedside rhythm monitoring, arterial line, pulse oximetry, ventilator monitoring and intermittent blood gas analysis.  Care plan discussed with ICU care team.  Patient remained critical; required more than usual post op care; I have spent 35 minutes providing non-routine post op care, in addition to initial critical time provided by CICU attending Dr. Irvin, re-evaluated multiple times during the day.         AMBAR PICKARD  MRN-[id number]  Patient is a 77y old  Male who presents with a chief complaint of tamponade (08 Sep 2022 22:35)    HPI:  This is a 76yo old  Male with PMH of HTN, HLD,  BPH, CKD ( creat 1.42 today), chronic angelita hydronephrosis ( x2 years), prostate ca ( radiation 10 years ago), seizure disorder ( last seizure 2015), ETOH use ( sober since 2016), hypothyroidism, and mild demenia; presents to [facility] on 9/3/2022 with chief complaint of SOB and CP x3 days.  Pt has been a heavy smoker his whole life. Denies fever, chills, cough, or congestion. No sick contacts.    Admitted to tele for likely COPD exacerbation and CP.  CXR is clear. Started on Duonebs, Symbicort and Solumedrol. No prior cardiac stents. EKG on arrival to ED notes PRWP V 1-3, no ST changes. Trop high on arrival at 268 trending down 180.8 today, proBNP 763, CK 1090. TSH 69.600 ( stopped synthroid a few weeks ago), T3 <20 T4 0.9, Na 126.  CXR revealing New cardiomegaly and CT chest with Large pericardial effusion, Echo today with large pericardial effusion with early tamponade physiology. Transferred to [facility] for pericardiocentesis. On arrival to [facility] patient awake and verbal A&OX3, denies any chest pain, dyspnea, dizziness, palpitations, N&V, Ha, orthopnea, LE edema.  VS WNL, on 2 L nasal cannula in no respiratory distress.      recs from [facility]:  home with home PT          INTERPRETATION:  CLINICAL STATEMENT: Chest Pain. Shortness of breath  TECHNIQUE: AP view of the chest.  COMPARISON: 7/19/2020    New cardiomegaly, may be exaggerated by AP technique. Suboptimal degree   of inspiration. Small left pleural effusion.    Degenerative changes of the spine, shoulders and AC joints.    IMPRESSION:    New cardiomegaly, may be exaggerated by AP technique.    Small left pleural effusion.    ECHO  Summary:   1. Left ventricular ejection fraction, by visual estimation, is 50 to   55%.   2. Mildly enlarged left atrium.   3. Maximum thickness of effusion is 2-2.5 cm.   4. Mild mitral annular calcification.   5. Mild thickening of the anterior and posterior mitral valve leaflets.   6. Trace mitral valve regurgitation.   7. Mild tricuspid regurgitation.   8. Estimated pulmonary artery systolic pressure is 37.4 mmHg assuming a   right atrial pressure of 5 mmHg, which is consistent with mild pulmonary   hypertension.   9. Findings are consistent with mild cardiac tamponade.      CT CHEST                          PROCEDURE DATE:  09/06/2022      INTERPRETATION:  HISTORY: Dyspnea on exertion. Smoker.    EXAMINATION: CT CHEST was performed without IV contrast.    COMPARISON: No CT chest comparison. Correlation with 1/9/2018 CT abdomen.    FINDINGS:    AIRWAYS, LUNGS, PLEURA: Trachea and mainstem bronchi patent. Emphysema.   Posterior left upper lobe and left basilar atelectasis. Small bilateral   pleural effusions.    MEDIASTINUM: Large circumferential pericardial effusion; depth of   pericardial fluid is 34 mm at the level of the basal inferior wall of the   left ventricle (sagittal series image 78). There is flattening of the   free wall of the right ventricle. Coronary atherosclerosis.    IMAGED ABDOMEN: Bilateral hydronephrosis. Cholelithiasis.    SOFT TISSUES: Bilateral gynecomastia.    BONES: Degenerative changes of the spine. Loss of height of L3 vertebral   body similar to 1/9/2018 CT abdomen.    IMPRESSION:.    Large pericardial effusion.    Small bilateral pleural effusions. (07 Sep 2022 13:09)      Hospital course:    Today:    Physical Exam:  Vital Signs Last 24 Hrs  T(C): 36.4 (09 Sep 2022 05:00), Max: 36.7 (08 Sep 2022 16:00)  T(F): 97.6 (09 Sep 2022 05:00), Max: 98 (08 Sep 2022 16:00)  HR: 64 (09 Sep 2022 05:00) (55 - 73)  BP: 147/61 (09 Sep 2022 05:00) (91/63 - 147/61)  BP(mean): 87 (09 Sep 2022 05:00) (69 - 102)  RR: 15 (09 Sep 2022 05:00) (11 - 22)  SpO2: 96% (09 Sep 2022 05:00) (94% - 100%)    Parameters below as of 09 Sep 2022 00:00  Patient On (Oxygen Delivery Method): room air        PHYSICAL EXAM:  Constitutional: Patient laying in bed, NAD  Head: Atraumatic, normocephalic  Eyes: No scleral icterus. PERRLA. EOMI  ENMT: Moist mucous membrane. Uvula midline  Neck: Supple, No JVD  Respiratory: CTA B/L, intermittent b/l expiratory wheezes. No rales or rhonchi   Cardiovascular: S1/S2. No murmurs, rubs, or gallops.  Gastrointestinal: protuberant BSx4, soft, nontender.  Extremities: Moves all extremities. Warm, no edema, nontender  Vascular: 2+ DP/PT pulses B/L   Neurological: A&Ox3. Follows commands. No focal deficits.   Skin: No rashes on exposed skin     ============================I/O===========================   I&O's Detail    08 Sep 2022 07:01  -  09 Sep 2022 07:00  --------------------------------------------------------  IN:    Oral Fluid: 660 mL  Total IN: 660 mL    OUT:    Drain (mL): 50 mL    Incontinent per Condom Catheter (mL): 150 mL    Voided (mL): 250 mL  Total OUT: 450 mL    Total NET: 210 mL        ============================ LABS =========================                        12.9   11.71 )-----------( 210      ( 09 Sep 2022 04:09 )             38.5     09-09    124<L>  |  93<L>  |  21  ----------------------------<  102<H>  4.5   |  23  |  1.50<H>    Ca    8.2<L>      09 Sep 2022 04:09  Phos  2.5     09-09  Mg     2.3     09-09    TPro  5.6<L>  /  Alb  2.9<L>  /  TBili  0.7  /  DBili  x   /  AST  28  /  ALT  22  /  AlkPhos  61  09-09    LIVER FUNCTIONS - ( 09 Sep 2022 04:09 )  Alb: 2.9 g/dL / Pro: 5.6 g/dL / ALK PHOS: 61 U/L / ALT: 22 U/L / AST: 28 U/L / GGT: x           PT/INR - ( 09 Sep 2022 04:09 )   PT: 12.0 sec;   INR: 1.04 ratio         PTT - ( 09 Sep 2022 04:09 )  PTT:27.5 sec      ======================Micro/Rad/Cardio=================  Culture: Reviewed   CXR: Reviewed  Echo:Reviewed  ======================================================  PAST MEDICAL & SURGICAL HISTORY:  ETOH abuse  sober since 2016      Seizure disorder  last seizure 2015      BPH (benign prostatic hyperplasia)      HTN (hypertension)      Prostate CA  Dx&#x27;d 2008, s/p radiation      Hyperlipidemia      Poor historian      S/P hernia repair  right inguinal hernia      H/O prostate cancer  10- yrs ago, s/p radiation therapy      Status post cataract extraction      H/O urethrotomy  June 2018        ====================ASSESSMENT ==============  77M with PMH of HTN, HLD,  BPH, CKD ( creat 1.42 today), chronic angelita hydronephrosis ( x2 years), prostate ca ( radiation 10 years ago), seizure disorder ( last seizure 2015), ETOH use ( sober since 2016), hypothyroidism, and mild demenia p/w CP and SOB found with large pericardial effusion s/p pericardiocentesis.     ====================Plan ==============  #Neuro   A&Ox3  - continue to monitor mental status as per protocol    Seizure disorder  - No seziure meds reported. sister confirmed no additional medication bottles at home     #Resp  Comfortable on RA  - SOB on admission likely 2/2 pericardial tamponade. Patient feels improvement s/p drain placement   - Monitor SpO2 with goal >94%  - 7/6 CT chest: Large pericardial effusion. Small bilateral pleural effusions. Bilateral hydronephrosis.    COPD  - duonebs prn   - started on levofloxacin outpatient for concern of COPD exacerbation/PNA. Will d/c for now. Likely sob related to pericardial effusion (states SOB improved s/p drain placement)   - provided smoking cessation education. Start nicotine patch while inpatient (states quit 1 week prior to admission, smokes 4-5 cig/daily)     #CV  Cardiac Tamponade  - 9/7 TTE: EF 70%. Mod concentric LVH. Normal RV size and function. Large pericardial effusion with evidence of echo tamponade physiology.   - s/p pericardiocentesis with drain placement, 1060mL out serosanguinous.   - as per lights criteria- meets criteria of exudative fluid. f/u pericardial fluid studies including cytology. Gram stain negative for organism   - Patient remained HD stable at [place] prior to transfer (as per documentation: HRs 60-70s; SBPs 150-170s, Lying flat off with O2 sats 99%). Continue to monitor vital signs   - trop I downtrending at OSH prior to transfer  - monitor drain output. Daily TTE while in place. Once <50mL/24hrs, can d/c drain. Last 24hrs had 150mL of output   - Cancer screening: Last colonoscopy >10 years ago (states went last year for colonoscopy but procedure aborted 2/2 HTN outpatient and never rescheduled). Remote history of Prostate CA, follows with urologist but unsure of name. waiting for patient sister to provide collateral info     HTN  - on home losartan 100mg and hydral 50mg.  - reinitiated on home meds yesterday as hypertensive upon presentation to CICU. This morning patient with softer BP (asymptomatic). Will decrease losartan dose 100mg-> 50mg     HLD  - c/w home simvastatin     #GI  - start diet as tolerated  - monitor for regular BM while inpatient. Last BM 5 days ago  - c/w miralax, senna, dulcolax   - Will start laculose q6hrs until BM     #  BPH  - continue home tamsulosin     Remote Hx Prostate Ca  - f.u with urology, patient unsure of name. will f/u with sister for collateral info.   - states in remission for >20 years     Hyponatremia  - Na on presentation 125, remains at 125 today. Will send urine lytes/workup   - Patient mentating appropriately   - Euvolemic appearing on exam. Likely 2/2 hypothyroidism   - will consult nephro     CKD  - Baseline SCr looks to be around 1.5-1.7 base upon prior SCr trend in 2020. SCr this AM 1.42  - monitor and trend SCr, BUN, and lytes  - replete lytes prn with goal K 4-4.5 and Mg >2    Hydronephrosis on CT 9/6  - renal US: Mild right hydronephrosis, improved since 9/6/2022. Poor visualization of the left kidney. No gross left hydronephrosis, which has improved or resolved since 9/6/2022. Urinary bladder volume of 136 mL. The patient had no urge to void during the exam.  - Urinating currently with bladder scan of 120mL. Also with b/l hydronephrosis back in 2020 with history of chronic hydronephrosis. SCr at baseline. Will also get collateral info from outpatient urologist once patient sister updates with info (left two voicemail messages for sister with no return of phone call)    #Endo  - hgb a1c 5.7    Hypothyroidism   - TSH elevated with low T3 and T4  - will consult endo for recs  - on home synthroid 112 mcg, may have missed a few doses (waiting upon refill). As per endo- resume home dose of 112mcg and repeat free T4 in 5-7 days.     #Heme  Normocytic anemia  - continue to monitor and trend CBC.   - Likely 2/2 anemia of chronic disease 2/2 CKD. If downtrends, can consider anemia workup     #ID  No leukocytosis, afebrile  - continue to monitor and trend wbc and temp curve          Patient requires continuous monitoring with bedside rhythm monitoring, arterial line, pulse oximetry, ventilator monitoring and intermittent blood gas analysis.  Care plan discussed with ICU care team.  Patient remained critical; required more than usual post op care; I have spent 35 minutes providing non-routine post op care, in addition to initial critical time provided by CICU attending Dr. Irvin, re-evaluated multiple times during the day.         AMBAR PICKARD  MRN-[id number]  Patient is a 77y old  Male who presents with a chief complaint of tamponade (08 Sep 2022 22:35)    HPI:  This is a 78yo old  Male with PMH of HTN, HLD,  BPH, CKD ( creat 1.42 today), chronic angelita hydronephrosis ( x2 years), prostate ca ( radiation 10 years ago), seizure disorder ( last seizure 2015), ETOH use ( sober since 2016), hypothyroidism, and mild demenia; presents to [facility] on 9/3/2022 with chief complaint of SOB and CP x3 days.  Pt has been a heavy smoker his whole life. Denies fever, chills, cough, or congestion. No sick contacts.    Admitted to tele for likely COPD exacerbation and CP.  CXR is clear. Started on Duonebs, Symbicort and Solumedrol. No prior cardiac stents. EKG on arrival to ED notes PRWP V 1-3, no ST changes. Trop high on arrival at 268 trending down 180.8 today, proBNP 763, CK 1090. TSH 69.600 ( stopped synthroid a few weeks ago), T3 <20 T4 0.9, Na 126.  CXR revealing New cardiomegaly and CT chest with Large pericardial effusion, Echo today with large pericardial effusion with early tamponade physiology. Transferred to [facility] for pericardiocentesis. On arrival to [facility] patient awake and verbal A&OX3, denies any chest pain, dyspnea, dizziness, palpitations, N&V, Ha, orthopnea, LE edema.  VS WNL, on 2 L nasal cannula in no respiratory distress.      recs from [facility]:  home with home PT          INTERPRETATION:  CLINICAL STATEMENT: Chest Pain. Shortness of breath  TECHNIQUE: AP view of the chest.  COMPARISON: 7/19/2020    New cardiomegaly, may be exaggerated by AP technique. Suboptimal degree   of inspiration. Small left pleural effusion.    Degenerative changes of the spine, shoulders and AC joints.    IMPRESSION:    New cardiomegaly, may be exaggerated by AP technique.    Small left pleural effusion.    ECHO  Summary:   1. Left ventricular ejection fraction, by visual estimation, is 50 to   55%.   2. Mildly enlarged left atrium.   3. Maximum thickness of effusion is 2-2.5 cm.   4. Mild mitral annular calcification.   5. Mild thickening of the anterior and posterior mitral valve leaflets.   6. Trace mitral valve regurgitation.   7. Mild tricuspid regurgitation.   8. Estimated pulmonary artery systolic pressure is 37.4 mmHg assuming a   right atrial pressure of 5 mmHg, which is consistent with mild pulmonary   hypertension.   9. Findings are consistent with mild cardiac tamponade.      CT CHEST                          PROCEDURE DATE:  09/06/2022      INTERPRETATION:  HISTORY: Dyspnea on exertion. Smoker.    EXAMINATION: CT CHEST was performed without IV contrast.    COMPARISON: No CT chest comparison. Correlation with 1/9/2018 CT abdomen.    FINDINGS:    AIRWAYS, LUNGS, PLEURA: Trachea and mainstem bronchi patent. Emphysema.   Posterior left upper lobe and left basilar atelectasis. Small bilateral   pleural effusions.    MEDIASTINUM: Large circumferential pericardial effusion; depth of   pericardial fluid is 34 mm at the level of the basal inferior wall of the   left ventricle (sagittal series image 78). There is flattening of the   free wall of the right ventricle. Coronary atherosclerosis.    IMAGED ABDOMEN: Bilateral hydronephrosis. Cholelithiasis.    SOFT TISSUES: Bilateral gynecomastia.    BONES: Degenerative changes of the spine. Loss of height of L3 vertebral   body similar to 1/9/2018 CT abdomen.    IMPRESSION:.    Large pericardial effusion.    Small bilateral pleural effusions. (07 Sep 2022 13:09)      Hospital course:    Today:    Physical Exam:  Vital Signs Last 24 Hrs  T(C): 36.4 (09 Sep 2022 05:00), Max: 36.7 (08 Sep 2022 16:00)  T(F): 97.6 (09 Sep 2022 05:00), Max: 98 (08 Sep 2022 16:00)  HR: 64 (09 Sep 2022 05:00) (55 - 73)  BP: 147/61 (09 Sep 2022 05:00) (91/63 - 147/61)  BP(mean): 87 (09 Sep 2022 05:00) (69 - 102)  RR: 15 (09 Sep 2022 05:00) (11 - 22)  SpO2: 96% (09 Sep 2022 05:00) (94% - 100%)    Parameters below as of 09 Sep 2022 00:00  Patient On (Oxygen Delivery Method): room air        PHYSICAL EXAM:  Constitutional: Patient laying in bed, NAD  Head: Atraumatic, normocephalic  Eyes: No scleral icterus. PERRLA. EOMI  ENMT: Moist mucous membrane. Uvula midline  Neck: Supple, No JVD  Respiratory: CTA B/L, intermittent b/l expiratory wheezes. No rales or rhonchi   Cardiovascular: S1/S2. No murmurs, rubs, or gallops.  Gastrointestinal: protuberant BSx4, soft, nontender.  Extremities: Moves all extremities. Warm, no edema, nontender  Vascular: 2+ DP/PT pulses B/L   Neurological: A&Ox3. Follows commands. No focal deficits.   Skin: No rashes on exposed skin     ============================I/O===========================   I&O's Detail    08 Sep 2022 07:01  -  09 Sep 2022 07:00  --------------------------------------------------------  IN:    Oral Fluid: 660 mL  Total IN: 660 mL    OUT:    Drain (mL): 50 mL    Incontinent per Condom Catheter (mL): 150 mL    Voided (mL): 250 mL  Total OUT: 450 mL    Total NET: 210 mL        ============================ LABS =========================                        12.9   11.71 )-----------( 210      ( 09 Sep 2022 04:09 )             38.5     09-09    124<L>  |  93<L>  |  21  ----------------------------<  102<H>  4.5   |  23  |  1.50<H>    Ca    8.2<L>      09 Sep 2022 04:09  Phos  2.5     09-09  Mg     2.3     09-09    TPro  5.6<L>  /  Alb  2.9<L>  /  TBili  0.7  /  DBili  x   /  AST  28  /  ALT  22  /  AlkPhos  61  09-09    LIVER FUNCTIONS - ( 09 Sep 2022 04:09 )  Alb: 2.9 g/dL / Pro: 5.6 g/dL / ALK PHOS: 61 U/L / ALT: 22 U/L / AST: 28 U/L / GGT: x           PT/INR - ( 09 Sep 2022 04:09 )   PT: 12.0 sec;   INR: 1.04 ratio         PTT - ( 09 Sep 2022 04:09 )  PTT:27.5 sec      ======================Micro/Rad/Cardio=================  Culture: Reviewed   CXR: Reviewed  Echo:Reviewed  ======================================================  PAST MEDICAL & SURGICAL HISTORY:  ETOH abuse  sober since 2016      Seizure disorder  last seizure 2015      BPH (benign prostatic hyperplasia)      HTN (hypertension)      Prostate CA  Dx&#x27;d 2008, s/p radiation      Hyperlipidemia      Poor historian      S/P hernia repair  right inguinal hernia      H/O prostate cancer  10- yrs ago, s/p radiation therapy      Status post cataract extraction      H/O urethrotomy  June 2018        ====================ASSESSMENT ==============  77M with PMH of HTN, HLD,  BPH, CKD ( creat 1.42 today), chronic angelita hydronephrosis ( x2 years), prostate ca ( radiation 10 years ago), seizure disorder ( last seizure 2015), ETOH use ( sober since 2016), hypothyroidism, and mild demenia p/w CP and SOB found with large pericardial effusion s/p pericardiocentesis.     ====================Plan ==============  #Neuro   A&Ox3  - continue to monitor mental status as per protocol    Seizure disorder  - No seziure meds reported. sister confirmed no additional medication bottles at home     #Resp  Comfortable on RA  - SOB on admission likely 2/2 pericardial tamponade. Patient feels improvement s/p drain placement   - Monitor SpO2 with goal >94%  - 7/6 CT chest: Large pericardial effusion. Small bilateral pleural effusions. Bilateral hydronephrosis.    COPD  - duonebs prn   - started on levofloxacin outpatient for concern of COPD exacerbation/PNA. Will d/c for now. Likely sob related to pericardial effusion (states SOB improved s/p drain placement)   - provided smoking cessation education. Start nicotine patch while inpatient (states quit 1 week prior to admission, smokes 4-5 cig/daily)     #CV  Cardiac Tamponade  - 9/7 TTE: EF 70%. Mod concentric LVH. Normal RV size and function. Large pericardial effusion with evidence of echo tamponade physiology.   - s/p pericardiocentesis with drain placement, 1060mL out serosanguinous.   - as per lights criteria- meets criteria of exudative fluid. f/u pericardial fluid studies including cytology. Gram stain negative for organism   - Patient remained HD stable at [place] prior to transfer (as per documentation: HRs 60-70s; SBPs 150-170s, Lying flat off with O2 sats 99%). Continue to monitor vital signs   - trop I downtrending at OSH prior to transfer  - monitor drain output. Daily TTE while in place. Once <50mL/24hrs, can d/c drain. Last 24hrs had 150mL of output   - Cancer screening: Last colonoscopy >10 years ago (states went last year for colonoscopy but procedure aborted 2/2 HTN outpatient and never rescheduled). Remote history of Prostate CA, follows with urologist but unsure of name. waiting for patient sister to provide collateral info     HTN  - on home losartan 100mg and hydral 50mg.  - reinitiated on home meds yesterday as hypertensive upon presentation to CICU. This morning patient with softer BP (asymptomatic). Will decrease losartan dose 100mg-> 50mg     HLD  - c/w home simvastatin     #GI  - start diet as tolerated  - monitor for regular BM while inpatient. Last BM 5 days ago  - c/w miralax, senna, dulcolax   - Will start laculose q6hrs until BM     #  BPH  - continue home tamsulosin     Remote Hx Prostate Ca  - f.u with urology, patient unsure of name. will f/u with sister for collateral info.   - states in remission for >20 years     Hyponatremia  - Na on presentation 125, remains at 125 today. Will send urine lytes/workup   - Patient mentating appropriately   - Euvolemic appearing on exam. Likely 2/2 hypothyroidism   - will consult nephro     CKD  - Baseline SCr looks to be around 1.5-1.7 base upon prior SCr trend in 2020. SCr this AM 1.42  - monitor and trend SCr, BUN, and lytes  - replete lytes prn with goal K 4-4.5 and Mg >2    Hydronephrosis on CT 9/6  - renal US: Mild right hydronephrosis, improved since 9/6/2022. Poor visualization of the left kidney. No gross left hydronephrosis, which has improved or resolved since 9/6/2022. Urinary bladder volume of 136 mL. The patient had no urge to void during the exam.  - Urinating currently with bladder scan of 120mL. Also with b/l hydronephrosis back in 2020 with history of chronic hydronephrosis. SCr at baseline. Will also get collateral info from outpatient urologist once patient sister updates with info (left two voicemail messages for sister with no return of phone call)    #Endo  - hgb a1c 5.7    Hypothyroidism   - TSH elevated with low T3 and T4  - will consult endo for recs  - on home synthroid 112 mcg, may have missed a few doses (waiting upon refill). As per endo- resume home dose of 112mcg and repeat free T4 in 5-7 days.     #Heme  DVT PPX: Start SQH  Normocytic anemia  - continue to monitor and trend CBC.   - Likely 2/2 anemia of chronic disease 2/2 CKD. If downtrends, can consider anemia workup     #ID  No leukocytosis, afebrile  - continue to monitor and trend wbc and temp curve          Patient requires continuous monitoring with bedside rhythm monitoring, arterial line, pulse oximetry, ventilator monitoring and intermittent blood gas analysis.  Care plan discussed with ICU care team.  Patient remained critical; required more than usual post op care; I have spent 35 minutes providing non-routine post op care, in addition to initial critical time provided by CICU attending Dr. Irvin, re-evaluated multiple times during the day.         AMBAR PICKARD  MRN-[id number]  Patient is a 77y old  Male who presents with a chief complaint of tamponade (08 Sep 2022 22:35)    HPI:  This is a 78yo old  Male with PMH of HTN, HLD,  BPH, CKD ( creat 1.42 today), chronic angelita hydronephrosis ( x2 years), prostate ca ( radiation 10 years ago), seizure disorder ( last seizure 2015), ETOH use ( sober since 2016), hypothyroidism, and mild demenia; presents to [facility] on 9/3/2022 with chief complaint of SOB and CP x3 days.  Pt has been a heavy smoker his whole life. Denies fever, chills, cough, or congestion. No sick contacts.    Admitted to tele for likely COPD exacerbation and CP.  CXR is clear. Started on Duonebs, Symbicort and Solumedrol. No prior cardiac stents. EKG on arrival to ED notes PRWP V 1-3, no ST changes. Trop high on arrival at 268 trending down 180.8 today, proBNP 763, CK 1090. TSH 69.600 ( stopped synthroid a few weeks ago), T3 <20 T4 0.9, Na 126.  CXR revealing New cardiomegaly and CT chest with Large pericardial effusion, Echo today with large pericardial effusion with early tamponade physiology. Transferred to [facility] for pericardiocentesis. On arrival to [facility] patient awake and verbal A&OX3, denies any chest pain, dyspnea, dizziness, palpitations, N&V, Ha, orthopnea, LE edema.  VS WNL, on 2 L nasal cannula in no respiratory distress.      recs from [facility]:  home with home PT          INTERPRETATION:  CLINICAL STATEMENT: Chest Pain. Shortness of breath  TECHNIQUE: AP view of the chest.  COMPARISON: 7/19/2020    New cardiomegaly, may be exaggerated by AP technique. Suboptimal degree   of inspiration. Small left pleural effusion.    Degenerative changes of the spine, shoulders and AC joints.    IMPRESSION:    New cardiomegaly, may be exaggerated by AP technique.    Small left pleural effusion.    ECHO  Summary:   1. Left ventricular ejection fraction, by visual estimation, is 50 to   55%.   2. Mildly enlarged left atrium.   3. Maximum thickness of effusion is 2-2.5 cm.   4. Mild mitral annular calcification.   5. Mild thickening of the anterior and posterior mitral valve leaflets.   6. Trace mitral valve regurgitation.   7. Mild tricuspid regurgitation.   8. Estimated pulmonary artery systolic pressure is 37.4 mmHg assuming a   right atrial pressure of 5 mmHg, which is consistent with mild pulmonary   hypertension.   9. Findings are consistent with mild cardiac tamponade.      CT CHEST                          PROCEDURE DATE:  09/06/2022      INTERPRETATION:  HISTORY: Dyspnea on exertion. Smoker.    EXAMINATION: CT CHEST was performed without IV contrast.    COMPARISON: No CT chest comparison. Correlation with 1/9/2018 CT abdomen.    FINDINGS:    AIRWAYS, LUNGS, PLEURA: Trachea and mainstem bronchi patent. Emphysema.   Posterior left upper lobe and left basilar atelectasis. Small bilateral   pleural effusions.    MEDIASTINUM: Large circumferential pericardial effusion; depth of   pericardial fluid is 34 mm at the level of the basal inferior wall of the   left ventricle (sagittal series image 78). There is flattening of the   free wall of the right ventricle. Coronary atherosclerosis.    IMAGED ABDOMEN: Bilateral hydronephrosis. Cholelithiasis.    SOFT TISSUES: Bilateral gynecomastia.    BONES: Degenerative changes of the spine. Loss of height of L3 vertebral   body similar to 1/9/2018 CT abdomen.    IMPRESSION:.    Large pericardial effusion.    Small bilateral pleural effusions. (07 Sep 2022 13:09)      Hospital course:    Today:    Physical Exam:  Vital Signs Last 24 Hrs  T(C): 36.4 (09 Sep 2022 05:00), Max: 36.7 (08 Sep 2022 16:00)  T(F): 97.6 (09 Sep 2022 05:00), Max: 98 (08 Sep 2022 16:00)  HR: 64 (09 Sep 2022 05:00) (55 - 73)  BP: 147/61 (09 Sep 2022 05:00) (91/63 - 147/61)  BP(mean): 87 (09 Sep 2022 05:00) (69 - 102)  RR: 15 (09 Sep 2022 05:00) (11 - 22)  SpO2: 96% (09 Sep 2022 05:00) (94% - 100%)    Parameters below as of 09 Sep 2022 00:00  Patient On (Oxygen Delivery Method): room air        PHYSICAL EXAM:  Constitutional: Patient laying in bed, NAD  Head: Atraumatic, normocephalic  Eyes: No scleral icterus. PERRLA. EOMI  ENMT: Moist mucous membrane. Uvula midline  Neck: Supple, No JVD  Respiratory: CTA B/L, intermittent b/l expiratory wheezes. No rales or rhonchi   Cardiovascular: S1/S2. No murmurs, rubs, or gallops.  Gastrointestinal: protuberant BSx4, soft, nontender.  Extremities: Moves all extremities. Warm, no edema, nontender  Vascular: 2+ DP/PT pulses B/L   Neurological: A&Ox3. Follows commands. No focal deficits.   Skin: No rashes on exposed skin     ============================I/O===========================   I&O's Detail    08 Sep 2022 07:01  -  09 Sep 2022 07:00  --------------------------------------------------------  IN:    Oral Fluid: 660 mL  Total IN: 660 mL    OUT:    Drain (mL): 50 mL    Incontinent per Condom Catheter (mL): 150 mL    Voided (mL): 250 mL  Total OUT: 450 mL    Total NET: 210 mL        ============================ LABS =========================                        12.9   11.71 )-----------( 210      ( 09 Sep 2022 04:09 )             38.5     09-09    124<L>  |  93<L>  |  21  ----------------------------<  102<H>  4.5   |  23  |  1.50<H>    Ca    8.2<L>      09 Sep 2022 04:09  Phos  2.5     09-09  Mg     2.3     09-09    TPro  5.6<L>  /  Alb  2.9<L>  /  TBili  0.7  /  DBili  x   /  AST  28  /  ALT  22  /  AlkPhos  61  09-09    LIVER FUNCTIONS - ( 09 Sep 2022 04:09 )  Alb: 2.9 g/dL / Pro: 5.6 g/dL / ALK PHOS: 61 U/L / ALT: 22 U/L / AST: 28 U/L / GGT: x           PT/INR - ( 09 Sep 2022 04:09 )   PT: 12.0 sec;   INR: 1.04 ratio         PTT - ( 09 Sep 2022 04:09 )  PTT:27.5 sec      ======================Micro/Rad/Cardio=================  Culture: Reviewed   CXR: Reviewed  Echo:Reviewed  ======================================================  PAST MEDICAL & SURGICAL HISTORY:  ETOH abuse  sober since 2016      Seizure disorder  last seizure 2015      BPH (benign prostatic hyperplasia)      HTN (hypertension)      Prostate CA  Dx&#x27;d 2008, s/p radiation      Hyperlipidemia      Poor historian      S/P hernia repair  right inguinal hernia      H/O prostate cancer  10- yrs ago, s/p radiation therapy      Status post cataract extraction      H/O urethrotomy  June 2018        ====================ASSESSMENT ==============  77M with PMH of HTN, HLD,  BPH, CKD ( creat 1.42 today), chronic angelita hydronephrosis ( x2 years), prostate ca ( radiation 10 years ago), seizure disorder ( last seizure 2015), ETOH use ( sober since 2016), hypothyroidism, and mild demenia p/w CP and SOB found with large pericardial effusion s/p pericardiocentesis.     ====================Plan ==============  #Neuro   A&Ox3  - continue to monitor mental status as per protocol    Seizure disorder  - No seziure meds reported. sister confirmed no additional medication bottles at home     #Resp  Comfortable on RA  - SOB on admission likely 2/2 pericardial tamponade. Patient feels improvement s/p drain placement   - Monitor SpO2 with goal >94%  - 7/6 CT chest: Large pericardial effusion. Small bilateral pleural effusions. Bilateral hydronephrosis.    COPD  - duonebs prn   - started on levofloxacin outpatient for concern of COPD exacerbation/PNA. Will d/c for now. Likely sob related to pericardial effusion (states SOB improved s/p drain placement)   - provided smoking cessation education. Start nicotine patch while inpatient (states quit 1 week prior to admission, smokes 4-5 cig/daily)     #CV  Cardiac Tamponade  - 9/7 TTE: EF 70%. Mod concentric LVH. Normal RV size and function. Large pericardial effusion with evidence of echo tamponade physiology.   - s/p pericardiocentesis with drain placement, 1060mL out serosanguinous.   - f/u pericardial fluid studies. as per lights criteria- meets criteria of exudative fluid. Gram stain negative for organism. Cytology NEGATIVE FOR MALIGNANT CELLS  - Patient remained HD stable at [facility] prior to transfer (as per documentation: HRs 60-70s; SBPs 150-170s, Lying flat off with O2 sats 99%). Continue to monitor vital signs   - trop I downtrending at OSH prior to transfer  - monitor drain output. Daily TTE while in place. Once <50mL/24hrs, can d/c drain. Last 24hrs had 150mL of output   - Cancer screening: Last colonoscopy >10 years ago (states went last year for colonoscopy but procedure aborted 2/2 HTN outpatient and never rescheduled). Remote history of Prostate CA, follows with urologist but unsure of name. waiting for patient sister to provide collateral info     HTN  - on home losartan 100mg and hydral 50mg.  - reinitiated on home meds yesterday as hypertensive upon presentation to CICU. This morning patient with softer BP (asymptomatic). Will decrease losartan dose 100mg-> 50mg     HLD  - c/w home simvastatin     #GI  - start diet as tolerated  - monitor for regular BM while inpatient. Last BM 5 days ago  - c/w miralax, senna, dulcolax   - Will start laculose q6hrs until BM     #  BPH  - continue home tamsulosin     Remote Hx Prostate Ca  - f.u with urology, patient unsure of name. will f/u with sister for collateral info.   - states in remission for >20 years     Hyponatremia  - Na on presentation 125, remains at 125 today. Will send urine lytes/workup   - Patient mentating appropriately   - Euvolemic appearing on exam. Likely 2/2 hypothyroidism   - will consult nephro     CKD  - Baseline SCr looks to be around 1.5-1.7 base upon prior SCr trend in 2020. SCr this AM 1.42  - monitor and trend SCr, BUN, and lytes  - replete lytes prn with goal K 4-4.5 and Mg >2    Hydronephrosis on CT 9/6  - renal US: Mild right hydronephrosis, improved since 9/6/2022. Poor visualization of the left kidney. No gross left hydronephrosis, which has improved or resolved since 9/6/2022. Urinary bladder volume of 136 mL. The patient had no urge to void during the exam.  - Urinating currently with bladder scan of 120mL. Also with b/l hydronephrosis back in 2020 with history of chronic hydronephrosis. SCr at baseline. Will also get collateral info from outpatient urologist once patient sister updates with info (left two voicemail messages for sister with no return of phone call)    #Endo  - hgb a1c 5.7    Hypothyroidism   - TSH elevated with low T3 and T4  - will consult endo for recs  - on home synthroid 112 mcg, may have missed a few doses (waiting upon refill). As per endo- resume home dose of 112mcg and repeat free T4 in 5-7 days.     #Heme  DVT PPX: Start SQH  Normocytic anemia  - continue to monitor and trend CBC.   - Likely 2/2 anemia of chronic disease 2/2 CKD. If downtrends, can consider anemia workup     #ID  No leukocytosis, afebrile  - continue to monitor and trend wbc and temp curve          Patient requires continuous monitoring with bedside rhythm monitoring, arterial line, pulse oximetry, ventilator monitoring and intermittent blood gas analysis.  Care plan discussed with ICU care team.  Patient remained critical; required more than usual post op care; I have spent 35 minutes providing non-routine post op care, in addition to initial critical time provided by CICU attending Dr. Irvin, re-evaluated multiple times during the day.

## 2022-09-09 NOTE — PROGRESS NOTE ADULT - SUBJECTIVE AND OBJECTIVE BOX
AMBAR PICKARD  MRN-[id number]  Patient is a 77y old  Male who presents with a chief complaint of Pericardial effusion (09 Sep 2022 16:52)    HPI:  This is a 78yo old  Male with PMH of HTN, HLD,  BPH, CKD ( creat 1.42 today), chronic angelita hydronephrosis ( x2 years), prostate ca ( radiation 10 years ago), seizure disorder ( last seizure ), ETOH use ( sober since ), hypothyroidism, and mild demenia; presents to [facility] on 9/3/2022 with chief complaint of SOB and CP x3 days.  Pt has been a heavy smoker his whole life. Denies fever, chills, cough, or congestion. No sick contacts.    Admitted to tele for likely COPD exacerbation and CP.  CXR is clear. Started on Duonebs, Symbicort and Solumedrol. No prior cardiac stents. EKG on arrival to ED notes PRWP V 1-3, no ST changes. Trop high on arrival at 268 trending down 180.8 today, proBNP 763, CK 1090. TSH 69.600 ( stopped synthroid a few weeks ago), T3 <20 T4 0.9, Na 126.  CXR revealing New cardiomegaly and CT chest with Large pericardial effusion, Echo today with large pericardial effusion with early tamponade physiology. Transferred to [facility] for pericardiocentesis. On arrival to [facility] patient awake and verbal A&OX3, denies any chest pain, dyspnea, dizziness, palpitations, N&V, Ha, orthopnea, LE edema.  VS WNL, on 2 L nasal cannula in no respiratory distress.          Hospital Course:    24 HOUR EVENTS:    REVIEW OF SYSTEMS:    CONSTITUTIONAL: No weakness, fevers or chills  EYES/ENT: No visual changes;  No vertigo or throat pain   NECK: No pain or stiffness  RESPIRATORY: No cough, wheezing, hemoptysis; No shortness of breath  CARDIOVASCULAR: No chest pain or palpitations  GASTROINTESTINAL: No abdominal or epigastric pain. No nausea, vomiting, or hematemesis; No diarrhea or constipation. No melena or hematochezia.  GENITOURINARY: No dysuria, frequency or hematuria  NEUROLOGICAL: No numbness or weakness  SKIN: No itching, rashes      ICU Vital Signs Last 24 Hrs  T(C): 36.7 (09 Sep 2022 22:00), Max: 36.7 (09 Sep 2022 22:00)  T(F): 98 (09 Sep 2022 22:00), Max: 98 (09 Sep 2022 22:00)  HR: 59 (10 Sep 2022 01:00) (59 - 73)  BP: 133/77 (10 Sep 2022 01:00) (97/66 - 147/61)  BP(mean): 89 (10 Sep 2022 00:00) (74 - 104)  ABP: --  ABP(mean): --  RR: 12 (10 Sep 2022 01:00) (12 - 20)  SpO2: 95% (10 Sep 2022 01:00) (91% - 100%)    O2 Parameters below as of 10 Sep 2022 01:00  Patient On (Oxygen Delivery Method): room air          CAPILLARY BLOOD GLUCOSE          PHYSICAL EXAM:  GENERAL: No acute distress, well-developed  HEAD:  Atraumatic, Normocephalic  EYES: EOMI, PERRLA, conjunctiva and sclera clear  NECK: Supple, no lymphadenopathy, no JVD  CHEST/LUNG: CTAB; No wheezes, rales, or rhonchi  HEART: Regular rate and rhythm. Normal S1/S2. No murmurs, rubs, or gallops  ABDOMEN: Soft, non-tender, non-distended; normal bowel sounds, no organomegaly  EXTREMITIES:  2+ peripheral pulses b/l, No clubbing, cyanosis, or edema  NEUROLOGY: A&O x 3, no focal deficits  SKIN: No rashes or lesions    ============================I/O===========================   I&O's Detail    08 Sep 2022 07:01  -  09 Sep 2022 07:00  --------------------------------------------------------  IN:    Oral Fluid: 660 mL  Total IN: 660 mL    OUT:    Drain (mL): 50 mL    Incontinent per Condom Catheter (mL): 150 mL    Voided (mL): 250 mL  Total OUT: 450 mL    Total NET: 210 mL      09 Sep 2022 07:01  -  10 Sep 2022 01:16  --------------------------------------------------------  IN:    Oral Fluid: 260 mL  Total IN: 260 mL    OUT:    Drain (mL): 0 mL    Incontinent per Condom Catheter (mL): 325 mL  Total OUT: 325 mL    Total NET: -65 mL        ============================ LABS =========================                        12.9   11.71 )-----------( 210      ( 09 Sep 2022 04:09 )             38.5     -    124<L>  |  93<L>  |  21  ----------------------------<  102<H>  4.5   |  23  |  1.50<H>    Ca    8.2<L>      09 Sep 2022 04:09  Phos  2.5     -  Mg     2.3         TPro  5.6<L>  /  Alb  2.9<L>  /  TBili  0.7  /  DBili  x   /  AST  28  /  ALT  22  /  AlkPhos  61  -09                LIVER FUNCTIONS - ( 09 Sep 2022 04:09 )  Alb: 2.9 g/dL / Pro: 5.6 g/dL / ALK PHOS: 61 U/L / ALT: 22 U/L / AST: 28 U/L / GGT: x           PT/INR - ( 09 Sep 2022 04:09 )   PT: 12.0 sec;   INR: 1.04 ratio         PTT - ( 09 Sep 2022 04:09 )  PTT:27.5 sec      Urinalysis Basic - ( 09 Sep 2022 17:48 )    Color: Yellow / Appearance: Clear / S.021 / pH: x  Gluc: x / Ketone: Trace  / Bili: Negative / Urobili: Negative   Blood: x / Protein: Trace / Nitrite: Negative   Leuk Esterase: Small / RBC: 1 /hpf / WBC 11 /HPF   Sq Epi: x / Non Sq Epi: 4 /hpf / Bacteria: Negative      ======================Micro/Rad/Cardio=================  Telemtry: Reviewed   EKG: Reviewed  CXR: Reviewed  Culture: Reviewed   Echo: Reviewed     Cath:   ======================================================  PAST MEDICAL & SURGICAL HISTORY:  ETOH abuse  sober since       Seizure disorder  last seizure       BPH (benign prostatic hyperplasia)      HTN (hypertension)      Prostate CA  Dx&#x27;d , s/p radiation      Hyperlipidemia      Poor historian      S/P hernia repair  right inguinal hernia      H/O prostate cancer  10- yrs ago, s/p radiation therapy      Status post cataract extraction      H/O urethrotomy  2018        ====================ASSESSMENT ==============            Plan:  ====================CARDIOVASCULAR==================    Mechaincal Circulatory Support [ ] IABP,  [ ] Impella 2.5,  [ ] Impella CP  Settings:     ==Hemodynamics==    CVP:   PCWP:  PA S/D:   Cardiac Output:  Cardiac Index:   SVR:      ==Coronaries==    Last ischemic workup:   Antiplatelet regimen:   Anticoagulant:   Statin:   Beta blocker:      ==Pump==    LVEF:                              Regional Wall Motion Abnormaility?:  [ ]Yes   [ ] No, If Yes, Details  Diastolic function:  RV function:   Any change frim prior?: [ ] Yes   [ ] No, If Yes, Details:       ==Rhythm==    Current rhythm:  AM EKG Interpretation:   Anti-arrhythmic therapies:   TVP with settings:     hydrALAZINE 50 milliGRAM(s) Oral three times a day  losartan 50 milliGRAM(s) Oral daily  tamsulosin 0.4 milliGRAM(s) Oral at bedtime  urea Oral Powder 15 Gram(s) Oral daily      ====================== NEUROLOGY=====================    ==================== RESPIRATORY======================  Mechanical Ventilation:        albuterol/ipratropium for Nebulization 3 milliLiter(s) Nebulizer every 6 hours PRN Shortness of Breath and/or Wheezing    ===================== RENAL =========================    22 @ 07:22 @ 07:00  --------------------------------------------------------  IN: 660 mL / OUT: 450 mL / NET: 210 mL    22 @ 07:01  -  09-10-22 @ 01:16  --------------------------------------------------------  IN: 260 mL / OUT: 325 mL / NET: -65 mL      Renal Replacement Therapy:  [ ] CRRT      [ ] IHD, Last Session:    Fluid removal:     [ ] Diuretic therapy, Regimen:       ==================== GASTROINTESTINAL===================    Last BM:   Indication for Stress Ulcer Prophylaxis, [ ] Yes    [ ] No   If Yes, Medication:     bisacodyl 5 milliGRAM(s) Oral every 12 hours  polyethylene glycol 3350 17 Gram(s) Oral two times a day  senna 2 Tablet(s) Oral at bedtime    ========================INFECTIOUS DISEASE================  T(C): 36.7 (22 @ 22:00), Max: 36.7 (22 @ 22:00)  WBC Count: 11.71 K/uL (22 @ 04:09)  WBC Count: 11.88 K/uL (22 @ 12:53)  WBC Count: 9.59 K/uL (22 @ 02:59)  WBC Count: 12.98 K/uL (22 @ 06:45)      Culture - Body Fluid with Gram Stain (collected 22 @ 15:33)  Source: .Body Fluid Pericardial Fluid  Gram Stain (22 @ 02:18):    polymorphonuclear leukocytes seen    No organisms seen    by cytocentrifuge  Preliminary Report (22 @ 19:30):    No growth        Current Antibiotics with start date:       ===================HEMATOLOGIC/ONC ===================  Hemoglobin: 12.9 g/dL (22 @ 04:09)  Hemoglobin: 13.9 g/dL (22 @ 12:53)  Hemoglobin: 12.2 g/dL (22 @ 02:59)  Hemoglobin: 10.9 g/dL (22 @ 06:45)    Platelet Count - Automated: 210 K/uL (22 @ 04:09)  Platelet Count - Automated: 231 K/uL (22 @ 12:53)  Platelet Count - Automated: 221 K/uL (22 @ 02:59)  Platelet Count - Automated: 220 K/uL (22 @ 06:45)    Chemical VTE Prophylaxis:  [ ] Lovenox    [ ] SQH   [ ]NA  Systemic Anticogaulation:  [ ] Yes    [ ] No,  If Yes, Medication:     heparin   Injectable 5000 Unit(s) SubCutaneous every 8 hours    =======================    ENDOCRINE  =====================        levothyroxine 112 MICROGram(s) Oral daily  simvastatin 20 milliGRAM(s) Oral at bedtime      Patient requires continuous monitoring with bedside rhythm monitoring, pulse ox monitoring, and intermittent blood gas analysis. Care plan discussed with ICU care team. Patient remained critical and at risk for life threatening decompensation.  Patient seen, examined and plan discussed with CCU team during rounds.       I have personally provided __30__ minutes of critical care time excluding time spent on separate procedures, in addition to initial critical care time provided by the CICU Attending, Dr. Salazar/ Karen/ Brandee/ Arelis/ Aida/ Marilia.       AMBAR PICKARD  MRN-[id number]  Patient is a 77y old  Male who presents with a chief complaint of Pericardial effusion (09 Sep 2022 16:52)    HPI:  This is a 78yo old  Male with PMH of HTN, HLD,  BPH, CKD ( creat 1.42 today), chronic angelita hydronephrosis ( x2 years), prostate ca ( radiation 10 years ago), seizure disorder ( last seizure ), ETOH use ( sober since ), hypothyroidism, and mild demenia; presents to [facility] on 9/3/2022 with chief complaint of SOB and CP x3 days.  Pt has been a heavy smoker his whole life. Denies fever, chills, cough, or congestion. No sick contacts.    Admitted to tele for likely COPD exacerbation and CP.  CXR is clear. Started on Duonebs, Symbicort and Solumedrol. No prior cardiac stents. EKG on arrival to ED notes PRWP V 1-3, no ST changes. Trop high on arrival at 268 trending down 180.8 today, proBNP 763, CK 1090. TSH 69.600 ( stopped synthroid a few weeks ago), T3 <20 T4 0.9, Na 126.  CXR revealing New cardiomegaly and CT chest with Large pericardial effusion, Echo today with large pericardial effusion with early tamponade physiology. Transferred to [facility] for pericardiocentesis. On arrival to [facility] patient awake and verbal A&OX3, denies any chest pain, dyspnea, dizziness, palpitations, N&V, Ha, orthopnea, LE edema.  VS WNL, on 2 L nasal cannula in no respiratory distress.          Hospital Course:    24 HOUR EVENTS:    REVIEW OF SYSTEMS:    CONSTITUTIONAL: No weakness, fevers or chills  EYES/ENT: No visual changes;  No vertigo or throat pain   NECK: No pain or stiffness  RESPIRATORY: No cough, wheezing, hemoptysis; No shortness of breath  CARDIOVASCULAR: No chest pain or palpitations  GASTROINTESTINAL: No abdominal or epigastric pain. No nausea, vomiting, or hematemesis; No diarrhea or constipation. No melena or hematochezia.  GENITOURINARY: No dysuria, frequency or hematuria  NEUROLOGICAL: No numbness or weakness  SKIN: No itching, rashes      ICU Vital Signs Last 24 Hrs  T(C): 36.7 (09 Sep 2022 22:00), Max: 36.7 (09 Sep 2022 22:00)  T(F): 98 (09 Sep 2022 22:00), Max: 98 (09 Sep 2022 22:00)  HR: 59 (10 Sep 2022 01:00) (59 - 73)  BP: 133/77 (10 Sep 2022 01:00) (97/66 - 147/61)  BP(mean): 89 (10 Sep 2022 00:00) (74 - 104)  ABP: --  ABP(mean): --  RR: 12 (10 Sep 2022 01:00) (12 - 20)  SpO2: 95% (10 Sep 2022 01:00) (91% - 100%)    O2 Parameters below as of 10 Sep 2022 01:00  Patient On (Oxygen Delivery Method): room air          CAPILLARY BLOOD GLUCOSE          PHYSICAL EXAM:  GENERAL: No acute distress, well-developed  HEAD:  Atraumatic, Normocephalic  EYES: EOMI, PERRLA, conjunctiva and sclera clear  NECK: Supple, no lymphadenopathy, no JVD  CHEST/LUNG: CTAB; No wheezes, rales, or rhonchi  HEART: Regular rate and rhythm. Normal S1/S2. No murmurs, rubs, or gallops  ABDOMEN: Soft, non-tender, non-distended; normal bowel sounds, no organomegaly  EXTREMITIES:  2+ peripheral pulses b/l, No clubbing, cyanosis, or edema  NEUROLOGY: A&O x 3, no focal deficits  SKIN: No rashes or lesions    ============================I/O===========================   I&O's Detail    08 Sep 2022 07:01  -  09 Sep 2022 07:00  --------------------------------------------------------  IN:    Oral Fluid: 660 mL  Total IN: 660 mL    OUT:    Drain (mL): 50 mL    Incontinent per Condom Catheter (mL): 150 mL    Voided (mL): 250 mL  Total OUT: 450 mL    Total NET: 210 mL      09 Sep 2022 07:01  -  10 Sep 2022 01:16  --------------------------------------------------------  IN:    Oral Fluid: 260 mL  Total IN: 260 mL    OUT:    Drain (mL): 0 mL    Incontinent per Condom Catheter (mL): 325 mL  Total OUT: 325 mL    Total NET: -65 mL        ============================ LABS =========================                        12.9   11.71 )-----------( 210      ( 09 Sep 2022 04:09 )             38.5     -    124<L>  |  93<L>  |  21  ----------------------------<  102<H>  4.5   |  23  |  1.50<H>    Ca    8.2<L>      09 Sep 2022 04:09  Phos  2.5     -  Mg     2.3         TPro  5.6<L>  /  Alb  2.9<L>  /  TBili  0.7  /  DBili  x   /  AST  28  /  ALT  22  /  AlkPhos  61  -09      LIVER FUNCTIONS - ( 09 Sep 2022 04:09 )  Alb: 2.9 g/dL / Pro: 5.6 g/dL / ALK PHOS: 61 U/L / ALT: 22 U/L / AST: 28 U/L / GGT: x           PT/INR - ( 09 Sep 2022 04:09 )   PT: 12.0 sec;   INR: 1.04 ratio         PTT - ( 09 Sep 2022 04:09 )  PTT:27.5 sec      Urinalysis Basic - ( 09 Sep 2022 17:48 )    Color: Yellow / Appearance: Clear / S.021 / pH: x  Gluc: x / Ketone: Trace  / Bili: Negative / Urobili: Negative   Blood: x / Protein: Trace / Nitrite: Negative   Leuk Esterase: Small / RBC: 1 /hpf / WBC 11 /HPF   Sq Epi: x / Non Sq Epi: 4 /hpf / Bacteria: Negative      ======================Micro/Rad/Cardio=================  Telemtry: Reviewed   EKG: Reviewed  CXR: Reviewed  Culture: Reviewed   Echo: Reviewed     Cath:   ======================================================  PAST MEDICAL & SURGICAL HISTORY:  ETOH abuse  sober since       Seizure disorder  last seizure       BPH (benign prostatic hyperplasia)      HTN (hypertension)      Prostate CA  Dx&#x27;d , s/p radiation      Hyperlipidemia      Poor historian      S/P hernia repair  right inguinal hernia      H/O prostate cancer  10- yrs ago, s/p radiation therapy      Status post cataract extraction      H/O urethrotomy  2018        ====================ASSESSMENT ==============            Plan:  ====================CARDIOVASCULAR==================  Cardiac Tamponade  -  TTE: EF 70%. Mod concentric LVH. Normal RV size and function. Large pericardial effusion with evidence of echo tamponade physiology.   - s/p pericardiocentesis with drain placement, 1060mL out serosanguinous.   - as per lights criteria- meets criteria of exudative fluid. f/u pericardial fluid studies including cytology. Gram stain negative for organism   - Patient remained HD stable at [facility] prior to transfer (as per documentation: HRs 60-70s; SBPs 150-170s, Lying flat off with O2 sats 99%). Continue to monitor vital signs   - trop I downtrending at OSH prior to transfer  - monitor drain output. Daily TTE while in place  - Cancer screening: Last colonoscopy >10 years ago (states went last year for colonoscopy but procedure aborted 2/2 HTN outpatient and never rescheduled). Remote history of Prostate CA, follows with urologist but unsure of name. waiting for patient sister to provide collateral info     HTN  - on home losartan 100mg and hydral 50mg.  - reinitiated on home meds yesterday as hypertensive upon presentation to CICU. This morning patient with softer BP (asymptomatic). Will decrease losartan dose 100mg-> 50mg     HLD  - c/w home simvastatin       hydrALAZINE 50 milliGRAM(s) Oral three times a day  losartan 50 milliGRAM(s) Oral daily  tamsulosin 0.4 milliGRAM(s) Oral at bedtime  urea Oral Powder 15 Gram(s) Oral daily      ====================== NEUROLOGY=====================A&Ox3  - continue to monitor mental status as per protocol    Seizure disorder  - No seziure meds reported.  sister confirmed patient not on any seizure meds      ==================== RESPIRATORY======================  Comfortable on RA  - SOB on admission likely 2/2 pericardial tamponade. Patient feels improvement s/p drain placement   - Monitor SpO2 with goal >94%  -  CT chest: Large pericardial effusion. Small bilateral pleural effusions. Bilateral hydronephrosis.    COPD  - duonebs prn   - started on levofloxacin outpatient for concern of COPD exacerbation/PNA. Will d/c for now. Likely sob related to pericardial effusion (states SOB improved s/p drain placement)   - provided smoking cessation education. Start nicotine patch while inpatient (states quit last week, smokes 4-5 cig/daily)   - C/w bronchodilators       albuterol/ipratropium for Nebulization 3 milliLiter(s) Nebulizer every 6 hours PRN Shortness of Breath and/or Wheezing    ===================== RENAL =========================  BPH  - continue home tamsulosin     Remote Hx Prostate Ca  - f.u with urology, patient unsure of name. will f/u with sister for collateral info.   - states in remission for >20 years     Hyponatremia  - Na on presentation 125, remains at 124 today. Will send urine lytes/workup   - Patient mentating appropriately   - Euvolemic appearing on exam. Could be 2/2 hypothyroidism   -started on UreNa 15gm daily    CKD  - Baseline SCr looks to be around 1.5-1.7 base upon prior SCr trend in . SCr this AM 1.42  - monitor and trend SCr, BUN, and lytes  - replete lytes prn with goal K 4-4.5 and Mg >2    Hydronephrosis on CT   - renal US ordered   - Urinating currently with bladder scan of 120mL. Also with b/l hydronephrosis back in 2020 with history of chronic hydronephrosis. SCr at baseline. Will also get collateral info from outpatient urologist once patient sister updates with info       22 @ 07:01  -  22 @ 07:00  --------------------------------------------------------  IN: 660 mL / OUT: 450 mL / NET: 210 mL    22 @ 07:01  -  09-10-22 @ 01:16  --------------------------------------------------------  IN: 260 mL / OUT: 325 mL / NET: -65 mL      Renal Replacement Therapy:  [ ] CRRT      [ ] IHD, Last Session:    Fluid removal:     [ ] Diuretic therapy, Regimen:       ==================== GASTROINTESTINAL===================  - Tolerating PO DASH/TLC diet.  - monitor for regular BM while inpatient. Last BM 4 days ago.   -Bowel regimen with Miralax and Senna     Last BM:   Indication for Stress Ulcer Prophylaxis, [ ] Yes    [ ] No   If Yes, Medication:     bisacodyl 5 milliGRAM(s) Oral every 12 hours  polyethylene glycol 3350 17 Gram(s) Oral two times a day  senna 2 Tablet(s) Oral at bedtime    ========================INFECTIOUS DISEASE================  No leukocytosis, afebrile  - continue to monitor and trend wbc and temp curve    T(C): 36.7 (22 @ 22:00), Max: 36.7 (22 @ 22:00)  WBC Count: 11.71 K/uL (22 @ 04:09)  WBC Count: 11.88 K/uL (22 @ 12:53)  WBC Count: 9.59 K/uL (22 @ 02:59)  WBC Count: 12.98 K/uL (22 @ 06:45)      Culture - Body Fluid with Gram Stain (collected 22 @ 15:33)  Source: .Body Fluid Pericardial Fluid  Gram Stain (22 @ 02:18):    polymorphonuclear leukocytes seen    No organisms seen    by cytocentrifuge  Preliminary Report (22 @ 19:30):    No growth        Current Antibiotics with start date:       ===================HEMATOLOGIC/ONC ===================  Normocytic anemia  - continue to monitor and trend CBC.   - Likely 2/2 anemia of chronic disease 2/2 CKD. If continues to downtrend can consider anemia workup     Hemoglobin: 12.9 g/dL (22 @ 04:09)  Hemoglobin: 13.9 g/dL (22 @ 12:53)  Hemoglobin: 12.2 g/dL (22 @ 02:59)  Hemoglobin: 10.9 g/dL (22 @ 06:45)    Platelet Count - Automated: 210 K/uL (22 @ 04:09)  Platelet Count - Automated: 231 K/uL (22 @ 12:53)  Platelet Count - Automated: 221 K/uL (22 @ 02:59)  Platelet Count - Automated: 220 K/uL (22 @ 06:45)    Chemical VTE Prophylaxis:  [ ] Lovenox    [ ] SQH   [ ]NA  Systemic Anticogaulation:  [ ] Yes    [ ] No,  If Yes, Medication:     heparin   Injectable 5000 Unit(s) SubCutaneous every 8 hours    =======================    ENDOCRINE  =====================  - f/u hgb a1c and lipid panel     Hypothyroidism   - TSH elevated with low T3 and T4  - will consult endo for recs  - on home synthroid 112 mcg, may have missed a few doses (waiting upon refill). Spoke to endo- states to resume home dose of 112mcg and repeat free T4 in 5-7 days.       levothyroxine 112 MICROGram(s) Oral daily  simvastatin 20 milliGRAM(s) Oral at bedtime      Patient requires continuous monitoring with bedside rhythm monitoring, pulse ox monitoring, and intermittent blood gas analysis. Care plan discussed with ICU care team. Patient remained critical and at risk for life threatening decompensation.  Patient seen, examined and plan discussed with CCU team during rounds.       I have personally provided __30__ minutes of critical care time excluding time spent on separate procedures, in addition to initial critical care time provided by the CICU Attending, Dr. Irvin

## 2022-09-09 NOTE — PROGRESS NOTE ADULT - CRITICAL CARE ATTENDING COMMENT
78yo old  Male with PMH of HTN, HLD,  BPH, CKD ( creat 1.42 today), chronic angelita hydronephrosis ( x2 years), prostate ca ( radiation 10 years ago), seizure disorder ( last seizure 2015), ETOH use ( sober since 2016), hypothyroidism, and mild dementia; presents to [facility] on 9/3/2022 with chief complaint of SOB and CP x3 days.  Pt has been a heavy smoker his whole life. Denies fever, chills, cough, or congestion. No sick contacts.      Admitted to tele at [facility] for likely COPD exacerbation and CP.  CXR is clear. Started on Duonebs, Symbicort and Solumedrol. No prior cardiac stents. EKG on arrival to ED notes PRWP V 1-3, no ST changes. Trop high on arrival at 268 trending down 180.8 today, proBNP 763, CK 1090. TSH 69.600 ( stopped synthroid a few weeks ago), T3 <20 T4 0.9, Na 126.    CXR revealing New cardiomegaly and CT chest with Large pericardial effusion, Echo today with large pericardial effusion with early tamponade physiology.   - Transferred  for pericardiocentesis. removed serosanguinous fluid  -  On arrival to CICU patient awake and verbal A&OX3  - overnight drained 500 cc  - repeat TTE  - follow up on fluid studies  - pt is not up to date with his preventative malignant work up  - h/o prostate cancer, chronic smoker, etc    Patient is critically ill, requiring critical care services. Despite the current condition, the patient is at a high risk of clinical and hemodynamic demise 76yo old  Male with PMH of HTN, HLD,  BPH, CKD ( creat 1.42 today), chronic angelita hydronephrosis ( x2 years), prostate ca ( radiation 10 years ago), seizure disorder ( last seizure 2015), ETOH use ( sober since 2016), hypothyroidism, and mild dementia; presents to [facility] on 9/3/2022 with chief complaint of SOB and CP x3 days.  Pt has been a heavy smoker his whole life. Denies fever, chills, cough, or congestion. No sick contacts.      Admitted to tele at formerly [facility] for likely COPD exacerbation and CP.  CXR is clear. Started on Duonebs, Symbicort and Solumedrol. No prior cardiac stents. EKG on arrival to ED notes PRWP V 1-3, no ST changes. Trop high on arrival at 268 trending down 180.8 today, proBNP 763, CK 1090. TSH 69.600 ( stopped synthroid a few weeks ago), T3 <20 T4 0.9, Na 126.    CXR revealing New cardiomegaly and CT chest with Large pericardial effusion, Echo today with large pericardial effusion with early tamponade physiology.   - Transferred  for pericardiocentesis. removed serosanguinous fluid - 150 cc overnight - not ready for removal  -  On arrival to CICU patient awake and verbal A&OX3  - repeat TTE  - follow up on fluid studies  - pt is not up to date with his preventative malignant work up  - h/o prostate cancer, chronic smoker, etc    Patient is critically ill, requiring critical care services. Despite the current condition, the patient is at a high risk of clinical and hemodynamic demise

## 2022-09-09 NOTE — CONSULT NOTE ADULT - PROBLEM SELECTOR RECOMMENDATION 2
Pt with h/o stage 3 CKD in the setting of chronic BL hydronephrosis. Upon review of [facility], baseline SCr ius ~1.4-1.6. SCr is currently 1.50. Renal US showing interval improvement in BL hydronephrosis. Recommend to obtain UA. Monitor labs and urine output. Avoid nephrotoxins. Dose medications as per eGFR.     If you have any questions, please feel free to contact me  Kaylee Rdz  Nephrology Fellow  [phone] / [employer] Teams(Preferred)  (After 5pm or on weekends please page the on-call fellow)

## 2022-09-10 LAB
ALBUMIN SERPL ELPH-MCNC: 3.1 G/DL — LOW (ref 3.3–5)
ALP SERPL-CCNC: 75 U/L — SIGNIFICANT CHANGE UP (ref 40–120)
ALT FLD-CCNC: 23 U/L — SIGNIFICANT CHANGE UP (ref 10–45)
ANION GAP SERPL CALC-SCNC: 9 MMOL/L — SIGNIFICANT CHANGE UP (ref 5–17)
AST SERPL-CCNC: 26 U/L — SIGNIFICANT CHANGE UP (ref 10–40)
BASOPHILS # BLD AUTO: 0.02 K/UL — SIGNIFICANT CHANGE UP (ref 0–0.2)
BASOPHILS NFR BLD AUTO: 0.1 % — SIGNIFICANT CHANGE UP (ref 0–2)
BILIRUB SERPL-MCNC: 0.5 MG/DL — SIGNIFICANT CHANGE UP (ref 0.2–1.2)
BUN SERPL-MCNC: 40 MG/DL — HIGH (ref 7–23)
CALCIUM SERPL-MCNC: 8.5 MG/DL — SIGNIFICANT CHANGE UP (ref 8.4–10.5)
CHLORIDE SERPL-SCNC: 91 MMOL/L — LOW (ref 96–108)
CO2 SERPL-SCNC: 24 MMOL/L — SIGNIFICANT CHANGE UP (ref 22–31)
CREAT SERPL-MCNC: 1.59 MG/DL — HIGH (ref 0.5–1.3)
EGFR: 44 ML/MIN/1.73M2 — LOW
EOSINOPHIL # BLD AUTO: 0.02 K/UL — SIGNIFICANT CHANGE UP (ref 0–0.5)
EOSINOPHIL NFR BLD AUTO: 0.1 % — SIGNIFICANT CHANGE UP (ref 0–6)
GLUCOSE SERPL-MCNC: 109 MG/DL — HIGH (ref 70–99)
HCT VFR BLD CALC: 39.2 % — SIGNIFICANT CHANGE UP (ref 39–50)
HGB BLD-MCNC: 13.1 G/DL — SIGNIFICANT CHANGE UP (ref 13–17)
IMM GRANULOCYTES NFR BLD AUTO: 0.9 % — SIGNIFICANT CHANGE UP (ref 0–1.5)
LYMPHOCYTES # BLD AUTO: 0.76 K/UL — LOW (ref 1–3.3)
LYMPHOCYTES # BLD AUTO: 5 % — LOW (ref 13–44)
MAGNESIUM SERPL-MCNC: 2.4 MG/DL — SIGNIFICANT CHANGE UP (ref 1.6–2.6)
MCHC RBC-ENTMCNC: 30.1 PG — SIGNIFICANT CHANGE UP (ref 27–34)
MCHC RBC-ENTMCNC: 33.4 GM/DL — SIGNIFICANT CHANGE UP (ref 32–36)
MCV RBC AUTO: 90.1 FL — SIGNIFICANT CHANGE UP (ref 80–100)
MONOCYTES # BLD AUTO: 0.72 K/UL — SIGNIFICANT CHANGE UP (ref 0–0.9)
MONOCYTES NFR BLD AUTO: 4.7 % — SIGNIFICANT CHANGE UP (ref 2–14)
NEUTROPHILS # BLD AUTO: 13.61 K/UL — HIGH (ref 1.8–7.4)
NEUTROPHILS NFR BLD AUTO: 89.2 % — HIGH (ref 43–77)
NRBC # BLD: 0 /100 WBCS — SIGNIFICANT CHANGE UP (ref 0–0)
PHOSPHATE SERPL-MCNC: 2.4 MG/DL — LOW (ref 2.5–4.5)
PLATELET # BLD AUTO: 205 K/UL — SIGNIFICANT CHANGE UP (ref 150–400)
POTASSIUM SERPL-MCNC: 4.3 MMOL/L — SIGNIFICANT CHANGE UP (ref 3.5–5.3)
POTASSIUM SERPL-SCNC: 4.3 MMOL/L — SIGNIFICANT CHANGE UP (ref 3.5–5.3)
PROT SERPL-MCNC: 6.2 G/DL — SIGNIFICANT CHANGE UP (ref 6–8.3)
RBC # BLD: 4.35 M/UL — SIGNIFICANT CHANGE UP (ref 4.2–5.8)
RBC # FLD: 17.5 % — HIGH (ref 10.3–14.5)
SODIUM SERPL-SCNC: 124 MMOL/L — LOW (ref 135–145)
UUN UR-MCNC: 1049 MG/DL — SIGNIFICANT CHANGE UP
WBC # BLD: 15.26 K/UL — HIGH (ref 3.8–10.5)
WBC # FLD AUTO: 15.26 K/UL — HIGH (ref 3.8–10.5)

## 2022-09-10 PROCEDURE — 93308 TTE F-UP OR LMTD: CPT | Mod: 26

## 2022-09-10 PROCEDURE — 99291 CRITICAL CARE FIRST HOUR: CPT

## 2022-09-10 PROCEDURE — 93010 ELECTROCARDIOGRAM REPORT: CPT

## 2022-09-10 PROCEDURE — 99233 SBSQ HOSP IP/OBS HIGH 50: CPT | Mod: GC

## 2022-09-10 PROCEDURE — 93321 DOPPLER ECHO F-UP/LMTD STD: CPT | Mod: 26

## 2022-09-10 PROCEDURE — 99292 CRITICAL CARE ADDL 30 MIN: CPT

## 2022-09-10 RX ORDER — UREA 15 G
15 POWDER IN PACKET (EA) ORAL
Refills: 0 | Status: DISCONTINUED | OUTPATIENT
Start: 2022-09-10 | End: 2022-09-16

## 2022-09-10 RX ORDER — SIMETHICONE 80 MG/1
80 TABLET, CHEWABLE ORAL
Refills: 0 | Status: DISCONTINUED | OUTPATIENT
Start: 2022-09-10 | End: 2022-09-20

## 2022-09-10 RX ADMIN — Medication 50 MILLIGRAM(S): at 05:39

## 2022-09-10 RX ADMIN — HEPARIN SODIUM 5000 UNIT(S): 5000 INJECTION INTRAVENOUS; SUBCUTANEOUS at 21:28

## 2022-09-10 RX ADMIN — Medication 5 MILLIGRAM(S): at 05:40

## 2022-09-10 RX ADMIN — LOSARTAN POTASSIUM 50 MILLIGRAM(S): 100 TABLET, FILM COATED ORAL at 05:39

## 2022-09-10 RX ADMIN — POLYETHYLENE GLYCOL 3350 17 GRAM(S): 17 POWDER, FOR SOLUTION ORAL at 05:40

## 2022-09-10 RX ADMIN — SENNA PLUS 2 TABLET(S): 8.6 TABLET ORAL at 21:28

## 2022-09-10 RX ADMIN — Medication 112 MICROGRAM(S): at 05:40

## 2022-09-10 RX ADMIN — POLYETHYLENE GLYCOL 3350 17 GRAM(S): 17 POWDER, FOR SOLUTION ORAL at 18:00

## 2022-09-10 RX ADMIN — Medication 50 MILLIGRAM(S): at 21:28

## 2022-09-10 RX ADMIN — CHLORHEXIDINE GLUCONATE 1 APPLICATION(S): 213 SOLUTION TOPICAL at 05:45

## 2022-09-10 RX ADMIN — Medication 1 PATCH: at 07:22

## 2022-09-10 RX ADMIN — Medication 1 PATCH: at 13:37

## 2022-09-10 RX ADMIN — Medication 50 MILLIGRAM(S): at 13:36

## 2022-09-10 RX ADMIN — Medication 1 PATCH: at 20:44

## 2022-09-10 RX ADMIN — HEPARIN SODIUM 5000 UNIT(S): 5000 INJECTION INTRAVENOUS; SUBCUTANEOUS at 13:36

## 2022-09-10 RX ADMIN — Medication 5 MILLIGRAM(S): at 18:00

## 2022-09-10 RX ADMIN — Medication 15 GRAM(S): at 18:00

## 2022-09-10 RX ADMIN — TAMSULOSIN HYDROCHLORIDE 0.4 MILLIGRAM(S): 0.4 CAPSULE ORAL at 21:28

## 2022-09-10 RX ADMIN — Medication 1 PATCH: at 12:01

## 2022-09-10 RX ADMIN — HEPARIN SODIUM 5000 UNIT(S): 5000 INJECTION INTRAVENOUS; SUBCUTANEOUS at 05:40

## 2022-09-10 RX ADMIN — SIMVASTATIN 20 MILLIGRAM(S): 20 TABLET, FILM COATED ORAL at 21:28

## 2022-09-10 NOTE — PROGRESS NOTE ADULT - CRITICAL CARE ATTENDING COMMENT
76yo old  Male with PMH of HTN, HLD,  BPH, CKD ( creat 1.42 today), chronic angelita hydronephrosis ( x2 years), prostate ca ( radiation 10 years ago), seizure disorder ( last seizure 2015), ETOH use ( sober since 2016), hypothyroidism, and mild dementia; presents to [facility] on 9/3/2022 with chief complaint of SOB and CP x3 days.  Pt has been a heavy smoker his whole life. Denies fever, chills, cough, or congestion. No sick contacts.      Admitted to tele at [facility] for likely COPD exacerbation and CP.  CXR is clear. Started on Duonebs, Symbicort and Solumedrol. No prior cardiac stents. EKG on arrival to ED notes PRWP V 1-3, no ST changes. Trop high on arrival at 268 trending down 180.8 today, proBNP 763, CK 1090. TSH 69.600 ( stopped synthroid a few weeks ago), T3 <20 T4 0.9, Na 126.    CXR revealing New cardiomegaly and CT chest with Large pericardial effusion, Echo today with large pericardial effusion with early tamponade physiology.   - Transferred  for pericardiocentesis. removed serosanguinous fluid - 150 cc overnight - not ready for removal  -  On arrival to CICU patient awake and verbal A&OX3  - repeat TTE  - follow up on fluid studies  - pt is not up to date with his preventative malignant work up  - h/o prostate cancer, chronic smoker, etc    Patient is critically ill, requiring critical care services. Despite the current condition, the patient is at a high risk of clinical and hemodynamic demise 76yo old  Male with PMH of HTN, HLD,  BPH, CKD ( creat 1.42 today), chronic angelita hydronephrosis ( x2 years), prostate ca ( radiation 10 years ago), seizure disorder ( last seizure 2015), ETOH use ( sober since 2016), hypothyroidism, and mild dementia; presents to [facility] on 9/3/2022 with chief complaint of SOB and CP x3 days.  Pt has been a heavy smoker his whole life. Denies fever, chills, cough, or congestion. No sick contacts.      Admitted to tele at [facility] for likely COPD exacerbation and CP.  CXR is clear. Started on Duonebs, Symbicort and Solumedrol. No prior cardiac stents. EKG on arrival to ED notes PRWP V 1-3, no ST changes. Trop high on arrival at 268 trending down 180.8 today, proBNP 763, CK 1090. TSH 69.600 ( stopped synthroid a few weeks ago), T3 <20 T4 0.9, Na 126.    CXR revealing New cardiomegaly and CT chest with Large pericardial effusion  - Transferred  for pericardiocentesis. removed serosanguinous fluid - 150 cc overnight - not ready for removal  -  On arrival to CICU patient awake and verbal A&OX3  - repeat TTE to assess the effusion  - minimal output via the drain  - follow up on fluid studies - cyto logy negative for malignancy  - pt is not up to date with his preventative malignant work up  - h/o prostate cancer, chronic smoker, etc    Patient is critically ill, requiring critical care services. Despite the current condition, the patient is at a high risk of clinical and hemodynamic demise 78yo old  Male with PMH of HTN, HLD,  BPH, CKD ( creat 1.42 today), chronic angelita hydronephrosis ( x2 years), prostate ca ( radiation 10 years ago), seizure disorder ( last seizure 2015), ETOH use ( sober since 2016), hypothyroidism, and mild dementia; presents to [facility] on 9/3/2022 with chief complaint of SOB and CP x3 days.  Pt has been a heavy smoker his whole life. Denies fever, chills, cough, or congestion. No sick contacts.      Admitted to tele at [facility] for likely COPD exacerbation and CP.  CXR is clear. Started on Duonebs, Symbicort and Solumedrol. No prior cardiac stents. EKG on arrival to ED notes PRWP V 1-3, no ST changes. Trop high on arrival at 268 trending down 180.8 today, pro-, CK 1090. TSH 69.600 ( stopped synthroid a few weeks ago), T3 <20 T4 0.9, Na 126.    CXR revealing New cardiomegaly and CT chest with Large pericardial effusion  - Transferred  for pericardiocentesis. removed serosanguinous fluid - 150 cc overnight - not ready for removal  -  On arrival to CICU patient awake and verbal A&OX3  - repeat TTE to assess the effusion  - minimal output via the drain  - follow up on fluid studies - cyto logy negative for malignancy  - pt is not up to date with his preventative malignant work up  - h/o prostate cancer, chronic smoker, etc    Patient is critically ill, requiring critical care services. Despite the current condition, the patient is at a high risk of clinical and hemodynamic demise

## 2022-09-10 NOTE — PROGRESS NOTE ADULT - ATTENDING COMMENTS
as above   increase urea dose     curtis fleming  nephrology attending   please contact me on TEAMS   Office- [phone]

## 2022-09-10 NOTE — PROGRESS NOTE ADULT - SUBJECTIVE AND OBJECTIVE BOX
HPI:  This is a 78yo old  Male with PMH of HTN, HLD,  BPH, CKD ( creat 1.42 today), chronic angelita hydronephrosis ( x2 years), prostate ca ( radiation 10 years ago), seizure disorder ( last seizure ), ETOH use ( sober since 2016), hypothyroidism, and mild demenia; presents to [facility] on 9/3/2022 with chief complaint of SOB and CP x3 days.  Pt has been a heavy smoker his whole life. Denies fever, chills, cough, or congestion. No sick contacts.    Admitted to tele for likely COPD exacerbation and CP.  CXR is clear. Started on Duonebs, Symbicort and Solumedrol. No prior cardiac stents. EKG on arrival to ED notes PRWP V 1-3, no ST changes. Trop high on arrival at 268 trending down 180.8 today, proBNP 763, CK 1090. TSH 69.600 ( stopped synthroid a few weeks ago), T3 <20 T4 0.9, Na 126.  CXR revealing New cardiomegaly and CT chest with Large pericardial effusion, Echo today with large pericardial effusion with early tamponade physiology. Transferred to [facility] for pericardiocentesis. On arrival to [facility] patient awake and verbal A&OX3, denies any chest pain, dyspnea, dizziness, palpitations, N&V, Ha, orthopnea, LE edema.  VS WNL, on 2 L nasal cannula in no respiratory distress.      recs from [facility]:  home with home PT    INTERVAL HISTORY:  - no acute events    MEDICATIONS  (STANDING):  bisacodyl 5 milliGRAM(s) Oral every 12 hours  chlorhexidine 4% Liquid 1 Application(s) Topical <User Schedule>  heparin   Injectable 5000 Unit(s) SubCutaneous every 8 hours  hydrALAZINE 50 milliGRAM(s) Oral three times a day  levothyroxine 112 MICROGram(s) Oral daily  losartan 50 milliGRAM(s) Oral daily  nicotine -  14 mG/24Hr(s) Patch 1 Patch Transdermal daily  polyethylene glycol 3350 17 Gram(s) Oral two times a day  senna 2 Tablet(s) Oral at bedtime  simvastatin 20 milliGRAM(s) Oral at bedtime  tamsulosin 0.4 milliGRAM(s) Oral at bedtime  urea Oral Powder 15 Gram(s) Oral daily    MEDICATIONS  (PRN):  albuterol/ipratropium for Nebulization 3 milliLiter(s) Nebulizer every 6 hours PRN Shortness of Breath and/or Wheezing      Objective:  ICU Vital Signs Last 24 Hrs  T(C): 36.4 (10 Sep 2022 06:00), Max: 36.7 (09 Sep 2022 22:00)  T(F): 97.6 (10 Sep 2022 06:00), Max: 98 (09 Sep 2022 22:00)  HR: 63 (10 Sep 2022 06:00) (56 - 69)  BP: 115/76 (10 Sep 2022 06:00) (97/66 - 136/80)  BP(mean): 92 (10 Sep 2022 06:) (74 - 104)  RR: 14 (10 Sep 2022 06:) (12 - 20)  SpO2: 97% (10 Sep 2022 06:) (91% - 100%)    O2 Parameters below as of 10 Sep 2022 06:00  Patient On (Oxygen Delivery Method): room air         @ 07:  -   @ 07:00  --------------------------------------------------------  IN: 660 mL / OUT: 450 mL / NET: 210 mL     @ 07:  -  09-10 @ 06:51  --------------------------------------------------------  IN: 380 mL / OUT: 525 mL / NET: -145 mL      Daily Weight in k (10 Sep 2022 05:00)        LABS:             13.1   15.26 )-----------( 205      ( 10 Sep 2022 03:50 )             39.2     09-10    124<L>  |  91<L>  |  40<H>  ----------------------------<  109<H>  4.3   |  24  |  1.59<H>    Ca    8.5      10 Sep 2022 03:50  Phos  2.4     09-10  Mg     2.4     -10    TPro  6.2  /  Alb  3.1<L>  /  TBili  0.5  /  DBili  x   /  AST  26  /  ALT  23  /  AlkPhos  75  09-10    LIVER FUNCTIONS - ( 10 Sep 2022 03:50 )  Alb: 3.1 g/dL / Pro: 6.2 g/dL / ALK PHOS: 75 U/L / ALT: 23 U/L / AST: 26 U/L / GGT: x           PT/INR - ( 09 Sep 2022 04:09 )   PT: 12.0 sec;   INR: 1.04 ratio         PTT - ( 09 Sep 2022 04:09 )  PTT:27.5 sec    Urinalysis Basic - ( 09 Sep 2022 17:48 )    Color: Yellow / Appearance: Clear / S.021 / pH: x  Gluc: x / Ketone: Trace  / Bili: Negative / Urobili: Negative   Blood: x / Protein: Trace / Nitrite: Negative   Leuk Esterase: Small / RBC: 1 /hpf / WBC 11 /HPF   Sq Epi: x / Non Sq Epi: 4 /hpf / Bacteria: Negative      ====================ASSESSMENT ==============  77M with PMH of HTN, HLD,  BPH, CKD ( creat 1.42 today), chronic angelita hydronephrosis ( x2 years), prostate ca ( radiation 10 years ago), seizure disorder ( last seizure ), ETOH use ( sober since ), hypothyroidism, and mild demenia p/w CP and SOB found with large pericardial effusion s/p pericardiocentesis.     ====================Plan ==============  #Neuro   A&Ox3  - continue to monitor mental status as per protocol    Seizure disorder  - No seziure meds reported. sister confirmed no additional medication bottles at home     #Resp  Comfortable on RA  - SOB on admission likely 2/2 pericardial tamponade. Patient feels improvement s/p drain placement   - Monitor SpO2 with goal >94%  -  CT chest: Large pericardial effusion. Small bilateral pleural effusions. Bilateral hydronephrosis.    COPD  - duonebs prn   - started on levofloxacin outpatient for concern of COPD exacerbation/PNA. Will d/c for now. Likely sob related to pericardial effusion (states SOB improved s/p drain placement)   - provided smoking cessation education. Start nicotine patch while inpatient (states quit 1 week prior to admission, smokes 4-5 cig/daily)     #CV  Cardiac Tamponade  -  TTE: EF 70%. Mod concentric LVH. Normal RV size and function. Large pericardial effusion with evidence of echo tamponade physiology.   - s/p pericardiocentesis with drain placement, 1060mL out serosanguinous.   - f/u pericardial fluid studies. as per lights criteria- meets criteria of exudative fluid. Gram stain negative for organism. Cytology NEGATIVE FOR MALIGNANT CELLS  - Patient remained HD stable at [facility] prior to transfer (as per documentation: HRs 60-70s; SBPs 150-170s, Lying flat off with O2 sats 99%). Continue to monitor vital signs   - trop I downtrending at OSH prior to transfer  - monitor drain output. Daily TTE while in place. Once <50mL/24hrs, can d/c drain. Last 24hrs had 150mL of output   - Cancer screening: Last colonoscopy >10 years ago (states went last year for colonoscopy but procedure aborted 2/2 HTN outpatient and never rescheduled). Remote history of Prostate CA, follows with urologist but unsure of name. waiting for patient sister to provide collateral info     HTN  - on home losartan 100mg and hydral 50mg.  - reinitiated on home meds yesterday as hypertensive upon presentation to CICU. This morning patient with softer BP (asymptomatic). Will decrease losartan dose 100mg-> 50mg     HLD  - c/w home simvastatin     #GI  - start diet as tolerated  - monitor for regular BM while inpatient. Last BM 5 days ago  - c/w miralax, senna, dulcolax   - Will start laculose q6hrs until BM     #  BPH  - continue home tamsulosin     Remote Hx Prostate Ca  - f.u with urology, patient unsure of name. will f/u with sister for collateral info.   - states in remission for >20 years     Hyponatremia  - Na on presentation 125, remains at 125 today. Will send urine lytes/workup   - Patient mentating appropriately   - Euvolemic appearing on exam. Likely 2/2 hypothyroidism   - will consult nephro     CKD  - Baseline SCr looks to be around 1.5-1.7 base upon prior SCr trend in 2020. SCr this AM 1.42  - monitor and trend SCr, BUN, and lytes  - replete lytes prn with goal K 4-4.5 and Mg >2    Hydronephrosis on CT   - renal US: Mild right hydronephrosis, improved since 2022. Poor visualization of the left kidney. No gross left hydronephrosis, which has improved or resolved since 2022. Urinary bladder volume of 136 mL. The patient had no urge to void during the exam.  - Urinating currently with bladder scan of 120mL. Also with b/l hydronephrosis back in  with history of chronic hydronephrosis. SCr at baseline. Will also get collateral info from outpatient urologist once patient sister updates with info (left two voicemail messages for sister with no return of phone call)    #Endo  - hgb a1c 5.7    Hypothyroidism   - TSH elevated with low T3 and T4  - will consult endo for recs  - on home synthroid 112 mcg, may have missed a few doses (waiting upon refill). As per endo- resume home dose of 112mcg and repeat free T4 in 5-7 days.     #Heme  DVT PPX: Start SQH  Normocytic anemia  - continue to monitor and trend CBC.   - Likely 2/2 anemia of chronic disease 2/2 CKD. If downtrends, can consider anemia workup     #ID  No leukocytosis, afebrile  - continue to monitor and trend wbc and temp curve          Patient requires continuous monitoring with bedside rhythm monitoring, arterial line, pulse oximetry, ventilator monitoring and intermittent blood gas analysis.  Care plan discussed with ICU care team.  Patient remained critical; required more than usual post op care; I have spent 35 minutes providing non-routine post op care, in addition to initial critical time provided by CICU attending Dr. Irvin, re-evaluated multiple times during the day. HPI:  This is a 78yo old  Male with PMH of HTN, HLD,  BPH, CKD ( creat 1.42 today), chronic angelita hydronephrosis ( x2 years), prostate ca ( radiation 10 years ago), seizure disorder ( last seizure ), ETOH use ( sober since 2016), hypothyroidism, and mild demenia; presents to [facility] on 9/3/2022 with chief complaint of SOB and CP x3 days.  Pt has been a heavy smoker his whole life. Denies fever, chills, cough, or congestion. No sick contacts.    Admitted to tele for likely COPD exacerbation and CP.  CXR is clear. Started on Duonebs, Symbicort and Solumedrol. No prior cardiac stents. EKG on arrival to ED notes PRWP V 1-3, no ST changes. Trop high on arrival at 268 trending down 180.8 today, proBNP 763, CK 1090. TSH 69.600 ( stopped synthroid a few weeks ago), T3 <20 T4 0.9, Na 126.  CXR revealing New cardiomegaly and CT chest with Large pericardial effusion, Echo today with large pericardial effusion with early tamponade physiology. Transferred to [facility] for pericardiocentesis. On arrival to [facility] patient awake and verbal A&OX3, denies any chest pain, dyspnea, dizziness, palpitations, N&V, Ha, orthopnea, LE edema.  VS WNL, on 2 L nasal cannula in no respiratory distress.      recs from [facility]:  home with home PT    INTERVAL HISTORY:  - no acute events. No pericardial drain output last 24 hours.    MEDICATIONS  (STANDING):  bisacodyl 5 milliGRAM(s) Oral every 12 hours  chlorhexidine 4% Liquid 1 Application(s) Topical <User Schedule>  heparin   Injectable 5000 Unit(s) SubCutaneous every 8 hours  hydrALAZINE 50 milliGRAM(s) Oral three times a day  levothyroxine 112 MICROGram(s) Oral daily  losartan 50 milliGRAM(s) Oral daily  nicotine -  14 mG/24Hr(s) Patch 1 Patch Transdermal daily  polyethylene glycol 3350 17 Gram(s) Oral two times a day  senna 2 Tablet(s) Oral at bedtime  simvastatin 20 milliGRAM(s) Oral at bedtime  tamsulosin 0.4 milliGRAM(s) Oral at bedtime  urea Oral Powder 15 Gram(s) Oral daily    MEDICATIONS  (PRN):  albuterol/ipratropium for Nebulization 3 milliLiter(s) Nebulizer every 6 hours PRN Shortness of Breath and/or Wheezing      Objective:  ICU Vital Signs Last 24 Hrs  T(C): 36.4 (10 Sep 2022 06:00), Max: 36.7 (09 Sep 2022 22:00)  T(F): 97.6 (10 Sep 2022 06:00), Max: 98 (09 Sep 2022 22:00)  HR: 63 (10 Sep 2022 06:00) (56 - 69)  BP: 115/76 (10 Sep 2022 06:00) (97/66 - 136/80)  BP(mean): 92 (10 Sep 2022 06:00) (74 - 104)  RR: 14 (10 Sep 2022 06:00) (12 - 20)  SpO2: 97% (10 Sep 2022 06:00) (91% - 100%)    O2 Parameters below as of 10 Sep 2022 06:00  Patient On (Oxygen Delivery Method): room air         @ 07:  -   @ 07:00  --------------------------------------------------------  IN: 660 mL / OUT: 450 mL / NET: 210 mL     @ 07:01  -  10 @ 06:51  --------------------------------------------------------  IN: 380 mL / OUT: 525 mL / NET: -145 mL      Daily Weight in k (10 Sep 2022 05:00)        LABS:             13.1   15.26 )-----------( 205      ( 10 Sep 2022 03:50 )             39.2     09-10    124<L>  |  91<L>  |  40<H>  ----------------------------<  109<H>  4.3   |  24  |  1.59<H>    Ca    8.5      10 Sep 2022 03:50  Phos  2.4     -10  Mg     2.4     -10    TPro  6.2  /  Alb  3.1<L>  /  TBili  0.5  /  DBili  x   /  AST  26  /  ALT  23  /  AlkPhos  75  -10    LIVER FUNCTIONS - ( 10 Sep 2022 03:50 )  Alb: 3.1 g/dL / Pro: 6.2 g/dL / ALK PHOS: 75 U/L / ALT: 23 U/L / AST: 26 U/L / GGT: x           PT/INR - ( 09 Sep 2022 04:09 )   PT: 12.0 sec;   INR: 1.04 ratio         PTT - ( 09 Sep 2022 04:09 )  PTT:27.5 sec    Urinalysis Basic - ( 09 Sep 2022 17:48 )    Color: Yellow / Appearance: Clear / S.021 / pH: x  Gluc: x / Ketone: Trace  / Bili: Negative / Urobili: Negative   Blood: x / Protein: Trace / Nitrite: Negative   Leuk Esterase: Small / RBC: 1 /hpf / WBC 11 /HPF   Sq Epi: x / Non Sq Epi: 4 /hpf / Bacteria: Negative      ====================ASSESSMENT ==============  77M with PMH of HTN, HLD,  BPH, CKD ( creat 1.42 today), chronic angelita hydronephrosis ( x2 years), prostate ca ( radiation 10 years ago), seizure disorder ( last seizure ), ETOH use ( sober since ), hypothyroidism, and mild demenia p/w CP and SOB found with large pericardial effusion s/p pericardiocentesis.     ====================Plan ==============  #Neuro   A&Ox3  - continue to monitor mental status as per protocol    Seizure disorder  - No seizure meds reported. Sister had confirmed this admission no additional medication bottles at home     #Respiratory  Comfortable on RA  - SOB on admission likely 2/2 pericardial tamponade. Patient feels improvement s/p drain placement  - Monitor SpO2 with goal >94%    COPD  - duonebs prn   - started on levofloxacin outpatient for concern of COPD exacerbation/PNA. Will d/c for now. Likely sob related to pericardial effusion (states SOB improved s/p drain placement)   - provided smoking cessation education. Start nicotine patch while inpatient (states quit 1 week prior to admission, smokes 4-5 cig/daily)     #CV  Cardiac Tamponade  -  TTE: EF 70%. Mod concentric LVH. Normal RV size and function. Large pericardial effusion with evidence of echo tamponade physiology.   - s/p pericardiocentesis with drain placement, 1060mL out serosanguinous.   - f/u pericardial fluid studies. as per lights criteria- meets criteria of exudative fluid. Gram stain negative for organism. Cytology NEGATIVE FOR MALIGNANT CELLS  - Patient remained HD stable at [facility] prior to transfer (as per documentation: HRs 60-70s; SBPs 150-170s, Lying flat off with O2 sats 99%). Continue to monitor vital signs   - trop I downtrending at OSH prior to transfer  - monitor drain output. Daily TTE while in place. Once <50mL/24hrs, can d/c drain. Last 24hrs had 150mL of output   - Cancer screening: Last colonoscopy >10 years ago (states went last year for colonoscopy but procedure aborted 2/2 HTN outpatient and never rescheduled). Remote history of Prostate CA, follows with urologist but unsure of name. waiting for patient sister to provide collateral info     HTN  - home meds losartan 100mg and hydral 50mg.  - continue losartan 50mg  - continue hydralazine 50mg     HLD  - c/w home simvastatin     #GI  - tolerating pureed diet  - c/w miralax, senna, dulcolax   - had BM yesterday s/p enema but still with significant amount of gas, start simethicone    #  BPH  - continue home tamsulosin     Remote Hx Prostate Ca  - f.u with urology, patient unsure of name. will f/u with sister for collateral info.   - states in remission for >20 years     Hyponatremia  - Na on presentation 125, remains at 125 today.   - nephrology consulted, started on urena  - Patient mentating appropriately   - Euvolemic appearing on exam    CKD  - Baseline SCr looks to be around 1.5-1.7 base upon prior SCr trend in . SCr this AM 1.59  - monitor and trend SCr, BUN, and lytes  - replete lytes prn with goal K 4-4.5 and Mg >2    Hydronephrosis on CT   - renal US: Mild right hydronephrosis, improved since 2022. Poor visualization of the left kidney. No gross left hydronephrosis, which has improved or resolved since 2022. Urinary bladder volume of 136 mL. The patient had no urge to void during the exam.  - Urinating currently , strict I/Os via condom cath    #Endo  - hgb a1c 5.7    Hypothyroidism   - TSH elevated with low T3 and T4  - will consult endo for recs  - on home synthroid 112 mcg, may have missed a few doses (waiting upon refill). As per endo- resume home dose of 112mcg and repeat free T4 in 5-7 days.     #Heme  DVT PPX: Start SQH  Normocytic anemia  - continue to monitor and trend CBC.   - Likely 2/2 anemia of chronic disease 2/2 CKD. If downtrends, can consider anemia workup. No evidence of bleeding    #ID  Afebrile  - uptrending leukocytosis today although has been variable since admission; possibly reactive in setting of effusion, has been afebrile without any symptoms of active infection, non-toxic appearing, u/a has been negative. Culture if spikes fever.  - continue to monitor and trend wbc and temp curve

## 2022-09-10 NOTE — PROGRESS NOTE ADULT - PROBLEM SELECTOR PLAN 2
Pt with h/o stage 3 CKD in the setting of chronic BL hydronephrosis. Upon review of [place] [facility]/Silvia VELEZ, baseline SCr ius ~1.4-1.6. SCr is currently 1.59. Renal US showing interval improvement in BL hydronephrosis. UA with trace proteinuria and trace LE. Obtain above studies for hyponatremia and urine prot/creat. Monitor labs and urine output. Avoid nephrotoxins. Dose medications as per eGFR.    If you have any questions, please feel free to contact me  Aniket Cisneros  Nephrology Fellow  [phone]; Prefer [employer] TEAMS  (After 5pm or on weekends please page the on-call fellow).    Patient was discussed with Dr. Tarango who agrees with my A/P. Addendum to follow

## 2022-09-10 NOTE — PROGRESS NOTE ADULT - SUBJECTIVE AND OBJECTIVE BOX
AMBAR PICKARD  MRN-[id number]  Patient is a 77y old  Male who presents with a chief complaint of pericardial effusion (10 Sep 2022 12:14)    HPI:  This is a 76yo old  Male with PMH of HTN, HLD,  BPH, CKD ( creat 1.42 today), chronic angelita hydronephrosis ( x2 years), prostate ca ( radiation 10 years ago), seizure disorder ( last seizure ), ETOH use ( sober since ), hypothyroidism, and mild demenia; presents to [facility] on 9/3/2022 with chief complaint of SOB and CP x3 days.  Pt has been a heavy smoker his whole life. Denies fever, chills, cough, or congestion. No sick contacts.    Admitted to tele for likely COPD exacerbation and CP.  CXR is clear. Started on Duonebs, Symbicort and Solumedrol. No prior cardiac stents. EKG on arrival to ED notes PRWP V 1-3, no ST changes. Trop high on arrival at 268 trending down 180.8 today, proBNP 763, CK 1090. TSH 69.600 ( stopped synthroid a few weeks ago), T3 <20 T4 0.9, Na 126.  CXR revealing New cardiomegaly and CT chest with Large pericardial effusion, Echo today with large pericardial effusion with early tamponade physiology. Transferred to [facility] for pericardiocentesis. On arrival to [facility] patient awake and verbal A&OX3, denies any chest pain, dyspnea, dizziness, palpitations, N&V, Ha, orthopnea, LE edema.  VS WNL, on 2 L nasal cannula in no respiratory distress.      recs from [facility]:  home with home PT          INTERPRETATION:  CLINICAL STATEMENT: Chest Pain. Shortness of breath  TECHNIQUE: AP view of the chest.  COMPARISON: 2020    New cardiomegaly, may be exaggerated by AP technique. Suboptimal degree   of inspiration. Small left pleural effusion.    Degenerative changes of the spine, shoulders and AC joints.    IMPRESSION:    New cardiomegaly, may be exaggerated by AP technique.    Small left pleural effusion.    ECHO  Summary:   1. Left ventricular ejection fraction, by visual estimation, is 50 to   55%.   2. Mildly enlarged left atrium.   3. Maximum thickness of effusion is 2-2.5 cm.   4. Mild mitral annular calcification.   5. Mild thickening of the anterior and posterior mitral valve leaflets.   6. Trace mitral valve regurgitation.   7. Mild tricuspid regurgitation.   8. Estimated pulmonary artery systolic pressure is 37.4 mmHg assuming a   right atrial pressure of 5 mmHg, which is consistent with mild pulmonary   hypertension.   9. Findings are consistent with mild cardiac tamponade.      CT CHEST                          PROCEDURE DATE:  2022      INTERPRETATION:  HISTORY: Dyspnea on exertion. Smoker.    EXAMINATION: CT CHEST was performed without IV contrast.    COMPARISON: No CT chest comparison. Correlation with 2018 CT abdomen.    FINDINGS:    AIRWAYS, LUNGS, PLEURA: Trachea and mainstem bronchi patent. Emphysema.   Posterior left upper lobe and left basilar atelectasis. Small bilateral   pleural effusions.    MEDIASTINUM: Large circumferential pericardial effusion; depth of   pericardial fluid is 34 mm at the level of the basal inferior wall of the   left ventricle (sagittal series image 78). There is flattening of the   free wall of the right ventricle. Coronary atherosclerosis.    IMAGED ABDOMEN: Bilateral hydronephrosis. Cholelithiasis.    SOFT TISSUES: Bilateral gynecomastia.    BONES: Degenerative changes of the spine. Loss of height of L3 vertebral   body similar to 2018 CT abdomen.    IMPRESSION:.    Large pericardial effusion.    Small bilateral pleural effusions. (07 Sep 2022 13:09)      Overnight events: pericardial drain removed earlier today. remains hemodynamically stable. no acute events.     REVIEW OF SYSTEMS:    CONSTITUTIONAL: No weakness, fevers or chills  EYES/ENT: No visual changes;  No vertigo or throat pain   NECK: No pain or stiffness  RESPIRATORY: No cough, wheezing, hemoptysis; No shortness of breath  CARDIOVASCULAR: No chest pain or palpitations  GASTROINTESTINAL: No abdominal or epigastric pain. No nausea, vomiting, or hematemesis; No diarrhea or constipation. No melena or hematochezia.  GENITOURINARY: No dysuria, frequency or hematuria  NEUROLOGICAL: No numbness or weakness  SKIN: No itching, rashes      Physical Exam:  Vital Signs Last 24 Hrs  T(C): 36.6 (10 Sep 2022 15:00), Max: 36.7 (09 Sep 2022 22:00)  T(F): 97.8 (10 Sep 2022 15:00), Max: 98 (09 Sep 2022 22:00)  HR: 62 (10 Sep 2022 19:00) (56 - 107)  BP: 111/63 (10 Sep 2022 19:00) (103/60 - 168/76)  BP(mean): 81 (10 Sep 2022 19:00) (63 - 109)  RR: 16 (10 Sep 2022 19:00) (12 - 27)  SpO2: 96% (10 Sep 2022 19:00) (93% - 98%)    Parameters below as of 10 Sep 2022 19:00  Patient On (Oxygen Delivery Method): room air      ============================I/O===========================   I&O's Detail    09 Sep 2022 07:01  -  10 Sep 2022 07:00  --------------------------------------------------------  IN:    Oral Fluid: 380 mL  Total IN: 380 mL    OUT:    Drain (mL): 0 mL    Incontinent per Condom Catheter (mL): 525 mL  Total OUT: 525 mL    Total NET: -145 mL      10 Sep 2022 07:01  -  10 Sep 2022 19:53  --------------------------------------------------------  IN:    Oral Fluid: 600 mL  Total IN: 600 mL    OUT:    Voided (mL): 125 mL  Total OUT: 125 mL    Total NET: 475 mL        ============================ LABS =========================                        13.1   15.26 )-----------( 205      ( 10 Sep 2022 03:50 )             39.2     09-10    124<L>  |  91<L>  |  40<H>  ----------------------------<  109<H>  4.3   |  24  |  1.59<H>    Ca    8.5      10 Sep 2022 03:50  Phos  2.4     -10  Mg     2.4     09-10    TPro  6.2  /  Alb  3.1<L>  /  TBili  0.5  /  DBili  x   /  AST  26  /  ALT  23  /  AlkPhos  75  -10    LIVER FUNCTIONS - ( 10 Sep 2022 03:50 )  Alb: 3.1 g/dL / Pro: 6.2 g/dL / ALK PHOS: 75 U/L / ALT: 23 U/L / AST: 26 U/L / GGT: x           PT/INR - ( 09 Sep 2022 04:09 )   PT: 12.0 sec;   INR: 1.04 ratio         PTT - ( 09 Sep 2022 04:09 )  PTT:27.5 sec    Urinalysis Basic - ( 09 Sep 2022 17:48 )    Color: Yellow / Appearance: Clear / S.021 / pH: x  Gluc: x / Ketone: Trace  / Bili: Negative / Urobili: Negative   Blood: x / Protein: Trace / Nitrite: Negative   Leuk Esterase: Small / RBC: 1 /hpf / WBC 11 /HPF   Sq Epi: x / Non Sq Epi: 4 /hpf / Bacteria: Negative      ======================Micro/Rad/Cardio=================  Culture: Reviewed   CXR: Reviewed  Echo:Reviewed  ======================================================  PAST MEDICAL & SURGICAL HISTORY:  ETOH abuse  sober since 2016      Seizure disorder  last seizure       BPH (benign prostatic hyperplasia)      HTN (hypertension)      Prostate CA  Dx&#x27;d , s/p radiation      Hyperlipidemia      Poor historian      S/P hernia repair  right inguinal hernia      H/O prostate cancer  10- yrs ago, s/p radiation therapy      Status post cataract extraction      H/O urethrotomy  2018

## 2022-09-10 NOTE — PROGRESS NOTE ADULT - SUBJECTIVE AND OBJECTIVE BOX
[facility] Division of Kidney Diseases & Hypertension  FOLLOW UP NOTE  [phone]--------------------------------------------------------------------------------  Chief Complaint:Noninflammatory pericardial effusion    78 yo M with h/o CKD, chronic BL hydronephrosis, prostate cancer, BPH, HTN, HLD, hypothyroidism, and dementia was initially admitted to [facility] on 9/3 for suspected COPD exacerbation. Pt was found to have a large pericardial effusion w/ early tamponade physiology and was transferred to [facility] on 9/7 for pericardiocentesis. Nephrology consulted for hyponatremia. Upon review of [place] HIE, SNa was 143 on 3/10/22. SNa was 125 on 9/3, and has remained low but stable at 124 9/9      24 hour events/subjective:  Patient creatinine stable at 1245 with urena 15g daily. Creatinine at baseline  1.59 (1.4-1.6)      PAST HISTORY  --------------------------------------------------------------------------------  No significant changes to PMH, PSH, FHx, SHx, unless otherwise noted    ALLERGIES & MEDICATIONS  --------------------------------------------------------------------------------  Allergies    No Known Allergies    Intolerances      Standing Inpatient Medications  bisacodyl 5 milliGRAM(s) Oral every 12 hours  chlorhexidine 4% Liquid 1 Application(s) Topical <User Schedule>  heparin   Injectable 5000 Unit(s) SubCutaneous every 8 hours  hydrALAZINE 50 milliGRAM(s) Oral three times a day  levothyroxine 112 MICROGram(s) Oral daily  losartan 50 milliGRAM(s) Oral daily  nicotine -  14 mG/24Hr(s) Patch 1 Patch Transdermal daily  polyethylene glycol 3350 17 Gram(s) Oral two times a day  senna 2 Tablet(s) Oral at bedtime  simvastatin 20 milliGRAM(s) Oral at bedtime  tamsulosin 0.4 milliGRAM(s) Oral at bedtime  urea Oral Powder 15 Gram(s) Oral daily    PRN Inpatient Medications  albuterol/ipratropium for Nebulization 3 milliLiter(s) Nebulizer every 6 hours PRN  simethicone 80 milliGRAM(s) Chew two times a day PRN      REVIEW OF SYSTEMS  --------------------------------------------------------------------------------  Gen: No fevers/chills  Skin: No rashes  Head/Eyes/Ears: Normal hearing,   Respiratory: SOB improved  CV: No chest pain  GI: No abdominal pain, diarrhea  : No dysuria, hematuria  MSK: No  edema  Heme: No easy bruising or bleeding  Psych: No significant depression      All other systems were reviewed and are negative, except as noted.    VITALS/PHYSICAL EXAM  --------------------------------------------------------------------------------  T(C): 36.3 (09-10-22 @ 08:00), Max: 36.7 (09-09-22 @ 22:00)  HR: 57 (09-10-22 @ 11:00) (56 - 107)  BP: 111/61 (09-10-22 @ 11:00) (97/66 - 135/75)  RR: 12 (09-10-22 @ 11:00) (12 - 26)  SpO2: 97% (09-10-22 @ 11:00) (91% - 98%)  Wt(kg): --        09-09-22 @ 07:01  -  09-10-22 @ 07:00  --------------------------------------------------------  IN: 380 mL / OUT: 525 mL / NET: -145 mL      Physical Exam:  Gen: Elderly male, in NAD  HEENT: MMM  Pulm: CTA B/L  CV: S1S2  Abd: Soft, +BS   Ext: No LE edema B/L  Neuro: Awake  Skin: pericardial drain noted  Vascular access: Peripheral IV      LABS/STUDIES  --------------------------------------------------------------------------------              13.1   15.26 >-----------<  205      [09-10-22 @ 03:50]              39.2     124  |  91  |  40  ----------------------------<  109      [09-10-22 @ 03:50]  4.3   |  24  |  1.59        Ca     8.5     [09-10-22 @ 03:50]      Mg     2.4     [09-10-22 @ 03:50]      Phos  2.4     [09-10-22 @ 03:50]    TPro  6.2  /  Alb  3.1  /  TBili  0.5  /  DBili  x   /  AST  26  /  ALT  23  /  AlkPhos  75  [09-10-22 @ 03:50]    PT/INR: PT 12.0 , INR 1.04       [09-09-22 @ 04:09]  PTT: 27.5       [09-09-22 @ 04:09]    Serum Osmolality 259      [09-08-22 @ 12:54]    Creatinine Trend:  SCr 1.59 [09-10 @ 03:50]  SCr 1.50 [09-09 @ 04:09]  SCr 1.45 [09-08 @ 12:54]  SCr 1.33 [09-08 @ 02:59]  SCr 1.42 [09-07 @ 06:45]    Urinalysis - [09-09-22 @ 17:48]      Color Yellow / Appearance Clear / SG 1.021 / pH 6.0      Gluc Negative / Ketone Trace  / Bili Negative / Urobili Negative       Blood Negative / Protein Trace / Leuk Est Small / Nitrite Negative      RBC 1 / WBC 11 / Hyaline 1 / Gran  / Sq Epi  / Non Sq Epi 4 / Bacteria Negative    Urine Creatinine 53      [09-06-22 @ 12:00]  Urine Protein 7      [09-06-22 @ 12:00]  Urine Urea Nitrogen 1049      [09-09-22 @ 17:49]    TSH 69.600      [09-06-22 @ 06:46]  Lipid: chol 159, TG 51, HDL 99, LDL --      [09-08-22 @ 02:59]

## 2022-09-11 LAB
ALBUMIN SERPL ELPH-MCNC: 2.9 G/DL — LOW (ref 3.3–5)
ALBUMIN SERPL ELPH-MCNC: 2.9 G/DL — LOW (ref 3.3–5)
ALBUMIN SERPL ELPH-MCNC: 3 G/DL — LOW (ref 3.3–5)
ALP SERPL-CCNC: 66 U/L — SIGNIFICANT CHANGE UP (ref 40–120)
ALP SERPL-CCNC: 68 U/L — SIGNIFICANT CHANGE UP (ref 40–120)
ALP SERPL-CCNC: 69 U/L — SIGNIFICANT CHANGE UP (ref 40–120)
ALT FLD-CCNC: 24 U/L — SIGNIFICANT CHANGE UP (ref 10–45)
ALT FLD-CCNC: 26 U/L — SIGNIFICANT CHANGE UP (ref 10–45)
ALT FLD-CCNC: 27 U/L — SIGNIFICANT CHANGE UP (ref 10–45)
ANION GAP SERPL CALC-SCNC: 10 MMOL/L — SIGNIFICANT CHANGE UP (ref 5–17)
ANION GAP SERPL CALC-SCNC: 8 MMOL/L — SIGNIFICANT CHANGE UP (ref 5–17)
ANION GAP SERPL CALC-SCNC: 9 MMOL/L — SIGNIFICANT CHANGE UP (ref 5–17)
APTT BLD: 28.3 SEC — SIGNIFICANT CHANGE UP (ref 27.5–35.5)
AST SERPL-CCNC: 25 U/L — SIGNIFICANT CHANGE UP (ref 10–40)
AST SERPL-CCNC: 29 U/L — SIGNIFICANT CHANGE UP (ref 10–40)
AST SERPL-CCNC: 29 U/L — SIGNIFICANT CHANGE UP (ref 10–40)
BASOPHILS # BLD AUTO: 0.02 K/UL — SIGNIFICANT CHANGE UP (ref 0–0.2)
BASOPHILS NFR BLD AUTO: 0.1 % — SIGNIFICANT CHANGE UP (ref 0–2)
BILIRUB SERPL-MCNC: 0.3 MG/DL — SIGNIFICANT CHANGE UP (ref 0.2–1.2)
BILIRUB SERPL-MCNC: 0.4 MG/DL — SIGNIFICANT CHANGE UP (ref 0.2–1.2)
BILIRUB SERPL-MCNC: 0.5 MG/DL — SIGNIFICANT CHANGE UP (ref 0.2–1.2)
BLD GP AB SCN SERPL QL: NEGATIVE — SIGNIFICANT CHANGE UP
BUN SERPL-MCNC: 48 MG/DL — HIGH (ref 7–23)
BUN SERPL-MCNC: 60 MG/DL — HIGH (ref 7–23)
BUN SERPL-MCNC: 62 MG/DL — HIGH (ref 7–23)
CALCIUM SERPL-MCNC: 8.6 MG/DL — SIGNIFICANT CHANGE UP (ref 8.4–10.5)
CALCIUM SERPL-MCNC: 8.6 MG/DL — SIGNIFICANT CHANGE UP (ref 8.4–10.5)
CALCIUM SERPL-MCNC: 8.9 MG/DL — SIGNIFICANT CHANGE UP (ref 8.4–10.5)
CHLORIDE SERPL-SCNC: 89 MMOL/L — LOW (ref 96–108)
CHLORIDE SERPL-SCNC: 90 MMOL/L — LOW (ref 96–108)
CHLORIDE SERPL-SCNC: 91 MMOL/L — LOW (ref 96–108)
CO2 SERPL-SCNC: 22 MMOL/L — SIGNIFICANT CHANGE UP (ref 22–31)
CO2 SERPL-SCNC: 23 MMOL/L — SIGNIFICANT CHANGE UP (ref 22–31)
CO2 SERPL-SCNC: 23 MMOL/L — SIGNIFICANT CHANGE UP (ref 22–31)
CREAT SERPL-MCNC: 1.36 MG/DL — HIGH (ref 0.5–1.3)
CREAT SERPL-MCNC: 1.42 MG/DL — HIGH (ref 0.5–1.3)
CREAT SERPL-MCNC: 1.43 MG/DL — HIGH (ref 0.5–1.3)
EGFR: 50 ML/MIN/1.73M2 — LOW
EGFR: 50 ML/MIN/1.73M2 — LOW
EGFR: 53 ML/MIN/1.73M2 — LOW
EOSINOPHIL # BLD AUTO: 0 K/UL — SIGNIFICANT CHANGE UP (ref 0–0.5)
EOSINOPHIL NFR BLD AUTO: 0 % — SIGNIFICANT CHANGE UP (ref 0–6)
GLUCOSE SERPL-MCNC: 110 MG/DL — HIGH (ref 70–99)
GLUCOSE SERPL-MCNC: 133 MG/DL — HIGH (ref 70–99)
GLUCOSE SERPL-MCNC: 136 MG/DL — HIGH (ref 70–99)
HCT VFR BLD CALC: 33 % — LOW (ref 39–50)
HCT VFR BLD CALC: 33.6 % — LOW (ref 39–50)
HGB BLD-MCNC: 11 G/DL — LOW (ref 13–17)
HGB BLD-MCNC: 11.4 G/DL — LOW (ref 13–17)
IMM GRANULOCYTES NFR BLD AUTO: 0.8 % — SIGNIFICANT CHANGE UP (ref 0–1.5)
INR BLD: 0.95 RATIO — SIGNIFICANT CHANGE UP (ref 0.88–1.16)
LYMPHOCYTES # BLD AUTO: 0.61 K/UL — LOW (ref 1–3.3)
LYMPHOCYTES # BLD AUTO: 3.6 % — LOW (ref 13–44)
MAGNESIUM SERPL-MCNC: 2.2 MG/DL — SIGNIFICANT CHANGE UP (ref 1.6–2.6)
MAGNESIUM SERPL-MCNC: 2.3 MG/DL — SIGNIFICANT CHANGE UP (ref 1.6–2.6)
MAGNESIUM SERPL-MCNC: 2.3 MG/DL — SIGNIFICANT CHANGE UP (ref 1.6–2.6)
MCHC RBC-ENTMCNC: 29.6 PG — SIGNIFICANT CHANGE UP (ref 27–34)
MCHC RBC-ENTMCNC: 30.3 PG — SIGNIFICANT CHANGE UP (ref 27–34)
MCHC RBC-ENTMCNC: 33.3 GM/DL — SIGNIFICANT CHANGE UP (ref 32–36)
MCHC RBC-ENTMCNC: 33.9 GM/DL — SIGNIFICANT CHANGE UP (ref 32–36)
MCV RBC AUTO: 88.7 FL — SIGNIFICANT CHANGE UP (ref 80–100)
MCV RBC AUTO: 89.4 FL — SIGNIFICANT CHANGE UP (ref 80–100)
MONOCYTES # BLD AUTO: 0.59 K/UL — SIGNIFICANT CHANGE UP (ref 0–0.9)
MONOCYTES NFR BLD AUTO: 3.5 % — SIGNIFICANT CHANGE UP (ref 2–14)
NEUTROPHILS # BLD AUTO: 15.64 K/UL — HIGH (ref 1.8–7.4)
NEUTROPHILS NFR BLD AUTO: 92 % — HIGH (ref 43–77)
NRBC # BLD: 0 /100 WBCS — SIGNIFICANT CHANGE UP (ref 0–0)
NRBC # BLD: 0 /100 WBCS — SIGNIFICANT CHANGE UP (ref 0–0)
OSMOLALITY SERPL: 273 MOSMOL/KG — LOW (ref 280–301)
OSMOLALITY UR: 620 MOS/KG — SIGNIFICANT CHANGE UP (ref 300–900)
PHOSPHATE SERPL-MCNC: 2 MG/DL — LOW (ref 2.5–4.5)
PHOSPHATE SERPL-MCNC: 2.1 MG/DL — LOW (ref 2.5–4.5)
PHOSPHATE SERPL-MCNC: 2.1 MG/DL — LOW (ref 2.5–4.5)
PLATELET # BLD AUTO: 211 K/UL — SIGNIFICANT CHANGE UP (ref 150–400)
PLATELET # BLD AUTO: 218 K/UL — SIGNIFICANT CHANGE UP (ref 150–400)
POTASSIUM SERPL-MCNC: 4.1 MMOL/L — SIGNIFICANT CHANGE UP (ref 3.5–5.3)
POTASSIUM SERPL-MCNC: 4.4 MMOL/L — SIGNIFICANT CHANGE UP (ref 3.5–5.3)
POTASSIUM SERPL-MCNC: 4.4 MMOL/L — SIGNIFICANT CHANGE UP (ref 3.5–5.3)
POTASSIUM SERPL-SCNC: 4.1 MMOL/L — SIGNIFICANT CHANGE UP (ref 3.5–5.3)
POTASSIUM SERPL-SCNC: 4.4 MMOL/L — SIGNIFICANT CHANGE UP (ref 3.5–5.3)
POTASSIUM SERPL-SCNC: 4.4 MMOL/L — SIGNIFICANT CHANGE UP (ref 3.5–5.3)
PROT SERPL-MCNC: 5.8 G/DL — LOW (ref 6–8.3)
PROT SERPL-MCNC: 6 G/DL — SIGNIFICANT CHANGE UP (ref 6–8.3)
PROT SERPL-MCNC: 6 G/DL — SIGNIFICANT CHANGE UP (ref 6–8.3)
PROTHROM AB SERPL-ACNC: 10.9 SEC — SIGNIFICANT CHANGE UP (ref 10.5–13.4)
RBC # BLD: 3.72 M/UL — LOW (ref 4.2–5.8)
RBC # BLD: 3.76 M/UL — LOW (ref 4.2–5.8)
RBC # FLD: 17.3 % — HIGH (ref 10.3–14.5)
RBC # FLD: 17.3 % — HIGH (ref 10.3–14.5)
RH IG SCN BLD-IMP: POSITIVE — SIGNIFICANT CHANGE UP
SODIUM SERPL-SCNC: 121 MMOL/L — LOW (ref 135–145)
SODIUM SERPL-SCNC: 122 MMOL/L — LOW (ref 135–145)
SODIUM SERPL-SCNC: 122 MMOL/L — LOW (ref 135–145)
SODIUM UR-SCNC: 8 MMOL/L — SIGNIFICANT CHANGE UP
WBC # BLD: 16.7 K/UL — HIGH (ref 3.8–10.5)
WBC # BLD: 16.99 K/UL — HIGH (ref 3.8–10.5)
WBC # FLD AUTO: 16.7 K/UL — HIGH (ref 3.8–10.5)
WBC # FLD AUTO: 16.99 K/UL — HIGH (ref 3.8–10.5)

## 2022-09-11 PROCEDURE — 93010 ELECTROCARDIOGRAM REPORT: CPT

## 2022-09-11 PROCEDURE — 93321 DOPPLER ECHO F-UP/LMTD STD: CPT | Mod: 26

## 2022-09-11 PROCEDURE — 99232 SBSQ HOSP IP/OBS MODERATE 35: CPT | Mod: GC

## 2022-09-11 PROCEDURE — 99292 CRITICAL CARE ADDL 30 MIN: CPT

## 2022-09-11 PROCEDURE — 99291 CRITICAL CARE FIRST HOUR: CPT

## 2022-09-11 PROCEDURE — 93308 TTE F-UP OR LMTD: CPT | Mod: 26

## 2022-09-11 RX ORDER — SODIUM CHLORIDE 5 G/100ML
1000 INJECTION, SOLUTION INTRAVENOUS
Refills: 0 | Status: DISCONTINUED | OUTPATIENT
Start: 2022-09-11 | End: 2022-09-12

## 2022-09-11 RX ADMIN — LOSARTAN POTASSIUM 50 MILLIGRAM(S): 100 TABLET, FILM COATED ORAL at 05:14

## 2022-09-11 RX ADMIN — Medication 63.75 MILLIMOLE(S): at 14:15

## 2022-09-11 RX ADMIN — HEPARIN SODIUM 5000 UNIT(S): 5000 INJECTION INTRAVENOUS; SUBCUTANEOUS at 05:15

## 2022-09-11 RX ADMIN — SIMVASTATIN 20 MILLIGRAM(S): 20 TABLET, FILM COATED ORAL at 21:46

## 2022-09-11 RX ADMIN — SENNA PLUS 2 TABLET(S): 8.6 TABLET ORAL at 21:46

## 2022-09-11 RX ADMIN — Medication 50 MILLIGRAM(S): at 08:48

## 2022-09-11 RX ADMIN — Medication 1 PATCH: at 13:38

## 2022-09-11 RX ADMIN — Medication 1 PATCH: at 14:15

## 2022-09-11 RX ADMIN — Medication 112 MICROGRAM(S): at 06:03

## 2022-09-11 RX ADMIN — HEPARIN SODIUM 5000 UNIT(S): 5000 INJECTION INTRAVENOUS; SUBCUTANEOUS at 21:46

## 2022-09-11 RX ADMIN — Medication 50 MILLIGRAM(S): at 14:16

## 2022-09-11 RX ADMIN — Medication 15 GRAM(S): at 18:11

## 2022-09-11 RX ADMIN — CHLORHEXIDINE GLUCONATE 1 APPLICATION(S): 213 SOLUTION TOPICAL at 05:37

## 2022-09-11 RX ADMIN — SODIUM CHLORIDE 40 MILLILITER(S): 5 INJECTION, SOLUTION INTRAVENOUS at 16:34

## 2022-09-11 RX ADMIN — Medication 50 MILLIGRAM(S): at 21:46

## 2022-09-11 RX ADMIN — TAMSULOSIN HYDROCHLORIDE 0.4 MILLIGRAM(S): 0.4 CAPSULE ORAL at 21:46

## 2022-09-11 RX ADMIN — Medication 5 MILLIGRAM(S): at 05:14

## 2022-09-11 RX ADMIN — Medication 15 GRAM(S): at 05:14

## 2022-09-11 RX ADMIN — POLYETHYLENE GLYCOL 3350 17 GRAM(S): 17 POWDER, FOR SOLUTION ORAL at 18:11

## 2022-09-11 RX ADMIN — Medication 5 MILLIGRAM(S): at 18:10

## 2022-09-11 RX ADMIN — HEPARIN SODIUM 5000 UNIT(S): 5000 INJECTION INTRAVENOUS; SUBCUTANEOUS at 14:15

## 2022-09-11 RX ADMIN — POLYETHYLENE GLYCOL 3350 17 GRAM(S): 17 POWDER, FOR SOLUTION ORAL at 05:14

## 2022-09-11 RX ADMIN — Medication 1 PATCH: at 07:00

## 2022-09-11 NOTE — CHART NOTE - NSCHARTNOTEFT_GEN_A_CORE
Afternoon Na 121, with Ramila 8 and Uosm >600. Mixed picture of SIADH with volume depletion. Please given 2%HTS at 40 cc/hour for 6 hours only. Please obtain labs after fluids are done around 9pm. C/w Urena BID   Spoke to primary team.     If you have any questions, please feel free to contact me  Aniket Cisneros  Nephrology Fellow  [phone]; Prefer [employer] TEAMS  (After 5pm or on weekends please page the on-call fellow).

## 2022-09-11 NOTE — PROGRESS NOTE ADULT - PROBLEM SELECTOR PLAN 2
Pt with h/o stage 3 CKD in the setting of chronic BL hydronephrosis. Upon review of [place], baseline SCr ius ~1.4-1.6. SCr is currently 1.43. Renal US showing interval improvement in BL hydronephrosis. UA with trace proteinuria and trace LE. Obtain above studies for hyponatremia and urine prot/creat. Monitor labs and urine output. Avoid nephrotoxins. Dose medications as per eGFR.    If you have any questions, please feel free to contact me  Aniket Cisneros  Nephrology Fellow  [phone]; Prefer [employer] TEAMS  (After 5pm or on weekends please page the on-call fellow).    Patient was discussed with Dr. Tarango who agrees with my A/P. Addendum to follow

## 2022-09-11 NOTE — PROGRESS NOTE ADULT - SUBJECTIVE AND OBJECTIVE BOX
[facility] Division of Kidney Diseases & Hypertension  FOLLOW UP NOTE  [phone]--------------------------------------------------------------------------------  Chief Complaint:Noninflammatory pericardial effusion    76 yo M with h/o CKD, chronic BL hydronephrosis, prostate cancer, BPH, HTN, HLD, hypothyroidism, and dementia was initially admitted to [facility] on 9/3 for suspected COPD exacerbation. Pt was found to have a large pericardial effusion w/ early tamponade physiology and was transferred to [facility] on 9/7 for pericardiocentesis. Nephrology consulted for hyponatremia. Upon review of [place] HIE, SNa was 143 on 3/10/22. SNa was 125 on 9/3, and has remained low but stable at 124 9/9    24 hour events/subjective:  Patient creatinine stable at 1.4. Na dropped to 122 this am despite increasing urena to  15g BID daily.      PAST HISTORY  --------------------------------------------------------------------------------  No significant changes to PMH, PSH, FHx, SHx, unless otherwise noted    ALLERGIES & MEDICATIONS  --------------------------------------------------------------------------------  Allergies    No Known Allergies    Intolerances      Standing Inpatient Medications  bisacodyl 5 milliGRAM(s) Oral every 12 hours  chlorhexidine 4% Liquid 1 Application(s) Topical <User Schedule>  heparin   Injectable 5000 Unit(s) SubCutaneous every 8 hours  hydrALAZINE 50 milliGRAM(s) Oral three times a day  levothyroxine 112 MICROGram(s) Oral daily  losartan 50 milliGRAM(s) Oral daily  nicotine -  14 mG/24Hr(s) Patch 1 Patch Transdermal daily  polyethylene glycol 3350 17 Gram(s) Oral two times a day  senna 2 Tablet(s) Oral at bedtime  simvastatin 20 milliGRAM(s) Oral at bedtime  tamsulosin 0.4 milliGRAM(s) Oral at bedtime  urea Oral Powder 15 Gram(s) Oral two times a day    PRN Inpatient Medications  albuterol/ipratropium for Nebulization 3 milliLiter(s) Nebulizer every 6 hours PRN  simethicone 80 milliGRAM(s) Chew two times a day PRN      REVIEW OF SYSTEMS  --------------------------------------------------------------------------------  Gen: No fevers/chills  Skin: No rashes  Head/Eyes/Ears: Normal hearing,   Respiratory: SOB improved  CV: No chest pain  GI: No abdominal pain, diarrhea  : No dysuria, hematuria  MSK: No  edema  Heme: No easy bruising or bleeding  Psych: No significant depression    All other systems were reviewed and are negative, except as noted.    VITALS/PHYSICAL EXAM  --------------------------------------------------------------------------------  T(C): 36.3 (09-11-22 @ 07:00), Max: 36.6 (09-10-22 @ 15:00)  HR: 55 (09-11-22 @ 08:00) (53 - 107)  BP: 132/72 (09-11-22 @ 08:00) (78/50 - 168/76)  RR: 15 (09-11-22 @ 08:00) (10 - 27)  SpO2: 96% (09-11-22 @ 08:00) (93% - 98%)  Wt(kg): --        09-10-22 @ 07:01  -  09-11-22 @ 07:00  --------------------------------------------------------  IN: 600 mL / OUT: 625 mL / NET: -25 mL      Physical Exam:    Gen: Elderly male, in NAD  HEENT: MMM  Pulm: CTA B/L  CV: S1S2  Abd: Soft, +BS   Ext: No LE edema B/L  Neuro: Awake  Skin: pericardial drain noted  Vascular access: Peripheral IV    LABS/STUDIES  --------------------------------------------------------------------------------              11.4   16.99 >-----------<  218      [09-11-22 @ 04:32]              33.6     122  |  89  |  48  ----------------------------<  133      [09-11-22 @ 04:32]  4.1   |  23  |  1.43        Ca     8.6     [09-11-22 @ 04:32]      Mg     2.3     [09-11-22 @ 04:32]      Phos  2.1     [09-11-22 @ 04:32]    TPro  6.0  /  Alb  2.9  /  TBili  0.5  /  DBili  x   /  AST  25  /  ALT  24  /  AlkPhos  69  [09-11-22 @ 04:32]    PT/INR: PT 10.9 , INR 0.95       [09-11-22 @ 04:32]  PTT: 28.3       [09-11-22 @ 04:32]      Creatinine Trend:  SCr 1.43 [09-11 @ 04:32]  SCr 1.59 [09-10 @ 03:50]  SCr 1.50 [09-09 @ 04:09]  SCr 1.45 [09-08 @ 12:54]  SCr 1.33 [09-08 @ 02:59]    Urinalysis - [09-09-22 @ 17:48]      Color Yellow / Appearance Clear / SG 1.021 / pH 6.0      Gluc Negative / Ketone Trace  / Bili Negative / Urobili Negative       Blood Negative / Protein Trace / Leuk Est Small / Nitrite Negative      RBC 1 / WBC 11 / Hyaline 1 / Gran  / Sq Epi  / Non Sq Epi 4 / Bacteria Negative    Urine Creatinine 53      [09-06-22 @ 12:00]  Urine Protein 7      [09-06-22 @ 12:00]  Urine Urea Nitrogen 1049      [09-09-22 @ 17:49]    TSH 69.600      [09-06-22 @ 06:46]  Lipid: chol 159, TG 51, HDL 99, LDL --      [09-08-22 @ 02:59]

## 2022-09-11 NOTE — PROGRESS NOTE ADULT - ATTENDING COMMENTS
hyponatremia-     check urine studies   continue Larsen 15 bid     curtis fleming  nephrology attending   please contact me on TEAMS   Office- [phone]

## 2022-09-11 NOTE — PROGRESS NOTE ADULT - CRITICAL CARE ATTENDING COMMENT
78yo old  Male with PMH of HTN, HLD,  BPH, CKD ( creat 1.42 today), chronic angelita hydronephrosis ( x2 years), prostate ca ( radiation 10 years ago), seizure disorder ( last seizure 2015), ETOH use ( sober since 2016), hypothyroidism, and mild dementia; presents to [facility] on 9/3/2022 with chief complaint of SOB and CP x3 days.  Pt has been a heavy smoker his whole life. Denies fever, chills, cough, or congestion. No sick contacts.      Admitted to tele at [facility] for likely COPD exacerbation and CP.  CXR is clear. Started on Duonebs, Symbicort and Solumedrol. No prior cardiac stents. EKG on arrival to ED notes PRWP V 1-3, no ST changes. Trop high on arrival at 268 trending down 180.8 today, pro-, CK 1090. TSH 69.600 ( stopped synthroid a few weeks ago), T3 <20 T4 0.9, Na 126.    CXR revealing New cardiomegaly and CT chest with Large pericardial effusion  - Transferred  for pericardiocentesis. removed serosanguinous fluid - s/p drain removal on 9/10  - concenred about drop in H/H - will trend - no overt source of bleeding  - patient awake and verbal A&OX3  - repeat TTE to assess the effusion post drain removal pending  - follow up on fluid studies - cyto logy negative for malignancy  - pt is not up to date with his preventative malignant work up  - h/o prostate cancer, chronic smoker, etc  - concenred about the hyponatremia despite the treatment - f/u with nephrology    Patient is critically ill, requiring critical care services. Despite the current condition, the patient is at a high risk of clinical and hemodynamic demise 78yo old  Male with PMH of HTN, HLD,  BPH, CKD ( creat 1.42 today), chronic angelita hydronephrosis ( x2 years), prostate ca ( radiation 10 years ago), seizure disorder ( last seizure 2015), ETOH use ( sober since 2016), hypothyroidism, and mild dementia; presents to [facility] on 9/3/2022 with chief complaint of SOB and CP x3 days.  Pt has been a heavy smoker his whole life. Denies fever, chills, cough, or congestion. No sick contacts.      Admitted to tele at [facility] for likely COPD exacerbation and CP.  CXR is clear. Started on Duonebs, Symbicort and Solumedrol. No prior cardiac stents. EKG on arrival to ED notes PRWP V 1-3, no ST changes. Trop high on arrival at 268 trending down 180.8 today, pro-, CK 1090. TSH 69.600 ( stopped synthroid a few weeks ago), T3 <20 T4 0.9, Na 126.    CXR revealing New cardiomegaly and CT chest with Large pericardial effusion  - Transferred  for pericardiocentesis. removed serosanguinous fluid - s/p drain removal on 9/10  - concenred about drop in H/H - will trend - no overt source of bleeding  - patient awake and verbal A&OX3  - repeat TTE to assess the effusion post drain removal pending  - follow up on fluid studies - cyto logy negative for malignancy  - pt is not up to date with his preventative malignant work up  - h/o prostate cancer, chronic smoker, etc  - concerned about the hyponatremia despite the treatment - f/u with nephrology    Patient is critically ill, requiring critical care services. Despite the current condition, the patient is at a high risk of clinical and hemodynamic demise

## 2022-09-11 NOTE — PROGRESS NOTE ADULT - SUBJECTIVE AND OBJECTIVE BOX
AMBAR PICKARD  MRN-[id number]  Patient is a 77y old  Male who presents with a chief complaint of Pericardial effusion (11 Sep 2022 09:26)    HPI:  This is a 78yo old  Male with PMH of HTN, HLD,  BPH, CKD ( creat 1.42 today), chronic angelita hydronephrosis ( x2 years), prostate ca ( radiation 10 years ago), seizure disorder ( last seizure 2015), ETOH use ( sober since 2016), hypothyroidism, and mild demenia; presents to [facility] on 9/3/2022 with chief complaint of SOB and CP x3 days.  Pt has been a heavy smoker his whole life. Denies fever, chills, cough, or congestion. No sick contacts.    Admitted to tele for likely COPD exacerbation and CP.  CXR is clear. Started on Duonebs, Symbicort and Solumedrol. No prior cardiac stents. EKG on arrival to ED notes PRWP V 1-3, no ST changes. Trop high on arrival at 268 trending down 180.8 today, proBNP 763, CK 1090. TSH 69.600 ( stopped synthroid a few weeks ago), T3 <20 T4 0.9, Na 126.  CXR revealing New cardiomegaly and CT chest with Large pericardial effusion, Echo today with large pericardial effusion with early tamponade physiology. Transferred to [facility] for pericardiocentesis. On arrival to [facility] patient awake and verbal A&OX3, denies any chest pain, dyspnea, dizziness, palpitations, N&V, Ha, orthopnea, LE edema.  VS WNL, on 2 L nasal cannula in no respiratory distress.      recs from [facility]:  home with home PT        INTERPRETATION:  CLINICAL STATEMENT: Chest Pain. Shortness of breath  TECHNIQUE: AP view of the chest.  COMPARISON: 7/19/2020    New cardiomegaly, may be exaggerated by AP technique. Suboptimal degree   of inspiration. Small left pleural effusion.    Degenerative changes of the spine, shoulders and AC joints.    IMPRESSION:    New cardiomegaly, may be exaggerated by AP technique.    Small left pleural effusion.    ECHO  Summary:   1. Left ventricular ejection fraction, by visual estimation, is 50 to   55%.   2. Mildly enlarged left atrium.   3. Maximum thickness of effusion is 2-2.5 cm.   4. Mild mitral annular calcification.   5. Mild thickening of the anterior and posterior mitral valve leaflets.   6. Trace mitral valve regurgitation.   7. Mild tricuspid regurgitation.   8. Estimated pulmonary artery systolic pressure is 37.4 mmHg assuming a   right atrial pressure of 5 mmHg, which is consistent with mild pulmonary   hypertension.   9. Findings are consistent with mild cardiac tamponade.      CT CHEST                          PROCEDURE DATE:  09/06/2022      INTERPRETATION:  HISTORY: Dyspnea on exertion. Smoker.    EXAMINATION: CT CHEST was performed without IV contrast.    COMPARISON: No CT chest comparison. Correlation with 1/9/2018 CT abdomen.    FINDINGS:    AIRWAYS, LUNGS, PLEURA: Trachea and mainstem bronchi patent. Emphysema.   Posterior left upper lobe and left basilar atelectasis. Small bilateral   pleural effusions.    MEDIASTINUM: Large circumferential pericardial effusion; depth of   pericardial fluid is 34 mm at the level of the basal inferior wall of the   left ventricle (sagittal series image 78). There is flattening of the   free wall of the right ventricle. Coronary atherosclerosis.    IMAGED ABDOMEN: Bilateral hydronephrosis. Cholelithiasis.    SOFT TISSUES: Bilateral gynecomastia.    BONES: Degenerative changes of the spine. Loss of height of L3 vertebral   body similar to 1/9/2018 CT abdomen.    IMPRESSION:.    Large pericardial effusion.    Small bilateral pleural effusions. (07 Sep 2022 13:09)      Hospital Course:  9/7 Transferred to CCU for further management     24 HOUR EVENTS:    REVIEW OF SYSTEMS:    CONSTITUTIONAL: No weakness, fevers or chills  EYES/ENT: No visual changes;  No vertigo or throat pain   NECK: No pain or stiffness  RESPIRATORY: No cough, wheezing, hemoptysis; No shortness of breath  CARDIOVASCULAR: No chest pain or palpitations  GASTROINTESTINAL: No abdominal or epigastric pain. No nausea, vomiting, or hematemesis; No diarrhea or constipation. No melena or hematochezia.  GENITOURINARY: No dysuria, frequency or hematuria  NEUROLOGICAL: No numbness or weakness  SKIN: No itching, rashes      ICU Vital Signs Last 24 Hrs  T(C): 36.4 (11 Sep 2022 15:00), Max: 36.4 (11 Sep 2022 00:00)  T(F): 97.6 (11 Sep 2022 15:00), Max: 97.6 (11 Sep 2022 15:00)  HR: 53 (11 Sep 2022 20:00) (49 - 85)  BP: 127/68 (11 Sep 2022 20:00) (78/50 - 208/98)  BP(mean): 92 (11 Sep 2022 20:00) (60 - 138)  ABP: --  ABP(mean): --  RR: 13 (11 Sep 2022 20:00) (10 - 22)  SpO2: 96% (11 Sep 2022 20:00) (93% - 99%)    O2 Parameters below as of 11 Sep 2022 20:00  Patient On (Oxygen Delivery Method): room air        10 Sep 2022 07:01  -  11 Sep 2022 07:00  --------------------------------------------------------  IN: 600 mL / OUT: 625 mL / NET: -25 mL    11 Sep 2022 07:01  -  11 Sep 2022 21:28  --------------------------------------------------------  IN: 570 mL / OUT: 100 mL / NET: 470 mL      CAPILLARY BLOOD GLUCOSE    PHYSICAL EXAM:  GENERAL: No acute distress, well-developed  HEAD:  Atraumatic, Normocephalic  EYES: EOMI, PERRLA, conjunctiva and sclera clear  NECK: Supple, no lymphadenopathy, no JVD  CHEST/LUNG: CTAB; No wheezes, rales, or rhonchi  HEART: Regular rate and rhythm. Normal S1/S2. No murmurs, rubs, or gallops  ABDOMEN: Soft, non-tender, non-distended; normal bowel sounds, no organomegaly  EXTREMITIES:  2+ peripheral pulses b/l, No clubbing, cyanosis, or edema  NEUROLOGY: A&O x 3, no focal deficits  SKIN: No rashes or lesions    ============================I/O===========================   I&O's Detail    10 Sep 2022 07:01  -  11 Sep 2022 07:00  --------------------------------------------------------  IN:    Oral Fluid: 600 mL  Total IN: 600 mL    OUT:    Voided (mL): 625 mL  Total OUT: 625 mL    Total NET: -25 mL    11 Sep 2022 07:01  -  11 Sep 2022 21:28  --------------------------------------------------------  IN:    IV PiggyBack: 250 mL    Oral Fluid: 240 mL    sodium chloride 2%: 80 mL  Total IN: 570 mL    OUT:    Incontinent per Condom Catheter (mL): 100 mL  Total OUT: 100 mL    Total NET: 470 mL      ============================ LABS =========================                        11.0   16.70 )-----------( 211      ( 11 Sep 2022 12:08 )             33.0     09-11    121<L>  |  90<L>  |  60<H>  ----------------------------<  136<H>  4.4   |  22  |  1.42<H>    Ca    8.9      11 Sep 2022 12:08  Phos  2.1     09-11  Mg     2.3     09-11    TPro  6.0  /  Alb  3.0<L>  /  TBili  0.4  /  DBili  x   /  AST  29  /  ALT  26  /  AlkPhos  66  09-11      LIVER FUNCTIONS - ( 11 Sep 2022 12:08 )  Alb: 3.0 g/dL / Pro: 6.0 g/dL / ALK PHOS: 66 U/L / ALT: 26 U/L / AST: 29 U/L / GGT: x           PT/INR - ( 11 Sep 2022 04:32 )   PT: 10.9 sec;   INR: 0.95 ratio        PTT - ( 11 Sep 2022 04:32 )  PTT:28.3 sec      ======================Micro/Rad/Cardio=================  Telemetry: Reviewed   EKG: Reviewed  CXR: Reviewed  Culture: Reviewed   Echo: Limited Transthoracic Echo:   Patient name: AMBAR PICKARD  YOB: 1945   Age: 77 (M)   MR#: [id number]  Study Date: 9/11/2022  [facility]  Study quality: Technically fair  Referring Physician: Keysha Irvin MD  Blood Pressure: 155/70 mmHg  Height: 173 cm  Weight: 67 kg  BSA: 1.8 m2  ------------------------------------------------------------------------  PROCEDURE: Limited transthoracic echocardiogram with 2-D.  M-Mode and spectral and color flow Doppler.  INDICATION: Pericardial effusion (noninflammatory) (I31.3)  ------------------------------------------------------------------------  CONCLUSIONS:  Limited repeat study to re-evaluate pericardial effusion.  Normal pericardium with no pericardial effusion.  ------------------------------------------------------------------------  Confirmed on  9/11/2022 - 09:28:51 by Emil Kramer M.D.,  [facility], [facility]  ------------------------------------------------------------------------ (09-11-22 @ 08:37)  ======================================================  PAST MEDICAL & SURGICAL HISTORY:  ETOH abuse  sober since 2016    Seizure disorder  last seizure 2015    BPH (benign prostatic hyperplasia)    HTN (hypertension)    Prostate CA  Dx&#x27;d 2008, s/p radiation    Hyperlipidemia    Poor historian    S/P hernia repair  right inguinal hernia    H/O prostate cancer  10- yrs ago, s/p radiation therapy    Status post cataract extraction    H/O urethrotomy  June 2018      ====================ASSESSMENT ==============  77M with PMH of HTN, HLD,  BPH, CKD ( creat 1.42 today), chronic angleita hydronephrosis ( x2 years), prostate ca ( radiation 10 years ago), seizure disorder ( last seizure 2015), ETOH use ( sober since 2016), hypothyroidism, and mild demenia p/w CP and SOB found with large pericardial effusion s/p pericardiocentesis.       Plan:  ====================== NEUROLOGY=====================  A&Ox3  - continue to monitor mental status as per protocol    Seizure disorder  - No seziure meds reported.  sister confirmed patient not on any seizure meds    ==================== RESPIRATORY======================  Comfortable on RA  - SOB on admission likely 2/2 pericardial tamponade. Patient feels improvement s/p drain placement   - SpO2 96%  - 7/6 CT chest: Large pericardial effusion. Small bilateral pleural effusions. Bilateral hydronephrosis.    COPD  - duonebs prn   - dc'ed levofloxacin. Likely sob related to pericardial effusion (states SOB improved s/p drain placement)   - provided smoking cessation education. Start nicotine patch while inpatient (states quit last week, smokes 4-5 cig/daily)     albuterol/ipratropium for Nebulization 3 milliLiter(s) Nebulizer every 6 hours PRN Shortness of Breath and/or Wheezing    ====================CARDIOVASCULAR==================  Cardiac Tamponade  - 9/7 TTE: EF 70%. Mod concentric LVH. Normal RV size and function. Large pericardial effusion with evidence of echo tamponade physiology.   - s/p pericardiocentesis with drain placement, 1060mL out serosanguinous.   - as per lights criteria- meets criteria of exudative fluid. f/u pericardial fluid studies including cytology. Gram stain negative for organism   - Patient remained HD stable at [facility] prior to transfer (as per documentation: HRs 60-70s; SBPs 150-170s, Lying flat off with O2 sats 99%). Continue to monitor vital signs   - trop I downtrending at OSH prior to transfer  - monitor drain output. Daily TTE while in place  - Cancer screening: Last colonoscopy >10 years ago (states went last year for colonoscopy but procedure aborted 2/2 HTN outpatient and never rescheduled). Remote history of Prostate CA, follows with urologist but unsure of name. waiting for patient sister to provide collateral info     HTN  - on home losartan 100mg and hydral 50mg.  - on home meds as hypertensive upon presentation to CICU. This morning patient with softer BP (asymptomatic).  - continue losartan 50mg     HLD  - c/w home simvastatin     hydrALAZINE 50 milliGRAM(s) Oral three times a day  losartan 50 milliGRAM(s) Oral daily  urea Oral Powder 15 Gram(s) Oral two times a day  simvastatin 20 milliGRAM(s) Oral at bedtime    ===================HEMATOLOGIC/ONC ===================  No acute issues   - continue to monitor H&H/Plts     heparin   Injectable 5000 Unit(s) SubCutaneous every 8 hours    ===================== RENAL =========================  BPH  - continue home tamsulosin     Remote Hx Prostate Ca  - f.u with urology, patient unsure of name. will f/u with sister for collateral info.   - states in remission for >20 years     Hyponatremia  - Na on presentation 125.  - Patient mentating appropriately   - Euvolemic appearing on exam 9/8. Could be 2/2 hypothyroidism     CKD  - Baseline SCr looks to be around 1.5-1.7 base upon prior SCr trend in 2020. SCr this AM 1.42  - monitor and trend SCr, BUN, and lytes  - replete lytes prn with goal K 4-4.5 and Mg >2    Hydronephrosis on CT 9/6  - renal US ordered   - Urinating currently with bladder scan of 120mL. Also with b/l hydronephrosis back in 2020 with history of chronic hydronephrosis. SCr at baseline. Will also get collateral info from outpatient urologist once patient sister updates with info     tamsulosin 0.4 milliGRAM(s) Oral at bedtime    ==================== GASTROINTESTINAL===================  - Tolerating PO DASH/TLC diet.  - monitor for regular BM while inpatient. Last BM yesterday.   - Bowel regimen with Miralax and Senna     bisacodyl 5 milliGRAM(s) Oral every 12 hours  polyethylene glycol 3350 17 Gram(s) Oral two times a day  senna 2 Tablet(s) Oral at bedtime  simethicone 80 milliGRAM(s) Chew two times a day PRN Gas  sodium chloride 2% . 1000 milliLiter(s) (40 mL/Hr) IV Continuous <Continuous>    =======================    ENDOCRINE  =====================  - f/u hgb a1c and lipid panel     Hypothyroidism   - TSH elevated with low T3 and T4 on 9/8  - will consult endo for recs  - on home synthroid 112 mcg     levothyroxine 112 MICROGram(s) Oral daily    ========================INFECTIOUS DISEASE================  Leukocytosis, afebrile  - WBC 16.70  - continue to monitor and trend wbc and temp curve         Patient requires continuous monitoring with bedside rhythm monitoring, pulse ox monitoring, and intermittent blood gas analysis. Care plan discussed with ICU care team. Patient remained critical and at risk for life threatening decompensation.  Patient seen, examined and plan discussed with CCU team during rounds.     I have personally provided ____ minutes of critical care time excluding time spent on separate procedures, in addition to initial critical care time provided by the CICU Attending, Dr. Irvin.     By signing my name below, I, Maria D'Amico, attest that this documentation has been prepared under the direction and in the presence of Grace Weems NP   Electronically signed: Maria D'Amico, Scribe, 09-11-22 @ 21:28    I, Grace Weems NP , personally performed the services described in this documentation. all medical record entries made by the scribe were at my direction and in my presence. I have reviewed the chart and agree that the record reflects my personal performance and is accurate and complete  Electronically signed: Grace Weems NP        AMBAR PICKARD  MRN-[id number]  Patient is a 77y old  Male who presents with a chief complaint of Pericardial effusion (11 Sep 2022 09:26)    HPI: 77M PMH of HTN, HLD,  BPH, CKD ( creat 1.42 today), chronic angelita hydronephrosis ( x2 years), prostate ca ( radiation 10 years ago), seizure disorder ( last seizure 2015), ETOH use ( sober since 2016), hypothyroidism, and mild demenia; presents to [facility] on 9/3/2022 with chief complaint of SOB and CP x3 days.  Pt has been a heavy smoker his whole life. Denies fever, chills, cough, or congestion. No sick contacts.    Admitted to tele for likely COPD exacerbation and CP.  CXR is clear. Started on Duonebs, Symbicort and Solumedrol. No prior cardiac stents. EKG on arrival to ED notes PRWP V 1-3, no ST changes. Trop high on arrival at 268 trending down 180.8 today, proBNP 763, CK 1090. TSH 69.600 ( stopped synthroid a few weeks ago), T3 <20 T4 0.9, Na 126.  CXR revealing New cardiomegaly and CT chest with Large pericardial effusion, Echo today with large pericardial effusion with early tamponade physiology. Transferred to [facility] for pericardiocentesis. On arrival to [facility] patient awake and verbal A&OX3, denies any chest pain, dyspnea, dizziness, palpitations, N&V, Ha, orthopnea, LE edema.  VS WNL, on 2 L nasal cannula in no respiratory distress.      Hospital Course:  9/7 Transferred to CCU for further management     REVIEW OF SYSTEMS:  CONSTITUTIONAL: No weakness, fevers or chills  EYES/ENT: No visual changes;  No vertigo or throat pain   NECK: No pain or stiffness  RESPIRATORY: No cough, wheezing, hemoptysis; No shortness of breath  CARDIOVASCULAR: No chest pain or palpitations  GASTROINTESTINAL: No abdominal or epigastric pain. No nausea, vomiting, or hematemesis; No diarrhea or constipation. No melena or hematochezia.  GENITOURINARY: No dysuria, frequency or hematuria  NEUROLOGICAL: No numbness or weakness  SKIN: No itching, rashes    ICU Vital Signs Last 24 Hrs  T(C): 36.4 (11 Sep 2022 15:00), Max: 36.4 (11 Sep 2022 00:00)  T(F): 97.6 (11 Sep 2022 15:00), Max: 97.6 (11 Sep 2022 15:00)  HR: 53 (11 Sep 2022 20:00) (49 - 85)  BP: 127/68 (11 Sep 2022 20:00) (78/50 - 208/98)  BP(mean): 92 (11 Sep 2022 20:00) (60 - 138)  RR: 13 (11 Sep 2022 20:00) (10 - 22)  SpO2: 96% (11 Sep 2022 20:00) (93% - 99%)    O2 Parameters below as of 11 Sep 2022 20:00  Patient On (Oxygen Delivery Method): room air    10 Sep 2022 07:01  -  11 Sep 2022 07:00  --------------------------------------------------------  IN: 600 mL / OUT: 625 mL / NET: -25 mL    11 Sep 2022 07:01  -  11 Sep 2022 21:28  --------------------------------------------------------  IN: 570 mL / OUT: 100 mL / NET: 470 mL    PHYSICAL EXAM:  GENERAL: No acute distress, well-developed  HEAD:  Atraumatic, Normocephalic  EYES: EOMI, PERRLA, conjunctiva and sclera clear  NECK: Supple, no lymphadenopathy, no JVD  CHEST/LUNG: CTAB; No wheezes, rales, or rhonchi  HEART: Regular rate and rhythm. Normal S1/S2. No murmurs, rubs, or gallops  ABDOMEN: Soft, non-tender, non-distended; normal bowel sounds, no organomegaly  EXTREMITIES:  2+ peripheral pulses b/l, No clubbing, cyanosis, or edema  NEUROLOGY: A&O x 3, no focal deficits  SKIN: No rashes or lesions  ============================I/O===========================   I&O's Detail    10 Sep 2022 07:01  -  11 Sep 2022 07:00  --------------------------------------------------------  IN:    Oral Fluid: 600 mL  Total IN: 600 mL    OUT:    Voided (mL): 625 mL  Total OUT: 625 mL    Total NET: -25 mL    11 Sep 2022 07:01  -  11 Sep 2022 21:28  --------------------------------------------------------  IN:    IV PiggyBack: 250 mL    Oral Fluid: 240 mL    sodium chloride 2%: 80 mL  Total IN: 570 mL    OUT:    Incontinent per Condom Catheter (mL): 100 mL  Total OUT: 100 mL    Total NET: 470 mL  ============================ LABS =========================                   11.0   16.70 )-----------( 211      ( 11 Sep 2022 12:08 )             33.0     09-11    121<L>  |  90<L>  |  60<H>  ----------------------------<  136<H>  4.4   |  22  |  1.42<H>    Ca    8.9      11 Sep 2022 12:08  Phos  2.1     09-11  Mg     2.3     09-11    TPro  6.0  /  Alb  3.0<L>  /  TBili  0.4  /  DBili  x   /  AST  29  /  ALT  26  /  AlkPhos  66  09-11    LIVER FUNCTIONS - ( 11 Sep 2022 12:08 )  Alb: 3.0 g/dL / Pro: 6.0 g/dL / ALK PHOS: 66 U/L / ALT: 26 U/L / AST: 29 U/L / GGT: x           PT/INR - ( 11 Sep 2022 04:32 )   PT: 10.9 sec;   INR: 0.95 ratio   PTT - ( 11 Sep 2022 04:32 )  PTT:28.3 sec  ======================Micro/Rad/Cardio=================  Telemetry: Reviewed   EKG: Reviewed  CXR: Reviewed  Culture: Reviewed   Echo: Limited Transthoracic Echo:   Patient name: AMBAR PICKARD  YOB: 1945   Age: 77 (M)   MR#: [id number]  Study Date: 9/11/2022  [facility] RD[facility]  Study quality: Technically fair  Referring Physician: Keysha Irvin MD  Blood Pressure: 155/70 mmHg  Height: 173 cm  Weight: 67 kg  BSA: 1.8 m2  ------------------------------------------------------------------------  PROCEDURE: Limited transthoracic echocardiogram with 2-D.  M-Mode and spectral and color flow Doppler.  INDICATION: Pericardial effusion (noninflammatory) (I31.3)  ------------------------------------------------------------------------  CONCLUSIONS:  Limited repeat study to re-evaluate pericardial effusion.  Normal pericardium with no pericardial effusion.  ======================================================  PAST MEDICAL & SURGICAL HISTORY:  ETOH abuse  sober since 2016    Seizure disorder  last seizure 2015    BPH (benign prostatic hyperplasia)    HTN (hypertension)    Prostate CA  Dx&#x27;d 2008, s/p radiation    Hyperlipidemia    Poor historian    S/P hernia repair  right inguinal hernia    H/O prostate cancer  10- yrs ago, s/p radiation therapy    Status post cataract extraction    H/O urethrotomy  June 2018    A/P: 77M Hx HTN, HLD,  BPH, CKD ( creat 1.42 today), chronic angelita hydronephrosis ( x2 years), prostate ca ( radiation 10 years ago), seizure disorder ( last seizure 2015), ETOH use ( sober since 2016), hypothyroidism, and mild demenia p/w CP and SOB found with large pericardial effusion s/p pericardiocentesis.     ====================== NEUROLOGY=====================  A&Ox3  - continue to monitor mental status as per protocol    Seizure disorder  - No seziure meds reported.  sister confirmed patient not on any seizure meds    ==================== RESPIRATORY======================  Comfortable on RA  - SOB on admission likely 2/2 pericardial tamponade. Patient feels improvement s/p drain placement   - SpO2 96%  - 7/6 CT chest: Large pericardial effusion. Small bilateral pleural effusions. Bilateral hydronephrosis.    COPD  - duonebs prn   - dc'ed levofloxacin. Likely sob related to pericardial effusion (states SOB improved s/p drain placement)   - provided smoking cessation education. Start nicotine patch while inpatient (states quit last week, smokes 4-5 cig/daily)     albuterol/ipratropium for Nebulization 3 milliLiter(s) Nebulizer every 6 hours PRN Shortness of Breath and/or Wheezing    ====================CARDIOVASCULAR==================  Cardiac Tamponade  - 9/7 TTE: EF 70%. Mod concentric LVH. Normal RV size and function. Large pericardial effusion with evidence of echo tamponade physiology.   - s/p pericardiocentesis with drain placement, 1060mL out serosanguinous.   - as per lights criteria- meets criteria of exudative fluid. f/u pericardial fluid studies including cytology. Gram stain negative for organism   - Patient remained HD stable at [place] prior to transfer (as per documentation: HRs 60-70s; SBPs 150-170s, Lying flat off with O2 sats 99%). Continue to monitor vital signs   - trop I downtrending at OSH prior to transfer  - monitor drain output. Daily TTE while in place  - Cancer screening: Last colonoscopy >10 years ago (states went last year for colonoscopy but procedure aborted 2/2 HTN outpatient and never rescheduled). Remote history of Prostate CA, follows with urologist but unsure of name. waiting for patient sister to provide collateral info     HTN  - on home losartan 100mg and hydral 50mg.  - on home meds as hypertensive upon presentation to CICU. This morning patient with softer BP (asymptomatic).  - continue losartan 50mg     HLD  - c/w home simvastatin     hydrALAZINE 50 milliGRAM(s) Oral three times a day  losartan 50 milliGRAM(s) Oral daily  urea Oral Powder 15 Gram(s) Oral two times a day  simvastatin 20 milliGRAM(s) Oral at bedtime    ===================HEMATOLOGIC/ONC ===================  No acute issues   - continue to monitor H&H/Plts     heparin   Injectable 5000 Unit(s) SubCutaneous every 8 hours    ===================== RENAL =========================  BPH  - continue home tamsulosin     Remote Hx Prostate Ca  - f.u with urology, patient unsure of name. will f/u with sister for collateral info.   - states in remission for >20 years     Hyponatremia  - Na on presentation 125.  - Patient mentating appropriately   - Euvolemic appearing on exam 9/8. Could be 2/2 hypothyroidism     CKD  - Baseline SCr looks to be around 1.5-1.7 base upon prior SCr trend in 2020. SCr this AM 1.42  - monitor and trend SCr, BUN, and lytes  - replete lytes prn with goal K 4-4.5 and Mg >2    Hydronephrosis on CT 9/6  - renal US ordered   - Urinating currently with bladder scan of 120mL. Also with b/l hydronephrosis back in 2020 with history of chronic hydronephrosis. SCr at baseline. Will also get collateral info from outpatient urologist once patient sister updates with info     tamsulosin 0.4 milliGRAM(s) Oral at bedtime    ==================== GASTROINTESTINAL===================  - Tolerating PO DASH/TLC diet.  - monitor for regular BM while inpatient. Last BM yesterday.   - Bowel regimen with Miralax and Senna     bisacodyl 5 milliGRAM(s) Oral every 12 hours  polyethylene glycol 3350 17 Gram(s) Oral two times a day  senna 2 Tablet(s) Oral at bedtime  simethicone 80 milliGRAM(s) Chew two times a day PRN Gas  sodium chloride 2% . 1000 milliLiter(s) (40 mL/Hr) IV Continuous <Continuous>    =======================    ENDOCRINE  =====================  - f/u hgb a1c and lipid panel     Hypothyroidism   - TSH elevated with low T3 and T4 on 9/8  - will consult endo for recs  - on home synthroid 112 mcg     levothyroxine 112 MICROGram(s) Oral daily    ========================INFECTIOUS DISEASE================  Leukocytosis, afebrile  - WBC 16.70  - continue to monitor and trend wbc and temp curve         Patient requires continuous monitoring with bedside rhythm monitoring, pulse ox monitoring, and intermittent blood gas analysis. Care plan discussed with ICU care team. Patient remained critical and at risk for life threatening decompensation.  Patient seen, examined and plan discussed with CCU team during rounds.     I have personally provided 30 minutes of critical care time excluding time spent on separate procedures, in addition to initial critical care time provided by the CICU Attending, Dr. Irvin.     By signing my name below, I, Maria D'Amico, attest that this documentation has been prepared under the direction and in the presence of Grace Weems NP   Electronically signed: Maria D'Amico, Scribe, 09-11-22 @ 21:28    I, Grace Weems NP , personally performed the services described in this documentation. all medical record entries made by the scribe were at my direction and in my presence. I have reviewed the chart and agree that the record reflects my personal performance and is accurate and complete  Electronically signed: Grace Weems NP

## 2022-09-11 NOTE — PROGRESS NOTE ADULT - ASSESSMENT
77M with PMH of HTN, HLD,  BPH, CKD ( creat 1.42 today), chronic angelita hydronephrosis ( x2 years), prostate ca ( radiation 10 years ago), seizure disorder ( last seizure 2015), ETOH use ( sober since 2016), hypothyroidism, and mild demenia p/w CP and SOB found with large pericardial effusion s/p pericardiocentesis. drain removed 9/10.    ====================Plan ==============  #Neuro   A&Ox3  - continue to monitor mental status as per protocol    Seizure disorder  - No seizure meds reported. Sister had confirmed this admission no additional medication bottles at home     #Respiratory  Comfortable on RA  - SOB on admission likely 2/2 pericardial tamponade. Patient feels improvement s/p drain placement  - Monitor SpO2 with goal >94%    COPD  - duonebs prn   - started on levofloxacin outpatient for concern of COPD exacerbation/PNA. Will d/c for now. Likely sob related to pericardial effusion (states SOB improved s/p drain placement)   - provided smoking cessation education. Start nicotine patch while inpatient (states quit 1 week prior to admission, smokes 4-5 cig/daily)     #CV  Cardiac Tamponade  - 9/7 TTE: EF 70%. Mod concentric LVH. Normal RV size and function. Large pericardial effusion with evidence of echo tamponade physiology.   - s/p pericardiocentesis with drain placement, 1060mL out serosanguinous.   - f/u pericardial fluid studies. as per lights criteria- meets criteria of exudative fluid. Gram stain negative for organism. Cytology NEGATIVE FOR MALIGNANT CELLS  - Patient remained HD stable at [place] prior to transfer (as per documentation: HRs 60-70s; SBPs 150-170s, Lying flat off with O2 sats 99%). Continue to monitor vital signs   - trop I downtrending at OSH prior to transfer  - pericardial drain removed 9/10 f/u repeat echo in AM to reassess effusion size   - Cancer screening: Last colonoscopy >10 years ago (states went last year for colonoscopy but procedure aborted 2/2 HTN outpatient and never rescheduled). Remote history of Prostate CA, follows with urologist but unsure of name. waiting for patient sister to provide collateral info     HTN  - home meds losartan 100mg and hydral 50mg.  - continue losartan 50mg  - continue hydralazine 50mg     HLD  - c/w home simvastatin     #GI  - tolerating pureed diet  - c/w miralax, senna, dulcolax   - last BM today    #  BPH  - continue home tamsulosin     Remote Hx Prostate Ca  - f.u with urology, patient unsure of name. will f/u with sister for collateral info.   - states in remission for >20 years     Hyponatremia  - Na on presentation 125, remains at 125 today.   - nephrology consulted, started on urena  - Patient mentating appropriately   - Euvolemic appearing on exam    CKD  - Baseline SCr looks to be around 1.5-1.7 base upon prior SCr trend in 2020. SCr this AM 1.59  - monitor and trend SCr, BUN, and lytes  - replete lytes prn with goal K 4-4.5 and Mg >2    Hydronephrosis on CT 9/6  - renal US: Mild right hydronephrosis, improved since 9/6/2022. Poor visualization of the left kidney. No gross left hydronephrosis, which has improved or resolved since 9/6/2022. Urinary bladder volume of 136 mL. The patient had no urge to void during the exam.  - Urinating currently , strict I/Os via condom cath    #Endo  - hgb a1c 5.7    Hypothyroidism   - TSH elevated with low T3 and T4  - will consult endo for recs  - on home synthroid 112 mcg, may have missed a few doses (waiting upon refill). As per endo- resume home dose of 112mcg and repeat free T4 in 5-7 days.     #Heme  DVT PPX: Start SQH  Normocytic anemia  - continue to monitor and trend CBC.   - Likely 2/2 anemia of chronic disease 2/2 CKD. If downtrends, can consider anemia workup. No evidence of bleeding    #ID  Afebrile  - uptrending leukocytosis today although has been variable since admission; possibly reactive in setting of effusion, has been afebrile without any symptoms of active infection, non-toxic appearing, u/a has been negative. Culture if spikes fever.  - continue to monitor and trend wbc and temp curve   77M with PMH of HTN, HLD,  BPH, CKD ( creat 1.42 today), chronic angelita hydronephrosis ( x2 years), prostate ca ( radiation 10 years ago), seizure disorder ( last seizure 2015), ETOH use ( sober since 2016), hypothyroidism, and mild demenia p/w CP and SOB found with large pericardial effusion s/p pericardiocentesis. drain removed 9/10.    ====================Plan ==============  #Neuro   A&Ox2  - continue to monitor mental status as per protocol    Seizure disorder  - No seizure meds reported. Sister had confirmed this admission no additional medication bottles at home     #Respiratory  Comfortable on RA  - SOB on admission likely 2/2 pericardial tamponade. Patient feels improvement s/p drain placement  - Monitor SpO2 with goal >94%    COPD  - duonebs prn   - started on levofloxacin outpatient for concern of COPD exacerbation/PNA. Will d/c for now. Likely sob related to pericardial effusion (states SOB improved s/p drain placement)   - provided smoking cessation education. Start nicotine patch while inpatient (states quit 1 week prior to admission, smokes 4-5 cig/daily)     #CV  Cardiac Tamponade  - 9/7 TTE: EF 70%. Mod concentric LVH. Normal RV size and function. Large pericardial effusion with evidence of echo tamponade physiology.   - s/p pericardiocentesis with drain placement, 1060mL out serosanguinous.   - f/u pericardial fluid studies. as per lights criteria- meets criteria of exudative fluid. Gram stain negative for organism. Cytology NEGATIVE FOR MALIGNANT CELLS  - Patient remained HD stable at [place] prior to transfer (as per documentation: HRs 60-70s; SBPs 150-170s, Lying flat off with O2 sats 99%). Continue to monitor vital signs   - trop I downtrending at OSH prior to transfer  - pericardial drain removed 9/10 f/u repeat echo (9/8, 9/9 and 9/11): No changes     hx of HTN  - continue losartan 50mg  - continue hydralazine 50mg     HLD  - c/w home simvastatin     #GI  - tolerating pureed diet  - c/w miralax, senna, dulcolax     #: hx of BPH  - continue home tamsulosin     Remote Hx Prostate Ca  - f.u with urology, patient unsure of name. will f/u with sister for collateral info.   - states in remission for >20 years     Hyponatremia  - Na on presentation 125, remains at 125 today.   - nephrology consulted, started on urena  - Patient mentating appropriately   - Euvolemic appearing on exam    CKD  - Baseline SCr looks to be around 1.5-1.7 base upon prior SCr trend in 2020. SCr this AM 1.59  - monitor and trend SCr, BUN, and lytes  - replete lytes prn with goal K 4-4.5 and Mg >2    Hydronephrosis on CT 9/6  - renal US: Mild right hydronephrosis, improved since 9/6/2022. Poor visualization of the left kidney. No gross left hydronephrosis, which has improved or resolved since 9/6/2022. Urinary bladder volume of 136 mL. The patient had no urge to void during the exam.  - Urinating currently , strict I/Os via condom cath    #Endo  - hgb a1c 5.7    Hypothyroidism   - TSH elevated with low T3 and T4  - will consult endo for recs  - on home synthroid 112 mcg, may have missed a few doses (waiting upon refill). As per endo- resume home dose of 112mcg and repeat free T4 in 5-7 days.     #Heme  DVT PPX: Start SQH  Normocytic anemia  - continue to monitor and trend CBC.   - Likely 2/2 anemia of chronic disease 2/2 CKD. If downtrends, can consider anemia workup. No evidence of bleeding    #ID  Afebrile  - uptrending leukocytosis today although has been variable since admission; possibly reactive in setting of effusion, has been afebrile without any symptoms of active infection, non-toxic appearing, u/a has been negative. Culture if spikes fever.  - continue to monitor and trend wbc and temp curve  - Trend WBC

## 2022-09-12 LAB
ALBUMIN SERPL ELPH-MCNC: 3 G/DL — LOW (ref 3.3–5)
ALP SERPL-CCNC: 65 U/L — SIGNIFICANT CHANGE UP (ref 40–120)
ALT FLD-CCNC: 28 U/L — SIGNIFICANT CHANGE UP (ref 10–45)
ANION GAP SERPL CALC-SCNC: 8 MMOL/L — SIGNIFICANT CHANGE UP (ref 5–17)
AST SERPL-CCNC: 25 U/L — SIGNIFICANT CHANGE UP (ref 10–40)
BASOPHILS # BLD AUTO: 0.01 K/UL — SIGNIFICANT CHANGE UP (ref 0–0.2)
BASOPHILS NFR BLD AUTO: 0.1 % — SIGNIFICANT CHANGE UP (ref 0–2)
BILIRUB SERPL-MCNC: 0.4 MG/DL — SIGNIFICANT CHANGE UP (ref 0.2–1.2)
BUN SERPL-MCNC: 64 MG/DL — HIGH (ref 7–23)
CALCIUM SERPL-MCNC: 8.8 MG/DL — SIGNIFICANT CHANGE UP (ref 8.4–10.5)
CHLORIDE SERPL-SCNC: 93 MMOL/L — LOW (ref 96–108)
CO2 SERPL-SCNC: 23 MMOL/L — SIGNIFICANT CHANGE UP (ref 22–31)
CREAT ?TM UR-MCNC: 59 MG/DL — SIGNIFICANT CHANGE UP
CREAT SERPL-MCNC: 1.3 MG/DL — SIGNIFICANT CHANGE UP (ref 0.5–1.3)
CULTURE RESULTS: SIGNIFICANT CHANGE UP
EGFR: 56 ML/MIN/1.73M2 — LOW
EOSINOPHIL # BLD AUTO: 0.02 K/UL — SIGNIFICANT CHANGE UP (ref 0–0.5)
EOSINOPHIL NFR BLD AUTO: 0.1 % — SIGNIFICANT CHANGE UP (ref 0–6)
GLUCOSE SERPL-MCNC: 112 MG/DL — HIGH (ref 70–99)
HCT VFR BLD CALC: 33.7 % — LOW (ref 39–50)
HGB BLD-MCNC: 11.4 G/DL — LOW (ref 13–17)
IMM GRANULOCYTES NFR BLD AUTO: 0.9 % — SIGNIFICANT CHANGE UP (ref 0–1.5)
LYMPHOCYTES # BLD AUTO: 0.85 K/UL — LOW (ref 1–3.3)
LYMPHOCYTES # BLD AUTO: 6.3 % — LOW (ref 13–44)
MAGNESIUM SERPL-MCNC: 2.2 MG/DL — SIGNIFICANT CHANGE UP (ref 1.6–2.6)
MCHC RBC-ENTMCNC: 30.1 PG — SIGNIFICANT CHANGE UP (ref 27–34)
MCHC RBC-ENTMCNC: 33.8 GM/DL — SIGNIFICANT CHANGE UP (ref 32–36)
MCV RBC AUTO: 88.9 FL — SIGNIFICANT CHANGE UP (ref 80–100)
MONOCYTES # BLD AUTO: 0.65 K/UL — SIGNIFICANT CHANGE UP (ref 0–0.9)
MONOCYTES NFR BLD AUTO: 4.8 % — SIGNIFICANT CHANGE UP (ref 2–14)
NEUTROPHILS # BLD AUTO: 11.9 K/UL — HIGH (ref 1.8–7.4)
NEUTROPHILS NFR BLD AUTO: 87.8 % — HIGH (ref 43–77)
NRBC # BLD: 0 /100 WBCS — SIGNIFICANT CHANGE UP (ref 0–0)
OSMOLALITY UR: 616 MOS/KG — SIGNIFICANT CHANGE UP (ref 300–900)
PHOSPHATE SERPL-MCNC: 1.9 MG/DL — LOW (ref 2.5–4.5)
PLATELET # BLD AUTO: 230 K/UL — SIGNIFICANT CHANGE UP (ref 150–400)
POTASSIUM SERPL-MCNC: 4.6 MMOL/L — SIGNIFICANT CHANGE UP (ref 3.5–5.3)
POTASSIUM SERPL-SCNC: 4.6 MMOL/L — SIGNIFICANT CHANGE UP (ref 3.5–5.3)
PROT SERPL-MCNC: 6.1 G/DL — SIGNIFICANT CHANGE UP (ref 6–8.3)
RBC # BLD: 3.79 M/UL — LOW (ref 4.2–5.8)
RBC # FLD: 17.4 % — HIGH (ref 10.3–14.5)
SODIUM SERPL-SCNC: 124 MMOL/L — LOW (ref 135–145)
SODIUM UR-SCNC: 10 MMOL/L — SIGNIFICANT CHANGE UP
SPECIMEN SOURCE: SIGNIFICANT CHANGE UP
WBC # BLD: 13.55 K/UL — HIGH (ref 3.8–10.5)
WBC # FLD AUTO: 13.55 K/UL — HIGH (ref 3.8–10.5)

## 2022-09-12 PROCEDURE — 99291 CRITICAL CARE FIRST HOUR: CPT | Mod: GC,25

## 2022-09-12 PROCEDURE — 99292 CRITICAL CARE ADDL 30 MIN: CPT

## 2022-09-12 PROCEDURE — 99232 SBSQ HOSP IP/OBS MODERATE 35: CPT | Mod: GC

## 2022-09-12 PROCEDURE — 93010 ELECTROCARDIOGRAM REPORT: CPT

## 2022-09-12 RX ORDER — SODIUM CHLORIDE 5 G/100ML
1000 INJECTION, SOLUTION INTRAVENOUS
Refills: 0 | Status: DISCONTINUED | OUTPATIENT
Start: 2022-09-12 | End: 2022-09-13

## 2022-09-12 RX ADMIN — Medication 1 PATCH: at 07:02

## 2022-09-12 RX ADMIN — Medication 5 MILLIGRAM(S): at 17:43

## 2022-09-12 RX ADMIN — LOSARTAN POTASSIUM 50 MILLIGRAM(S): 100 TABLET, FILM COATED ORAL at 05:57

## 2022-09-12 RX ADMIN — CHLORHEXIDINE GLUCONATE 1 APPLICATION(S): 213 SOLUTION TOPICAL at 06:49

## 2022-09-12 RX ADMIN — Medication 50 MILLIGRAM(S): at 21:35

## 2022-09-12 RX ADMIN — Medication 50 MILLIGRAM(S): at 05:57

## 2022-09-12 RX ADMIN — Medication 1 PATCH: at 14:01

## 2022-09-12 RX ADMIN — HEPARIN SODIUM 5000 UNIT(S): 5000 INJECTION INTRAVENOUS; SUBCUTANEOUS at 05:57

## 2022-09-12 RX ADMIN — Medication 1 PATCH: at 19:00

## 2022-09-12 RX ADMIN — HEPARIN SODIUM 5000 UNIT(S): 5000 INJECTION INTRAVENOUS; SUBCUTANEOUS at 13:22

## 2022-09-12 RX ADMIN — HEPARIN SODIUM 5000 UNIT(S): 5000 INJECTION INTRAVENOUS; SUBCUTANEOUS at 21:36

## 2022-09-12 RX ADMIN — Medication 112 MICROGRAM(S): at 06:30

## 2022-09-12 RX ADMIN — Medication 15 GRAM(S): at 05:57

## 2022-09-12 RX ADMIN — POLYETHYLENE GLYCOL 3350 17 GRAM(S): 17 POWDER, FOR SOLUTION ORAL at 17:43

## 2022-09-12 RX ADMIN — TAMSULOSIN HYDROCHLORIDE 0.4 MILLIGRAM(S): 0.4 CAPSULE ORAL at 21:36

## 2022-09-12 RX ADMIN — SIMVASTATIN 20 MILLIGRAM(S): 20 TABLET, FILM COATED ORAL at 21:36

## 2022-09-12 RX ADMIN — Medication 1 PATCH: at 13:21

## 2022-09-12 RX ADMIN — Medication 50 MILLIGRAM(S): at 13:22

## 2022-09-12 RX ADMIN — SODIUM CHLORIDE 40 MILLILITER(S): 5 INJECTION, SOLUTION INTRAVENOUS at 01:00

## 2022-09-12 RX ADMIN — Medication 5 MILLIGRAM(S): at 05:57

## 2022-09-12 RX ADMIN — Medication 15 GRAM(S): at 17:43

## 2022-09-12 RX ADMIN — POLYETHYLENE GLYCOL 3350 17 GRAM(S): 17 POWDER, FOR SOLUTION ORAL at 05:57

## 2022-09-12 NOTE — PROGRESS NOTE ADULT - ATTENDING COMMENTS
History of malignancy presents with pericardial tamponade s/p pericardiocentesis  A+Ox3  Hemodynamics acceptable, no pressors or inotropes  Pericardial tamponade s/p pericardiocentesis, drain now removed  O2 sats mid to high 90s on nasal cannula - wean to room air  DASH diet  Normal renal function that is improving - target even  Hyponatremic, ? euvolemic, responded to 2% saline, Renal is following - fluid restrict   H/H low but acceptable on HSQ for DVT prophylaxis   Hypothermic, likely due to hypothyroidism, no antibiotics  Sugars controlled  Hypothyroidism that is not well controlled - continue Synthroid  No central access History of malignancy presents with pericardial tamponade s/p pericardiocentesis  A+Ox2-3, baseline dementia  Hemodynamics acceptable, no pressors or inotropes  Pericardial tamponade s/p pericardiocentesis, drain now removed  O2 sats mid to high 90s on nasal cannula - wean to room air  DASH diet  Normal renal function that is improving - target even  Hyponatremic, ? euvolemic, responded to 2% saline, Renal is following - fluid restrict   H/H low but acceptable on HSQ for DVT prophylaxis   Hypothermic, likely due to hypothyroidism, no antibiotics  Sugars controlled  Hypothyroidism that is not well controlled - continue Synthroid  No central access History of malignancy presents with pericardial tamponade s/p pericardiocentesis  A+Ox2-3, baseline dementia  Hemodynamics acceptable, no pressors or inotropes  Pericardial tamponade s/p pericardiocentesis, drain now removed  O2 sats mid to high 90s on nasal cannula - wean to room air  DASH diet  Normal renal function that is improving - target even  Hyponatremic, ? euvolemic, responded to 2% saline, Renal is following   Continue to fluid restrict, trend Na q8-12h, may require more hypertonic saline   H/H low but acceptable on HSQ for DVT prophylaxis   Hypothermic, likely due to hypothyroidism, no antibiotics  Sugars controlled  Hypothyroidism that is not well controlled - continue Synthroid  No central access

## 2022-09-12 NOTE — PROGRESS NOTE ADULT - SUBJECTIVE AND OBJECTIVE BOX
Tuan Stewart, PGY1  Internal Medicine       PATIENT:  AMBAR PICKARD  [id number]    CHIEF COMPLAINT:  Patient is a 77y old  Male who presents with a chief complaint of Tamponade (11 Sep 2022 21:27)      INTERVAL HISTORY/OVERNIGHT EVENTS:    MEDICATIONS:  MEDICATIONS  (STANDING):  bisacodyl 5 milliGRAM(s) Oral every 12 hours  chlorhexidine 4% Liquid 1 Application(s) Topical <User Schedule>  heparin   Injectable 5000 Unit(s) SubCutaneous every 8 hours  hydrALAZINE 50 milliGRAM(s) Oral three times a day  levothyroxine 112 MICROGram(s) Oral daily  losartan 50 milliGRAM(s) Oral daily  nicotine -  14 mG/24Hr(s) Patch 1 Patch Transdermal daily  polyethylene glycol 3350 17 Gram(s) Oral two times a day  senna 2 Tablet(s) Oral at bedtime  simvastatin 20 milliGRAM(s) Oral at bedtime  sodium chloride 2% . 1000 milliLiter(s) (40 mL/Hr) IV Continuous <Continuous>  tamsulosin 0.4 milliGRAM(s) Oral at bedtime  urea Oral Powder 15 Gram(s) Oral two times a day    MEDICATIONS  (PRN):  albuterol/ipratropium for Nebulization 3 milliLiter(s) Nebulizer every 6 hours PRN Shortness of Breath and/or Wheezing  simethicone 80 milliGRAM(s) Chew two times a day PRN Gas      ALLERGIES:  Allergies    No Known Allergies    Intolerances        OBJECTIVE:  ICU Vital Signs Last 24 Hrs  T(C): 34.6 (12 Sep 2022 07:00), Max: 36.7 (12 Sep 2022 00:00)  T(F): 94.2 (12 Sep 2022 07:00), Max: 98 (12 Sep 2022 00:00)  HR: 53 (12 Sep 2022 07:00) (49 - 82)  BP: 126/60 (12 Sep 2022 07:00) (100/55 - 208/98)  BP(mean): 86 (12 Sep 2022 07:00) (73 - 138)  ABP: --  ABP(mean): --  RR: 14 (12 Sep 2022 07:00) (11 - 32)  SpO2: 97% (12 Sep 2022 07:00) (92% - 99%)    O2 Parameters below as of 12 Sep 2022 07:00  Patient On (Oxygen Delivery Method): room air            Adult Advanced Hemodynamics Last 24 Hrs  CVP(mm Hg): --  CVP(cm H2O): --  CO: --  CI: --  PA: --  PA(mean): --  PCWP: --  SVR: --  SVRI: --  PVR: --  PVRI: --  CAPILLARY BLOOD GLUCOSE        CAPILLARY BLOOD GLUCOSE        I&O's Summary    11 Sep 2022 07:01  -  12 Sep 2022 07:00  --------------------------------------------------------  IN: 1210 mL / OUT: 400 mL / NET: 810 mL      Daily     Daily Weight in k.2 (12 Sep 2022 04:00)    PHYSICAL EXAMINATION:  General: WN/WD NAD  HEENT: PERRLA, EOMI, moist mucous membranes  Neurology: A&Ox3, nonfocal, PINEDA x 4  Respiratory: CTA B/L, normal respiratory effort, no wheezes, crackles, rales  CV: RRR, S1S2, no murmurs, rubs or gallops  Abdominal: Soft, NT, ND +BS, Last BM  Extremities: No edema, + peripheral pulses  Incisions:   Tubes:    LABS:                          11.4   13.55 )-----------( 230      ( 12 Sep 2022 07:01 )             33.7     09-12    124<L>  |  93<L>  |  64<H>  ----------------------------<  112<H>  4.6   |  23  |  1.30    Ca    8.8      12 Sep 2022 07:05  Phos  1.9     09-12  Mg     2.2     09-12    TPro  6.1  /  Alb  3.0<L>  /  TBili  0.4  /  DBili  x   /  AST  25  /  ALT  28  /  AlkPhos  65  09-12    LIVER FUNCTIONS - ( 12 Sep 2022 07:05 )  Alb: 3.0 g/dL / Pro: 6.1 g/dL / ALK PHOS: 65 U/L / ALT: 28 U/L / AST: 25 U/L / GGT: x           PT/INR - ( 11 Sep 2022 04:32 )   PT: 10.9 sec;   INR: 0.95 ratio         PTT - ( 11 Sep 2022 04:32 )  PTT:28.3 sec            TELEMETRY:     EKG:     IMAGING:       Alexis Mcdonald, PGY-2   Emergency Medicine    PATIENT:  AMBAR PICKARD  [id number]    CHIEF COMPLAINT:  Patient is a 77y old  Male who presents with a chief complaint of Tamponade (11 Sep 2022 21:27)      INTERVAL HISTORY/OVERNIGHT EVENTS:  Pt hypothermic to 94.2, placed on bairhugger. Pt alert, responds appropriately to questions. No complaints. BM this AM. No pain, feels stable.     MEDICATIONS:  MEDICATIONS  (STANDING):  bisacodyl 5 milliGRAM(s) Oral every 12 hours  chlorhexidine 4% Liquid 1 Application(s) Topical <User Schedule>  heparin   Injectable 5000 Unit(s) SubCutaneous every 8 hours  hydrALAZINE 50 milliGRAM(s) Oral three times a day  levothyroxine 112 MICROGram(s) Oral daily  losartan 50 milliGRAM(s) Oral daily  nicotine -  14 mG/24Hr(s) Patch 1 Patch Transdermal daily  polyethylene glycol 3350 17 Gram(s) Oral two times a day  senna 2 Tablet(s) Oral at bedtime  simvastatin 20 milliGRAM(s) Oral at bedtime  sodium chloride 2% . 1000 milliLiter(s) (40 mL/Hr) IV Continuous <Continuous>  tamsulosin 0.4 milliGRAM(s) Oral at bedtime  urea Oral Powder 15 Gram(s) Oral two times a day    MEDICATIONS  (PRN):  albuterol/ipratropium for Nebulization 3 milliLiter(s) Nebulizer every 6 hours PRN Shortness of Breath and/or Wheezing  simethicone 80 milliGRAM(s) Chew two times a day PRN Gas      ALLERGIES:  Allergies    No Known Allergies    Intolerances        OBJECTIVE:  ICU Vital Signs Last 24 Hrs  T(C): 34.6 (12 Sep 2022 07:00), Max: 36.7 (12 Sep 2022 00:00)  T(F): 94.2 (12 Sep 2022 07:00), Max: 98 (12 Sep 2022 00:00)  HR: 53 (12 Sep 2022 07:00) (49 - 82)  BP: 126/60 (12 Sep 2022 07:00) (100/55 - 208/98)  BP(mean): 86 (12 Sep 2022 07:00) (73 - 138)  ABP: --  ABP(mean): --  RR: 14 (12 Sep 2022 07:00) (11 - 32)  SpO2: 97% (12 Sep 2022 07:00) (92% - 99%)    O2 Parameters below as of 12 Sep 2022 07:00  Patient On (Oxygen Delivery Method): room air            Adult Advanced Hemodynamics Last 24 Hrs  CVP(mm Hg): --  CVP(cm H2O): --  CO: --  CI: --  PA: --  PA(mean): --  PCWP: --  SVR: --  SVRI: --  PVR: --  PVRI: --  CAPILLARY BLOOD GLUCOSE        CAPILLARY BLOOD GLUCOSE        I&O's Summary    11 Sep 2022 07:01  -  12 Sep 2022 07:00  --------------------------------------------------------  IN: 1210 mL / OUT: 400 mL / NET: 810 mL      Daily     Daily Weight in k.2 (12 Sep 2022 04:00)    PHYSICAL EXAMINATION:  General: WN/WD NAD  HEENT: PERRLA, EOMI, moist mucous membranes  Neurology: A&Ox3, nonfocal, PINEDA x 4  Respiratory: CTA B/L, normal respiratory effort, no wheezes, crackles, rales  CV: RRR, S1S2, no murmurs, rubs or gallops  Abdominal: Soft, NT, ND +BS, Last BM  Extremities: No edema, + peripheral pulses  Incisions:   Tubes:    LABS:                          11.4   13.55 )-----------( 230      ( 12 Sep 2022 07:01 )             33.7     09-12    124<L>  |  93<L>  |  64<H>  ----------------------------<  112<H>  4.6   |  23  |  1.30    Ca    8.8      12 Sep 2022 07:05  Phos  1.9     09-12  Mg     2.2     09-12    TPro  6.1  /  Alb  3.0<L>  /  TBili  0.4  /  DBili  x   /  AST  25  /  ALT  28  /  AlkPhos  65  09-12    LIVER FUNCTIONS - ( 12 Sep 2022 07:05 )  Alb: 3.0 g/dL / Pro: 6.1 g/dL / ALK PHOS: 65 U/L / ALT: 28 U/L / AST: 25 U/L / GGT: x           PT/INR - ( 11 Sep 2022 04:32 )   PT: 10.9 sec;   INR: 0.95 ratio         PTT - ( 11 Sep 2022 04:32 )  PTT:28.3 sec            TELEMETRY: sinus bradycardia

## 2022-09-12 NOTE — GOALS OF CARE CONVERSATION - ADVANCED CARE PLANNING - CONVERSATION DETAILS
Patient was asleep during approach of conversation, spoke to sister (emergency contact) for conversation on advance directives.   Provided sister health care proxy form and video information of goals of care. Patient will follow up to complete health care proxy with medical team. Sister expressed that patient has no spouse, has two living children, both living in different parts of the world. Sister expressed patient has moved in with her and she is acting as his caregiver. Sister will discuss HCP with patient and follow up with medical team to complete HCP form. Provided sister with health care proxy form. Sister Diane also requested other sister Lis Chapman be involved in decision making conversations.     Regarding code status, patient is considering what they would prefer with their goals of care.  Patient verbalized understanding that until they express wishes to their medical team, they will remain Full Code. Provided patient video information on advance directives, contact information and will remain available for questions.

## 2022-09-12 NOTE — PROGRESS NOTE ADULT - ASSESSMENT
A/P: 77M Hx HTN, HLD,  BPH, CKD ( creat 1.42 today), chronic angelita hydronephrosis ( x2 years), prostate ca ( radiation 10 years ago), seizure disorder ( last seizure 2015), ETOH use ( sober since 2016), hypothyroidism, and mild demenia p/w CP and SOB found with large pericardial effusion s/p pericardiocentesis.     ====================== NEUROLOGY=====================  A&Ox3  - continue to monitor mental status as per protocol    Seizure disorder  - No seziure meds reported.  sister confirmed patient not on any seizure meds    ==================== RESPIRATORY======================  Comfortable on RA  - SOB on admission likely 2/2 pericardial tamponade. Patient feels improvement s/p drain placement   - SpO2 96%  - 7/6 CT chest: Large pericardial effusion. Small bilateral pleural effusions. Bilateral hydronephrosis.    COPD  - duonebs prn   - dc'ed levofloxacin. Likely sob related to pericardial effusion (states SOB improved s/p drain placement)   - provided smoking cessation education. Start nicotine patch while inpatient (states quit last week, smokes 4-5 cig/daily)     albuterol/ipratropium for Nebulization 3 milliLiter(s) Nebulizer every 6 hours PRN Shortness of Breath and/or Wheezing    ====================CARDIOVASCULAR==================  Cardiac Tamponade  - 9/7 TTE: EF 70%. Mod concentric LVH. Normal RV size and function. Large pericardial effusion with evidence of echo tamponade physiology.   - s/p pericardiocentesis with drain placement, 1060mL out serosanguinous.   - as per lights criteria- meets criteria of exudative fluid. f/u pericardial fluid studies including cytology. Gram stain negative for organism   - Patient remained HD stable at [facility] prior to transfer (as per documentation: HRs 60-70s; SBPs 150-170s, Lying flat off with O2 sats 99%). Continue to monitor vital signs   - trop I downtrending at OSH prior to transfer  - monitor drain output. Daily TTE while in place  - Cancer screening: Last colonoscopy >10 years ago (states went last year for colonoscopy but procedure aborted 2/2 HTN outpatient and never rescheduled). Remote history of Prostate CA, follows with urologist but unsure of name. waiting for patient sister to provide collateral info     HTN  - on home losartan 100mg and hydral 50mg.  - on home meds as hypertensive upon presentation to CICU. This morning patient with softer BP (asymptomatic).  - continue losartan 50mg     HLD  - c/w home simvastatin     hydrALAZINE 50 milliGRAM(s) Oral three times a day  losartan 50 milliGRAM(s) Oral daily  urea Oral Powder 15 Gram(s) Oral two times a day  simvastatin 20 milliGRAM(s) Oral at bedtime    ===================HEMATOLOGIC/ONC ===================  No acute issues   - continue to monitor H&H/Plts     heparin   Injectable 5000 Unit(s) SubCutaneous every 8 hours    ===================== RENAL =========================  BPH  - continue home tamsulosin     Remote Hx Prostate Ca  - f.u with urology, patient unsure of name. will f/u with sister for collateral info.   - states in remission for >20 years     Hyponatremia  - Na on presentation 125.  - Patient mentating appropriately   - Euvolemic appearing on exam 9/8. Could be 2/2 hypothyroidism     CKD  - Baseline SCr looks to be around 1.5-1.7 base upon prior SCr trend in 2020. SCr this AM 1.42  - monitor and trend SCr, BUN, and lytes  - replete lytes prn with goal K 4-4.5 and Mg >2    Hydronephrosis on CT 9/6  - renal US ordered   - Urinating currently with bladder scan of 120mL. Also with b/l hydronephrosis back in 2020 with history of chronic hydronephrosis. SCr at baseline. Will also get collateral info from outpatient urologist once patient sister updates with info     tamsulosin 0.4 milliGRAM(s) Oral at bedtime    ==================== GASTROINTESTINAL===================  - Tolerating PO DASH/TLC diet.  - monitor for regular BM while inpatient. Last BM yesterday.   - Bowel regimen with Miralax and Senna     bisacodyl 5 milliGRAM(s) Oral every 12 hours  polyethylene glycol 3350 17 Gram(s) Oral two times a day  senna 2 Tablet(s) Oral at bedtime  simethicone 80 milliGRAM(s) Chew two times a day PRN Gas  sodium chloride 2% . 1000 milliLiter(s) (40 mL/Hr) IV Continuous <Continuous>    =======================    ENDOCRINE  =====================  - f/u hgb a1c and lipid panel     Hypothyroidism   - TSH elevated with low T3 and T4 on 9/8  - will consult endo for recs  - on home synthroid 112 mcg     levothyroxine 112 MICROGram(s) Oral daily    ========================INFECTIOUS DISEASE================  Leukocytosis, afebrile  - WBC 16.70  - continue to monitor and trend wbc and temp curve   A/P: 77M Hx HTN, HLD,  BPH, CKD ( creat 1.42 today), chronic angelita hydronephrosis ( x2 years), prostate ca ( radiation 10 years ago), seizure disorder ( last seizure 2015), ETOH use ( sober since 2016), hypothyroidism, and mild demenia p/w CP and SOB found with large pericardial effusion s/p pericardiocentesis, hyponatremia.    ====================== NEUROLOGY=====================  A&Ox3  - continue to monitor mental status as per protocol    Seizure disorder  - No seziure meds reported.  sister confirmed patient not on any seizure meds    ==================== RESPIRATORY======================  Comfortable on RA  - SOB on admission likely 2/2 pericardial tamponade. Patient feels improvement s/p drain placement now removed.  - SpO2 96%  - 7/6 CT chest: Large pericardial effusion. Small bilateral pleural effusions. Bilateral hydronephrosis.    COPD  - duonebs prn   - dc'ed levofloxacin. Likely sob related to pericardial effusion (states SOB improved s/p drain placement)   - provided smoking cessation education. Start nicotine patch while inpatient (states quit last week, smokes 4-5 cig/daily)     albuterol/ipratropium for Nebulization 3 milliLiter(s) Nebulizer every 6 hours PRN Shortness of Breath and/or Wheezing    ====================CARDIOVASCULAR==================  Cardiac Tamponade  - possibly due to hypothyroidism given fluid results  - 9/11 limited echo: no pericardial effusion   - 9/7 TTE: EF 70%. Mod concentric LVH. Normal RV size and function. Large pericardial effusion with evidence of echo tamponade physiology.   - s/p pericardiocentesis with drain placement, 1060mL out serosanguinous s/p drain removal  - as per lights criteria- meets criteria of exudative fluid. f/u pericardial fluid studies including cytology. Gram stain negative for organism   - Patient remained HD stable at [place] prior to transfer (as per documentation: HRs 60-70s; SBPs 150-170s, Lying flat off with O2 sats 99%). Continue to monitor vital signs   - trop I downtrending at OSH prior to transfer  - Cancer screening: Last colonoscopy >10 years ago (states went last year for colonoscopy but procedure aborted 2/2 HTN outpatient and never rescheduled). Remote history of Prostate CA, follows with urologist but unsure of name. waiting for patient sister to provide collateral info     HTN  - on home losartan 50mg  - on home hydralazine 50mg    HLD  - c/w home simvastatin 20mg qD    hydrALAZINE 50 milliGRAM(s) Oral three times a day  losartan 50 milliGRAM(s) Oral daily  urea Oral Powder 15 Gram(s) Oral two times a day  simvastatin 20 milliGRAM(s) Oral at bedtime    ===================HEMATOLOGIC/ONC ===================  No acute issues   - continue to monitor H&H/Plts     heparin   Injectable 5000 Unit(s) SubCutaneous every 8 hours    ===================== RENAL =========================  BPH  - continue home tamsulosin     Remote Hx Prostate Ca  - f.u with urology, patient unsure of name. will f/u with sister for collateral info.   - states in remission for >20 years     Hyponatremia  - Na 124 today after 2% hypertonic  - f/u nephro recs  - Na on presentation 125.  - Patient mentating appropriately   - Euvolemic appearing on exam. Could be 2/2 hypothyroidism, chronic given pt was not taking his home thyroid meds    CKD  - Baseline SCr looks to be around 1.5-1.7 base upon prior SCr trend in 2020. SCr this AM 1.3  - monitor and trend SCr, BUN, and lytes  - replete lytes prn with goal K 4-4.5 and Mg >2    Hydronephrosis on CT 9/6  - renal US shows improvement of hydronephrosis  - Urinating currently. Also with b/l hydronephrosis back in 2020 with history of chronic hydronephrosis. SCr at baseline. Will also get collateral info from outpatient urologist once patient sister updates with info     tamsulosin 0.4 milliGRAM(s) Oral at bedtime    ==================== GASTROINTESTINAL===================  - Tolerating PO DASH/TLC diet.  - monitor for regular BM while inpatient. Last BM yesterday.   - Bowel regimen with Miralax and Senna     bisacodyl 5 milliGRAM(s) Oral every 12 hours  polyethylene glycol 3350 17 Gram(s) Oral two times a day  senna 2 Tablet(s) Oral at bedtime  simethicone 80 milliGRAM(s) Chew two times a day PRN Gas      =======================    ENDOCRINE  =====================  - f/u hgb a1c and lipid panel     Hypothyroidism   - TSH elevated with low T3 and T4 on 9/8  - will consult endo for recs  - on home synthroid 112 mcg     levothyroxine 112 MICROGram(s) Oral daily    ========================INFECTIOUS DISEASE================  Leukocytosis, afebrile  - WBC 16.70  - continue to monitor and trend wbc and temp curve   A/P: 77M Hx HTN, HLD,  BPH, CKD ( creat 1.42 today), chronic angelita hydronephrosis ( x2 years), prostate ca ( radiation 10 years ago), seizure disorder ( last seizure 2015), ETOH use ( sober since 2016), hypothyroidism, and mild demenia p/w CP and SOB found with large pericardial effusion s/p pericardiocentesis, hyponatremia.    ====================== NEUROLOGY=====================  A&Ox3  - continue to monitor mental status as per protocol    Seizure disorder  - No seziure meds reported.  sister confirmed patient not on any seizure meds    ==================== RESPIRATORY======================  Comfortable on RA  - SOB on admission likely 2/2 pericardial tamponade. Patient feels improvement s/p drain placement now removed.  - SpO2 96%  - 7/6 CT chest: Large pericardial effusion. Small bilateral pleural effusions. Bilateral hydronephrosis.    COPD  - duonebs prn   - dc'ed levofloxacin. Likely sob related to pericardial effusion (states SOB improved s/p drain placement)   - provided smoking cessation education. Start nicotine patch while inpatient (states quit last week, smokes 4-5 cig/daily)     albuterol/ipratropium for Nebulization 3 milliLiter(s) Nebulizer every 6 hours PRN Shortness of Breath and/or Wheezing    ====================CARDIOVASCULAR==================  Cardiac Tamponade  - possibly due to hypothyroidism given fluid results  - 9/11 limited echo: no pericardial effusion   - 9/7 TTE: EF 70%. Mod concentric LVH. Normal RV size and function. Large pericardial effusion with evidence of echo tamponade physiology.   - s/p pericardiocentesis with drain placement, 1060mL out serosanguinous s/p drain removal  - as per lights criteria- meets criteria of exudative fluid. f/u pericardial fluid studies including cytology. Gram stain negative for organism   - Patient remained HD stable at [facility] prior to transfer (as per documentation: HRs 60-70s; SBPs 150-170s, Lying flat off with O2 sats 99%). Continue to monitor vital signs   - trop I downtrending at OSH prior to transfer  - Cancer screening: Last colonoscopy >10 years ago (states went last year for colonoscopy but procedure aborted 2/2 HTN outpatient and never rescheduled). Remote history of Prostate CA, follows with urologist but unsure of name. waiting for patient sister to provide collateral info     HTN  - on home losartan 50mg  - on home hydralazine 50mg    HLD  - c/w home simvastatin 20mg qD    hydrALAZINE 50 milliGRAM(s) Oral three times a day  losartan 50 milliGRAM(s) Oral daily  urea Oral Powder 15 Gram(s) Oral two times a day  simvastatin 20 milliGRAM(s) Oral at bedtime    ===================HEMATOLOGIC/ONC ===================  No acute issues   - continue to monitor H&H/Plts     heparin   Injectable 5000 Unit(s) SubCutaneous every 8 hours    ===================== RENAL =========================  BPH  - continue home tamsulosin     Remote Hx Prostate Ca  - f.u with urology, patient unsure of name. will f/u with sister for collateral info.   - states in remission for >20 years     Hyponatremia  - Na 124 today after 2% hypertonic  - f/u nephro recs  - Na on presentation 125  - Fluid restriction to <1L/d  - Patient mentating appropriately   - Euvolemic appearing on exam. Could be 2/2 hypothyroidism, chronic given pt was not taking his home thyroid meds    CKD  - Baseline SCr looks to be around 1.5-1.7 base upon prior SCr trend in 2020. SCr this AM 1.3  - monitor and trend SCr, BUN, and lytes  - replete lytes prn with goal K 4-4.5 and Mg >2    Hydronephrosis on CT 9/6  - renal US shows improvement of hydronephrosis  - Urinating currently. Also with b/l hydronephrosis back in 2020 with history of chronic hydronephrosis. SCr at baseline. Will also get collateral info from outpatient urologist once patient sister updates with info     tamsulosin 0.4 milliGRAM(s) Oral at bedtime    ==================== GASTROINTESTINAL===================  - Tolerating PO DASH/TLC diet.  - monitor for regular BM while inpatient. Last BM yesterday.   - Bowel regimen with Miralax and Senna     bisacodyl 5 milliGRAM(s) Oral every 12 hours  polyethylene glycol 3350 17 Gram(s) Oral two times a day  senna 2 Tablet(s) Oral at bedtime  simethicone 80 milliGRAM(s) Chew two times a day PRN Gas      =======================    ENDOCRINE  =====================  - f/u hgb a1c and lipid panel     Hypothyroidism   - TSH elevated with low T3 and T4 on 9/8  - will consult endo for recs  - on home synthroid 112 mcg     levothyroxine 112 MICROGram(s) Oral daily    ========================INFECTIOUS DISEASE================  Leukocytosis, hypothermia  - WBC 16.70 --> 13.55  - continue to monitor and trend wbc and temp curve  - likely isolated hypothermia, not infectious picture given improving wbc

## 2022-09-12 NOTE — CHART NOTE - NSCHARTNOTEFT_GEN_A_CORE
CICU DOWN GRADE NOTE      Patient is a 77y old  Male who presents with a chief complaint of Tamponade (11 Sep 2022 21:27)      HPI:     INTERVAL HPI/OVERNIGHT EVENTS:        REVIEW OF SYSTEMS:  CONSTITUTIONAL: No fever, chills  HEENT:  No blurry vision No sinus or throat pain  NECK: No pain or stiffness  RESPIRATORY: No cough, wheezing, chills or hemoptysis; No shortness of breath  CARDIOVASCULAR: No chest pain, palpitations  GASTROINTESTINAL: No abdominal pain. No nausea, vomiting, or diarrhea  GENITOURINARY: No dysuria  NEUROLOGICAL: No HA, No focal weakness  SKIN: No itching, burning, rashes, or lesions   MUSCULOSKELETAL: No joint pain or swelling; No muscle, back, or extremity pain    MEDICATIONS:  albuterol/ipratropium for Nebulization 3 milliLiter(s) Nebulizer every 6 hours PRN  bisacodyl 5 milliGRAM(s) Oral every 12 hours  chlorhexidine 4% Liquid 1 Application(s) Topical <User Schedule>  heparin   Injectable 5000 Unit(s) SubCutaneous every 8 hours  hydrALAZINE 50 milliGRAM(s) Oral three times a day  levothyroxine 112 MICROGram(s) Oral daily  losartan 50 milliGRAM(s) Oral daily  nicotine -  14 mG/24Hr(s) Patch 1 Patch Transdermal daily  polyethylene glycol 3350 17 Gram(s) Oral two times a day  senna 2 Tablet(s) Oral at bedtime  simethicone 80 milliGRAM(s) Chew two times a day PRN  simvastatin 20 milliGRAM(s) Oral at bedtime  sodium chloride 2% . 1000 milliLiter(s) IV Continuous <Continuous>  tamsulosin 0.4 milliGRAM(s) Oral at bedtime  urea Oral Powder 15 Gram(s) Oral two times a day      T(C): 36.6 (09-12-22 @ 10:00), Max: 36.7 (09-12-22 @ 00:00)  HR: 62 (09-12-22 @ 13:00) (49 - 82)  BP: 135/64 (09-12-22 @ 13:00) (100/53 - 208/98)  RR: 16 (09-12-22 @ 13:00) (11 - 32)  SpO2: 97% (09-12-22 @ 13:00) (92% - 99%)  Wt(kg): --Vital Signs Last 24 Hrs  T(C): 36.6 (12 Sep 2022 10:00), Max: 36.7 (12 Sep 2022 00:00)  T(F): 97.8 (12 Sep 2022 10:00), Max: 98 (12 Sep 2022 00:00)  HR: 62 (12 Sep 2022 13:00) (49 - 82)  BP: 135/64 (12 Sep 2022 13:00) (100/53 - 208/98)  BP(mean): 92 (12 Sep 2022 13:00) (72 - 138)  RR: 16 (12 Sep 2022 13:00) (11 - 32)  SpO2: 97% (12 Sep 2022 13:00) (92% - 99%)    Parameters below as of 12 Sep 2022 13:00  Patient On (Oxygen Delivery Method): room air        PHYSICAL EXAM:  GENERAL: NAD, well-groomed, well-developed  HEAD:  Atraumatic, Normocephalic  EYES: EOMI, PERRLA, conjunctiva and sclera clear  ENMT:  Moist mucous membranes  NECK: Supple, No JVD,  CHEST/LUNG: Clear to auscultation bilaterally; No rales, rhonchi, wheezing, or rubs  HEART: Regular rate and rhythm; No murmurs, rubs, or gallops  ABDOMEN: Soft, Nontender, Nondistended; Bowel sounds present  NEURO: Alert & Oriented X3  EXTREMITIES: No LE edema, no calf tenderness  LYMPH: No lymphadenopathy noted  SKIN: No rashes or lesions    Consultant(s) Notes Reviewed:  [x ] YES  [ ] NO  Care Discussed with Consultants/Other Providers [ x] YES  [ ] NO    LABS:                        11.4   13.55 )-----------( 230      ( 12 Sep 2022 07:01 )             33.7     09-12    124<L>  |  93<L>  |  64<H>  ----------------------------<  112<H>  4.6   |  23  |  1.30    Ca    8.8      12 Sep 2022 07:05  Phos  1.9     09-12  Mg     2.2     09-12    TPro  6.1  /  Alb  3.0<L>  /  TBili  0.4  /  DBili  x   /  AST  25  /  ALT  28  /  AlkPhos  65  09-12    PT/INR - ( 11 Sep 2022 04:32 )   PT: 10.9 sec;   INR: 0.95 ratio         PTT - ( 11 Sep 2022 04:32 )  PTT:28.3 sec    CAPILLARY BLOOD GLUCOSE                RADIOLOGY & ADDITIONAL TESTS:    Imaging Personally Reviewed:  [x ] YES  [ ] NO CICU DOWN GRADE NOTE      Patient is a 77y old  Male who presents with a chief complaint of Tamponade (11 Sep 2022 21:27)      HPI: 78yo old  Male with PMH of HTN, HLD,  BPH, CKD ( creat 1.42 today), chronic angelita hydronephrosis ( x2 years), prostate ca ( radiation 10 years ago), seizure disorder ( last seizure 2015), ETOH use (sober since 2016), hypothyroidism, and mild demenia; presents to [facility] on 9/3/2022 with chief complaint of SOB and CP x3 days. Admitted to tele for likely COPD exacerbation and CP. CXR clear. Started on Duonebs, Symbicort and Solumedrol. No prior cardiac stents. EKG on arrival to ED notes PRWP V 1-3, no ST changes. Trop high on arrival at 268 trending down, proBNP 763, CK 1090. TSH 69.600 ( stopped synthroid a few weeks ago), T3 <20 T4 0.9, Na 126. CXR revealing New cardiomegaly and CT chest with Large pericardial effusion, Echo with large pericardial effusion with early tamponade physiology. Transferred to [facility] for pericardiocentesis.    On arrival to [facility] patient awake and verbal A&OX3, denies any chest pain, dyspnea, dizziness, palpitations, N&V, Ha, orthopnea, LE edema.  VS WNL, on 2 L nasal cannula in no respiratory distress. Pericardiocentesis performed, drain placed. Pt's sob improved, no longer on oxygen. Drain stopped having output, removed. Pericardial fluid was exudative per Light's criteria. Found to be hyponatremic to 125 on admission, nephro consulted and pt put on UreNa and hypertonic saline without improvement of Na. Pt was not taking thyroid meds at home. Likely due to hypothyroidism.    INTERVAL HPI/OVERNIGHT EVENTS:  Pt hypothermic to 94.2. Pt asymptomatic. WBC downtrending.    To follow up:  - hyponatremia- nephro recs      REVIEW OF SYSTEMS:  CONSTITUTIONAL: No fever, chills  HEENT:  No blurry vision No sinus or throat pain  NECK: No pain or stiffness  RESPIRATORY: No cough, wheezing, chills or hemoptysis; No shortness of breath  CARDIOVASCULAR: No chest pain, palpitations  GASTROINTESTINAL: No abdominal pain. No nausea, vomiting, or diarrhea  GENITOURINARY: No dysuria  NEUROLOGICAL: No HA, No focal weakness  SKIN: No itching, burning, rashes, or lesions   MUSCULOSKELETAL: No joint pain or swelling; No muscle, back, or extremity pain    MEDICATIONS:  albuterol/ipratropium for Nebulization 3 milliLiter(s) Nebulizer every 6 hours PRN  bisacodyl 5 milliGRAM(s) Oral every 12 hours  chlorhexidine 4% Liquid 1 Application(s) Topical <User Schedule>  heparin   Injectable 5000 Unit(s) SubCutaneous every 8 hours  hydrALAZINE 50 milliGRAM(s) Oral three times a day  levothyroxine 112 MICROGram(s) Oral daily  losartan 50 milliGRAM(s) Oral daily  nicotine -  14 mG/24Hr(s) Patch 1 Patch Transdermal daily  polyethylene glycol 3350 17 Gram(s) Oral two times a day  senna 2 Tablet(s) Oral at bedtime  simethicone 80 milliGRAM(s) Chew two times a day PRN  simvastatin 20 milliGRAM(s) Oral at bedtime  sodium chloride 2% . 1000 milliLiter(s) IV Continuous <Continuous>  tamsulosin 0.4 milliGRAM(s) Oral at bedtime  urea Oral Powder 15 Gram(s) Oral two times a day      T(C): 36.6 (09-12-22 @ 10:00), Max: 36.7 (09-12-22 @ 00:00)  HR: 62 (09-12-22 @ 13:00) (49 - 82)  BP: 135/64 (09-12-22 @ 13:00) (100/53 - 208/98)  RR: 16 (09-12-22 @ 13:00) (11 - 32)  SpO2: 97% (09-12-22 @ 13:00) (92% - 99%)  Wt(kg): --Vital Signs Last 24 Hrs  T(C): 36.6 (12 Sep 2022 10:00), Max: 36.7 (12 Sep 2022 00:00)  T(F): 97.8 (12 Sep 2022 10:00), Max: 98 (12 Sep 2022 00:00)  HR: 62 (12 Sep 2022 13:00) (49 - 82)  BP: 135/64 (12 Sep 2022 13:00) (100/53 - 208/98)  BP(mean): 92 (12 Sep 2022 13:00) (72 - 138)  RR: 16 (12 Sep 2022 13:00) (11 - 32)  SpO2: 97% (12 Sep 2022 13:00) (92% - 99%)    Parameters below as of 12 Sep 2022 13:00  Patient On (Oxygen Delivery Method): room air        PHYSICAL EXAM:  GENERAL: NAD, well-groomed, well-developed  HEAD:  Atraumatic, Normocephalic  EYES: EOMI, PERRLA, conjunctiva and sclera clear  ENMT:  Moist mucous membranes  NECK: Supple, No JVD,  CHEST/LUNG: Clear to auscultation bilaterally; No rales, rhonchi, wheezing, or rubs  HEART: Regular rate and rhythm; No murmurs, rubs, or gallops  ABDOMEN: Soft, Nontender, Nondistended; Bowel sounds present  NEURO: Alert & Oriented X3  EXTREMITIES: No LE edema, no calf tenderness  LYMPH: No lymphadenopathy noted  SKIN: No rashes or lesions    Consultant(s) Notes Reviewed:  [x ] YES  [ ] NO  Care Discussed with Consultants/Other Providers [ x] YES  [ ] NO    LABS:                        11.4   13.55 )-----------( 230      ( 12 Sep 2022 07:01 )             33.7     09-12    124<L>  |  93<L>  |  64<H>  ----------------------------<  112<H>  4.6   |  23  |  1.30    Ca    8.8      12 Sep 2022 07:05  Phos  1.9     09-12  Mg     2.2     09-12    TPro  6.1  /  Alb  3.0<L>  /  TBili  0.4  /  DBili  x   /  AST  25  /  ALT  28  /  AlkPhos  65  09-12    PT/INR - ( 11 Sep 2022 04:32 )   PT: 10.9 sec;   INR: 0.95 ratio         PTT - ( 11 Sep 2022 04:32 )  PTT:28.3 sec    CAPILLARY BLOOD GLUCOSE                RADIOLOGY & ADDITIONAL TESTS:    Imaging Personally Reviewed:  [x ] YES  [ ] NO CCU Transfer Note    Transfer from: CCU    Transfer to: (  ) Medicine    ( X) Telemetry    (  ) RCU                               (  ) Palliative    (  ) Stroke Unit    (  ) MICU    (  ) __________________    Accepting Physician: Dr. Jorge Sullivan    Signout given to: Dr. Jorge Sullivan    HPI / CCU COURSE:  78yo old  Male with PMH of HTN, HLD,  BPH, CKD ( creat 1.42 today), chronic angelita hydronephrosis ( x2 years), prostate ca ( radiation 10 years ago), seizure disorder ( last seizure 2015), ETOH use (sober since 2016), hypothyroidism, and mild demenia; presents to [facility] on 9/3/2022 with chief complaint of SOB and CP x3 days. Admitted to tele for likely COPD exacerbation and CP. CXR clear. Started on Duonebs, Symbicort and Solumedrol. No prior cardiac stents. EKG on arrival to ED notes PRWP V 1-3, no ST changes. Trop high on arrival at 268 trending down, proBNP 763, CK 1090. TSH 69.600 ( stopped synthroid a few weeks ago), T3 <20 T4 0.9, Na 126. CXR revealing New cardiomegaly and CT chest with Large pericardial effusion, Echo with large pericardial effusion with early tamponade physiology. Transferred to [facility] for pericardiocentesis.    On arrival to [facility] patient awake and verbal A&OX3, denies any chest pain, dyspnea, dizziness, palpitations, N&V, Ha, orthopnea, LE edema.  VS WNL, on 2 L nasal cannula in no respiratory distress. Pericardiocentesis performed, drain placed. Pt's sob improved, no longer on oxygen. Drain stopped having output, removed. Pericardial fluid was exudative per Light's criteria. Found to be hyponatremic to 125 on admission, nephro consulted and pt put on UreNa and hypertonic saline without improvement of Na. Pt was not taking thyroid meds at home. Likely due to hypothyroidism.    INTERVAL HPI/OVERNIGHT EVENTS:  Pt hypothermic to 94.2. Pt asymptomatic. WBC downtrending.    To follow up:  - hyponatremia- nephro recs      Vital Signs Last 24 Hrs  T(C): 36.6 (12 Sep 2022 10:00), Max: 36.7 (12 Sep 2022 00:00)  T(F): 97.8 (12 Sep 2022 10:00), Max: 98 (12 Sep 2022 00:00)  HR: 62 (12 Sep 2022 13:00) (49 - 82)  BP: 135/64 (12 Sep 2022 13:00) (100/53 - 208/98)  BP(mean): 92 (12 Sep 2022 13:00) (72 - 138)  RR: 16 (12 Sep 2022 13:00) (11 - 32)  SpO2: 97% (12 Sep 2022 13:00) (92% - 99%)    Parameters below as of 12 Sep 2022 13:00  Patient On (Oxygen Delivery Method): room air        I&O's Summary    11 Sep 2022 07:01  -  12 Sep 2022 07:00  --------------------------------------------------------  IN: 1210 mL / OUT: 400 mL / NET: 810 mL    12 Sep 2022 07:01  -  12 Sep 2022 14:16  --------------------------------------------------------  IN: 150 mL / OUT: 400 mL / NET: -250 mL        Physical Exam:       LABS:                               11.4   13.55 )-----------( 230      ( 12 Sep 2022 07:01 )             33.7       09-12    124<L>  |  93<L>  |  64<H>  ----------------------------<  112<H>  4.6   |  23  |  1.30    Ca    8.8      12 Sep 2022 07:05  Phos  1.9     09-12  Mg     2.2     09-12    TPro  6.1  /  Alb  3.0<L>  /  TBili  0.4  /  DBili  x   /  AST  25  /  ALT  28  /  AlkPhos  65  09-12      PT/INR - ( 11 Sep 2022 04:32 )   PT: 10.9 sec;   INR: 0.95 ratio         PTT - ( 11 Sep 2022 04:32 )  PTT:28.3 sec          ECG:    Telemetry:    Imaging:      ASSESSMENT & PLAN:           FOR FOLLOW UP:  [ ]   [ ]   [ ]

## 2022-09-12 NOTE — CHART NOTE - NSCHARTNOTEFT_GEN_A_CORE
s/p 6hrs 2% NaCl Na 121 -> 122 case discussed with Nephro Fellow Dr. Cisneros - repeat additional 6hr NaCl 2% recheck BMP post completion.

## 2022-09-12 NOTE — PROGRESS NOTE ADULT - SUBJECTIVE AND OBJECTIVE BOX
AMBAR PICKARD  MRN-[id number]  Patient is a 77y old  Male who presents with a chief complaint of Tamponade (11 Sep 2022 21:27)    HPI:  This is a 78yo old  Male with PMH of HTN, HLD,  BPH, CKD ( creat 1.42 today), chronic angelita hydronephrosis ( x2 years), prostate ca ( radiation 10 years ago), seizure disorder ( last seizure 2015), ETOH use ( sober since 2016), hypothyroidism, and mild demenia; presents to [facility] on 9/3/2022 with chief complaint of SOB and CP x3 days.  Pt has been a heavy smoker his whole life. Denies fever, chills, cough, or congestion. No sick contacts.    Admitted to tele for likely COPD exacerbation and CP.  CXR is clear. Started on Duonebs, Symbicort and Solumedrol. No prior cardiac stents. EKG on arrival to ED notes PRWP V 1-3, no ST changes. Trop high on arrival at 268 trending down 180.8 today, proBNP 763, CK 1090. TSH 69.600 ( stopped synthroid a few weeks ago), T3 <20 T4 0.9, Na 126.  CXR revealing New cardiomegaly and CT chest with Large pericardial effusion, Echo today with large pericardial effusion with early tamponade physiology. Transferred to [facility] for pericardiocentesis. On arrival to [facility] patient awake and verbal A&OX3, denies any chest pain, dyspnea, dizziness, palpitations, N&V, Ha, orthopnea, LE edema.  VS WNL, on 2 L nasal cannula in no respiratory distress.      recs from [facility]:  home with home PT          INTERPRETATION:  CLINICAL STATEMENT: Chest Pain. Shortness of breath  TECHNIQUE: AP view of the chest.  COMPARISON: 7/19/2020    New cardiomegaly, may be exaggerated by AP technique. Suboptimal degree   of inspiration. Small left pleural effusion.    Degenerative changes of the spine, shoulders and AC joints.    IMPRESSION:    New cardiomegaly, may be exaggerated by AP technique.    Small left pleural effusion.    ECHO  Summary:   1. Left ventricular ejection fraction, by visual estimation, is 50 to   55%.   2. Mildly enlarged left atrium.   3. Maximum thickness of effusion is 2-2.5 cm.   4. Mild mitral annular calcification.   5. Mild thickening of the anterior and posterior mitral valve leaflets.   6. Trace mitral valve regurgitation.   7. Mild tricuspid regurgitation.   8. Estimated pulmonary artery systolic pressure is 37.4 mmHg assuming a   right atrial pressure of 5 mmHg, which is consistent with mild pulmonary   hypertension.   9. Findings are consistent with mild cardiac tamponade.      CT CHEST                          PROCEDURE DATE:  09/06/2022      INTERPRETATION:  HISTORY: Dyspnea on exertion. Smoker.    EXAMINATION: CT CHEST was performed without IV contrast.    COMPARISON: No CT chest comparison. Correlation with 1/9/2018 CT abdomen.    FINDINGS:    AIRWAYS, LUNGS, PLEURA: Trachea and mainstem bronchi patent. Emphysema.   Posterior left upper lobe and left basilar atelectasis. Small bilateral   pleural effusions.    MEDIASTINUM: Large circumferential pericardial effusion; depth of   pericardial fluid is 34 mm at the level of the basal inferior wall of the   left ventricle (sagittal series image 78). There is flattening of the   free wall of the right ventricle. Coronary atherosclerosis.    IMAGED ABDOMEN: Bilateral hydronephrosis. Cholelithiasis.    SOFT TISSUES: Bilateral gynecomastia.    BONES: Degenerative changes of the spine. Loss of height of L3 vertebral   body similar to 1/9/2018 CT abdomen.    IMPRESSION:.    Large pericardial effusion.    Small bilateral pleural effusions. (07 Sep 2022 13:09)      Hospital Course:    24 HOUR EVENTS:    REVIEW OF SYSTEMS:  CONSTITUTIONAL: No weakness, fevers or chills  EYES/ENT: No visual changes;  No vertigo or throat pain   NECK: No pain or stiffness  RESPIRATORY: No cough, wheezing, hemoptysis; No shortness of breath  CARDIOVASCULAR: No chest pain or palpitations  GASTROINTESTINAL: No abdominal or epigastric pain. No nausea, vomiting, or hematemesis; No diarrhea or constipation. No melena or hematochezia.  GENITOURINARY: No dysuria, frequency or hematuria  NEUROLOGICAL: No numbness or weakness  SKIN: No itching, rashes      ICU Vital Signs Last 24 Hrs  T(C): 36.6 (12 Sep 2022 19:00), Max: 36.8 (12 Sep 2022 18:00)  T(F): 97.8 (12 Sep 2022 19:00), Max: 98.2 (12 Sep 2022 18:00)  HR: 60 (12 Sep 2022 20:00) (52 - 82)  BP: 107/58 (12 Sep 2022 20:00) (91/50 - 185/88)  BP(mean): 78 (12 Sep 2022 20:00) (67 - 126)  ABP: --  ABP(mean): --  RR: 15 (12 Sep 2022 20:00) (11 - 32)  SpO2: 92% (12 Sep 2022 20:00) (92% - 99%)    O2 Parameters below as of 12 Sep 2022 20:00  Patient On (Oxygen Delivery Method): room air      I&O's Summary    11 Sep 2022 07:01  -  12 Sep 2022 07:00  --------------------------------------------------------  IN: 1210 mL / OUT: 400 mL / NET: 810 mL    12 Sep 2022 07:01  -  12 Sep 2022 21:19  --------------------------------------------------------  IN: 150 mL / OUT: 700 mL / NET: -550 mL        CAPILLARY BLOOD GLUCOSE        PHYSICAL EXAM:  GENERAL: No acute distress, well-developed  HEAD:  Atraumatic, Normocephalic  EYES: EOMI, PERRLA, conjunctiva and sclera clear  NECK: Supple, no lymphadenopathy, no JVD  CHEST/LUNG: CTAB; No wheezes, rales, or rhonchi  HEART: Regular rate and rhythm. Normal S1/S2. No murmurs, rubs, or gallops  ABDOMEN: Soft, non-tender, non-distended; normal bowel sounds, no organomegaly  EXTREMITIES:  2+ peripheral pulses b/l, No clubbing, cyanosis, or edema  NEUROLOGY: A&O x 3, no focal deficits  SKIN: No rashes or lesions    ============================I/O===========================   I&O's Detail    11 Sep 2022 07:01  -  12 Sep 2022 07:00  --------------------------------------------------------  IN:    IV PiggyBack: 250 mL    Oral Fluid: 480 mL    sodium chloride 2%: 240 mL    sodium chloride 2%: 240 mL  Total IN: 1210 mL    OUT:    Incontinent per Condom Catheter (mL): 100 mL    Voided (mL): 300 mL  Total OUT: 400 mL    Total NET: 810 mL      12 Sep 2022 07:01  -  12 Sep 2022 21:19  --------------------------------------------------------  IN:    Oral Fluid: 150 mL  Total IN: 150 mL    OUT:    Voided (mL): 700 mL  Total OUT: 700 mL    Total NET: -550 mL        ============================ LABS =========================                        11.4   13.55 )-----------( 230      ( 12 Sep 2022 07:01 )             33.7     09-12    124<L>  |  93<L>  |  64<H>  ----------------------------<  112<H>  4.6   |  23  |  1.30    Ca    8.8      12 Sep 2022 07:05  Phos  1.9     09-12  Mg     2.2     09-12    TPro  6.1  /  Alb  3.0<L>  /  TBili  0.4  /  DBili  x   /  AST  25  /  ALT  28  /  AlkPhos  65  09-12                LIVER FUNCTIONS - ( 12 Sep 2022 07:05 )  Alb: 3.0 g/dL / Pro: 6.1 g/dL / ALK PHOS: 65 U/L / ALT: 28 U/L / AST: 25 U/L / GGT: x           PT/INR - ( 11 Sep 2022 04:32 )   PT: 10.9 sec;   INR: 0.95 ratio         PTT - ( 11 Sep 2022 04:32 )  PTT:28.3 sec        ======================Micro/Rad/Cardio=================  Telemtry: Reviewed   EKG: Reviewed  CXR: Reviewed  Culture: Reviewed   Echo: Limited Transthoracic Echo:   Patient name: AMBAR PICKARD  YOB: 1945   Age: 77 (M)   MR#: [id number]  Study Date: 9/11/2022  [facility] Mon[facility]  Study quality: Technically fair  Referring Physician: Keysha Irvin MD  Blood Pressure: 155/70 mmHg  Height: 173 cm  Weight: 67 kg  BSA: 1.8 m2  ------------------------------------------------------------------------  PROCEDURE: Limited transthoracic echocardiogram with 2-D.  M-Mode and spectral and color flow Doppler.  INDICATION: Pericardial effusion (noninflammatory) (I31.3)  ------------------------------------------------------------------------  CONCLUSIONS:  Limited repeat study to re-evaluate pericardial effusion.  Normal pericardium with no pericardial effusion.  ------------------------------------------------------------------------  Confirmed on  9/11/2022 - 09:28:51 by Emil Kramer M.D.,  [facility], [place]  ------------------------------------------------------------------------ (09-11-22 @ 08:37)  Limited Transthoracic Echo:   Patient name: AMBAR PICKARD  YOB: 1945   Age: 77 (M)   MR#: [id number]  Study Date: 9/10/2022  [facility]  Study quality: Technically difficult  Referring Physician: Keysha Irvin MD  Blood Pressure: 115/61 mmHg  Height: 173 cm  Weight: 67 kg  BSA: 1.8 m2  ------------------------------------------------------------------------  PROCEDURE: Limited transthoracic echocardiogram with 2-D.  M-Mode and spectral and color flow Doppler.  INDICATION: Pericardial effusion (noninflammatory) (I31.3)  ------------------------------------------------------------------------  CONCLUSIONS:  Limited and technically difficult study to re-evaluate  pericardial effusion.  1. Thickened pericardium wtth trace pericardial effusion.  2. Bilateral pleural effusions.  ------------------------------------------------------------------------  Confirmed on  9/10/2022 - 13:03:00 by Emil Kramer M.D.,  [facility], [place]  ------------------------------------------------------------------------ (09-10-22 @ 10:45)    Cath:   ======================================================  PAST MEDICAL & SURGICAL HISTORY:  ETOH abuse  sober since 2016      Seizure disorder  last seizure 2015      BPH (benign prostatic hyperplasia)      HTN (hypertension)      Prostate CA  Dx&#x27;d 2008, s/p radiation      Hyperlipidemia      Poor historian      S/P hernia repair  right inguinal hernia      H/O prostate cancer  10- yrs ago, s/p radiation therapy      Status post cataract extraction      H/O urethrotomy  June 2018        ====================ASSESSMENT ==============  77M Hx HTN, HLD,  BPH, CKD ( creat 1.42 today), chronic angelita hydronephrosis ( x2 years), prostate ca ( radiation 10 years ago), seizure disorder ( last seizure 2015), ETOH use ( sober since 2016), hypothyroidism, and mild demenia p/w CP and SOB found with large pericardial effusion s/p pericardiocentesis.     Plan:  ====================== NEUROLOGY=====================  A&Ox3  - continue to monitor mental status as per protocol    Seizure disorder  - No seziure meds reported.  sister confirmed patient not on any seizure meds    ==================== RESPIRATORY======================  Comfortable on RA  - SOB on admission likely 2/2 pericardial tamponade. Patient feels improvement s/p drain placement   - SpO2 96%  - 7/6 CT chest: Large pericardial effusion. Small bilateral pleural effusions. Bilateral hydronephrosis.    COPD  - duonebs prn   - dc'ed levofloxacin. Likely sob related to pericardial effusion (states SOB improved s/p drain placement)   - provided smoking cessation education. Start nicotine patch while inpatient (states quit last week, smokes 4-5 cig/daily)     ====================CARDIOVASCULAR==================  Cardiac Tamponade  - 9/7 TTE: EF 70%. Mod concentric LVH. Normal RV size and function. Large pericardial effusion with evidence of echo tamponade physiology.   - s/p pericardiocentesis with drain placement, 1060mL out serosanguinous.   - as per lights criteria- meets criteria of exudative fluid. f/u pericardial fluid studies including cytology. Gram stain negative for organism   - Patient remained HD stable at [place] prior to transfer (as per documentation: HRs 60-70s; SBPs 150-170s, Lying flat off with O2 sats 99%). Continue to monitor vital signs   - trop I downtrending at OSH prior to transfer  - monitor drain output. Daily TTE while in place  - Cancer screening: Last colonoscopy >10 years ago (states went last year for colonoscopy but procedure aborted 2/2 HTN outpatient and never rescheduled). Remote history of Prostate CA, follows with urologist but unsure of name. waiting for patient sister to provide collateral info     HTN  - on home losartan 100mg and hydral 50mg.  - on home meds as hypertensive upon presentation to CICU. This morning patient with softer BP (asymptomatic).  - continue losartan 50mg     HLD  - c/w home simvastatin   ===================HEMATOLOGIC/ONC ===================  No acute issues   - continue to monitor H&H/Plts     ===================== RENAL =========================  BPH  - continue home tamsulosin     Remote Hx Prostate Ca  - f.u with urology, patient unsure of name. will f/u with sister for collateral info.   - states in remission for >20 years     Hyponatremia  - Na on presentation 125.  - Patient mentating appropriately   - Euvolemic appearing on exam 9/8. Could be 2/2 hypothyroidism     CKD  - Baseline SCr looks to be around 1.5-1.7 base upon prior SCr trend in 2020. SCr this AM 1.42  - monitor and trend SCr, BUN, and lytes  - replete lytes prn with goal K 4-4.5 and Mg >2    Hydronephrosis on CT 9/6  - renal US ordered   - Urinating currently with bladder scan of 120mL. Also with b/l hydronephrosis back in 2020 with history of chronic hydronephrosis. SCr at baseline. Will also get collateral info from outpatient urologist once patient sister updates with info   ==================== GASTROINTESTINAL===================  - Tolerating PO DASH/TLC diet.  - monitor for regular BM while inpatient. Last BM yesterday.   - Bowel regimen with Miralax and Senna     =======================    ENDOCRINE  =====================  - f/u hgb a1c and lipid panel     Hypothyroidism   - TSH elevated with low T3 and T4 on 9/8  - will consult endo for recs  - on home synthroid 112 mcg   ========================INFECTIOUS DISEASE================  Leukocytosis, afebrile  - WBC 16.70  - continue to monitor and trend wbc and temp curve       Patient requires continuous monitoring with bedside rhythm monitoring, pulse ox monitoring, and intermittent blood gas analysis. Care plan discussed with ICU care team. Patient remained critical and at risk for life threatening decompensation.  Patient seen, examined and plan discussed with CCU team during rounds.     I have personally provided ____ minutes of critical care time excluding time spent on separate procedures, in addition to initial critical care time provided by the CICU Attending, Dr. Salazar.  By signing my name below, I, Joya Mcnamara, attest that this documentation has been prepared under the direction and in the presence of Grace Weems NP.  Electronically signed: Jesus Gallagher, 09-12-22 @ 21:19    I, Grace Weems, personally performed the services described in this documentation. all medical record entries made by the scribe were at my direction and in my presence. I have reviewed the chart and agree that the record reflects my personal performance and is accurate and complete  Electronically signed: Grace Weems NP.       AMBAR PICKARD  MRN-[id number]  Patient is a 77y old  Male who presents with a chief complaint of Tamponade (11 Sep 2022 21:27)    HPI: 77M PMH of HTN, HLD,  BPH, CKD ( creat 1.42 today), chronic angelita hydronephrosis ( x2 years), prostate ca ( radiation 10 years ago), seizure disorder ( last seizure 2015), ETOH use ( sober since 2016), hypothyroidism, and mild demenia; presents to [facility] on 9/3/2022 with chief complaint of SOB and CP x3 days.  Pt has been a heavy smoker his whole life. Denies fever, chills, cough, or congestion. No sick contacts.    Admitted to tele for likely COPD exacerbation and CP.  CXR is clear. Started on Duonebs, Symbicort and Solumedrol. No prior cardiac stents. EKG on arrival to ED notes PRWP V 1-3, no ST changes. Trop high on arrival at 268 trending down 180.8 today, proBNP 763, CK 1090. TSH 69.600 ( stopped synthroid a few weeks ago), T3 <20 T4 0.9, Na 126.  CXR revealing New cardiomegaly and CT chest with Large pericardial effusion, Echo today with large pericardial effusion with early tamponade physiology. Transferred to [facility] for pericardiocentesis. On arrival to [facility] patient awake and verbal A&OX3, denies any chest pain, dyspnea, dizziness, palpitations, N&V, Ha, orthopnea, LE edema.  VS WNL, on 2 L nasal cannula in no respiratory distress.      REVIEW OF SYSTEMS:  CONSTITUTIONAL: No weakness, fevers or chills  EYES/ENT: No visual changes;  No vertigo or throat pain   NECK: No pain or stiffness  RESPIRATORY: No cough, wheezing, hemoptysis; No shortness of breath  CARDIOVASCULAR: No chest pain or palpitations  GASTROINTESTINAL: No abdominal or epigastric pain. No nausea, vomiting, or hematemesis; No diarrhea or constipation. No melena or hematochezia.  GENITOURINARY: No dysuria, frequency or hematuria  NEUROLOGICAL: No numbness or weakness  SKIN: No itching, rashes    ICU Vital Signs Last 24 Hrs  T(C): 36.6 (12 Sep 2022 19:00), Max: 36.8 (12 Sep 2022 18:00)  T(F): 97.8 (12 Sep 2022 19:00), Max: 98.2 (12 Sep 2022 18:00)  HR: 60 (12 Sep 2022 20:00) (52 - 82)  BP: 107/58 (12 Sep 2022 20:00) (91/50 - 185/88)  BP(mean): 78 (12 Sep 2022 20:00) (67 - 126)  RR: 15 (12 Sep 2022 20:00) (11 - 32)  SpO2: 92% (12 Sep 2022 20:00) (92% - 99%)    O2 Parameters below as of 12 Sep 2022 20:00  Patient On (Oxygen Delivery Method): room air      I&O's Summary    11 Sep 2022 07:01  -  12 Sep 2022 07:00  --------------------------------------------------------  IN: 1210 mL / OUT: 400 mL / NET: 810 mL    12 Sep 2022 07:01  -  12 Sep 2022 21:19  --------------------------------------------------------  IN: 150 mL / OUT: 700 mL / NET: -550 mL  ============================I/O===========================   I&O's Detail    11 Sep 2022 07:01  -  12 Sep 2022 07:00  --------------------------------------------------------  IN:    IV PiggyBack: 250 mL    Oral Fluid: 480 mL    sodium chloride 2%: 240 mL    sodium chloride 2%: 240 mL  Total IN: 1210 mL    OUT:    Incontinent per Condom Catheter (mL): 100 mL    Voided (mL): 300 mL  Total OUT: 400 mL    Total NET: 810 mL      12 Sep 2022 07:01  -  12 Sep 2022 21:19  --------------------------------------------------------  IN:    Oral Fluid: 150 mL  Total IN: 150 mL    OUT:    Voided (mL): 700 mL  Total OUT: 700 mL    Total NET: -550 mL  ============================ LABS =========================                 11.4   13.55 )-----------( 230      ( 12 Sep 2022 07:01 )             33.7     09-12    124<L>  |  93<L>  |  64<H>  ----------------------------<  112<H>  4.6   |  23  |  1.30    Ca    8.8      12 Sep 2022 07:05  Phos  1.9     09-12  Mg     2.2     09-12    TPro  6.1  /  Alb  3.0<L>  /  TBili  0.4  /  DBili  x   /  AST  25  /  ALT  28  /  AlkPhos  65  09-12    LIVER FUNCTIONS - ( 12 Sep 2022 07:05 )  Alb: 3.0 g/dL / Pro: 6.1 g/dL / ALK PHOS: 65 U/L / ALT: 28 U/L / AST: 25 U/L / GGT: x           PT/INR - ( 11 Sep 2022 04:32 )   PT: 10.9 sec;   INR: 0.95 ratio    PTT - ( 11 Sep 2022 04:32 )  PTT:28.3 sec  ======================Micro/Rad/Cardio=================  Telemtry: Reviewed   EKG: Reviewed  CXR: Reviewed  Culture: Reviewed   Echo: Limited Transthoracic Echo:   Patient name: AMBAR PICKARD  YOB: 1945   Age: 77 (M)   MR#: [id number]  Study Date: 9/11/2022  [facility]  Study quality: Technically fair  Referring Physician: Keysha Irvin MD  Blood Pressure: 155/70 mmHg  Height: 173 cm  Weight: 67 kg  BSA: 1.8 m2  ------------------------------------------------------------------------  PROCEDURE: Limited transthoracic echocardiogram with 2-D.  M-Mode and spectral and color flow Doppler.  INDICATION: Pericardial effusion (noninflammatory) (I31.3)  ------------------------------------------------------------------------  CONCLUSIONS:  Limited repeat study to re-evaluate pericardial effusion.  Normal pericardium with no pericardial effusion.    Limited Transthoracic Echo:   Patient name: AMBAR PICKARD  YOB: 1945   Age: 77 (M)   MR#: [id number]  Study Date: 9/10/2022  [facility] RD[facility]  Study quality: Technically difficult  Referring Physician: Keysha Irvin MD  Blood Pressure: 115/61 mmHg  Height: 173 cm  Weight: 67 kg  BSA: 1.8 m2  ------------------------------------------------------------------------  PROCEDURE: Limited transthoracic echocardiogram with 2-D.  M-Mode and spectral and color flow Doppler.  INDICATION: Pericardial effusion (noninflammatory) (I31.3)  ------------------------------------------------------------------------  CONCLUSIONS:  Limited and technically difficult study to re-evaluate  pericardial effusion.  1. Thickened pericardium wtth trace pericardial effusion.  2. Bilateral pleural effusions.  ======================================================  PAST MEDICAL & SURGICAL HISTORY:  ETOH abuse  sober since 2016    Seizure disorder  last seizure 2015    BPH (benign prostatic hyperplasia)    HTN (hypertension)    Prostate CA  Dx&#x27;d 2008, s/p radiation    Hyperlipidemia    Poor historian    S/P hernia repair  right inguinal hernia    H/O prostate cancer  10- yrs ago, s/p radiation therapy    Status post cataract extraction    H/O urethrotomy  June 2018    A/P: 77M Hx HTN, HLD,  BPH, CKD ( creat 1.42 today), chronic angelita hydronephrosis ( x2 years), prostate ca ( radiation 10 years ago), seizure disorder ( last seizure 2015), ETOH use ( sober since 2016), hypothyroidism, and mild demenia p/w CP and SOB found with large pericardial effusion s/p pericardiocentesis.     ====================== NEUROLOGY=====================  A&Ox3  - continue to monitor mental status as per protocol    Seizure disorder  - No seziure meds reported.  sister confirmed patient not on any seizure meds    ==================== RESPIRATORY======================  Comfortable on RA  - SOB on admission likely 2/2 pericardial tamponade. Patient feels improvement s/p drain placement   - SpO2 96%  - 7/6 CT chest: Large pericardial effusion. Small bilateral pleural effusions. Bilateral hydronephrosis.    COPD  - duonebs prn   - dc'ed levofloxacin. Likely sob related to pericardial effusion (states SOB improved s/p drain placement)   - provided smoking cessation education. Start nicotine patch while inpatient (states quit last week, smokes 4-5 cig/daily)     ====================CARDIOVASCULAR==================  Cardiac Tamponade  - 9/7 TTE: EF 70%. Mod concentric LVH. Normal RV size and function. Large pericardial effusion with evidence of echo tamponade physiology.   - s/p pericardiocentesis with drain placement, 1060mL out serosanguinous.   - as per lights criteria- meets criteria of exudative fluid. f/u pericardial fluid studies including cytology. Gram stain negative for organism   - Patient remained HD stable at [place] prior to transfer (as per documentation: HRs 60-70s; SBPs 150-170s, Lying flat off with O2 sats 99%). Continue to monitor vital signs   - trop I downtrending at OSH prior to transfer  - monitor drain output. Daily TTE while in place  - Cancer screening: Last colonoscopy >10 years ago (states went last year for colonoscopy but procedure aborted 2/2 HTN outpatient and never rescheduled). Remote history of Prostate CA, follows with urologist but unsure of name. waiting for patient sister to provide collateral info     HTN  - on home losartan 100mg and hydral 50mg.  - on home meds as hypertensive upon presentation to CICU. This morning patient with softer BP (asymptomatic).  - continue losartan 50mg     HLD  - c/w home simvastatin   ===================HEMATOLOGIC/ONC ===================  No acute issues   - continue to monitor H&H/Plts     ===================== RENAL =========================  BPH  - continue home tamsulosin     Remote Hx Prostate Ca  - f.u with urology, patient unsure of name. will f/u with sister for collateral info.   - states in remission for >20 years     Hyponatremia  - Na on presentation 125.  - Patient mentating appropriately   - Euvolemic appearing on exam 9/8. Could be 2/2 hypothyroidism     CKD  - Baseline SCr looks to be around 1.5-1.7 base upon prior SCr trend in 2020. SCr this AM 1.42  - monitor and trend SCr, BUN, and lytes  - replete lytes prn with goal K 4-4.5 and Mg >2    Hydronephrosis on CT 9/6  - renal US ordered   - Urinating currently with bladder scan of 120mL. Also with b/l hydronephrosis back in 2020 with history of chronic hydronephrosis. SCr at baseline. Will also get collateral info from outpatient urologist once patient sister updates with info   ==================== GASTROINTESTINAL===================  - Tolerating PO DASH/TLC diet.  - monitor for regular BM while inpatient. Last BM yesterday.   - Bowel regimen with Miralax and Senna     =======================    ENDOCRINE  =====================  - f/u hgb a1c and lipid panel     Hypothyroidism   - TSH elevated with low T3 and T4 on 9/8  - will consult endo for recs  - on home synthroid 112 mcg   ========================INFECTIOUS DISEASE================  Leukocytosis, afebrile  - WBC 16.70  - continue to monitor and trend wbc and temp curve       Patient requires continuous monitoring with bedside rhythm monitoring, pulse ox monitoring, and intermittent blood gas analysis. Care plan discussed with ICU care team. Patient remained critical and at risk for life threatening decompensation.  Patient seen, examined and plan discussed with CCU team during rounds.     I have personally provided 30 minutes of critical care time excluding time spent on separate procedures, in addition to initial critical care time provided by the CICU Attending, Dr. Salazar.  By signing my name below, I, Joya Mcnamara, attest that this documentation has been prepared under the direction and in the presence of Grace Weems NP.  Electronically signed: Jesus Gallagher, 09-12-22 @ 21:19    I, Grace Weems, personally performed the services described in this documentation. all medical record entries made by the davidibe were at my direction and in my presence. I have reviewed the chart and agree that the record reflects my personal performance and is accurate and complete  Electronically signed: Grace Weems NP.       AMBAR PICKARD  MRN-[id number]  Patient is a 77y old  Male who presents with a chief complaint of Tamponade (11 Sep 2022 21:27)    HPI: 77M PMH of HTN, HLD,  BPH, CKD ( creat 1.42 today), chronic angelita hydronephrosis ( x2 years), prostate ca ( radiation 10 years ago), seizure disorder ( last seizure 2015), ETOH use ( sober since 2016), hypothyroidism, and mild demenia; presents to [facility] on 9/3/2022 with chief complaint of SOB and CP x3 days.  Pt has been a heavy smoker his whole life. Denies fever, chills, cough, or congestion. No sick contacts.    Admitted to tele for likely COPD exacerbation and CP.  CXR is clear. Started on Duonebs, Symbicort and Solumedrol. No prior cardiac stents. EKG on arrival to ED notes PRWP V 1-3, no ST changes. Trop high on arrival at 268 trending down 180.8 today, proBNP 763, CK 1090. TSH 69.600 ( stopped synthroid a few weeks ago), T3 <20 T4 0.9, Na 126.  CXR revealing New cardiomegaly and CT chest with Large pericardial effusion, Echo today with large pericardial effusion with early tamponade physiology. Transferred to [facility] for pericardiocentesis. On arrival to [facility] patient awake and verbal A&OX3, denies any chest pain, dyspnea, dizziness, palpitations, N&V, Ha, orthopnea, LE edema.  VS WNL, on 2 L nasal cannula in no respiratory distress.      REVIEW OF SYSTEMS:  CONSTITUTIONAL: No weakness, fevers or chills  EYES/ENT: No visual changes;  No vertigo or throat pain   NECK: No pain or stiffness  RESPIRATORY: No cough, wheezing, hemoptysis; No shortness of breath  CARDIOVASCULAR: No chest pain or palpitations  GASTROINTESTINAL: No abdominal or epigastric pain. No nausea, vomiting, or hematemesis; No diarrhea or constipation. No melena or hematochezia.  GENITOURINARY: No dysuria, frequency or hematuria  NEUROLOGICAL: No numbness or weakness  SKIN: No itching, rashes    ICU Vital Signs Last 24 Hrs  T(C): 36.6 (12 Sep 2022 19:00), Max: 36.8 (12 Sep 2022 18:00)  T(F): 97.8 (12 Sep 2022 19:00), Max: 98.2 (12 Sep 2022 18:00)  HR: 60 (12 Sep 2022 20:00) (52 - 82)  BP: 107/58 (12 Sep 2022 20:00) (91/50 - 185/88)  BP(mean): 78 (12 Sep 2022 20:00) (67 - 126)  RR: 15 (12 Sep 2022 20:00) (11 - 32)  SpO2: 92% (12 Sep 2022 20:00) (92% - 99%)    O2 Parameters below as of 12 Sep 2022 20:00  Patient On (Oxygen Delivery Method): room air      I&O's Summary    11 Sep 2022 07:01  -  12 Sep 2022 07:00  --------------------------------------------------------  IN: 1210 mL / OUT: 400 mL / NET: 810 mL    12 Sep 2022 07:01  -  12 Sep 2022 21:19  --------------------------------------------------------  IN: 150 mL / OUT: 700 mL / NET: -550 mL  ============================I/O===========================   I&O's Detail    11 Sep 2022 07:01  -  12 Sep 2022 07:00  --------------------------------------------------------  IN:    IV PiggyBack: 250 mL    Oral Fluid: 480 mL    sodium chloride 2%: 240 mL    sodium chloride 2%: 240 mL  Total IN: 1210 mL    OUT:    Incontinent per Condom Catheter (mL): 100 mL    Voided (mL): 300 mL  Total OUT: 400 mL    Total NET: 810 mL      12 Sep 2022 07:01  -  12 Sep 2022 21:19  --------------------------------------------------------  IN:    Oral Fluid: 150 mL  Total IN: 150 mL    OUT:    Voided (mL): 700 mL  Total OUT: 700 mL    Total NET: -550 mL  ============================ LABS =========================                 11.4   13.55 )-----------( 230      ( 12 Sep 2022 07:01 )             33.7     09-12    124<L>  |  93<L>  |  64<H>  ----------------------------<  112<H>  4.6   |  23  |  1.30    Ca    8.8      12 Sep 2022 07:05  Phos  1.9     09-12  Mg     2.2     09-12    TPro  6.1  /  Alb  3.0<L>  /  TBili  0.4  /  DBili  x   /  AST  25  /  ALT  28  /  AlkPhos  65  09-12    LIVER FUNCTIONS - ( 12 Sep 2022 07:05 )  Alb: 3.0 g/dL / Pro: 6.1 g/dL / ALK PHOS: 65 U/L / ALT: 28 U/L / AST: 25 U/L / GGT: x           PT/INR - ( 11 Sep 2022 04:32 )   PT: 10.9 sec;   INR: 0.95 ratio    PTT - ( 11 Sep 2022 04:32 )  PTT:28.3 sec  ======================Micro/Rad/Cardio=================  Telemtry: Reviewed   EKG: Reviewed  CXR: Reviewed  Culture: Reviewed   Echo: Limited Transthoracic Echo:   Patient name: AMBAR PICKARD  YOB: 1945   Age: 77 (M)   MR#: [id number]  Study Date: 9/11/2022  [facility]  Study quality: Technically fair  Referring Physician: Keysha Irvin MD  Blood Pressure: 155/70 mmHg  Height: 173 cm  Weight: 67 kg  BSA: 1.8 m2  ------------------------------------------------------------------------  PROCEDURE: Limited transthoracic echocardiogram with 2-D.  M-Mode and spectral and color flow Doppler.  INDICATION: Pericardial effusion (noninflammatory) (I31.3)  ------------------------------------------------------------------------  CONCLUSIONS:  Limited repeat study to re-evaluate pericardial effusion.  Normal pericardium with no pericardial effusion.    Limited Transthoracic Echo:   Patient name: AMBAR PICKARD  YOB: 1945   Age: 77 (M)   MR#: [id number]  Study Date: 9/10/2022  [facility]  Study quality: Technically difficult  Referring Physician: Keysha Irvin MD  Blood Pressure: 115/61 mmHg  Height: 173 cm  Weight: 67 kg  BSA: 1.8 m2  ------------------------------------------------------------------------  PROCEDURE: Limited transthoracic echocardiogram with 2-D.  M-Mode and spectral and color flow Doppler.  INDICATION: Pericardial effusion (noninflammatory) (I31.3)  ------------------------------------------------------------------------  CONCLUSIONS:  Limited and technically difficult study to re-evaluate  pericardial effusion.  1. Thickened pericardium wtth trace pericardial effusion.  2. Bilateral pleural effusions.  ======================================================  PAST MEDICAL & SURGICAL HISTORY:  ETOH abuse  sober since 2016    Seizure disorder  last seizure 2015    BPH (benign prostatic hyperplasia)    HTN (hypertension)    Prostate CA  Dx&#x27;d 2008, s/p radiation    Hyperlipidemia    Poor historian    S/P hernia repair  right inguinal hernia    H/O prostate cancer  10- yrs ago, s/p radiation therapy    Status post cataract extraction    H/O urethrotomy  June 2018    A/P: 77M Hx HTN, HLD,  BPH, CKD ( creat 1.42 today), chronic angelita hydronephrosis ( x2 years), prostate ca ( radiation 10 years ago), seizure disorder ( last seizure 2015), ETOH use ( sober since 2016), hypothyroidism, and mild demenia p/w CP and SOB found with large pericardial effusion s/p pericardiocentesis.     ====================== NEUROLOGY=====================  A&Ox3  - continue to monitor mental status as per protocol    Seizure disorder  - No seziure meds reported.  sister confirmed patient not on any seizure meds    ==================== RESPIRATORY======================  Comfortable on RA  - SOB on admission likely 2/2 pericardial tamponade. Patient feels improvement s/p drain placement   - SpO2 96%  - 7/6 CT chest: Large pericardial effusion. Small bilateral pleural effusions. Bilateral hydronephrosis.    COPD  - duonebs prn   - dc'ed levofloxacin. Likely sob related to pericardial effusion (states SOB improved s/p drain placement)   - provided smoking cessation education. Start nicotine patch while inpatient (states quit last week, smokes 4-5 cig/daily)     ====================CARDIOVASCULAR==================  Cardiac Tamponade  - 9/7 TTE: EF 70%. Mod concentric LVH. Normal RV size and function. Large pericardial effusion with evidence of echo tamponade physiology.   - s/p pericardiocentesis with drain placement, 1060mL out serosanguinous.   - as per lights criteria- meets criteria of exudative fluid. f/u pericardial fluid studies including cytology. Gram stain negative for organism   - Patient remained HD stable at [facility] prior to transfer (as per documentation: HRs 60-70s; SBPs 150-170s, Lying flat off with O2 sats 99%). Continue to monitor vital signs   - trop I downtrending at OSH prior to transfer  - monitor drain output. Daily TTE while in place  - Cancer screening: Last colonoscopy >10 years ago (states went last year for colonoscopy but procedure aborted 2/2 HTN outpatient and never rescheduled). Remote history of Prostate CA, follows with urologist but unsure of name. waiting for patient sister to provide collateral info     HTN  - on home losartan 100mg and hydral 50mg.  - on home meds as hypertensive upon presentation to CICU. This morning patient with softer BP (asymptomatic).  - continue losartan 50mg     HLD  - c/w home simvastatin   ===================HEMATOLOGIC/ONC ===================  No acute issues   - continue to monitor H&H/Plts     ===================== RENAL =========================  BPH  - continue home tamsulosin     Remote Hx Prostate Ca  - f.u with urology, patient unsure of name. will f/u with sister for collateral info.   - states in remission for >20 years     Hyponatremia  - Na on presentation 125.  - Patient mentating appropriately   - Euvolemic appearing on exam 9/8. Could be 2/2 hypothyroidism     CKD  - Baseline SCr looks to be around 1.5-1.7 base upon prior SCr trend in 2020. SCr this AM 1.42  - monitor and trend SCr, BUN, and lytes  - replete lytes prn with goal K 4-4.5 and Mg >2    Hydronephrosis on CT 9/6  - renal US ordered   - Urinating currently with bladder scan of 120mL. Also with b/l hydronephrosis back in 2020 with history of chronic hydronephrosis. SCr at baseline. Will also get collateral info from outpatient urologist once patient sister updates with info   ==================== GASTROINTESTINAL===================  - Tolerating PO DASH/TLC diet.  - monitor for regular BM while inpatient. Last BM yesterday.   - Bowel regimen with Miralax and Senna     =======================    ENDOCRINE  =====================  - f/u hgb a1c and lipid panel     Hypothyroidism   - TSH elevated with low T3 and T4 on 9/8  - will consult endo for recs  - on home synthroid 112 mcg   ========================INFECTIOUS DISEASE================  Leukocytosis, afebrile  - WBC 16.70  - continue to monitor and trend wbc and temp curve       Patient requires continuous monitoring with bedside rhythm monitoring, pulse ox monitoring, and intermittent blood gas analysis. Care plan discussed with ICU care team. Patient remained critical and at risk for life threatening decompensation.  Patient seen, examined and plan discussed with CCU team during rounds.     I have personally provided 30 minutes of critical care time excluding time spent on separate procedures, in addition to initial critical care time provided by the CICU Attending, Dr. Salazar.  By signing my name below, I, Joya Mcnamara, attest that this documentation has been prepared under the direction and in the presence of Grace Weems NP.  Electronically signed: Jesus Gallagher, 09-12-22 @ 21:19    I, Grace Weems, personally performed the services described in this documentation. all medical record entries made by the davidibe were at my direction and in my presence. I have reviewed the chart and agree that the record reflects my personal performance and is accurate and complete  Electronically signed: Grace Weems NP.    Fellow addendum:   77M Hx HTN, HLD,  BPH, CKD ( creat 1.42 today), chronic angelita hydronephrosis ( x2 years), prostate ca ( radiation 10 years ago), seizure disorder ( last seizure 2015), ETOH use ( sober since 2016), hypothyroidism, and mild demenia p/w CP and SOB found with large pericardial effusion s/p pericardiocentesis.   Plan:  Continue current management.   Interval TTE's without reaccumulation of pericardial effusion  Hyponatremia marginally improved at 124

## 2022-09-12 NOTE — PROGRESS NOTE ADULT - ASSESSMENT
Hyponatremia  CKD / old age limited free water excretion  Hypothryroid  Na improved a bit w 2% NaCl early this morning  Because of very high U Osm will eliza 3% NaCl to raise Na to target  Gentle- 3 hours- 30 ml/h  Restrict water intake  UreNa 15 g    Missael Beaver MD

## 2022-09-12 NOTE — PROGRESS NOTE ADULT - SUBJECTIVE AND OBJECTIVE BOX
[facility] DIVISION OF KIDNEY DISEASES AND HYPERTENSION -- FOLLOW UP NOTE  --------------------------------------------------------------------------------  Chief Complaint:    24 hour events/subjective:        PAST HISTORY  --------------------------------------------------------------------------------  No significant changes to PMH, PSH, FHx, SHx, unless otherwise noted    ALLERGIES & MEDICATIONS  --------------------------------------------------------------------------------  Allergies    No Known Allergies    Intolerances      Standing Inpatient Medications  bisacodyl 5 milliGRAM(s) Oral every 12 hours  chlorhexidine 4% Liquid 1 Application(s) Topical <User Schedule>  heparin   Injectable 5000 Unit(s) SubCutaneous every 8 hours  hydrALAZINE 50 milliGRAM(s) Oral three times a day  levothyroxine 112 MICROGram(s) Oral daily  losartan 50 milliGRAM(s) Oral daily  nicotine -  14 mG/24Hr(s) Patch 1 Patch Transdermal daily  polyethylene glycol 3350 17 Gram(s) Oral two times a day  senna 2 Tablet(s) Oral at bedtime  simvastatin 20 milliGRAM(s) Oral at bedtime  sodium chloride 2% . 1000 milliLiter(s) IV Continuous <Continuous>  tamsulosin 0.4 milliGRAM(s) Oral at bedtime  urea Oral Powder 15 Gram(s) Oral two times a day    PRN Inpatient Medications  albuterol/ipratropium for Nebulization 3 milliLiter(s) Nebulizer every 6 hours PRN  simethicone 80 milliGRAM(s) Chew two times a day PRN      REVIEW OF SYSTEMS  --------------------------------------------------------------------------------  Gen: No weight changes, fatigue, fevers/chills, weakness  Skin: No rashes  Head/Eyes/Ears/Mouth: No headache; Normal hearing; Normal vision w/o blurriness; No sinus pain/discomfort, sore throat  Respiratory: No dyspnea, cough, wheezing, hemoptysis  CV: No chest pain, PND, orthopnea  GI: No abdominal pain, diarrhea, constipation, nausea, vomiting, melena, hematochezia  : No increased frequency, dysuria, hematuria, nocturia  MSK: No joint pain/swelling; no back pain; no edema  Neuro: No dizziness/lightheadedness, weakness, seizures, numbness, tingling  Heme: No easy bruising or bleeding  Endo: No heat/cold intolerance  Psych: No significant nervousness, anxiety, stress, depression    All other systems were reviewed and are negative, except as noted.    VITALS/PHYSICAL EXAM  --------------------------------------------------------------------------------  T(C): 36.7 (09-12-22 @ 13:00), Max: 36.7 (09-12-22 @ 00:00)  HR: 72 (09-12-22 @ 14:00) (49 - 82)  BP: 91/50 (09-12-22 @ 14:00) (91/50 - 208/98)  RR: 12 (09-12-22 @ 14:00) (11 - 32)  SpO2: 95% (09-12-22 @ 14:00) (92% - 99%)  Wt(kg): --        09-11-22 @ 07:01  -  09-12-22 @ 07:00  --------------------------------------------------------  IN: 1210 mL / OUT: 400 mL / NET: 810 mL    09-12-22 @ 07:01  -  09-12-22 @ 15:19  --------------------------------------------------------  IN: 150 mL / OUT: 400 mL / NET: -250 mL      Physical Exam:  	Gen: NAD, well-appearing  	HEENT: PERRL, supple neck, clear oropharynx  	Pulm: CTA B/L  	CV: RRR, S1S2; no rub  	Back: No spinal or CVA tenderness; no sacral edema  	Abd: +BS, soft, nontender/nondistended  	: No suprapubic tenderness  	UE: Warm, FROM, no clubbing, intact strength; no edema; no asterixis  	LE: Warm, FROM, no clubbing, intact strength; no edema  	Neuro: No focal deficits, intact gait  	Psych: Normal affect and mood  	Skin: Warm, without rashes  	Vascular access:    LABS/STUDIES  --------------------------------------------------------------------------------              11.4   13.55 >-----------<  230      [09-12-22 @ 07:01]              33.7     124  |  93  |  64  ----------------------------<  112      [09-12-22 @ 07:05]  4.6   |  23  |  1.30        Ca     8.8     [09-12-22 @ 07:05]      Mg     2.2     [09-12-22 @ 07:05]      Phos  1.9     [09-12-22 @ 07:05]    TPro  6.1  /  Alb  3.0  /  TBili  0.4  /  DBili  x   /  AST  25  /  ALT  28  /  AlkPhos  65  [09-12-22 @ 07:05]    PT/INR: PT 10.9 , INR 0.95       [09-11-22 @ 04:32]  PTT: 28.3       [09-11-22 @ 04:32]    Serum Osmolality 273      [09-11-22 @ 12:08]    Creatinine Trend:  SCr 1.30 [09-12 @ 07:05]  SCr 1.36 [09-11 @ 23:18]  SCr 1.42 [09-11 @ 12:08]  SCr 1.43 [09-11 @ 04:32]  SCr 1.59 [09-10 @ 03:50]    Urinalysis - [09-09-22 @ 17:48]      Color Yellow / Appearance Clear / SG 1.021 / pH 6.0      Gluc Negative / Ketone Trace  / Bili Negative / Urobili Negative       Blood Negative / Protein Trace / Leuk Est Small / Nitrite Negative      RBC 1 / WBC 11 / Hyaline 1 / Gran  / Sq Epi  / Non Sq Epi 4 / Bacteria Negative    Urine Creatinine 59      [09-12-22 @ 08:13]  Urine Protein 7      [09-06-22 @ 12:00]  Urine Sodium 10      [09-12-22 @ 08:13]  Urine Urea Nitrogen 1049      [09-09-22 @ 17:49]  Urine Osmolality 616      [09-12-22 @ 08:13]    TSH 69.600      [09-06-22 @ 06:46]  Lipid: chol 159, TG 51, HDL 99, LDL --      [09-08-22 @ 02:59]

## 2022-09-13 DIAGNOSIS — I10 ESSENTIAL (PRIMARY) HYPERTENSION: ICD-10-CM

## 2022-09-13 DIAGNOSIS — D64.9 ANEMIA, UNSPECIFIED: ICD-10-CM

## 2022-09-13 DIAGNOSIS — Z29.9 ENCOUNTER FOR PROPHYLACTIC MEASURES, UNSPECIFIED: ICD-10-CM

## 2022-09-13 DIAGNOSIS — R14.0 ABDOMINAL DISTENSION (GASEOUS): ICD-10-CM

## 2022-09-13 DIAGNOSIS — I31.3 PERICARDIAL EFFUSION (NONINFLAMMATORY): ICD-10-CM

## 2022-09-13 DIAGNOSIS — G40.909 EPILEPSY, UNSPECIFIED, NOT INTRACTABLE, WITHOUT STATUS EPILEPTICUS: ICD-10-CM

## 2022-09-13 DIAGNOSIS — E03.9 HYPOTHYROIDISM, UNSPECIFIED: ICD-10-CM

## 2022-09-13 DIAGNOSIS — N40.0 BENIGN PROSTATIC HYPERPLASIA WITHOUT LOWER URINARY TRACT SYMPTOMS: ICD-10-CM

## 2022-09-13 DIAGNOSIS — K62.5 HEMORRHAGE OF ANUS AND RECTUM: ICD-10-CM

## 2022-09-13 DIAGNOSIS — E78.5 HYPERLIPIDEMIA, UNSPECIFIED: ICD-10-CM

## 2022-09-13 LAB
ALBUMIN SERPL ELPH-MCNC: 2.9 G/DL — LOW (ref 3.3–5)
ALP SERPL-CCNC: 71 U/L — SIGNIFICANT CHANGE UP (ref 40–120)
ALT FLD-CCNC: 28 U/L — SIGNIFICANT CHANGE UP (ref 10–45)
ANION GAP SERPL CALC-SCNC: 10 MMOL/L — SIGNIFICANT CHANGE UP (ref 5–17)
APTT BLD: 36.3 SEC — HIGH (ref 27.5–35.5)
AST SERPL-CCNC: 26 U/L — SIGNIFICANT CHANGE UP (ref 10–40)
BASOPHILS # BLD AUTO: 0.01 K/UL — SIGNIFICANT CHANGE UP (ref 0–0.2)
BASOPHILS NFR BLD AUTO: 0.1 % — SIGNIFICANT CHANGE UP (ref 0–2)
BILIRUB SERPL-MCNC: 0.3 MG/DL — SIGNIFICANT CHANGE UP (ref 0.2–1.2)
BUN SERPL-MCNC: 54 MG/DL — HIGH (ref 7–23)
CALCIUM SERPL-MCNC: 9.2 MG/DL — SIGNIFICANT CHANGE UP (ref 8.4–10.5)
CHLORIDE SERPL-SCNC: 96 MMOL/L — SIGNIFICANT CHANGE UP (ref 96–108)
CO2 SERPL-SCNC: 21 MMOL/L — LOW (ref 22–31)
CREAT SERPL-MCNC: 1.44 MG/DL — HIGH (ref 0.5–1.3)
EGFR: 49 ML/MIN/1.73M2 — LOW
EOSINOPHIL # BLD AUTO: 0.05 K/UL — SIGNIFICANT CHANGE UP (ref 0–0.5)
EOSINOPHIL NFR BLD AUTO: 0.5 % — SIGNIFICANT CHANGE UP (ref 0–6)
GLUCOSE SERPL-MCNC: 96 MG/DL — SIGNIFICANT CHANGE UP (ref 70–99)
HCT VFR BLD CALC: 32.7 % — LOW (ref 39–50)
HGB BLD-MCNC: 11 G/DL — LOW (ref 13–17)
IMM GRANULOCYTES NFR BLD AUTO: 1 % — SIGNIFICANT CHANGE UP (ref 0–1.5)
INR BLD: 0.95 RATIO — SIGNIFICANT CHANGE UP (ref 0.88–1.16)
LYMPHOCYTES # BLD AUTO: 0.96 K/UL — LOW (ref 1–3.3)
LYMPHOCYTES # BLD AUTO: 9.4 % — LOW (ref 13–44)
MAGNESIUM SERPL-MCNC: 2.2 MG/DL — SIGNIFICANT CHANGE UP (ref 1.6–2.6)
MCHC RBC-ENTMCNC: 30 PG — SIGNIFICANT CHANGE UP (ref 27–34)
MCHC RBC-ENTMCNC: 33.6 GM/DL — SIGNIFICANT CHANGE UP (ref 32–36)
MCV RBC AUTO: 89.1 FL — SIGNIFICANT CHANGE UP (ref 80–100)
MONOCYTES # BLD AUTO: 0.78 K/UL — SIGNIFICANT CHANGE UP (ref 0–0.9)
MONOCYTES NFR BLD AUTO: 7.6 % — SIGNIFICANT CHANGE UP (ref 2–14)
NEUTROPHILS # BLD AUTO: 8.3 K/UL — HIGH (ref 1.8–7.4)
NEUTROPHILS NFR BLD AUTO: 81.4 % — HIGH (ref 43–77)
NRBC # BLD: 0 /100 WBCS — SIGNIFICANT CHANGE UP (ref 0–0)
PHOSPHATE SERPL-MCNC: 1.9 MG/DL — LOW (ref 2.5–4.5)
PLATELET # BLD AUTO: 227 K/UL — SIGNIFICANT CHANGE UP (ref 150–400)
POTASSIUM SERPL-MCNC: 4.4 MMOL/L — SIGNIFICANT CHANGE UP (ref 3.5–5.3)
POTASSIUM SERPL-SCNC: 4.4 MMOL/L — SIGNIFICANT CHANGE UP (ref 3.5–5.3)
PROT SERPL-MCNC: 6.2 G/DL — SIGNIFICANT CHANGE UP (ref 6–8.3)
PROTHROM AB SERPL-ACNC: 10.9 SEC — SIGNIFICANT CHANGE UP (ref 10.5–13.4)
RBC # BLD: 3.67 M/UL — LOW (ref 4.2–5.8)
RBC # FLD: 17.7 % — HIGH (ref 10.3–14.5)
SODIUM SERPL-SCNC: 127 MMOL/L — LOW (ref 135–145)
WBC # BLD: 10.2 K/UL — SIGNIFICANT CHANGE UP (ref 3.8–10.5)
WBC # FLD AUTO: 10.2 K/UL — SIGNIFICANT CHANGE UP (ref 3.8–10.5)

## 2022-09-13 PROCEDURE — 93010 ELECTROCARDIOGRAM REPORT: CPT

## 2022-09-13 PROCEDURE — 99233 SBSQ HOSP IP/OBS HIGH 50: CPT | Mod: GC,25

## 2022-09-13 PROCEDURE — 99232 SBSQ HOSP IP/OBS MODERATE 35: CPT | Mod: GC

## 2022-09-13 PROCEDURE — 99232 SBSQ HOSP IP/OBS MODERATE 35: CPT

## 2022-09-13 PROCEDURE — 74018 RADEX ABDOMEN 1 VIEW: CPT | Mod: 26

## 2022-09-13 RX ORDER — LOSARTAN POTASSIUM 100 MG/1
50 TABLET, FILM COATED ORAL ONCE
Refills: 0 | Status: COMPLETED | OUTPATIENT
Start: 2022-09-13 | End: 2022-09-13

## 2022-09-13 RX ORDER — LOSARTAN POTASSIUM 100 MG/1
100 TABLET, FILM COATED ORAL DAILY
Refills: 0 | Status: DISCONTINUED | OUTPATIENT
Start: 2022-09-14 | End: 2022-09-20

## 2022-09-13 RX ORDER — SIMETHICONE 80 MG/1
160 TABLET, CHEWABLE ORAL ONCE
Refills: 0 | Status: COMPLETED | OUTPATIENT
Start: 2022-09-13 | End: 2022-09-13

## 2022-09-13 RX ADMIN — SIMVASTATIN 20 MILLIGRAM(S): 20 TABLET, FILM COATED ORAL at 21:31

## 2022-09-13 RX ADMIN — Medication 15 GRAM(S): at 05:36

## 2022-09-13 RX ADMIN — Medication 112 MICROGRAM(S): at 05:36

## 2022-09-13 RX ADMIN — Medication 1 PATCH: at 11:58

## 2022-09-13 RX ADMIN — SENNA PLUS 2 TABLET(S): 8.6 TABLET ORAL at 21:31

## 2022-09-13 RX ADMIN — TAMSULOSIN HYDROCHLORIDE 0.4 MILLIGRAM(S): 0.4 CAPSULE ORAL at 21:31

## 2022-09-13 RX ADMIN — Medication 1 PATCH: at 21:35

## 2022-09-13 RX ADMIN — Medication 1 PATCH: at 07:30

## 2022-09-13 RX ADMIN — LOSARTAN POTASSIUM 50 MILLIGRAM(S): 100 TABLET, FILM COATED ORAL at 05:36

## 2022-09-13 RX ADMIN — LOSARTAN POTASSIUM 50 MILLIGRAM(S): 100 TABLET, FILM COATED ORAL at 12:05

## 2022-09-13 RX ADMIN — HEPARIN SODIUM 5000 UNIT(S): 5000 INJECTION INTRAVENOUS; SUBCUTANEOUS at 12:05

## 2022-09-13 RX ADMIN — Medication 15 GRAM(S): at 17:53

## 2022-09-13 RX ADMIN — SIMETHICONE 160 MILLIGRAM(S): 80 TABLET, CHEWABLE ORAL at 05:58

## 2022-09-13 RX ADMIN — Medication 1 PATCH: at 12:05

## 2022-09-13 RX ADMIN — Medication 50 MILLIGRAM(S): at 05:36

## 2022-09-13 RX ADMIN — CHLORHEXIDINE GLUCONATE 1 APPLICATION(S): 213 SOLUTION TOPICAL at 05:22

## 2022-09-13 RX ADMIN — HEPARIN SODIUM 5000 UNIT(S): 5000 INJECTION INTRAVENOUS; SUBCUTANEOUS at 05:36

## 2022-09-13 NOTE — DIETITIAN INITIAL EVALUATION ADULT - PERTINENT MEDS FT
MEDICATIONS  (STANDING):  chlorhexidine 4% Liquid 1 Application(s) Topical <User Schedule>  heparin   Injectable 5000 Unit(s) SubCutaneous every 8 hours  hydrALAZINE 50 milliGRAM(s) Oral three times a day  levothyroxine 112 MICROGram(s) Oral daily  losartan 50 milliGRAM(s) Oral daily  nicotine -  14 mG/24Hr(s) Patch 1 Patch Transdermal daily  senna 2 Tablet(s) Oral at bedtime  simvastatin 20 milliGRAM(s) Oral at bedtime  tamsulosin 0.4 milliGRAM(s) Oral at bedtime  urea Oral Powder 15 Gram(s) Oral two times a day    MEDICATIONS  (PRN):  albuterol/ipratropium for Nebulization 3 milliLiter(s) Nebulizer every 6 hours PRN Shortness of Breath and/or Wheezing  simethicone 80 milliGRAM(s) Chew two times a day PRN Gas

## 2022-09-13 NOTE — PROGRESS NOTE ADULT - PROBLEM SELECTOR PLAN 5
- continue with synthroid 112 mcg   - repeat TSH in 6-8 weeks - noted to have abdominal distention   - no tenderness to palpation   - xray abdomen ordered to be done; f/u official read

## 2022-09-13 NOTE — PROGRESS NOTE ADULT - PROBLEM SELECTOR PLAN 7
- continue with simvastatin - patient on losartan and hydralazine at home  - continue with losartan   - monitor BP and adjust meds as needed - creatinine currently stable; currently at 1.44   - monitor BMP

## 2022-09-13 NOTE — PROGRESS NOTE ADULT - PROBLEM SELECTOR PLAN 6
- patient on losartan and hydralazine at home  - continue with losartan   - monitor BP and adjust meds as needed - creatinine currently stable; currently at 1.44   - monitor BMP - continue with synthroid 112 mcg   - repeat TSH in 6-8 weeks

## 2022-09-13 NOTE — PROGRESS NOTE ADULT - ASSESSMENT
A/P: 77M Hx HTN, HLD,  BPH, CKD ( creat 1.42 today), chronic angelita hydronephrosis ( x2 years), prostate ca ( radiation 10 years ago), seizure disorder ( last seizure 2015), ETOH use ( sober since 2016), hypothyroidism, and mild demenia p/w CP and SOB found with large pericardial effusion s/p pericardiocentesis, hyponatremia.    ====================== NEUROLOGY=====================  A&Ox3  - continue to monitor mental status as per protocol    Seizure disorder  - No seziure meds reported.  sister confirmed patient not on any seizure meds    ==================== RESPIRATORY======================  Comfortable on RA  - SOB on admission likely 2/2 pericardial tamponade. Patient feels improvement s/p drain placement now removed.  - SpO2 96%  - 7/6 CT chest: Large pericardial effusion. Small bilateral pleural effusions. Bilateral hydronephrosis.    COPD  - duonebs prn   - dc'ed levofloxacin. Likely sob related to pericardial effusion (states SOB improved s/p drain placement)   - provided smoking cessation education. Start nicotine patch while inpatient (states quit last week, smokes 4-5 cig/daily)     albuterol/ipratropium for Nebulization 3 milliLiter(s) Nebulizer every 6 hours PRN Shortness of Breath and/or Wheezing    ====================CARDIOVASCULAR==================  Cardiac Tamponade  - possibly due to hypothyroidism given fluid results  - 9/11 limited echo: no pericardial effusion   - 9/7 TTE: EF 70%. Mod concentric LVH. Normal RV size and function. Large pericardial effusion with evidence of echo tamponade physiology.   - s/p pericardiocentesis with drain placement, 1060mL out serosanguinous s/p drain removal  - as per lights criteria- meets criteria of exudative fluid. f/u pericardial fluid studies including cytology. Gram stain negative for organism   - Patient remained HD stable at [place] prior to transfer (as per documentation: HRs 60-70s; SBPs 150-170s, Lying flat off with O2 sats 99%). Continue to monitor vital signs   - trop I downtrending at OSH prior to transfer  - Cancer screening: Last colonoscopy >10 years ago (states went last year for colonoscopy but procedure aborted 2/2 HTN outpatient and never rescheduled). Remote history of Prostate CA, follows with urologist but unsure of name. waiting for patient sister to provide collateral info     HTN  - on home losartan 50mg  - on home hydralazine 50mg    HLD  - c/w home simvastatin 20mg qD    hydrALAZINE 50 milliGRAM(s) Oral three times a day  losartan 50 milliGRAM(s) Oral daily  urea Oral Powder 15 Gram(s) Oral two times a day  simvastatin 20 milliGRAM(s) Oral at bedtime    ===================HEMATOLOGIC/ONC ===================  No acute issues   - continue to monitor H&H/Plts     heparin   Injectable 5000 Unit(s) SubCutaneous every 8 hours    ===================== RENAL =========================  BPH  - continue home tamsulosin     Remote Hx Prostate Ca  - f.u with urology, patient unsure of name. will f/u with sister for collateral info.   - states in remission for >20 years     Hyponatremia  - Na 124 today after 2% hypertonic  - f/u nephro recs  - Na on presentation 125  - Fluid restriction to <1L/d  - Patient mentating appropriately   - Euvolemic appearing on exam. Could be 2/2 hypothyroidism, chronic given pt was not taking his home thyroid meds    CKD  - Baseline SCr looks to be around 1.5-1.7 base upon prior SCr trend in 2020. SCr this AM 1.3  - monitor and trend SCr, BUN, and lytes  - replete lytes prn with goal K 4-4.5 and Mg >2    Hydronephrosis on CT 9/6  - renal US shows improvement of hydronephrosis  - Urinating currently. Also with b/l hydronephrosis back in 2020 with history of chronic hydronephrosis. SCr at baseline. Will also get collateral info from outpatient urologist once patient sister updates with info     tamsulosin 0.4 milliGRAM(s) Oral at bedtime    ==================== GASTROINTESTINAL===================  - Tolerating PO DASH/TLC diet.  - monitor for regular BM while inpatient. Last BM yesterday.   - Bowel regimen with Miralax and Senna     bisacodyl 5 milliGRAM(s) Oral every 12 hours  polyethylene glycol 3350 17 Gram(s) Oral two times a day  senna 2 Tablet(s) Oral at bedtime  simethicone 80 milliGRAM(s) Chew two times a day PRN Gas      =======================    ENDOCRINE  =====================  - f/u hgb a1c and lipid panel     Hypothyroidism   - TSH elevated with low T3 and T4 on 9/8  - will consult endo for recs  - on home synthroid 112 mcg     levothyroxine 112 MICROGram(s) Oral daily    ========================INFECTIOUS DISEASE================  Leukocytosis, hypothermia  - WBC 16.70 --> 13.55  - continue to monitor and trend wbc and temp curve  - likely isolated hypothermia, not infectious picture given improving wbc A/P: 77M Hx HTN, HLD,  BPH, CKD ( creat 1.42 today), chronic angelita hydronephrosis ( x2 years), prostate ca ( radiation 10 years ago), seizure disorder ( last seizure 2015), ETOH use ( sober since 2016), hypothyroidism, and mild demenia p/w CP and SOB found with large pericardial effusion s/p pericardiocentesis, hyponatremia.    ====================== NEUROLOGY=====================  A&Ox3  - continue to monitor mental status as per protocol    Seizure disorder  - No seziure meds reported.  sister confirmed patient not on any seizure meds    ==================== RESPIRATORY======================  Comfortable on RA  - SOB on admission likely 2/2 pericardial tamponade. Patient feels improvement s/p drain placement now removed.  - SpO2 96%  - 7/6 CT chest: Large pericardial effusion. Small bilateral pleural effusions. Bilateral hydronephrosis.    COPD  - duonebs prn   - dc'ed levofloxacin. Likely sob related to pericardial effusion (states SOB improved s/p drain placement)   - provided smoking cessation education. Start nicotine patch while inpatient (states quit last week, smokes 4-5 cig/daily)     albuterol/ipratropium for Nebulization 3 milliLiter(s) Nebulizer every 6 hours PRN Shortness of Breath and/or Wheezing    ====================CARDIOVASCULAR==================  Cardiac Tamponade  - possibly due to hypothyroidism given fluid results  - 9/11 limited echo: no pericardial effusion   - 9/7 TTE: EF 70%. Mod concentric LVH. Normal RV size and function. Large pericardial effusion with evidence of echo tamponade physiology.   - s/p pericardiocentesis with drain placement, 1060mL out serosanguinous s/p drain removal  - as per lights criteria- meets criteria of exudative fluid. f/u pericardial fluid studies including cytology. Gram stain negative for organism   - Patient remained HD stable at [place] prior to transfer (as per documentation: HRs 60-70s; SBPs 150-170s, Lying flat off with O2 sats 99%). Continue to monitor vital signs   - trop I downtrending at OSH prior to transfer  - Cancer screening: Last colonoscopy >10 years ago (states went last year for colonoscopy but procedure aborted 2/2 HTN outpatient and never rescheduled). Remote history of Prostate CA, follows with urologist but unsure of name. waiting for patient sister to provide collateral info     HTN  - on home losartan 50mg  - on home hydralazine 50mg    HLD  - c/w home simvastatin 20mg qD    hydrALAZINE 50 milliGRAM(s) Oral three times a day  losartan 50 milliGRAM(s) Oral daily  urea Oral Powder 15 Gram(s) Oral two times a day  simvastatin 20 milliGRAM(s) Oral at bedtime    ===================HEMATOLOGIC/ONC ===================  No acute issues   - continue to monitor H&H/Plts     heparin   Injectable 5000 Unit(s) SubCutaneous every 8 hours    ===================== RENAL =========================  BPH  - continue home tamsulosin     Remote Hx Prostate Ca  - f.u with urology, patient unsure of name. will f/u with sister for collateral info.   - states in remission for >20 years     Hyponatremia  - Na 127  - f/u nephro recs  - Na on presentation 125  - Fluid restriction to <1L/d  - Patient mentating appropriately   - Euvolemic appearing on exam. Could be 2/2 hypothyroidism, chronic given pt was not taking his home thyroid meds    CKD  - Baseline SCr looks to be around 1.5-1.7 base upon prior SCr trend in 2020. SCr this AM 1.3  - monitor and trend SCr, BUN, and lytes  - replete lytes prn with goal K 4-4.5 and Mg >2    Hydronephrosis on CT 9/6  - renal US shows improvement of hydronephrosis  - Urinating currently. Also with b/l hydronephrosis back in 2020 with history of chronic hydronephrosis. SCr at baseline. Will also get collateral info from outpatient urologist once patient sister updates with info     tamsulosin 0.4 milliGRAM(s) Oral at bedtime    ==================== GASTROINTESTINAL===================  - Tolerating PO DASH/TLC diet.  - monitor for regular BM while inpatient.   - Hold Bowel regimen with Miralax and Senna for diarrhea    bisacodyl 5 milliGRAM(s) Oral every 12 hours  polyethylene glycol 3350 17 Gram(s) Oral two times a day  senna 2 Tablet(s) Oral at bedtime  simethicone 80 milliGRAM(s) Chew two times a day PRN Gas      =======================    ENDOCRINE  =====================  - f/u hgb a1c and lipid panel     Hypothyroidism   - TSH elevated with low T3 and T4 on 9/8  - will consult endo for recs  - on home synthroid 112 mcg     levothyroxine 112 MICROGram(s) Oral daily    ========================INFECTIOUS DISEASE================  Leukocytosis, afebrile  - WBC 16.70 --> 13.55 --> 11  - continue to monitor and trend wbc and temp curve

## 2022-09-13 NOTE — DIETITIAN INITIAL EVALUATION ADULT - PERTINENT LABORATORY DATA
09-13    127<L>  |  96  |  54<H>  ----------------------------<  96  4.4   |  21<L>  |  1.44<H>    Ca    9.2      13 Sep 2022 05:14  Phos  1.9     09-13  Mg     2.2     09-13    TPro  6.2  /  Alb  2.9<L>  /  TBili  0.3  /  DBili  x   /  AST  26  /  ALT  28  /  AlkPhos  71  09-13  A1C with Estimated Average Glucose Result: 5.7 % (09-08-22 @ 02:59)

## 2022-09-13 NOTE — DIETITIAN INITIAL EVALUATION ADULT - NS FNS DIET ORDER
Diet, DASH/TLC:   Sodium & Cholesterol Restricted  Soft and Bite Sized ([id number])  1200mL Fluid Restriction ([id number]) (09-13-22 @ 09:01)

## 2022-09-13 NOTE — PROGRESS NOTE ADULT - PROBLEM SELECTOR PLAN 8
- continue with flomax - continue with simvastatin - patient on losartan and hydralazine at home  - continue with losartan   - monitor BP and adjust meds as needed

## 2022-09-13 NOTE — DIETITIAN INITIAL EVALUATION ADULT - OTHER INFO
Weight history per [facility]: 53.3 kg (7/2020). Dosing weight this admission (66.8kg, 9/7/2022) suggests weight gain. Will continue to monitor and trend weights as able/appropriate.

## 2022-09-13 NOTE — DIETITIAN INITIAL EVALUATION ADULT - ADD RECOMMEND
1) Defer texture/consistency of diet to team/SLP recommendations  2) Liberalize DASH restrictions to Low Sodium to encourage PO intake, adjust as needed  3) Continue fluid restriction as per team  4) Noted with HbA1c 5.7% - no noted hx of DM. RD remains available to provide diet education as able/appropriate once patient aware.  5) Monitor PO intake, weight, labs, skin, GI status, diet

## 2022-09-13 NOTE — PROGRESS NOTE ADULT - ATTENDING COMMENTS
History of malignancy presents with pericardial tamponade s/p pericardiocentesis  A+Ox2-3, baseline dementia  Hemodynamics acceptable, no pressors or inotropes  On both Losartan and Hydralazine - transition Hydralazine to Norvasc  Pericardial tamponade s/p pericardiocentesis, drain now removed  O2 sats mid to high 90s on room air  DASH diet  Labile renal function, compensated on exam - target even  Hyponatremic, ? euvolemic, responded to 2% saline, Renal is following   Continue to fluid restrict  H/H low but acceptable on HSQ for DVT prophylaxis   Afebrile, no antibiotics  Sugars controlled  Hypothyroid - continue Synthroid  No central access  OOB

## 2022-09-13 NOTE — PROGRESS NOTE ADULT - SUBJECTIVE AND OBJECTIVE BOX
[facility] DIVISION OF KIDNEY DISEASES AND HYPERTENSION -- FOLLOW UP NOTE  --------------------------------------------------------------------------------  Chief Complaint:    24 hour events/subjective:        PAST HISTORY  --------------------------------------------------------------------------------  No significant changes to PMH, PSH, FHx, SHx, unless otherwise noted    ALLERGIES & MEDICATIONS  --------------------------------------------------------------------------------  Allergies    No Known Allergies    Intolerances      Standing Inpatient Medications  levothyroxine 112 MICROGram(s) Oral daily  nicotine -  14 mG/24Hr(s) Patch 1 Patch Transdermal daily  senna 2 Tablet(s) Oral at bedtime  simvastatin 20 milliGRAM(s) Oral at bedtime  tamsulosin 0.4 milliGRAM(s) Oral at bedtime  urea Oral Powder 15 Gram(s) Oral two times a day    PRN Inpatient Medications  albuterol/ipratropium for Nebulization 3 milliLiter(s) Nebulizer every 6 hours PRN  simethicone 80 milliGRAM(s) Chew two times a day PRN      REVIEW OF SYSTEMS  --------------------------------------------------------------------------------  Gen: No weight changes, fatigue, fevers/chills, weakness  Skin: No rashes  Head/Eyes/Ears/Mouth: No headache; Normal hearing; Normal vision w/o blurriness; No sinus pain/discomfort, sore throat  Respiratory: No dyspnea, cough, wheezing, hemoptysis  CV: No chest pain, PND, orthopnea  GI: No abdominal pain, diarrhea, constipation, nausea, vomiting, melena, hematochezia  : No increased frequency, dysuria, hematuria, nocturia  MSK: No joint pain/swelling; no back pain; no edema  Neuro: No dizziness/lightheadedness, weakness, seizures, numbness, tingling  Heme: No easy bruising or bleeding  Endo: No heat/cold intolerance  Psych: No significant nervousness, anxiety, stress, depression    All other systems were reviewed and are negative, except as noted.    VITALS/PHYSICAL EXAM  --------------------------------------------------------------------------------  T(C): 36.4 (09-13-22 @ 19:52), Max: 36.6 (09-12-22 @ 23:00)  HR: 57 (09-13-22 @ 19:52) (50 - 110)  BP: 155/93 (09-13-22 @ 19:52) (101/58 - 171/80)  RR: 18 (09-13-22 @ 19:52) (12 - 26)  SpO2: 98% (09-13-22 @ 19:52) (92% - 99%)  Wt(kg): --        09-12-22 @ 07:01  -  09-13-22 @ 07:00  --------------------------------------------------------  IN: 390 mL / OUT: 1105 mL / NET: -715 mL    09-13-22 @ 07:01 - 09-13-22 @ 20:48  --------------------------------------------------------  IN: 400 mL / OUT: 90 mL / NET: 310 mL      Physical Exam:  	Gen: NAD, well-appearing  	HEENT: PERRL, supple neck, clear oropharynx  	Pulm: CTA B/L  	CV: RRR, S1S2; no rub  	Back: No spinal or CVA tenderness; no sacral edema  	Abd: +BS, soft, nontender/nondistended  	: No suprapubic tenderness  	UE: Warm, FROM, no clubbing, intact strength; no edema; no asterixis  	LE: Warm, FROM, no clubbing, intact strength; no edema  	Neuro: No focal deficits, intact gait  	Psych: Normal affect and mood  	Skin: Warm, without rashes  	Vascular access:    LABS/STUDIES  --------------------------------------------------------------------------------              11.0   10.20 >-----------<  227      [09-13-22 @ 05:14]              32.7     127  |  96  |  54  ----------------------------<  96      [09-13-22 @ 05:14]  4.4   |  21  |  1.44        Ca     9.2     [09-13-22 @ 05:14]      Mg     2.2     [09-13-22 @ 05:14]      Phos  1.9     [09-13-22 @ 05:14]    TPro  6.2  /  Alb  2.9  /  TBili  0.3  /  DBili  x   /  AST  26  /  ALT  28  /  AlkPhos  71  [09-13-22 @ 05:14]    PT/INR: PT 10.9 , INR 0.95       [09-13-22 @ 05:14]  PTT: 36.3       [09-13-22 @ 05:14]      Creatinine Trend:  SCr 1.44 [09-13 @ 05:14]  SCr 1.30 [09-12 @ 07:05]  SCr 1.36 [09-11 @ 23:18]  SCr 1.42 [09-11 @ 12:08]  SCr 1.43 [09-11 @ 04:32]    Urinalysis - [09-09-22 @ 17:48]      Color Yellow / Appearance Clear / SG 1.021 / pH 6.0      Gluc Negative / Ketone Trace  / Bili Negative / Urobili Negative       Blood Negative / Protein Trace / Leuk Est Small / Nitrite Negative      RBC 1 / WBC 11 / Hyaline 1 / Gran  / Sq Epi  / Non Sq Epi 4 / Bacteria Negative    Urine Creatinine 59      [09-12-22 @ 08:13]  Urine Sodium 10      [09-12-22 @ 08:13]  Urine Urea Nitrogen 1049      [09-09-22 @ 17:49]  Urine Osmolality 616      [09-12-22 @ 08:13]    TSH 69.600      [09-06-22 @ 06:46]  Lipid: chol 159, TG 51, HDL 99, LDL --      [09-08-22 @ 02:59]

## 2022-09-13 NOTE — DIETITIAN INITIAL EVALUATION ADULT - ENTER TO (CAL/KG)
30 [Anxiety] : anxiety [Depression] : depression [Heartburn] : no heartburn [Nocturia] : no nocturia [Frequency] : no frequency [Negative] : Integumentary [Dizziness] : no dizziness

## 2022-09-13 NOTE — PROGRESS NOTE ADULT - PROBLEM SELECTOR PLAN 4
- continue with synthroid 112 mcg   - repeat TSH in 6-8 weeks - noted to have abdominal distention   - no tenderness to palpation   - xray abdomen ordered to be done; f/u official read - Hgb currently stable at 11 today  - monitor CBC daily

## 2022-09-13 NOTE — DIETITIAN INITIAL EVALUATION ADULT - REASON INDICATOR FOR ASSESSMENT
Length of stay  Source: EMR, medical team  Pt sleeping soundly despite multiple attempts to visit. Attempted to reach caregiver/emergency contact (sister, Amirah Penn) over phone, no answer.

## 2022-09-13 NOTE — PROGRESS NOTE ADULT - SUBJECTIVE AND OBJECTIVE BOX
[facility]  Emergency Medicine      PATIENT:  AMBAR PICKARD  [id number]    CHIEF COMPLAINT:  Patient is a 77y old  Male who presents with a chief complaint of Tamponade (12 Sep 2022 21:19)      INTERVAL HISTORY/OVERNIGHT EVENTS:    MEDICATIONS:  MEDICATIONS  (STANDING):  chlorhexidine 4% Liquid 1 Application(s) Topical <User Schedule>  heparin   Injectable 5000 Unit(s) SubCutaneous every 8 hours  hydrALAZINE 50 milliGRAM(s) Oral three times a day  levothyroxine 112 MICROGram(s) Oral daily  losartan 50 milliGRAM(s) Oral daily  nicotine -  14 mG/24Hr(s) Patch 1 Patch Transdermal daily  senna 2 Tablet(s) Oral at bedtime  simvastatin 20 milliGRAM(s) Oral at bedtime  tamsulosin 0.4 milliGRAM(s) Oral at bedtime  urea Oral Powder 15 Gram(s) Oral two times a day    MEDICATIONS  (PRN):  albuterol/ipratropium for Nebulization 3 milliLiter(s) Nebulizer every 6 hours PRN Shortness of Breath and/or Wheezing  simethicone 80 milliGRAM(s) Chew two times a day PRN Gas      ALLERGIES:  Allergies    No Known Allergies    Intolerances        OBJECTIVE:  ICU Vital Signs Last 24 Hrs  T(C): 36.4 (13 Sep 2022 03:00), Max: 36.8 (12 Sep 2022 18:00)  T(F): 97.5 (13 Sep 2022 03:00), Max: 98.2 (12 Sep 2022 18:00)  HR: 53 (13 Sep 2022 07:00) (53 - 72)  BP: 125/64 (13 Sep 2022 07:00) (91/50 - 171/80)  BP(mean): 87 (13 Sep 2022 07:00) (67 - 115)  ABP: --  ABP(mean): --  RR: 15 (13 Sep 2022 07:00) (12 - 26)  SpO2: 95% (13 Sep 2022 07:00) (92% - 98%)    O2 Parameters below as of 13 Sep 2022 04:00  Patient On (Oxygen Delivery Method): room air            Adult Advanced Hemodynamics Last 24 Hrs  CVP(mm Hg): --  CVP(cm H2O): --  CO: --  CI: --  PA: --  PA(mean): --  PCWP: --  SVR: --  SVRI: --  PVR: --  PVRI: --  CAPILLARY BLOOD GLUCOSE        CAPILLARY BLOOD GLUCOSE        I&O's Summary    12 Sep 2022 07:01  -  13 Sep 2022 07:00  --------------------------------------------------------  IN: 390 mL / OUT: 1105 mL / NET: -715 mL      Daily     Daily Weight in k (13 Sep 2022 05:00)    PHYSICAL EXAMINATION:  General: WN/WD NAD  HEENT: PERRLA, EOMI, moist mucous membranes  Neurology: A&Ox3, nonfocal, PINEDA x 4  Respiratory: CTA B/L, normal respiratory effort, no wheezes, crackles, rales  CV: RRR, S1S2, no murmurs, rubs or gallops  Abdominal: Soft, NT, ND +BS, Last BM  Extremities: No edema, + peripheral pulses  Incisions:   Tubes:    LABS:                          11.0   10.20 )-----------( 227      ( 13 Sep 2022 05:14 )             32.7     09-13    127<L>  |  96  |  54<H>  ----------------------------<  96  4.4   |  21<L>  |  1.44<H>    Ca    9.2      13 Sep 2022 05:14  Phos  1.9     09-13  Mg     2.2     09-13    TPro  6.2  /  Alb  2.9<L>  /  TBili  0.3  /  DBili  x   /  AST  26  /  ALT  28  /  AlkPhos  71  09-13    LIVER FUNCTIONS - ( 13 Sep 2022 05:14 )  Alb: 2.9 g/dL / Pro: 6.2 g/dL / ALK PHOS: 71 U/L / ALT: 28 U/L / AST: 26 U/L / GGT: x           PT/INR - ( 13 Sep 2022 05:14 )   PT: 10.9 sec;   INR: 0.95 ratio         PTT - ( 13 Sep 2022 05:14 )  PTT:36.3 sec            TELEMETRY:     EKG:     IMAGING:       [facility]  Emergency Medicine      PATIENT:  AMBAR PICKARD  [id number]    CHIEF COMPLAINT:  Patient is a 77y old  Male who presents with a chief complaint of Tamponade (12 Sep 2022 21:19)      INTERVAL HISTORY/OVERNIGHT EVENTS:  Pt stable, no complaints or pain. +Diarrhea yesterday, bowel regimen held. +abd distention, no ttp.    MEDICATIONS:  MEDICATIONS  (STANDING):  chlorhexidine 4% Liquid 1 Application(s) Topical <User Schedule>  heparin   Injectable 5000 Unit(s) SubCutaneous every 8 hours  hydrALAZINE 50 milliGRAM(s) Oral three times a day  levothyroxine 112 MICROGram(s) Oral daily  losartan 50 milliGRAM(s) Oral daily  nicotine -  14 mG/24Hr(s) Patch 1 Patch Transdermal daily  senna 2 Tablet(s) Oral at bedtime  simvastatin 20 milliGRAM(s) Oral at bedtime  tamsulosin 0.4 milliGRAM(s) Oral at bedtime  urea Oral Powder 15 Gram(s) Oral two times a day    MEDICATIONS  (PRN):  albuterol/ipratropium for Nebulization 3 milliLiter(s) Nebulizer every 6 hours PRN Shortness of Breath and/or Wheezing  simethicone 80 milliGRAM(s) Chew two times a day PRN Gas      ALLERGIES:  Allergies    No Known Allergies    Intolerances        OBJECTIVE:  ICU Vital Signs Last 24 Hrs  T(C): 36.4 (13 Sep 2022 03:00), Max: 36.8 (12 Sep 2022 18:00)  T(F): 97.5 (13 Sep 2022 03:00), Max: 98.2 (12 Sep 2022 18:00)  HR: 53 (13 Sep 2022 07:00) (53 - 72)  BP: 125/64 (13 Sep 2022 07:00) (91/50 - 171/80)  BP(mean): 87 (13 Sep 2022 07:00) (67 - 115)  ABP: --  ABP(mean): --  RR: 15 (13 Sep 2022 07:00) (12 - 26)  SpO2: 95% (13 Sep 2022 07:00) (92% - 98%)    O2 Parameters below as of 13 Sep 2022 04:00  Patient On (Oxygen Delivery Method): room air            Adult Advanced Hemodynamics Last 24 Hrs  CVP(mm Hg): --  CVP(cm H2O): --  CO: --  CI: --  PA: --  PA(mean): --  PCWP: --  SVR: --  SVRI: --  PVR: --  PVRI: --  CAPILLARY BLOOD GLUCOSE        CAPILLARY BLOOD GLUCOSE        I&O's Summary    12 Sep 2022 07:01  -  13 Sep 2022 07:00  --------------------------------------------------------  IN: 390 mL / OUT: 1105 mL / NET: -715 mL      Daily     Daily Weight in k (13 Sep 2022 05:00)    PHYSICAL EXAMINATION:  General: WN/WD NAD  HEENT: PERRLA, EOMI, moist mucous membranes  Neurology: A&Ox3, nonfocal, PINEDA x 4  Respiratory: CTA B/L, normal respiratory effort, no wheezes, crackles, rales  CV: RRR, S1S2, no murmurs, rubs or gallops  Abdominal: +distention, NT, +BS, Last BM  Extremities: No edema, + peripheral pulses  Incisions:   Tubes:    LABS:                          11.0   10.20 )-----------( 227      ( 13 Sep 2022 05:14 )             32.7     09-    127<L>  |  96  |  54<H>  ----------------------------<  96  4.4   |  21<L>  |  1.44<H>    Ca    9.2      13 Sep 2022 05:14  Phos  1.9     -  Mg     2.2     -    TPro  6.2  /  Alb  2.9<L>  /  TBili  0.3  /  DBili  x   /  AST  26  /  ALT  28  /  AlkPhos  71  09-13    LIVER FUNCTIONS - ( 13 Sep 2022 05:14 )  Alb: 2.9 g/dL / Pro: 6.2 g/dL / ALK PHOS: 71 U/L / ALT: 28 U/L / AST: 26 U/L / GGT: x           PT/INR - ( 13 Sep 2022 05:14 )   PT: 10.9 sec;   INR: 0.95 ratio         PTT - ( 13 Sep 2022 05:14 )  PTT:36.3 sec            TELEMETRY:     EKG:     IMAGING:

## 2022-09-13 NOTE — PROGRESS NOTE ADULT - PROBLEM SELECTOR PLAN 3
- Hgb currently stable at 11 today  - no signs of bleeding noted  - monitor CBC daily - patient presented due to pericardial effusion  - s/p pericardiocentesis 9/7 with drain placed and now removed   - no effusion noted on limited ECHO from 9/11  - f/u cx from pericardiocentesis

## 2022-09-13 NOTE — PROGRESS NOTE ADULT - ASSESSMENT
Hyponatremia  CKD / old age limited free water excretion  Hypothryroid  Na improving at good gentle rate  Will reassess tomorrow for hypertonic saline  Restrict water intake  UreNa 15 g    Missael Beaver MD

## 2022-09-13 NOTE — PROGRESS NOTE ADULT - ASSESSMENT
Patient is a 77 year old male, with PMH of HTN, HLD, BPH, CKD (creat 1.42 today), chronic b/l hydronephrosis (x2 years), prostate ca (radiation 10 years ago), seizure disorder ( last seizure 2015), ETOH use ( sober since 2016), hypothyroidism, and mild demenia; presents to [facility] on 9/3/2022 with chief complaint of SOB and CP x3 days. Noted to have large pericardial effusion and transferred to [facility] for pericardiocentesis. S/p pericardiocentesis and downgraded to medicine service for further management.

## 2022-09-13 NOTE — PROGRESS NOTE ADULT - SUBJECTIVE AND OBJECTIVE BOX
Chief Complaint:    HPI:  This is a 78yo old  Male with PMH of HTN, HLD,  BPH, CKD ( creat 1.42 today), chronic angelita hydronephrosis ( x2 years), prostate ca ( radiation 10 years ago), seizure disorder ( last seizure 2015), ETOH use ( sober since 2016), hypothyroidism, and mild demenia; presents to [facility] on 9/3/2022 with chief complaint of SOB and CP x3 days.  Pt has been a heavy smoker his whole life. Denies fever, chills, cough, or congestion. No sick contacts.    Admitted to tele for likely COPD exacerbation and CP.  CXR is clear. Started on Duonebs, Symbicort and Solumedrol. No prior cardiac stents. EKG on arrival to ED notes PRWP V 1-3, no ST changes. Trop high on arrival at 268 trending down 180.8 today, proBNP 763, CK 1090. TSH 69.600 ( stopped synthroid a few weeks ago), T3 <20 T4 0.9, Na 126.  CXR revealing New cardiomegaly and CT chest with Large pericardial effusion, Echo today with large pericardial effusion with early tamponade physiology. Transferred to [facility] for pericardiocentesis. On arrival to [facility] patient awake and verbal A&OX3, denies any chest pain, dyspnea, dizziness, palpitations, N&V, Ha, orthopnea, LE edema.  VS WNL, on 2 L nasal cannula in no respiratory distress.      recs from [facility]:  home with home PT          INTERPRETATION:  CLINICAL STATEMENT: Chest Pain. Shortness of breath  TECHNIQUE: AP view of the chest.  COMPARISON: 7/19/2020    New cardiomegaly, may be exaggerated by AP technique. Suboptimal degree   of inspiration. Small left pleural effusion.    Degenerative changes of the spine, shoulders and AC joints.    IMPRESSION:    New cardiomegaly, may be exaggerated by AP technique.    Small left pleural effusion.    ECHO  Summary:   1. Left ventricular ejection fraction, by visual estimation, is 50 to   55%.   2. Mildly enlarged left atrium.   3. Maximum thickness of effusion is 2-2.5 cm.   4. Mild mitral annular calcification.   5. Mild thickening of the anterior and posterior mitral valve leaflets.   6. Trace mitral valve regurgitation.   7. Mild tricuspid regurgitation.   8. Estimated pulmonary artery systolic pressure is 37.4 mmHg assuming a   right atrial pressure of 5 mmHg, which is consistent with mild pulmonary   hypertension.   9. Findings are consistent with mild cardiac tamponade.      CT CHEST                          PROCEDURE DATE:  09/06/2022      INTERPRETATION:  HISTORY: Dyspnea on exertion. Smoker.    EXAMINATION: CT CHEST was performed without IV contrast.    COMPARISON: No CT chest comparison. Correlation with 1/9/2018 CT abdomen.    FINDINGS:    AIRWAYS, LUNGS, PLEURA: Trachea and mainstem bronchi patent. Emphysema.   Posterior left upper lobe and left basilar atelectasis. Small bilateral   pleural effusions.    MEDIASTINUM: Large circumferential pericardial effusion; depth of   pericardial fluid is 34 mm at the level of the basal inferior wall of the   left ventricle (sagittal series image 78). There is flattening of the   free wall of the right ventricle. Coronary atherosclerosis.    IMAGED ABDOMEN: Bilateral hydronephrosis. Cholelithiasis.    SOFT TISSUES: Bilateral gynecomastia.    BONES: Degenerative changes of the spine. Loss of height of L3 vertebral   body similar to 1/9/2018 CT abdomen.    IMPRESSION:.    Large pericardial effusion.    Small bilateral pleural effusions. (07 Sep 2022 13:09)      PAST MEDICAL & SURGICAL HISTORY:  ETOH abuse  sober since 2016      Seizure disorder  last seizure 2015      BPH (benign prostatic hyperplasia)      HTN (hypertension)      Prostate CA  Dx&#x27;d 2008, s/p radiation      Hyperlipidemia      Poor historian      S/P hernia repair  right inguinal hernia      H/O prostate cancer  10- yrs ago, s/p radiation therapy      Status post cataract extraction      H/O urethrotomy  June 2018          Review of Systems:   CONSTITUTIONAL: No fever.  EYES: No eye pain or discharge.  ENMT:  No sinus or throat pain  NECK: No pain or stiffness  RESPIRATORY: No cough, wheezing, chills or hemoptysis; No shortness of breath  CARDIOVASCULAR: No chest pain, palpitations, dizziness, or leg swelling  GASTROINTESTINAL: No abdominal or epigastric pain. No nausea, vomiting, or hematemesis; No diarrhea or constipation. No melena or hematochezia.  GENITOURINARY: No dysuria or incontinence  NEUROLOGICAL: No headaches, memory loss, loss of strength, numbness, or tremors  SKIN: No rashes.  LYMPH NODES: No enlarged glands  ENDOCRINE: No heat or cold intolerance; No hair loss  MUSCULOSKELETAL: No joint pain or swelling; No muscle, back, or extremity pain  PSYCHIATRIC: No depression, anxiety, mood swings, or difficulty sleeping  HEME/LYMPH: No easy bruising, or bleeding gums  ALLERY AND IMMUNOLOGIC: No hives or eczema    Allergies    No Known Allergies    Intolerances        Social History:     FAMILY HISTORY:  Hypertension (Mother, Sibling)        Home Medications:  Albuterol (Eqv-ProAir HFA) 90 mcg/inh inhalation aerosol: 2 puff(s) inhaled every 6 hours, As Needed  HOME (08 Sep 2022 19:28)  doxazosin 4 mg oral tablet: 1 tab(s) orally once a day (at bedtime)  HOSP (08 Sep 2022 19:28)  DuoNeb 0.5 mg-2.5 mg/3 mL inhalation solution: 3 milliliter(s) inhaled 4 times a day  HOSP (08 Sep 2022 19:28)  hydrALAZINE 50 mg oral tablet: 1 tab(s) orally 3 times a day  HOME (08 Sep 2022 19:28)  hydrALAZINE 50 mg oral tablet: 1.5 tab(s) orally 3 times a day  HOSP (08 Sep 2022 19:28)  levothyroxine 112 mcg (0.112 mg) oral tablet: 1 tab(s) orally once a day  HOME (08 Sep 2022 19:28)  losartan 100 mg oral tablet: 1 tab(s) orally once a day in Am  HOSP &amp; HOME (08 Sep 2022 19:28)  simvastatin 20 mg oral tablet: 1 tab(s) orally once a day (at bedtime)  hosp &amp; HOME (08 Sep 2022 19:28)  SOLU-Medrol: 20 milligram(s) injectable every 12 hours  HOSP (08 Sep 2022 19:28)  Symbicort 160 mcg-4.5 mcg/inh inhalation aerosol: 2 puff(s) inhaled 2 times a day  HOSP (08 Sep 2022 19:28)  Synthroid 112 mcg (0.112 mg) oral tablet: 1 tab(s) orally once a day (08 Sep 2022 19:28)  tamsulosin 0.4 mg oral capsule: 1 cap(s) orally once a day  HOSP &amp; HOME (08 Sep 2022 19:28)      MEDICATIONS  (STANDING):  heparin   Injectable 5000 Unit(s) SubCutaneous every 8 hours  levothyroxine 112 MICROGram(s) Oral daily  nicotine -  14 mG/24Hr(s) Patch 1 Patch Transdermal daily  senna 2 Tablet(s) Oral at bedtime  simvastatin 20 milliGRAM(s) Oral at bedtime  tamsulosin 0.4 milliGRAM(s) Oral at bedtime  urea Oral Powder 15 Gram(s) Oral two times a day    MEDICATIONS  (PRN):  albuterol/ipratropium for Nebulization 3 milliLiter(s) Nebulizer every 6 hours PRN Shortness of Breath and/or Wheezing  simethicone 80 milliGRAM(s) Chew two times a day PRN Gas      CAPILLARY BLOOD GLUCOSE        I&O's Summary    12 Sep 2022 07:01  -  13 Sep 2022 07:00  --------------------------------------------------------  IN: 390 mL / OUT: 1105 mL / NET: -715 mL    13 Sep 2022 07:01  -  13 Sep 2022 16:01  --------------------------------------------------------  IN: 160 mL / OUT: 90 mL / NET: 70 mL        PHYSICAL EXAM:  Vital Signs Last 24 Hrs  T(C): 36.4 (13 Sep 2022 14:45), Max: 36.8 (12 Sep 2022 18:00)  T(F): 97.5 (13 Sep 2022 14:45), Max: 98.2 (12 Sep 2022 18:00)  HR: 62 (13 Sep 2022 14:45) (50 - 110)  BP: 114/65 (13 Sep 2022 14:45) (101/58 - 171/80)  BP(mean): 75 (13 Sep 2022 13:00) (71 - 115)  RR: 18 (13 Sep 2022 14:45) (12 - 26)  SpO2: 97% (13 Sep 2022 14:45) (92% - 98%)    Parameters below as of 13 Sep 2022 14:45  Patient On (Oxygen Delivery Method): room air      GENERAL: NAD, well-developed  HEAD:  Atraumatic, Normocephalic  EYES: EOMI, PERRLA, conjunctiva and sclera clear  NECK: Supple, No JVD  CHEST/LUNG: Clear to auscultation bilaterally; No wheeze  HEART: Regular rate and rhythm; No murmurs, rubs, or gallops  ABDOMEN: Soft, Nontender, Nondistended; Bowel sounds present  EXTREMITIES:  2+ Peripheral Pulses, No clubbing, cyanosis, or edema  PSYCH: AAOx3  NEUROLOGY: non-focal  SKIN: No rashes or lesions    LABS:                        11.0   10.20 )-----------( 227      ( 13 Sep 2022 05:14 )             32.7     09-13    127<L>  |  96  |  54<H>  ----------------------------<  96  4.4   |  21<L>  |  1.44<H>    Ca    9.2      13 Sep 2022 05:14  Phos  1.9     09-13  Mg     2.2     09-13    TPro  6.2  /  Alb  2.9<L>  /  TBili  0.3  /  DBili  x   /  AST  26  /  ALT  28  /  AlkPhos  71  09-13    PT/INR - ( 13 Sep 2022 05:14 )   PT: 10.9 sec;   INR: 0.95 ratio         PTT - ( 13 Sep 2022 05:14 )  PTT:36.3 sec          RADIOLOGY & ADDITIONAL TESTS:    Imaging Personally Reviewed:    EKG Personally Reviewed:    Consultant(s) Notes Reviewed:      Care Discussed with Consultants/Other Providers:   Chief Complaint: SOB, chest pain     HPI:  This is a 76yo old  Male with PMH of HTN, HLD,  BPH, CKD ( creat 1.42 today), chronic angelita hydronephrosis ( x2 years), prostate ca ( radiation 10 years ago), seizure disorder ( last seizure 2015), ETOH use ( sober since 2016), hypothyroidism, and mild demenia; presents to [facility] on 9/3/2022 with chief complaint of SOB and CP x3 days.  Pt has been a heavy smoker his whole life. Denies fever, chills, cough, or congestion. No sick contacts.    Admitted to tele for likely COPD exacerbation and CP.  CXR is clear. Started on Duonebs, Symbicort and Solumedrol. No prior cardiac stents. EKG on arrival to ED notes PRWP V 1-3, no ST changes. Trop high on arrival at 268 trending down 180.8 today, proBNP 763, CK 1090. TSH 69.600 ( stopped synthroid a few weeks ago), T3 <20 T4 0.9, Na 126.  CXR revealing New cardiomegaly and CT chest with Large pericardial effusion, Echo today with large pericardial effusion with early tamponade physiology. Transferred to [facility] for pericardiocentesis. On arrival to [facility] patient awake and verbal A&OX3, denies any chest pain, dyspnea, dizziness, palpitations, N&V, Ha, orthopnea, LE edema.  VS WNL, on 2 L nasal cannula in no respiratory distress.      recs from [facility]:  home with home PT    Interval history: Patient is s/p pericardiocentesis 9/7; drain placed and now removed since no more output noted. Patient seen at bedside. States he is feeling well. Denies any chest pain, SOB, N/V or abdominal pain. Patient denies any other concerns or complaints at this time.       INTERPRETATION:  CLINICAL STATEMENT: Chest Pain. Shortness of breath  TECHNIQUE: AP view of the chest.  COMPARISON: 7/19/2020    New cardiomegaly, may be exaggerated by AP technique. Suboptimal degree   of inspiration. Small left pleural effusion.    Degenerative changes of the spine, shoulders and AC joints.    IMPRESSION:    New cardiomegaly, may be exaggerated by AP technique.    Small left pleural effusion.    ECHO  Summary:   1. Left ventricular ejection fraction, by visual estimation, is 50 to   55%.   2. Mildly enlarged left atrium.   3. Maximum thickness of effusion is 2-2.5 cm.   4. Mild mitral annular calcification.   5. Mild thickening of the anterior and posterior mitral valve leaflets.   6. Trace mitral valve regurgitation.   7. Mild tricuspid regurgitation.   8. Estimated pulmonary artery systolic pressure is 37.4 mmHg assuming a   right atrial pressure of 5 mmHg, which is consistent with mild pulmonary   hypertension.   9. Findings are consistent with mild cardiac tamponade.      CT CHEST                          PROCEDURE DATE:  09/06/2022      INTERPRETATION:  HISTORY: Dyspnea on exertion. Smoker.    EXAMINATION: CT CHEST was performed without IV contrast.    COMPARISON: No CT chest comparison. Correlation with 1/9/2018 CT abdomen.    FINDINGS:    AIRWAYS, LUNGS, PLEURA: Trachea and mainstem bronchi patent. Emphysema.   Posterior left upper lobe and left basilar atelectasis. Small bilateral   pleural effusions.    MEDIASTINUM: Large circumferential pericardial effusion; depth of   pericardial fluid is 34 mm at the level of the basal inferior wall of the   left ventricle (sagittal series image 78). There is flattening of the   free wall of the right ventricle. Coronary atherosclerosis.    IMAGED ABDOMEN: Bilateral hydronephrosis. Cholelithiasis.    SOFT TISSUES: Bilateral gynecomastia.    BONES: Degenerative changes of the spine. Loss of height of L3 vertebral   body similar to 1/9/2018 CT abdomen.    IMPRESSION:.    Large pericardial effusion.    Small bilateral pleural effusions. (07 Sep 2022 13:09)      PAST MEDICAL & SURGICAL HISTORY:  ETOH abuse  sober since 2016      Seizure disorder  last seizure 2015      BPH (benign prostatic hyperplasia)      HTN (hypertension)      Prostate CA  Dx&#x27;d 2008, s/p radiation      Hyperlipidemia      Poor historian      S/P hernia repair  right inguinal hernia      H/O prostate cancer  10- yrs ago, s/p radiation therapy      Status post cataract extraction      H/O urethrotomy  June 2018    Hernia repair       Review of Systems:   CONSTITUTIONAL: No fever, no chills   EYES: No eye pain or discharge   ENMT: No throat pain, no hearing difficulty   NECK: No pain or stiffness  RESPIRATORY: No cough, wheezing, chills or hemoptysis; No shortness of breath  CARDIOVASCULAR: No chest pain or palpitations   GASTROINTESTINAL: No abdominal or epigastric pain. No nausea, vomiting, or hematemesis; No diarrhea or constipation   GENITOURINARY: No dysuria or hematuria   NEUROLOGICAL: No headache or dizziness   SKIN: No rashes, no itching   MUSCULOSKELETAL: No joint pain or swelling; No muscle, back, or extremity pain  PSYCHIATRIC: No depression or anxiety   HEME/LYMPH: No easy bruising or bleeding gums    Allergies  No Known Allergies    Social History:   Patient is a former smoker and quit about 2 weeks ago. Former alcohol use but quit about 15 years ago. Denies any illicit drug use.    FAMILY HISTORY:  Hypertension (Mother, Sibling)    Home Medications:  Albuterol (Eqv-ProAir HFA) 90 mcg/inh inhalation aerosol: 2 puff(s) inhaled every 6 hours, As Needed  HOME (08 Sep 2022 19:28)  doxazosin 4 mg oral tablet: 1 tab(s) orally once a day (at bedtime)  HOSP (08 Sep 2022 19:28)  DuoNeb 0.5 mg-2.5 mg/3 mL inhalation solution: 3 milliliter(s) inhaled 4 times a day  HOSP (08 Sep 2022 19:28)  hydrALAZINE 50 mg oral tablet: 1 tab(s) orally 3 times a day  HOME (08 Sep 2022 19:28)  hydrALAZINE 50 mg oral tablet: 1.5 tab(s) orally 3 times a day  HOSP (08 Sep 2022 19:28)  levothyroxine 112 mcg (0.112 mg) oral tablet: 1 tab(s) orally once a day  HOME (08 Sep 2022 19:28)  losartan 100 mg oral tablet: 1 tab(s) orally once a day in Am  HOSP &amp; HOME (08 Sep 2022 19:28)  simvastatin 20 mg oral tablet: 1 tab(s) orally once a day (at bedtime)  hosp &amp; HOME (08 Sep 2022 19:28)  SOLU-Medrol: 20 milligram(s) injectable every 12 hours  HOSP (08 Sep 2022 19:28)  Symbicort 160 mcg-4.5 mcg/inh inhalation aerosol: 2 puff(s) inhaled 2 times a day  HOSP (08 Sep 2022 19:28)  Synthroid 112 mcg (0.112 mg) oral tablet: 1 tab(s) orally once a day (08 Sep 2022 19:28)  tamsulosin 0.4 mg oral capsule: 1 cap(s) orally once a day  HOSP &amp; HOME (08 Sep 2022 19:28)      MEDICATIONS  (STANDING):  heparin   Injectable 5000 Unit(s) SubCutaneous every 8 hours  levothyroxine 112 MICROGram(s) Oral daily  nicotine -  14 mG/24Hr(s) Patch 1 Patch Transdermal daily  senna 2 Tablet(s) Oral at bedtime  simvastatin 20 milliGRAM(s) Oral at bedtime  tamsulosin 0.4 milliGRAM(s) Oral at bedtime  urea Oral Powder 15 Gram(s) Oral two times a day    MEDICATIONS  (PRN):  albuterol/ipratropium for Nebulization 3 milliLiter(s) Nebulizer every 6 hours PRN Shortness of Breath and/or Wheezing  simethicone 80 milliGRAM(s) Chew two times a day PRN Gas      CAPILLARY BLOOD GLUCOSE    I&O's Summary    12 Sep 2022 07:01  -  13 Sep 2022 07:00  --------------------------------------------------------  IN: 390 mL / OUT: 1105 mL / NET: -715 mL    13 Sep 2022 07:01  -  13 Sep 2022 16:01  --------------------------------------------------------  IN: 160 mL / OUT: 90 mL / NET: 70 mL      PHYSICAL EXAM:  Vital Signs Last 24 Hrs  T(C): 36.4 (13 Sep 2022 14:45), Max: 36.8 (12 Sep 2022 18:00)  T(F): 97.5 (13 Sep 2022 14:45), Max: 98.2 (12 Sep 2022 18:00)  HR: 62 (13 Sep 2022 14:45) (50 - 110)  BP: 114/65 (13 Sep 2022 14:45) (101/58 - 171/80)  BP(mean): 75 (13 Sep 2022 13:00) (71 - 115)  RR: 18 (13 Sep 2022 14:45) (12 - 26)  SpO2: 97% (13 Sep 2022 14:45) (92% - 98%)    Parameters below as of 13 Sep 2022 14:45  Patient On (Oxygen Delivery Method): room air      GENERAL: NAD, well-developed  HEAD:  Atraumatic, Normocephalic  EYES: EOMI, PERRLA, conjunctiva and sclera clear  NECK: Supple, No JVD  CHEST/LUNG: Clear to auscultation bilaterally; No wheeze  HEART: Regular rate and rhythm; No murmurs, rubs, or gallops  ABDOMEN: Soft, Nontender, Nondistended; Bowel sounds present  EXTREMITIES:  2+ Peripheral Pulses, No clubbing, cyanosis, or edema  PSYCH: AAOx3  NEUROLOGY: non-focal  SKIN: No rashes or lesions    LABS:                        11.0   10.20 )-----------( 227      ( 13 Sep 2022 05:14 )             32.7     09-13    127<L>  |  96  |  54<H>  ----------------------------<  96  4.4   |  21<L>  |  1.44<H>    Ca    9.2      13 Sep 2022 05:14  Phos  1.9     09-13  Mg     2.2     09-13    TPro  6.2  /  Alb  2.9<L>  /  TBili  0.3  /  DBili  x   /  AST  26  /  ALT  28  /  AlkPhos  71  09-13    PT/INR - ( 13 Sep 2022 05:14 )   PT: 10.9 sec;   INR: 0.95 ratio         PTT - ( 13 Sep 2022 05:14 )  PTT:36.3 sec      RADIOLOGY & ADDITIONAL TESTS:    Imaging Personally Reviewed:    EKG Personally Reviewed:    Consultant(s) Notes Reviewed:      Care Discussed with Consultants/Other Providers:   Chief Complaint: SOB, chest pain     HPI:  This is a 78yo old  Male with PMH of HTN, HLD,  BPH, CKD ( creat 1.42 today), chronic angelita hydronephrosis ( x2 years), prostate ca ( radiation 10 years ago), seizure disorder ( last seizure 2015), ETOH use ( sober since 2016), hypothyroidism, and mild demenia; presents to [facility] on 9/3/2022 with chief complaint of SOB and CP x3 days.  Pt has been a heavy smoker his whole life. Denies fever, chills, cough, or congestion. No sick contacts.    Admitted to tele for likely COPD exacerbation and CP.  CXR is clear. Started on Duonebs, Symbicort and Solumedrol. No prior cardiac stents. EKG on arrival to ED notes PRWP V 1-3, no ST changes. Trop high on arrival at 268 trending down 180.8 today, proBNP 763, CK 1090. TSH 69.600 ( stopped synthroid a few weeks ago), T3 <20 T4 0.9, Na 126.  CXR revealing New cardiomegaly and CT chest with Large pericardial effusion, Echo today with large pericardial effusion with early tamponade physiology. Transferred to [facility] for pericardiocentesis. On arrival to [facility] patient awake and verbal A&OX3, denies any chest pain, dyspnea, dizziness, palpitations, N&V, Ha, orthopnea, LE edema.  VS WNL, on 2 L nasal cannula in no respiratory distress.      recs from [facility]:  home with home PT    Interval history: Patient is s/p pericardiocentesis 9/7; drain placed and now removed since no more output noted. Patient seen at bedside. States he is feeling well. Denies any chest pain, SOB, N/V or abdominal pain. Patient denies any other concerns or complaints at this time.       INTERPRETATION:  CLINICAL STATEMENT: Chest Pain. Shortness of breath  TECHNIQUE: AP view of the chest.  COMPARISON: 7/19/2020    New cardiomegaly, may be exaggerated by AP technique. Suboptimal degree   of inspiration. Small left pleural effusion.    Degenerative changes of the spine, shoulders and AC joints.    IMPRESSION:    New cardiomegaly, may be exaggerated by AP technique.    Small left pleural effusion.    ECHO  Summary:   1. Left ventricular ejection fraction, by visual estimation, is 50 to   55%.   2. Mildly enlarged left atrium.   3. Maximum thickness of effusion is 2-2.5 cm.   4. Mild mitral annular calcification.   5. Mild thickening of the anterior and posterior mitral valve leaflets.   6. Trace mitral valve regurgitation.   7. Mild tricuspid regurgitation.   8. Estimated pulmonary artery systolic pressure is 37.4 mmHg assuming a   right atrial pressure of 5 mmHg, which is consistent with mild pulmonary   hypertension.   9. Findings are consistent with mild cardiac tamponade.      CT CHEST                          PROCEDURE DATE:  09/06/2022      INTERPRETATION:  HISTORY: Dyspnea on exertion. Smoker.    EXAMINATION: CT CHEST was performed without IV contrast.    COMPARISON: No CT chest comparison. Correlation with 1/9/2018 CT abdomen.    FINDINGS:    AIRWAYS, LUNGS, PLEURA: Trachea and mainstem bronchi patent. Emphysema.   Posterior left upper lobe and left basilar atelectasis. Small bilateral   pleural effusions.    MEDIASTINUM: Large circumferential pericardial effusion; depth of   pericardial fluid is 34 mm at the level of the basal inferior wall of the   left ventricle (sagittal series image 78). There is flattening of the   free wall of the right ventricle. Coronary atherosclerosis.    IMAGED ABDOMEN: Bilateral hydronephrosis. Cholelithiasis.    SOFT TISSUES: Bilateral gynecomastia.    BONES: Degenerative changes of the spine. Loss of height of L3 vertebral   body similar to 1/9/2018 CT abdomen.    IMPRESSION:.    Large pericardial effusion.    Small bilateral pleural effusions. (07 Sep 2022 13:09)      PAST MEDICAL & SURGICAL HISTORY:  ETOH abuse  sober since 2016      Seizure disorder  last seizure 2015      BPH (benign prostatic hyperplasia)      HTN (hypertension)      Prostate CA  Dx&#x27;d 2008, s/p radiation      Hyperlipidemia      Poor historian      S/P hernia repair  right inguinal hernia      H/O prostate cancer  10- yrs ago, s/p radiation therapy      Status post cataract extraction      H/O urethrotomy  June 2018    Hernia repair       Review of Systems:   CONSTITUTIONAL: No fever, no chills   EYES: No eye pain or discharge   ENMT: No throat pain, no hearing difficulty   NECK: No pain or stiffness  RESPIRATORY: No cough, wheezing, chills or hemoptysis; No shortness of breath  CARDIOVASCULAR: No chest pain or palpitations   GASTROINTESTINAL: No abdominal or epigastric pain. No nausea, vomiting, or hematemesis; No diarrhea or constipation   GENITOURINARY: No dysuria or hematuria   NEUROLOGICAL: No headache or dizziness   SKIN: No rashes, no itching   MUSCULOSKELETAL: No joint pain or swelling; No muscle, back, or extremity pain  PSYCHIATRIC: No depression or anxiety   HEME/LYMPH: No easy bruising or bleeding gums    Allergies  No Known Allergies    Social History:   Patient is a former smoker and quit about 2 weeks ago. Former alcohol use but quit about 15 years ago. Denies any illicit drug use.    FAMILY HISTORY:  Hypertension (Mother, Sibling)    Home Medications:  Albuterol (Eqv-ProAir HFA) 90 mcg/inh inhalation aerosol: 2 puff(s) inhaled every 6 hours, As Needed  HOME (08 Sep 2022 19:28)  doxazosin 4 mg oral tablet: 1 tab(s) orally once a day (at bedtime)  HOSP (08 Sep 2022 19:28)  DuoNeb 0.5 mg-2.5 mg/3 mL inhalation solution: 3 milliliter(s) inhaled 4 times a day  HOSP (08 Sep 2022 19:28)  hydrALAZINE 50 mg oral tablet: 1 tab(s) orally 3 times a day  HOME (08 Sep 2022 19:28)  hydrALAZINE 50 mg oral tablet: 1.5 tab(s) orally 3 times a day  HOSP (08 Sep 2022 19:28)  levothyroxine 112 mcg (0.112 mg) oral tablet: 1 tab(s) orally once a day  HOME (08 Sep 2022 19:28)  losartan 100 mg oral tablet: 1 tab(s) orally once a day in Am  HOSP &amp; HOME (08 Sep 2022 19:28)  simvastatin 20 mg oral tablet: 1 tab(s) orally once a day (at bedtime)  hosp &amp; HOME (08 Sep 2022 19:28)  SOLU-Medrol: 20 milligram(s) injectable every 12 hours  HOSP (08 Sep 2022 19:28)  Symbicort 160 mcg-4.5 mcg/inh inhalation aerosol: 2 puff(s) inhaled 2 times a day  HOSP (08 Sep 2022 19:28)  Synthroid 112 mcg (0.112 mg) oral tablet: 1 tab(s) orally once a day (08 Sep 2022 19:28)  tamsulosin 0.4 mg oral capsule: 1 cap(s) orally once a day  HOSP &amp; HOME (08 Sep 2022 19:28)      MEDICATIONS  (STANDING):  heparin   Injectable 5000 Unit(s) SubCutaneous every 8 hours  levothyroxine 112 MICROGram(s) Oral daily  nicotine -  14 mG/24Hr(s) Patch 1 Patch Transdermal daily  senna 2 Tablet(s) Oral at bedtime  simvastatin 20 milliGRAM(s) Oral at bedtime  tamsulosin 0.4 milliGRAM(s) Oral at bedtime  urea Oral Powder 15 Gram(s) Oral two times a day    MEDICATIONS  (PRN):  albuterol/ipratropium for Nebulization 3 milliLiter(s) Nebulizer every 6 hours PRN Shortness of Breath and/or Wheezing  simethicone 80 milliGRAM(s) Chew two times a day PRN Gas      CAPILLARY BLOOD GLUCOSE    I&O's Summary    12 Sep 2022 07:01  -  13 Sep 2022 07:00  --------------------------------------------------------  IN: 390 mL / OUT: 1105 mL / NET: -715 mL    13 Sep 2022 07:01  -  13 Sep 2022 16:01  --------------------------------------------------------  IN: 160 mL / OUT: 90 mL / NET: 70 mL      PHYSICAL EXAM:  Vital Signs Last 24 Hrs  T(C): 36.4 (13 Sep 2022 14:45), Max: 36.8 (12 Sep 2022 18:00)  T(F): 97.5 (13 Sep 2022 14:45), Max: 98.2 (12 Sep 2022 18:00)  HR: 62 (13 Sep 2022 14:45) (50 - 110)  BP: 114/65 (13 Sep 2022 14:45) (101/58 - 171/80)  BP(mean): 75 (13 Sep 2022 13:00) (71 - 115)  RR: 18 (13 Sep 2022 14:45) (12 - 26)  SpO2: 97% (13 Sep 2022 14:45) (92% - 98%)    Parameters below as of 13 Sep 2022 14:45  Patient On (Oxygen Delivery Method): room air      GENERAL: NAD, well-developed  HEAD:  Atraumatic, Normocephalic  EYES: PERRLA, conjunctiva and sclera clear  NECK: Supple  CHEST/LUNG: Clear to auscultation bilaterally; No wheeze  HEART: Regular rate and rhythm; No murmurs   ABDOMEN: Soft, Nontender, Nondistended; Bowel sounds present  EXTREMITIES:  No clubbing, cyanosis, or edema  PSYCH: AAOx2-3  NEUROLOGY: non-focal  SKIN: No rashes or lesions    LABS:                        11.0   10.20 )-----------( 227      ( 13 Sep 2022 05:14 )             32.7     09-13    127<L>  |  96  |  54<H>  ----------------------------<  96  4.4   |  21<L>  |  1.44<H>    Ca    9.2      13 Sep 2022 05:14  Phos  1.9     09-13  Mg     2.2     09-13    TPro  6.2  /  Alb  2.9<L>  /  TBili  0.3  /  DBili  x   /  AST  26  /  ALT  28  /  AlkPhos  71  09-13    PT/INR - ( 13 Sep 2022 05:14 )   PT: 10.9 sec;   INR: 0.95 ratio         PTT - ( 13 Sep 2022 05:14 )  PTT:36.3 sec      RADIOLOGY & ADDITIONAL TESTS:    Imaging Personally Reviewed:    EKG Personally Reviewed:    Consultant(s) Notes Reviewed:      Care Discussed with Consultants/Other Providers:

## 2022-09-13 NOTE — DIETITIAN INITIAL EVALUATION ADULT - REASON FOR ADMISSION
Pt is a 78 yo male with PMH of HTN, HLD, BPH, CKD, chronic angelita. hydronephrosis (x2 years), prostate ca (radiation 10 years ago), seizure disorder (last 2015), ETOH use (sober since 2016), hypothyroidism, and mild dementia who presented to [facility] on 9/3 with chief complaint of SOB and chest pain x 3 days. Found with large pericardial effusion. Transferred to [facility] for pericardiocentesis, admitted 9/7. Pt also with hyponatremia.

## 2022-09-13 NOTE — PROGRESS NOTE ADULT - PROBLEM SELECTOR PLAN 2
- patient presented due to pericardial effusion  - s/p pericardiocentesis 9/7 with drain placed and now removed   - no effusion noted on limited ECHO from 9/11  - f/u cx from pericardiocentesis - patient was noted to have an episode of BRBPR as per nursing   - no known episodes noted previously   - Hgb has been stable for past few days   - will hold heparin SQ for now   - monitor CBC and monitor for any more signs of bleeding

## 2022-09-14 LAB
ANION GAP SERPL CALC-SCNC: 10 MMOL/L — SIGNIFICANT CHANGE UP (ref 5–17)
BUN SERPL-MCNC: 68 MG/DL — HIGH (ref 7–23)
CALCIUM SERPL-MCNC: 9.2 MG/DL — SIGNIFICANT CHANGE UP (ref 8.4–10.5)
CHLORIDE SERPL-SCNC: 92 MMOL/L — LOW (ref 96–108)
CO2 SERPL-SCNC: 23 MMOL/L — SIGNIFICANT CHANGE UP (ref 22–31)
CREAT SERPL-MCNC: 1.36 MG/DL — HIGH (ref 0.5–1.3)
EGFR: 53 ML/MIN/1.73M2 — LOW
GLUCOSE SERPL-MCNC: 111 MG/DL — HIGH (ref 70–99)
HCT VFR BLD CALC: 34 % — LOW (ref 39–50)
HGB BLD-MCNC: 11.4 G/DL — LOW (ref 13–17)
MAGNESIUM SERPL-MCNC: 2.2 MG/DL — SIGNIFICANT CHANGE UP (ref 1.6–2.6)
MCHC RBC-ENTMCNC: 30.4 PG — SIGNIFICANT CHANGE UP (ref 27–34)
MCHC RBC-ENTMCNC: 33.5 GM/DL — SIGNIFICANT CHANGE UP (ref 32–36)
MCV RBC AUTO: 90.7 FL — SIGNIFICANT CHANGE UP (ref 80–100)
MRSA PCR RESULT.: DETECTED
NRBC # BLD: 0 /100 WBCS — SIGNIFICANT CHANGE UP (ref 0–0)
PHOSPHATE SERPL-MCNC: 2.5 MG/DL — SIGNIFICANT CHANGE UP (ref 2.5–4.5)
PLATELET # BLD AUTO: 229 K/UL — SIGNIFICANT CHANGE UP (ref 150–400)
POTASSIUM SERPL-MCNC: 4.6 MMOL/L — SIGNIFICANT CHANGE UP (ref 3.5–5.3)
POTASSIUM SERPL-SCNC: 4.6 MMOL/L — SIGNIFICANT CHANGE UP (ref 3.5–5.3)
RBC # BLD: 3.75 M/UL — LOW (ref 4.2–5.8)
RBC # FLD: 17.7 % — HIGH (ref 10.3–14.5)
S AUREUS DNA NOSE QL NAA+PROBE: DETECTED
SODIUM SERPL-SCNC: 125 MMOL/L — LOW (ref 135–145)
WBC # BLD: 9.4 K/UL — SIGNIFICANT CHANGE UP (ref 3.8–10.5)
WBC # FLD AUTO: 9.4 K/UL — SIGNIFICANT CHANGE UP (ref 3.8–10.5)

## 2022-09-14 PROCEDURE — 74176 CT ABD & PELVIS W/O CONTRAST: CPT | Mod: 26

## 2022-09-14 PROCEDURE — 99232 SBSQ HOSP IP/OBS MODERATE 35: CPT

## 2022-09-14 PROCEDURE — 99232 SBSQ HOSP IP/OBS MODERATE 35: CPT | Mod: GC

## 2022-09-14 PROCEDURE — 99233 SBSQ HOSP IP/OBS HIGH 50: CPT

## 2022-09-14 RX ORDER — HYDRALAZINE HCL 50 MG
50 TABLET ORAL THREE TIMES A DAY
Refills: 0 | Status: DISCONTINUED | OUTPATIENT
Start: 2022-09-14 | End: 2022-09-20

## 2022-09-14 RX ORDER — SODIUM CHLORIDE 5 G/100ML
500 INJECTION, SOLUTION INTRAVENOUS
Refills: 0 | Status: DISCONTINUED | OUTPATIENT
Start: 2022-09-14 | End: 2022-09-14

## 2022-09-14 RX ORDER — SODIUM CHLORIDE 5 G/100ML
500 INJECTION, SOLUTION INTRAVENOUS
Refills: 0 | Status: DISCONTINUED | OUTPATIENT
Start: 2022-09-14 | End: 2022-09-15

## 2022-09-14 RX ORDER — CHLORHEXIDINE GLUCONATE 213 G/1000ML
1 SOLUTION TOPICAL
Refills: 0 | Status: DISCONTINUED | OUTPATIENT
Start: 2022-09-14 | End: 2022-09-20

## 2022-09-14 RX ADMIN — SIMVASTATIN 20 MILLIGRAM(S): 20 TABLET, FILM COATED ORAL at 22:00

## 2022-09-14 RX ADMIN — SENNA PLUS 2 TABLET(S): 8.6 TABLET ORAL at 22:00

## 2022-09-14 RX ADMIN — Medication 1 PATCH: at 19:17

## 2022-09-14 RX ADMIN — Medication 1 PATCH: at 09:16

## 2022-09-14 RX ADMIN — TAMSULOSIN HYDROCHLORIDE 0.4 MILLIGRAM(S): 0.4 CAPSULE ORAL at 22:00

## 2022-09-14 RX ADMIN — Medication 50 MILLIGRAM(S): at 22:01

## 2022-09-14 RX ADMIN — LOSARTAN POTASSIUM 100 MILLIGRAM(S): 100 TABLET, FILM COATED ORAL at 06:00

## 2022-09-14 RX ADMIN — Medication 1 PATCH: at 11:58

## 2022-09-14 RX ADMIN — Medication 15 GRAM(S): at 18:52

## 2022-09-14 RX ADMIN — Medication 15 GRAM(S): at 06:01

## 2022-09-14 RX ADMIN — Medication 112 MICROGRAM(S): at 06:00

## 2022-09-14 RX ADMIN — Medication 1 PATCH: at 11:34

## 2022-09-14 RX ADMIN — SODIUM CHLORIDE 40 MILLILITER(S): 5 INJECTION, SOLUTION INTRAVENOUS at 19:00

## 2022-09-14 NOTE — PROGRESS NOTE ADULT - PROBLEM SELECTOR PLAN 5
- noted to have abdominal distention   - no tenderness to palpation   - xray abdomen shows Mildly distended small bowel loops to 3.3 cm and distended large bowel   with descending colon measuring up to 7.1 cm. this may represent an ileus versus distal obstruction  - f/u CT A/P

## 2022-09-14 NOTE — PROGRESS NOTE ADULT - PROBLEM SELECTOR PLAN 2
- patient was noted to have an episode of BRBPR as per nursing   - no known episodes noted previously   - Hgb has been stable for past few days   - will hold heparin SQ for now   - monitor CBC and monitor for any more signs of bleeding

## 2022-09-14 NOTE — PROGRESS NOTE ADULT - SUBJECTIVE AND OBJECTIVE BOX
[facility] Division of Kidney Diseases & Hypertension  FOLLOW UP NOTE  [phone]--------------------------------------------------------------------------------    HPI: 76 yo M with h/o CKD, chronic BL hydronephrosis, prostate cancer, BPH, HTN, HLD, hypothyroidism, and dementia was initially admitted to [facility] on 9/3 for suspected COPD exacerbation. Pt was found to have a large pericardial effusion w/ early tamponade physiology and was transferred to [facility] on 9/7 for pericardiocentesis. Nephrology consulted for hyponatremia.     24 hour events/subjective: Pt seen and evaluated bedside this morning. Reports feeling well, endorses no complaints. Denies any headaches, nausea, vomiting, fevers/chills, chest pain, palpitations, SOB, abdominal pain, and leg swelling.     PAST HISTORY  --------------------------------------------------------------------------------  No significant changes to PMH, PSH, FHx, SHx, unless otherwise noted    ALLERGIES & MEDICATIONS  --------------------------------------------------------------------------------  Allergies    No Known Allergies    Intolerances      Standing Inpatient Medications  chlorhexidine 2% Cloths 1 Application(s) Topical <User Schedule>  levothyroxine 112 MICROGram(s) Oral daily  losartan 100 milliGRAM(s) Oral daily  nicotine -  14 mG/24Hr(s) Patch 1 Patch Transdermal daily  senna 2 Tablet(s) Oral at bedtime  simvastatin 20 milliGRAM(s) Oral at bedtime  tamsulosin 0.4 milliGRAM(s) Oral at bedtime  urea Oral Powder 15 Gram(s) Oral two times a day    PRN Inpatient Medications  albuterol/ipratropium for Nebulization 3 milliLiter(s) Nebulizer every 6 hours PRN  simethicone 80 milliGRAM(s) Chew two times a day PRN      REVIEW OF SYSTEMS  --------------------------------------------------------------------------------  Gen: No fevers/chills  Skin: No rashes  Head/Eyes/Ears: Normal hearing,   Respiratory: SOB improved  CV: No chest pain  GI: No abdominal pain, diarrhea  : No dysuria, hematuria  MSK: No  edema  Heme: No easy bruising or bleeding  Psych: No significant depression    All other systems were reviewed and are negative, except as noted.    VITALS/PHYSICAL EXAM  --------------------------------------------------------------------------------  T(C): 36.6 (09-14-22 @ 12:33), Max: 36.6 (09-14-22 @ 04:00)  HR: 66 (09-14-22 @ 12:33) (56 - 66)  BP: 145/78 (09-14-22 @ 12:33) (145/78 - 167/81)  RR: 18 (09-14-22 @ 12:33) (18 - 18)  SpO2: 97% (09-14-22 @ 12:33) (96% - 98%)  Wt(kg): --        09-13-22 @ 07:01  -  09-14-22 @ 07:00  --------------------------------------------------------  IN: 400 mL / OUT: 290 mL / NET: 110 mL    09-14-22 @ 07:01  -  09-14-22 @ 16:28  --------------------------------------------------------  IN: 610 mL / OUT: 0 mL / NET: 610 mL    Physical Exam:  Gen: Elderly male, in NAD  HEENT: MMM  Pulm: CTA B/L  CV: S1S2  Abd: Soft, +BS   Ext: No LE edema B/L  Neuro: Awake  Skin: pericardial drain noted  Vascular access: Peripheral IV    LABS/STUDIES  --------------------------------------------------------------------------------              11.4   9.40  >-----------<  229      [09-14-22 @ 11:47]              34.0     125  |  92  |  68  ----------------------------<  111      [09-14-22 @ 11:47]  4.6   |  23  |  1.36        Ca     9.2     [09-14-22 @ 11:47]      Mg     2.2     [09-14-22 @ 11:47]      Phos  2.5     [09-14-22 @ 11:47]    TPro  6.2  /  Alb  2.9  /  TBili  0.3  /  DBili  x   /  AST  26  /  ALT  28  /  AlkPhos  71  [09-13-22 @ 05:14]    PT/INR: PT 10.9 , INR 0.95       [09-13-22 @ 05:14]  PTT: 36.3       [09-13-22 @ 05:14]    Creatinine Trend:  SCr 1.36 [09-14 @ 11:47]  SCr 1.44 [09-13 @ 05:14]  SCr 1.30 [09-12 @ 07:05]  SCr 1.36 [09-11 @ 23:18]  SCr 1.42 [09-11 @ 12:08]    Urinalysis - [09-09-22 @ 17:48]      Color Yellow / Appearance Clear / SG 1.021 / pH 6.0      Gluc Negative / Ketone Trace  / Bili Negative / Urobili Negative       Blood Negative / Protein Trace / Leuk Est Small / Nitrite Negative      RBC 1 / WBC 11 / Hyaline 1 / Gran  / Sq Epi  / Non Sq Epi 4 / Bacteria Negative    Urine Creatinine 59      [09-12-22 @ 08:13]  Urine Sodium 10      [09-12-22 @ 08:13]  Urine Urea Nitrogen 1049      [09-09-22 @ 17:49]  Urine Osmolality 616      [09-12-22 @ 08:13]    Lipid: chol 159, TG 51, HDL 99, LDL --      [09-08-22 @ 02:59]

## 2022-09-14 NOTE — PROGRESS NOTE ADULT - PROBLEM SELECTOR PLAN 2
Pt with h/o stage 3 CKD in the setting of chronic BL hydronephrosis. [facility] SCr ius ~1.4-1.6. SCr is currently 1.36. Renal US showing interval improvement in BL hydronephrosis. UA with trace proteinuria and trace LE. Monitor labs and urine output. Avoid nephrotoxins. Dose medications as per eGFR.    If you have any questions, please feel free to contact me  Kaylee Rdz  Nephrology Fellow  [phone] / [employer] Teams(Preferred)  (After 5pm or on weekends please page the on-call fellow)

## 2022-09-14 NOTE — PROGRESS NOTE ADULT - ASSESSMENT
Patient is a 77 year old male, with PMH of HTN, HLD, BPH, CKD (creat 1.42 today), chronic b/l hydronephrosis (x2 years), prostate ca (radiation 10 years ago), seizure disorder ( last seizure 2015), ETOH use ( sober since 2016), hypothyroidism, and mild demenia; presents to [facility] on 9/3/2022 with chief complaint of SOB and CP x3 days. Noted to have large pericardial effusion and transferred to [facility] for pericardiocentesis. S/p pericardiocentesis and downgraded to medicine service for further management.

## 2022-09-14 NOTE — PROGRESS NOTE ADULT - PROBLEM SELECTOR PLAN 8
- patient on losartan and hydralazine at home  - continue with losartan   - restarted hydralazine, can increase to home dose if bp tolerates  - monitor BP and adjust meds as needed

## 2022-09-14 NOTE — PROGRESS NOTE ADULT - SUBJECTIVE AND OBJECTIVE BOX
INTERVAL EVENTS/SUBJ:  [facility]    Home Medications:  Albuterol (Eqv-ProAir HFA) 90 mcg/inh inhalation aerosol: 2 puff(s) inhaled every 6 hours, As Needed  HOME (08 Sep 2022 19:28)  doxazosin 4 mg oral tablet: 1 tab(s) orally once a day (at bedtime)  HOSP (08 Sep 2022 19:28)  DuoNeb 0.5 mg-2.5 mg/3 mL inhalation solution: 3 milliliter(s) inhaled 4 times a day  HOSP (08 Sep 2022 19:28)  hydrALAZINE 50 mg oral tablet: 1 tab(s) orally 3 times a day  HOME (08 Sep 2022 19:28)  hydrALAZINE 50 mg oral tablet: 1.5 tab(s) orally 3 times a day  HOSP (08 Sep 2022 19:28)  levothyroxine 112 mcg (0.112 mg) oral tablet: 1 tab(s) orally once a day  HOME (08 Sep 2022 19:28)  losartan 100 mg oral tablet: 1 tab(s) orally once a day in Am  HOSP &amp; HOME (08 Sep 2022 19:28)  simvastatin 20 mg oral tablet: 1 tab(s) orally once a day (at bedtime)  hosp &amp; HOME (08 Sep 2022 19:28)  SOLU-Medrol: 20 milligram(s) injectable every 12 hours  HOSP (08 Sep 2022 19:28)  Symbicort 160 mcg-4.5 mcg/inh inhalation aerosol: 2 puff(s) inhaled 2 times a day  HOSP (08 Sep 2022 19:28)  Synthroid 112 mcg (0.112 mg) oral tablet: 1 tab(s) orally once a day (08 Sep 2022 19:28)  tamsulosin 0.4 mg oral capsule: 1 cap(s) orally once a day  HOSP &amp; HOME (08 Sep 2022 19:28)      MEDICATIONS  (STANDING):  levothyroxine 112 MICROGram(s) Oral daily  losartan 100 milliGRAM(s) Oral daily  nicotine -  14 mG/24Hr(s) Patch 1 Patch Transdermal daily  senna 2 Tablet(s) Oral at bedtime  simvastatin 20 milliGRAM(s) Oral at bedtime  tamsulosin 0.4 milliGRAM(s) Oral at bedtime  urea Oral Powder 15 Gram(s) Oral two times a day    MEDICATIONS  (PRN):  albuterol/ipratropium for Nebulization 3 milliLiter(s) Nebulizer every 6 hours PRN Shortness of Breath and/or Wheezing  simethicone 80 milliGRAM(s) Chew two times a day PRN Gas      Vital Signs Last 24 Hrs  T(C): 36.6 (14 Sep 2022 04:00), Max: 36.6 (14 Sep 2022 04:00)  T(F): 97.8 (14 Sep 2022 04:00), Max: 97.8 (14 Sep 2022 04:00)  HR: 59 (14 Sep 2022 04:00) (50 - 110)  BP: 167/81 (14 Sep 2022 04:00) (101/58 - 167/81)  BP(mean): 75 (13 Sep 2022 13:00) (71 - 101)  RR: 18 (14 Sep 2022 04:00) (12 - 20)  SpO2: 96% (14 Sep 2022 04:00) (92% - 99%)    Parameters below as of 14 Sep 2022 04:00  Patient On (Oxygen Delivery Method): room air        REVIEW OF SYSTEMS:  As per HPI, otherwise unremarkable.     PHYSICAL EXAM:  Constitutional/Appearance: Normal, Well-developed  HEENT:   Normal oral mucosa, no drainage or redness, supple neck  Lymphatic: No lymphadenopathy  Cardiovascular: Normal S1 S2, No edema, II/VI JAYLEEN  Respiratory: Lungs clear to auscultation, respirations non-labored  Psychiatry: A & O x 3, appropriate affect.   Gastrointestinal:  Soft, Non-tender, no distention  Skin: No rashes, No ecchymoses, No cyanosis	  Neurologic: Non-focal, Alert and oriented x 3  Extremities: Normal range of motion  Vascular: Peripheral pulses palpable 2+ bilaterally (radial)    LABS:  CBC Full  -  ( 13 Sep 2022 05:14 )  WBC Count : 10.20 K/uL  RBC Count : 3.67 M/uL  Hemoglobin : 11.0 g/dL  Hematocrit : 32.7 %  Platelet Count - Automated : 227 K/uL  Mean Cell Volume : 89.1 fl  Mean Cell Hemoglobin : 30.0 pg  Mean Cell Hemoglobin Concentration : 33.6 gm/dL  Auto Neutrophil # : 8.30 K/uL  Auto Lymphocyte # : 0.96 K/uL  Auto Monocyte # : 0.78 K/uL  Auto Eosinophil # : 0.05 K/uL  Auto Basophil # : 0.01 K/uL  Auto Neutrophil % : 81.4 %  Auto Lymphocyte % : 9.4 %  Auto Monocyte % : 7.6 %  Auto Eosinophil % : 0.5 %  Auto Basophil % : 0.1 %      09-13    127<L>  |  96  |  54<H>  ----------------------------<  96  4.4   |  21<L>  |  1.44<H>    Ca    9.2      13 Sep 2022 05:14  Phos  1.9     09-13  Mg     2.2     09-13    TPro  6.2  /  Alb  2.9<L>  /  TBili  0.3  /  DBili  x   /  AST  26  /  ALT  28  /  AlkPhos  71  09-13    IMPRESSION AND PLAN: 77M w HTN, HL, BPH, CKD, EtOH here with CP and SOB, transferred to [facility] for large pericardial effusion, now s/p pericardiocentesis. Ongoing hyponatremia.  -renal recs appreciated  -trending Na, fluid restrictions  -follow-up pericardial fluid Cx  -HTN mgmt on losartan, reinitiate home hydral prior to dc  -HL on statin    ***    Alfred Ospina MD, MPhil, [facility]  Cardiologist, [facility]  ; [employer] at \[facility]\""/[facility]  email: [email or web address] and Cardiovascular Surgery on-service contact/call information, go to [email or web address] and use "[email or web address]" to login.  Outpatient Cardiology appointments, call  [phone] to arrange with a colleague; I do not have outpatient Cardiology clinic.

## 2022-09-14 NOTE — PROGRESS NOTE ADULT - SUBJECTIVE AND OBJECTIVE BOX
PROGRESS NOTE:   Authoted by Dr. Nestor Arroyo MD, Available on MS Teams    Patient is a 77y old  Male who presents with a chief complaint of Pericardial Effusion (14 Sep 2022 16:28)      SUBJECTIVE / OVERNIGHT EVENTS: No acute events overnight. Patient had diarrhea earlier today. No chest pain or shortness of breath. His abdomen remains distended.    REVIEW OF SYSTEMS:  CONSTITUTIONAL: No weakness, fevers or chills  EYES/ENT: No visual changes;  No vertigo or throat pain   NECK: No pain or stiffness  RESPIRATORY: No cough, wheezing, hemoptysis; No shortness of breath  CARDIOVASCULAR: No chest pain or palpitations  GASTROINTESTINAL: no nausea or vomiting, + abdominal distention. + diarrhea. No melena or hematochezia  GENITOURINARY: No dysuria, frequency or hematuria  NEUROLOGICAL: No numbness or weakness  SKIN: No itching, burning, rashes, or lesions   All other review of systems is negative unless indicated above.    MEDICATIONS  (STANDING):  chlorhexidine 2% Cloths 1 Application(s) Topical <User Schedule>  hydrALAZINE 50 milliGRAM(s) Oral three times a day  levothyroxine 112 MICROGram(s) Oral daily  losartan 100 milliGRAM(s) Oral daily  nicotine -  14 mG/24Hr(s) Patch 1 Patch Transdermal daily  senna 2 Tablet(s) Oral at bedtime  simvastatin 20 milliGRAM(s) Oral at bedtime  sodium chloride 2% . 500 milliLiter(s) (40 mL/Hr) IV Continuous <Continuous>  tamsulosin 0.4 milliGRAM(s) Oral at bedtime  urea Oral Powder 15 Gram(s) Oral two times a day    MEDICATIONS  (PRN):  albuterol/ipratropium for Nebulization 3 milliLiter(s) Nebulizer every 6 hours PRN Shortness of Breath and/or Wheezing  simethicone 80 milliGRAM(s) Chew two times a day PRN Gas      CAPILLARY BLOOD GLUCOSE        I&O's Summary    13 Sep 2022 07:01  -  14 Sep 2022 07:00  --------------------------------------------------------  IN: 400 mL / OUT: 290 mL / NET: 110 mL    14 Sep 2022 07:01  -  14 Sep 2022 17:59  --------------------------------------------------------  IN: 860 mL / OUT: 0 mL / NET: 860 mL        PHYSICAL EXAM:  Vital Signs Last 24 Hrs  T(C): 36.6 (14 Sep 2022 12:33), Max: 36.6 (14 Sep 2022 04:00)  T(F): 97.9 (14 Sep 2022 12:33), Max: 97.9 (14 Sep 2022 12:33)  HR: 66 (14 Sep 2022 12:33) (56 - 66)  BP: 145/78 (14 Sep 2022 12:33) (145/78 - 167/81)  BP(mean): --  RR: 18 (14 Sep 2022 12:33) (18 - 18)  SpO2: 97% (14 Sep 2022 12:33) (96% - 98%)    Parameters below as of 14 Sep 2022 12:33  Patient On (Oxygen Delivery Method): room air        CONSTITUTIONAL: NAD, well-developed  RESPIRATORY: Normal respiratory effort; lungs are clear to auscultation bilaterally  CARDIOVASCULAR: Regular rate and rhythm, normal S1 and S2, no murmur/rub/gallop; No lower extremity edema  ABDOMEN: Nontender to palpation, very distended, tympanic  MUSCLOSKELETAL: no clubbing or cyanosis of digits; no joint swelling or tenderness to palpation  PSYCH: A+O to person, place, and time; affect appropriate    LABS:                        11.4   9.40  )-----------( 229      ( 14 Sep 2022 11:47 )             34.0     09-14    125<L>  |  92<L>  |  68<H>  ----------------------------<  111<H>  4.6   |  23  |  1.36<H>    Ca    9.2      14 Sep 2022 11:47  Phos  2.5     09-14  Mg     2.2     09-14    TPro  6.2  /  Alb  2.9<L>  /  TBili  0.3  /  DBili  x   /  AST  26  /  ALT  28  /  AlkPhos  71  09-13    PT/INR - ( 13 Sep 2022 05:14 )   PT: 10.9 sec;   INR: 0.95 ratio         PTT - ( 13 Sep 2022 05:14 )  PTT:36.3 sec            RADIOLOGY & ADDITIONAL TESTS:  Results Reviewed:   Imaging Personally Reviewed:  Electrocardiogram Personally Reviewed:    COORDINATION OF CARE:  Care Discussed with Consultants/Other Providers [Y/N]:  Prior or Outpatient Records Reviewed [Y/N]:

## 2022-09-14 NOTE — PROGRESS NOTE ADULT - PROBLEM SELECTOR PLAN 3
- patient presented due to pericardial effusion  - s/p pericardiocentesis 9/7 with drain placed and now removed   - no effusion noted on limited ECHO from 9/11  - f/u cx from pericardiocentesis

## 2022-09-14 NOTE — PROVIDER CONTACT NOTE (OTHER) - ASSESSMENT
vitals noted , pt found to have dark brown colour stool , denies any chest pain , SOb , dizziness at this time
AO4, VSS, pt asleep at time of event, no complaints of dizziness/SOB/chest pain

## 2022-09-15 DIAGNOSIS — K56.7 ILEUS, UNSPECIFIED: ICD-10-CM

## 2022-09-15 DIAGNOSIS — N13.30 UNSPECIFIED HYDRONEPHROSIS: ICD-10-CM

## 2022-09-15 LAB
ANION GAP SERPL CALC-SCNC: 6 MMOL/L — SIGNIFICANT CHANGE UP (ref 5–17)
ANION GAP SERPL CALC-SCNC: 8 MMOL/L — SIGNIFICANT CHANGE UP (ref 5–17)
BLD GP AB SCN SERPL QL: NEGATIVE — SIGNIFICANT CHANGE UP
BUN SERPL-MCNC: 71 MG/DL — HIGH (ref 7–23)
BUN SERPL-MCNC: 74 MG/DL — HIGH (ref 7–23)
CALCIUM SERPL-MCNC: 9.2 MG/DL — SIGNIFICANT CHANGE UP (ref 8.4–10.5)
CALCIUM SERPL-MCNC: 9.4 MG/DL — SIGNIFICANT CHANGE UP (ref 8.4–10.5)
CHLORIDE SERPL-SCNC: 94 MMOL/L — LOW (ref 96–108)
CHLORIDE SERPL-SCNC: 95 MMOL/L — LOW (ref 96–108)
CO2 SERPL-SCNC: 25 MMOL/L — SIGNIFICANT CHANGE UP (ref 22–31)
CO2 SERPL-SCNC: 25 MMOL/L — SIGNIFICANT CHANGE UP (ref 22–31)
CREAT ?TM UR-MCNC: 43 MG/DL — SIGNIFICANT CHANGE UP
CREAT SERPL-MCNC: 1.4 MG/DL — HIGH (ref 0.5–1.3)
CREAT SERPL-MCNC: 1.41 MG/DL — HIGH (ref 0.5–1.3)
EGFR: 51 ML/MIN/1.73M2 — LOW
EGFR: 51 ML/MIN/1.73M2 — LOW
GLUCOSE SERPL-MCNC: 91 MG/DL — SIGNIFICANT CHANGE UP (ref 70–99)
GLUCOSE SERPL-MCNC: 93 MG/DL — SIGNIFICANT CHANGE UP (ref 70–99)
HCT VFR BLD CALC: 29.1 % — LOW (ref 39–50)
HCT VFR BLD CALC: 31.5 % — LOW (ref 39–50)
HGB BLD-MCNC: 10.4 G/DL — LOW (ref 13–17)
HGB BLD-MCNC: 9.6 G/DL — LOW (ref 13–17)
MCHC RBC-ENTMCNC: 30.1 PG — SIGNIFICANT CHANGE UP (ref 27–34)
MCHC RBC-ENTMCNC: 30.2 PG — SIGNIFICANT CHANGE UP (ref 27–34)
MCHC RBC-ENTMCNC: 33 GM/DL — SIGNIFICANT CHANGE UP (ref 32–36)
MCHC RBC-ENTMCNC: 33 GM/DL — SIGNIFICANT CHANGE UP (ref 32–36)
MCV RBC AUTO: 91.3 FL — SIGNIFICANT CHANGE UP (ref 80–100)
MCV RBC AUTO: 91.5 FL — SIGNIFICANT CHANGE UP (ref 80–100)
NRBC # BLD: 0 /100 WBCS — SIGNIFICANT CHANGE UP (ref 0–0)
NRBC # BLD: 0 /100 WBCS — SIGNIFICANT CHANGE UP (ref 0–0)
OSMOLALITY UR: 575 MOS/KG — SIGNIFICANT CHANGE UP (ref 300–900)
PLATELET # BLD AUTO: 232 K/UL — SIGNIFICANT CHANGE UP (ref 150–400)
PLATELET # BLD AUTO: 234 K/UL — SIGNIFICANT CHANGE UP (ref 150–400)
POTASSIUM SERPL-MCNC: 4.3 MMOL/L — SIGNIFICANT CHANGE UP (ref 3.5–5.3)
POTASSIUM SERPL-MCNC: 4.6 MMOL/L — SIGNIFICANT CHANGE UP (ref 3.5–5.3)
POTASSIUM SERPL-SCNC: 4.3 MMOL/L — SIGNIFICANT CHANGE UP (ref 3.5–5.3)
POTASSIUM SERPL-SCNC: 4.6 MMOL/L — SIGNIFICANT CHANGE UP (ref 3.5–5.3)
RBC # BLD: 3.18 M/UL — LOW (ref 4.2–5.8)
RBC # BLD: 3.45 M/UL — LOW (ref 4.2–5.8)
RBC # FLD: 17.5 % — HIGH (ref 10.3–14.5)
RBC # FLD: 17.5 % — HIGH (ref 10.3–14.5)
RH IG SCN BLD-IMP: POSITIVE — SIGNIFICANT CHANGE UP
SODIUM SERPL-SCNC: 126 MMOL/L — LOW (ref 135–145)
SODIUM SERPL-SCNC: 127 MMOL/L — LOW (ref 135–145)
SODIUM UR-SCNC: 43 MMOL/L — SIGNIFICANT CHANGE UP
UUN UR-MCNC: 1294 MG/DL — SIGNIFICANT CHANGE UP
WBC # BLD: 8.16 K/UL — SIGNIFICANT CHANGE UP (ref 3.8–10.5)
WBC # BLD: 8.97 K/UL — SIGNIFICANT CHANGE UP (ref 3.8–10.5)
WBC # FLD AUTO: 8.16 K/UL — SIGNIFICANT CHANGE UP (ref 3.8–10.5)
WBC # FLD AUTO: 8.97 K/UL — SIGNIFICANT CHANGE UP (ref 3.8–10.5)

## 2022-09-15 PROCEDURE — 99233 SBSQ HOSP IP/OBS HIGH 50: CPT

## 2022-09-15 PROCEDURE — 99221 1ST HOSP IP/OBS SF/LOW 40: CPT

## 2022-09-15 PROCEDURE — 99232 SBSQ HOSP IP/OBS MODERATE 35: CPT | Mod: GC

## 2022-09-15 PROCEDURE — 99231 SBSQ HOSP IP/OBS SF/LOW 25: CPT

## 2022-09-15 RX ORDER — SODIUM CHLORIDE 9 MG/ML
1 INJECTION INTRAMUSCULAR; INTRAVENOUS; SUBCUTANEOUS THREE TIMES A DAY
Refills: 0 | Status: DISCONTINUED | OUTPATIENT
Start: 2022-09-15 | End: 2022-09-20

## 2022-09-15 RX ORDER — MUPIROCIN 20 MG/G
1 OINTMENT TOPICAL
Refills: 0 | Status: COMPLETED | OUTPATIENT
Start: 2022-09-15 | End: 2022-09-20

## 2022-09-15 RX ADMIN — TAMSULOSIN HYDROCHLORIDE 0.4 MILLIGRAM(S): 0.4 CAPSULE ORAL at 22:00

## 2022-09-15 RX ADMIN — Medication 1 PATCH: at 11:26

## 2022-09-15 RX ADMIN — Medication 1 PATCH: at 12:06

## 2022-09-15 RX ADMIN — LOSARTAN POTASSIUM 100 MILLIGRAM(S): 100 TABLET, FILM COATED ORAL at 05:24

## 2022-09-15 RX ADMIN — Medication 1 PATCH: at 07:32

## 2022-09-15 RX ADMIN — Medication 50 MILLIGRAM(S): at 21:59

## 2022-09-15 RX ADMIN — CHLORHEXIDINE GLUCONATE 1 APPLICATION(S): 213 SOLUTION TOPICAL at 05:24

## 2022-09-15 RX ADMIN — Medication 15 GRAM(S): at 18:19

## 2022-09-15 RX ADMIN — SENNA PLUS 2 TABLET(S): 8.6 TABLET ORAL at 21:59

## 2022-09-15 RX ADMIN — SODIUM CHLORIDE 1 GRAM(S): 9 INJECTION INTRAMUSCULAR; INTRAVENOUS; SUBCUTANEOUS at 21:59

## 2022-09-15 RX ADMIN — Medication 50 MILLIGRAM(S): at 05:24

## 2022-09-15 RX ADMIN — Medication 1 PATCH: at 20:11

## 2022-09-15 RX ADMIN — Medication 15 GRAM(S): at 05:28

## 2022-09-15 RX ADMIN — SIMVASTATIN 20 MILLIGRAM(S): 20 TABLET, FILM COATED ORAL at 22:00

## 2022-09-15 RX ADMIN — Medication 112 MICROGRAM(S): at 05:24

## 2022-09-15 RX ADMIN — MUPIROCIN 1 APPLICATION(S): 20 OINTMENT TOPICAL at 18:19

## 2022-09-15 RX ADMIN — Medication 50 MILLIGRAM(S): at 13:10

## 2022-09-15 NOTE — PROGRESS NOTE ADULT - ASSESSMENT
Patient is a 77 year old male, with PMH of HTN, HLD, BPH, CKD (creat 1.42 today), chronic b/l hydronephrosis (x2 years), prostate ca (radiation 10 years ago), seizure disorder ( last seizure 2015), ETOH use ( sober since 2016), hypothyroidism, and mild demenia; presents to [facility] on 9/3/2022 with chief complaint of SOB and CP x3 days. Noted to have large pericardial effusion and transferred to [facility] for pericardiocentesis. S/p pericardiocentesis and downgraded to medicine service for further management.

## 2022-09-15 NOTE — CONSULT NOTE ADULT - ASSESSMENT
78yo old  Male with PMH of HTN, HLD,  BPH, CKD ( creat 1.42 today), chronic angelita hydronephrosis ( x2 years), prostate ca ( radiation 10 years ago), seizure disorder ( last seizure 2015), ETOH use ( sober since 2016), hypothyroidism, and mild demenia; presents to [facility] on 9/3/2022 a/w pericardial effusion s/p pericardiocentesis. Surgery consulted for abdominal distention with dilated right colon on CT scan. Patient clinically unobstructed, tolerating diet, passing gas and BM.     Plan:  - No acute surgical intervention  - Patient passing gas and BM. Can increase bowel regimen if not passing gas/BM   - Can back down diet if patient develops nausea/vomiting     Plan discussed with Dr. Betito Fischer PGY-2   Trauma Surgery  [id number] 
Impression:    gluteal cleft Moisture dermatitis  Pressure Injury Prophylaxis    Recommend:  1.) topical therapy: sacral/buttock injury - cleanse with incontinence cleanser, pat dry, apply Ruby ointment BID and PRN for incontinent episodes  2.) Incontinence Management - incontinence cleanser, pads, pericare BID  3.) Maintain on an alternating air with low air loss surface  4.) Turn and reposition Q 2 hours  5.) Nutrition optimization  6.) Offload heels/feet with complete cair air fluidized boots; ensure that the soles of the feet are not resting on the foot board of the bed.    Care as per medicine. Will not actively follow but will remain available. Please recall for new issues or deterioration.  Upon discharge [facility] [address] [place] [address] [phone]  Seen and discussed with clinical nurse  Thank you for this consult  [place], NP-C, [id number] [id number]  
Pt with hyponatremia and CKD.

## 2022-09-15 NOTE — PROGRESS NOTE ADULT - SUBJECTIVE AND OBJECTIVE BOX
[facility] DIVISION OF KIDNEY DISEASES AND HYPERTENSION -- FOLLOW UP NOTE  --------------------------------------------------------------------------------  Chief Complaint:    24 hour events/subjective:        PAST HISTORY  --------------------------------------------------------------------------------  No significant changes to PMH, PSH, FHx, SHx, unless otherwise noted    ALLERGIES & MEDICATIONS  --------------------------------------------------------------------------------  Allergies    No Known Allergies    Intolerances      Standing Inpatient Medications  chlorhexidine 2% Cloths 1 Application(s) Topical <User Schedule>  hydrALAZINE 50 milliGRAM(s) Oral three times a day  levothyroxine 112 MICROGram(s) Oral daily  losartan 100 milliGRAM(s) Oral daily  mupirocin 2% Nasal 1 Application(s) Both Nostrils two times a day  nicotine -  14 mG/24Hr(s) Patch 1 Patch Transdermal daily  senna 2 Tablet(s) Oral at bedtime  simvastatin 20 milliGRAM(s) Oral at bedtime  tamsulosin 0.4 milliGRAM(s) Oral at bedtime  urea Oral Powder 15 Gram(s) Oral two times a day    PRN Inpatient Medications  albuterol/ipratropium for Nebulization 3 milliLiter(s) Nebulizer every 6 hours PRN  simethicone 80 milliGRAM(s) Chew two times a day PRN      REVIEW OF SYSTEMS  --------------------------------------------------------------------------------  Gen: No weight changes, fatigue, fevers/chills, weakness  Skin: No rashes  Head/Eyes/Ears/Mouth: No headache; Normal hearing; Normal vision w/o blurriness; No sinus pain/discomfort, sore throat  Respiratory: No dyspnea, cough, wheezing, hemoptysis  CV: No chest pain, PND, orthopnea  GI: No abdominal pain, diarrhea, constipation, nausea, vomiting, melena, hematochezia  : No increased frequency, dysuria, hematuria, nocturia  MSK: No joint pain/swelling; no back pain; no edema  Neuro: No dizziness/lightheadedness, weakness, seizures, numbness, tingling  Heme: No easy bruising or bleeding  Endo: No heat/cold intolerance  Psych: No significant nervousness, anxiety, stress, depression    All other systems were reviewed and are negative, except as noted.    VITALS/PHYSICAL EXAM  --------------------------------------------------------------------------------  T(C): 36.6 (09-15-22 @ 12:22), Max: 36.6 (09-15-22 @ 04:00)  HR: 54 (09-15-22 @ 12:22) (54 - 62)  BP: 129/72 (09-15-22 @ 12:22) (114/73 - 129/72)  RR: 18 (09-15-22 @ 12:22) (17 - 18)  SpO2: 97% (09-15-22 @ 12:22) (96% - 100%)  Wt(kg): --        09-14-22 @ 07:01  -  09-15-22 @ 07:00  --------------------------------------------------------  IN: 860 mL / OUT: 0 mL / NET: 860 mL    09-15-22 @ 07:01  -  09-15-22 @ 13:41  --------------------------------------------------------  IN: 630 mL / OUT: 0 mL / NET: 630 mL      Physical Exam:  	Gen: NAD, well-appearing  	HEENT: PERRL, supple neck, clear oropharynx  	Pulm: CTA B/L  	CV: RRR, S1S2; no rub  	Back: No spinal or CVA tenderness; no sacral edema  	Abd: +BS, soft, nontender/nondistended  	: No suprapubic tenderness  	UE: Warm, FROM, no clubbing, intact strength; no edema; no asterixis  	LE: Warm, FROM, no clubbing, intact strength; no edema  	Neuro: No focal deficits, intact gait  	Psych: Normal affect and mood  	Skin: Warm, without rashes  	Vascular access:    LABS/STUDIES  --------------------------------------------------------------------------------              9.6    8.16  >-----------<  232      [09-15-22 @ 06:40]              29.1     127  |  94  |  74  ----------------------------<  91      [09-15-22 @ 06:41]  4.3   |  25  |  1.40        Ca     9.2     [09-15-22 @ 06:41]      Mg     2.2     [09-14-22 @ 11:47]      Phos  2.5     [09-14-22 @ 11:47]            Creatinine Trend:  SCr 1.40 [09-15 @ 06:41]  SCr 1.41 [09-14 @ 23:35]  SCr 1.36 [09-14 @ 11:47]  SCr 1.44 [09-13 @ 05:14]  SCr 1.30 [09-12 @ 07:05]    Urinalysis - [09-09-22 @ 17:48]      Color Yellow / Appearance Clear / SG 1.021 / pH 6.0      Gluc Negative / Ketone Trace  / Bili Negative / Urobili Negative       Blood Negative / Protein Trace / Leuk Est Small / Nitrite Negative      RBC 1 / WBC 11 / Hyaline 1 / Gran  / Sq Epi  / Non Sq Epi 4 / Bacteria Negative    Urine Creatinine 59      [09-12-22 @ 08:13]  Urine Sodium 10      [09-12-22 @ 08:13]  Urine Urea Nitrogen 1049      [09-09-22 @ 17:49]  Urine Osmolality 616      [09-12-22 @ 08:13]    TSH 69.600      [09-06-22 @ 06:46]  Lipid: chol 159, TG 51, HDL 99, LDL --      [09-08-22 @ 02:59]

## 2022-09-15 NOTE — PROGRESS NOTE ADULT - SUBJECTIVE AND OBJECTIVE BOX
PROGRESS NOTE:   Authoted by Dr. Nestor Arroyo MD, Available on MS Teams    Patient is a 77y old  Male who presents with a chief complaint of Pericardial Effusion (14 Sep 2022 16:28)    SUBJECTIVE / OVERNIGHT EVENTS: Patient continues to have significant abdominal distention. Tolerated diet this morning. Patient had some diarrhea yesterday.    REVIEW OF SYSTEMS:    CONSTITUTIONAL: No weakness, fevers or chills  EYES/ENT: No visual changes;  No vertigo or throat pain   NECK: No pain or stiffness  RESPIRATORY: No cough, wheezing, hemoptysis; No shortness of breath  CARDIOVASCULAR: No chest pain or palpitations  GASTROINTESTINAL: + abdominal distention, discomfort. No nausea or vomiting  GENITOURINARY: No dysuria, frequency or hematuria  NEUROLOGICAL: No numbness or weakness  SKIN: No itching, burning, rashes, or lesions   All other review of systems is negative unless indicated above.    MEDICATIONS  (STANDING):  chlorhexidine 2% Cloths 1 Application(s) Topical <User Schedule>  hydrALAZINE 50 milliGRAM(s) Oral three times a day  levothyroxine 112 MICROGram(s) Oral daily  losartan 100 milliGRAM(s) Oral daily  mupirocin 2% Nasal 1 Application(s) Both Nostrils two times a day  nicotine -  14 mG/24Hr(s) Patch 1 Patch Transdermal daily  senna 2 Tablet(s) Oral at bedtime  simvastatin 20 milliGRAM(s) Oral at bedtime  sodium chloride 2% . 500 milliLiter(s) (40 mL/Hr) IV Continuous <Continuous>  tamsulosin 0.4 milliGRAM(s) Oral at bedtime  urea Oral Powder 15 Gram(s) Oral two times a day    MEDICATIONS  (PRN):  albuterol/ipratropium for Nebulization 3 milliLiter(s) Nebulizer every 6 hours PRN Shortness of Breath and/or Wheezing  simethicone 80 milliGRAM(s) Chew two times a day PRN Gas      CAPILLARY BLOOD GLUCOSE        I&O's Summary    14 Sep 2022 07:01  -  15 Sep 2022 07:00  --------------------------------------------------------  IN: 860 mL / OUT: 0 mL / NET: 860 mL    15 Sep 2022 07:01  -  15 Sep 2022 12:29  --------------------------------------------------------  IN: 310 mL / OUT: 0 mL / NET: 310 mL        PHYSICAL EXAM:  Vital Signs Last 24 Hrs  T(C): 36.6 (15 Sep 2022 12:22), Max: 36.6 (14 Sep 2022 12:33)  T(F): 97.9 (15 Sep 2022 12:22), Max: 97.9 (14 Sep 2022 12:33)  HR: 54 (15 Sep 2022 12:22) (54 - 66)  BP: 129/72 (15 Sep 2022 12:22) (114/73 - 145/78)  BP(mean): --  RR: 18 (15 Sep 2022 12:22) (17 - 18)  SpO2: 97% (15 Sep 2022 12:22) (96% - 100%)    Parameters below as of 15 Sep 2022 12:22  Patient On (Oxygen Delivery Method): room air        CONSTITUTIONAL: NAD, well-developed  RESPIRATORY: Normal respiratory effort; lungs are clear to auscultation bilaterally  CARDIOVASCULAR: Regular rate and rhythm, normal S1 and S2, no murmur/rub/gallop; No lower extremity edema  ABDOMEN: very distended, tympanic, mild discomfort  MUSCLOSKELETAL: no clubbing or cyanosis of digits; no joint swelling or tenderness to palpation  PSYCH: A+O to person, place, and time; affect appropriate    LABS:                        9.6    8.16  )-----------( 232      ( 15 Sep 2022 06:40 )             29.1     09-15    127<L>  |  94<L>  |  74<H>  ----------------------------<  91  4.3   |  25  |  1.40<H>    Ca    9.2      15 Sep 2022 06:41  Phos  2.5     09-14  Mg     2.2     09-14                  RADIOLOGY & ADDITIONAL TESTS:  Results Reviewed:   Imaging Personally Reviewed:  Electrocardiogram Personally Reviewed:    COORDINATION OF CARE:  Care Discussed with Consultants/Other Providers [Y/N]:  Prior or Outpatient Records Reviewed [Y/N]:

## 2022-09-15 NOTE — PROGRESS NOTE ADULT - ASSESSMENT
Na still lagging  2% NaCl today  High urine osm  low urine Na  SCr stable  Na still 127  WIll hold on HTS today   Please add NaCl tabs 1 g thrice daily

## 2022-09-15 NOTE — PROGRESS NOTE ADULT - SUBJECTIVE AND OBJECTIVE BOX
INTERVAL EVENTS/SUBJ:  no o/n events    MEDICATIONS  (STANDING):  chlorhexidine 2% Cloths 1 Application(s) Topical <User Schedule>  hydrALAZINE 50 milliGRAM(s) Oral three times a day  levothyroxine 112 MICROGram(s) Oral daily  losartan 100 milliGRAM(s) Oral daily  mupirocin 2% Nasal 1 Application(s) Both Nostrils two times a day  nicotine -  14 mG/24Hr(s) Patch 1 Patch Transdermal daily  senna 2 Tablet(s) Oral at bedtime  simvastatin 20 milliGRAM(s) Oral at bedtime  sodium chloride 2% . 500 milliLiter(s) (40 mL/Hr) IV Continuous <Continuous>  tamsulosin 0.4 milliGRAM(s) Oral at bedtime  urea Oral Powder 15 Gram(s) Oral two times a day    MEDICATIONS  (PRN):  albuterol/ipratropium for Nebulization 3 milliLiter(s) Nebulizer every 6 hours PRN Shortness of Breath and/or Wheezing  simethicone 80 milliGRAM(s) Chew two times a day PRN Gas      Vital Signs Last 24 Hrs  T(C): 36.6 (15 Sep 2022 04:00), Max: 36.6 (14 Sep 2022 12:33)  T(F): 97.8 (15 Sep 2022 04:00), Max: 97.9 (14 Sep 2022 12:33)  HR: 62 (15 Sep 2022 04:00) (58 - 66)  BP: 114/73 (15 Sep 2022 04:00) (114/73 - 145/78)  BP(mean): --  RR: 17 (15 Sep 2022 04:00) (17 - 18)  SpO2: 96% (15 Sep 2022 04:00) (96% - 100%)    Parameters below as of 15 Sep 2022 04:00  Patient On (Oxygen Delivery Method): room air        REVIEW OF SYSTEMS:  As per HPI, otherwise unremarkable.     PHYSICAL EXAM:  Constitutional/Appearance: Normal, Well-developed  HEENT:   Normal oral mucosa, no drainage or redness, supple neck  Lymphatic: No lymphadenopathy  Cardiovascular: Normal S1 S2, No edema, II/VI JAYLEEN  Respiratory: Lungs clear to auscultation, respirations non-labored  Psychiatry: A & O x 3, appropriate affect.   Gastrointestinal:  Soft, Non-tender, no distention  Skin: No rashes, No ecchymoses, No cyanosis	  Neurologic: Non-focal, Alert and oriented x 3  Extremities: Normal range of motion  Vascular: Peripheral pulses palpable 2+ bilaterally (radial)    LABS:  CBC Full  -  ( 15 Sep 2022 06:40 )  WBC Count : 8.16 K/uL  RBC Count : 3.18 M/uL  Hemoglobin : 9.6 g/dL  Hematocrit : 29.1 %  Platelet Count - Automated : 232 K/uL  Mean Cell Volume : 91.5 fl  Mean Cell Hemoglobin : 30.2 pg  Mean Cell Hemoglobin Concentration : 33.0 gm/dL      09-15    127<L>  |  94<L>  |  74<H>  ----------------------------<  91  4.3   |  25  |  1.40<H>    Ca    9.2      15 Sep 2022 06:41  Phos  2.5     09-14  Mg     2.2     09-14    IMPRESSION AND PLAN: 77M w HTN, HL, BPH, CKD, EtOH here with CP and SOB, transferred to [facility] for large pericardial effusion, now s/p pericardiocentesis. Ongoing hyponatremia.  -renal recs appreciated; trending Na, 2% HT Saline  -follow-up pericardial fluid Cx  -HTN mgmt on losartan, reinitiate home hydral prior to dc  -HL on statin  -Cardiology to sign off      ***    Alfred Ospina MD, MPhil, [facility]  Cardiologist, [facility]  ; [employer] at [facility]  email: [email or web address] on-service contact/call information, go to [email or web address] and use [email or web address]" to login.  Outpatient Cardiology appointments, call  [phone] to arrange with a colleague; I do not have outpatient Cardiology clinic.

## 2022-09-15 NOTE — PROGRESS NOTE ADULT - PROBLEM SELECTOR PLAN 2
- patient noted to have hyponatremia during admission in the setting of pericardial effusion and significant hypothyroidism  - nephrology recs appreciated: 2% hypertonic saline at 40 cc/hr for a total of 4 hours (9/14), Na improved to 127 today  - Continue with Urena  - continue fluid restrictions  - nephro following, f/u recs  - monitor BMP daily

## 2022-09-15 NOTE — PROGRESS NOTE ADULT - PROBLEM SELECTOR PLAN 5
Patient's Hgb dropped from 11.4-->9.6 otherwise hemodynamically stable  - check CBC q12  - transfuse for Hgb <8  - monitor for any signs of bleed

## 2022-09-15 NOTE — PROGRESS NOTE ADULT - PROBLEM SELECTOR PLAN 12
- hold heparin SQ due to reported episode of BRBPR  - SCDs for now
CT shows mild bilateral hydroureteronephrosis, has hx of chronic hydronephrosis  - check bladder scan to r/o retention    CT also showed a compression deformity of the L4 superior endplate, new since   1/9/2018  - PT consult
- hold heparin SQ due to reported episode of BRBPR  - SCDs for now

## 2022-09-15 NOTE — PROGRESS NOTE ADULT - PROBLEM SELECTOR PLAN 3
- patient was noted to have an episode of BRBPR as per nursing, no known episodes noted previously   - Hgb dropped from 11.4 to 9.6 today  - trend CBC q12 for now, transfuse for Hgb <8  - will hold heparin SQ for now

## 2022-09-16 LAB
ANION GAP SERPL CALC-SCNC: 8 MMOL/L — SIGNIFICANT CHANGE UP (ref 5–17)
BUN SERPL-MCNC: 59 MG/DL — HIGH (ref 7–23)
CALCIUM SERPL-MCNC: 9.2 MG/DL — SIGNIFICANT CHANGE UP (ref 8.4–10.5)
CHLORIDE SERPL-SCNC: 97 MMOL/L — SIGNIFICANT CHANGE UP (ref 96–108)
CO2 SERPL-SCNC: 24 MMOL/L — SIGNIFICANT CHANGE UP (ref 22–31)
CREAT SERPL-MCNC: 1.46 MG/DL — HIGH (ref 0.5–1.3)
EGFR: 49 ML/MIN/1.73M2 — LOW
GLUCOSE SERPL-MCNC: 80 MG/DL — SIGNIFICANT CHANGE UP (ref 70–99)
HCT VFR BLD CALC: 31.1 % — LOW (ref 39–50)
HGB BLD-MCNC: 10.2 G/DL — LOW (ref 13–17)
MCHC RBC-ENTMCNC: 30 PG — SIGNIFICANT CHANGE UP (ref 27–34)
MCHC RBC-ENTMCNC: 32.8 GM/DL — SIGNIFICANT CHANGE UP (ref 32–36)
MCV RBC AUTO: 91.5 FL — SIGNIFICANT CHANGE UP (ref 80–100)
NRBC # BLD: 0 /100 WBCS — SIGNIFICANT CHANGE UP (ref 0–0)
PLATELET # BLD AUTO: 237 K/UL — SIGNIFICANT CHANGE UP (ref 150–400)
POTASSIUM SERPL-MCNC: 4.7 MMOL/L — SIGNIFICANT CHANGE UP (ref 3.5–5.3)
POTASSIUM SERPL-SCNC: 4.7 MMOL/L — SIGNIFICANT CHANGE UP (ref 3.5–5.3)
RBC # BLD: 3.4 M/UL — LOW (ref 4.2–5.8)
RBC # FLD: 17.5 % — HIGH (ref 10.3–14.5)
SODIUM SERPL-SCNC: 129 MMOL/L — LOW (ref 135–145)
VIRUS SPEC CULT: SIGNIFICANT CHANGE UP
WBC # BLD: 8.39 K/UL — SIGNIFICANT CHANGE UP (ref 3.8–10.5)
WBC # FLD AUTO: 8.39 K/UL — SIGNIFICANT CHANGE UP (ref 3.8–10.5)

## 2022-09-16 PROCEDURE — 99233 SBSQ HOSP IP/OBS HIGH 50: CPT

## 2022-09-16 PROCEDURE — 99232 SBSQ HOSP IP/OBS MODERATE 35: CPT | Mod: GC

## 2022-09-16 RX ORDER — SODIUM CHLORIDE 5 G/100ML
1000 INJECTION, SOLUTION INTRAVENOUS
Refills: 0 | Status: COMPLETED | OUTPATIENT
Start: 2022-09-16 | End: 2022-09-16

## 2022-09-16 RX ADMIN — SIMVASTATIN 20 MILLIGRAM(S): 20 TABLET, FILM COATED ORAL at 22:15

## 2022-09-16 RX ADMIN — SENNA PLUS 2 TABLET(S): 8.6 TABLET ORAL at 22:15

## 2022-09-16 RX ADMIN — SODIUM CHLORIDE 1 GRAM(S): 9 INJECTION INTRAMUSCULAR; INTRAVENOUS; SUBCUTANEOUS at 22:15

## 2022-09-16 RX ADMIN — SODIUM CHLORIDE 1 GRAM(S): 9 INJECTION INTRAMUSCULAR; INTRAVENOUS; SUBCUTANEOUS at 14:00

## 2022-09-16 RX ADMIN — TAMSULOSIN HYDROCHLORIDE 0.4 MILLIGRAM(S): 0.4 CAPSULE ORAL at 22:15

## 2022-09-16 RX ADMIN — Medication 50 MILLIGRAM(S): at 22:15

## 2022-09-16 RX ADMIN — MUPIROCIN 1 APPLICATION(S): 20 OINTMENT TOPICAL at 05:20

## 2022-09-16 RX ADMIN — Medication 50 MILLIGRAM(S): at 14:00

## 2022-09-16 RX ADMIN — SODIUM CHLORIDE 1 GRAM(S): 9 INJECTION INTRAMUSCULAR; INTRAVENOUS; SUBCUTANEOUS at 05:19

## 2022-09-16 RX ADMIN — Medication 112 MICROGRAM(S): at 05:19

## 2022-09-16 RX ADMIN — LOSARTAN POTASSIUM 100 MILLIGRAM(S): 100 TABLET, FILM COATED ORAL at 05:19

## 2022-09-16 RX ADMIN — SODIUM CHLORIDE 30 MILLILITER(S): 5 INJECTION, SOLUTION INTRAVENOUS at 20:11

## 2022-09-16 RX ADMIN — Medication 50 MILLIGRAM(S): at 05:20

## 2022-09-16 RX ADMIN — Medication 1 PATCH: at 10:01

## 2022-09-16 RX ADMIN — CHLORHEXIDINE GLUCONATE 1 APPLICATION(S): 213 SOLUTION TOPICAL at 05:20

## 2022-09-16 RX ADMIN — Medication 1 PATCH: at 12:18

## 2022-09-16 RX ADMIN — Medication 1 PATCH: at 17:01

## 2022-09-16 RX ADMIN — Medication 1 PATCH: at 12:19

## 2022-09-16 RX ADMIN — MUPIROCIN 1 APPLICATION(S): 20 OINTMENT TOPICAL at 17:01

## 2022-09-16 RX ADMIN — Medication 15 GRAM(S): at 05:19

## 2022-09-16 RX ADMIN — SODIUM CHLORIDE 30 MILLILITER(S): 5 INJECTION, SOLUTION INTRAVENOUS at 17:02

## 2022-09-16 NOTE — PROGRESS NOTE ADULT - ATTENDING COMMENTS
Pt with euvolemic hyponatremia.   Slowly pushing up Na    Continue salt tabs. Recommend to administer 2% hypertonic saline at 30 cc/hr for a total of 4 hours today. HTS required only bc of very highurine osmolalities for more predictable correction.   Na 121 - 125 - 127 - 129  As Na goes above 130 will stop HTS infusions

## 2022-09-16 NOTE — PROGRESS NOTE ADULT - ASSESSMENT
77 year old male, with PMH of HTN, HLD, BPH, CKD (creat 1.42 today), chronic b/l hydronephrosis (x2 years), prostate ca (radiation 10 years ago), seizure disorder ( last seizure 2015), ETOH use ( sober since 2016), hypothyroidism, and mild demenia; presents to [facility] on 9/3/2022 with chief complaint of SOB and CP x3 days. Noted to have large pericardial effusion and transferred to [facility] for pericardiocentesis. S/p pericardiocentesis and downgraded to medicine service for further management.

## 2022-09-16 NOTE — PROGRESS NOTE ADULT - SUBJECTIVE AND OBJECTIVE BOX
[facility] DIVISION OF KIDNEY DISEASES AND HYPERTENSION   FOLLOW UP NOTE    --------------------------------------------------------------------------------  Chief Complaint: Hyponatremia    24 hour events/subjective: Pt. was seen and examined today. Overnight events  noted. SNa improved to 129 today. Pt recieved HTS on 9/15/22.        PAST HISTORY  --------------------------------------------------------------------------------  No significant changes to PMH, PSH, FHx, SHx, unless otherwise noted    ALLERGIES & MEDICATIONS  --------------------------------------------------------------------------------  Allergies    No Known Allergies    Intolerances      Standing Inpatient Medications  chlorhexidine 2% Cloths 1 Application(s) Topical <User Schedule>  hydrALAZINE 50 milliGRAM(s) Oral three times a day  levothyroxine 112 MICROGram(s) Oral daily  losartan 100 milliGRAM(s) Oral daily  mupirocin 2% Nasal 1 Application(s) Both Nostrils two times a day  nicotine -  14 mG/24Hr(s) Patch 1 Patch Transdermal daily  senna 2 Tablet(s) Oral at bedtime  simvastatin 20 milliGRAM(s) Oral at bedtime  sodium chloride 1 Gram(s) Oral three times a day  tamsulosin 0.4 milliGRAM(s) Oral at bedtime  urea Oral Powder 15 Gram(s) Oral two times a day    PRN Inpatient Medications  albuterol/ipratropium for Nebulization 3 milliLiter(s) Nebulizer every 6 hours PRN  simethicone 80 milliGRAM(s) Chew two times a day PRN      REVIEW OF SYSTEMS  --------------------------------------------------------------------------------  Gen: No fevers/chills  Head/Eyes/Ears: No HA   Respiratory: No dyspnea, cough  CV: No chest pain  GI: No abdominal pain, diarrhea  : No dysuria, hematuria  MSK: No  edema  Skin: No rashes  Heme: No easy bruising or bleeding    All other systems were reviewed and are negative, except as noted.    VITALS/PHYSICAL EXAM  --------------------------------------------------------------------------------  T(C): 36.4 (09-16-22 @ 04:00), Max: 36.6 (09-15-22 @ 12:22)  HR: 72 (09-16-22 @ 04:00) (54 - 72)  BP: 160/87 (09-16-22 @ 04:00) (129/72 - 160/87)  RR: 18 (09-16-22 @ 04:00) (18 - 18)  SpO2: 97% (09-16-22 @ 04:00) (97% - 98%)  Wt(kg): --      09-15-22 @ 07:01  -  09-16-22 @ 07:00  --------------------------------------------------------  IN: 990 mL / OUT: 201 mL / NET: 789 mL      Physical Exam:  	Gen: NAD  	HEENT: Anicteric  	Pulm: CTA B/L  	CV: S1S2+  	Abd: Soft, +BS           Transplant site: RLQ non tender, well healed surgical scar+  	Ext: No LE edema B/L  	Neuro: Awake          :Moser cath+ with clear urine  	Skin: Warm and dry  	Dialysis access:      LABS/STUDIES  --------------------------------------------------------------------------------              10.2   8.39  >-----------<  237      [09-16-22 @ 05:52]              31.1     129  |  97  |  59  ----------------------------<  80      [09-16-22 @ 05:52]  4.7   |  24  |  1.46        Ca     9.2     [09-16-22 @ 05:52]      Mg     2.2     [09-14-22 @ 11:47]      Phos  2.5     [09-14-22 @ 11:47]    Creatinine Trend:  SCr 1.46 [09-16 @ 05:52]  SCr 1.40 [09-15 @ 06:41]  SCr 1.41 [09-14 @ 23:35]  SCr 1.36 [09-14 @ 11:47]  SCr 1.44 [09-13 @ 05:14]    Urinalysis - [09-09-22 @ 17:48]      Color Yellow / Appearance Clear / SG 1.021 / pH 6.0      Gluc Negative / Ketone Trace  / Bili Negative / Urobili Negative       Blood Negative / Protein Trace / Leuk Est Small / Nitrite Negative      RBC 1 / WBC 11 / Hyaline 1 / Gran  / Sq Epi  / Non Sq Epi 4 / Bacteria Negative    Urine Creatinine 43      [09-15-22 @ 14:33]  Urine Sodium 43      [09-15-22 @ 14:33]  Urine Urea Nitrogen 1294      [09-15-22 @ 14:33]  Urine Osmolality 575      [09-15-22 @ 14:33]

## 2022-09-16 NOTE — PROGRESS NOTE ADULT - PROBLEM SELECTOR PLAN 3
- patient was noted to have an episode of BRBPR as per nursing, no known episodes noted previously   - Monitor Hg  - will hold heparin SQ for now  - Defer endoscopic evaluation as does not seem significant

## 2022-09-16 NOTE — PROGRESS NOTE ADULT - SUBJECTIVE AND OBJECTIVE BOX
Patient is a 77y old  Male who presents with a chief complaint of Pericardial Effusion (14 Sep 2022 16:28)      SUBJECTIVE / OVERNIGHT EVENTS: Patient seen and examined at bedside. No acute events overnight. +BM, +Flatus. Tolerating diet. No CP/SOB    MEDICATIONS  (STANDING):  chlorhexidine 2% Cloths 1 Application(s) Topical <User Schedule>  hydrALAZINE 50 milliGRAM(s) Oral three times a day  levothyroxine 112 MICROGram(s) Oral daily  losartan 100 milliGRAM(s) Oral daily  mupirocin 2% Nasal 1 Application(s) Both Nostrils two times a day  nicotine -  14 mG/24Hr(s) Patch 1 Patch Transdermal daily  senna 2 Tablet(s) Oral at bedtime  simvastatin 20 milliGRAM(s) Oral at bedtime  sodium chloride 1 Gram(s) Oral three times a day  tamsulosin 0.4 milliGRAM(s) Oral at bedtime  urea Oral Powder 15 Gram(s) Oral two times a day    MEDICATIONS  (PRN):  albuterol/ipratropium for Nebulization 3 milliLiter(s) Nebulizer every 6 hours PRN Shortness of Breath and/or Wheezing  simethicone 80 milliGRAM(s) Chew two times a day PRN Gas      CAPILLARY BLOOD GLUCOSE        I&O's Summary    15 Sep 2022 07:01  -  16 Sep 2022 07:00  --------------------------------------------------------  IN: 990 mL / OUT: 201 mL / NET: 789 mL        PHYSICAL EXAM:  Vital Signs Last 24 Hrs  T(C): 36.4 (16 Sep 2022 11:26), Max: 36.4 (16 Sep 2022 04:00)  T(F): 97.5 (16 Sep 2022 11:26), Max: 97.5 (16 Sep 2022 04:00)  HR: 60 (16 Sep 2022 11:26) (59 - 72)  BP: 160/78 (16 Sep 2022 11:26) (134/66 - 160/87)  BP(mean): --  RR: 18 (16 Sep 2022 11:26) (18 - 18)  SpO2: 99% (16 Sep 2022 11:26) (97% - 99%)    Parameters below as of 16 Sep 2022 11:26  Patient On (Oxygen Delivery Method): room air    GEN: male in NAD, appears comfortable, no diaphoresis  EYES: No scleral injection, PERRL, EOMI  ENTM: neck supple & symmetric without tracheal deviation, moist membranes, no gross hearing impairment, thyroid gland not enlarged  CV: +S1/S2, no m/r/g, no abdominal bruit, no LE edema  RESP: breathing comfortably, no respiratory accessory muscle use, CTAB, no w/r/r  GI: normoactive BS, soft, NT, no rebounding/guarding, no palpable masses, +distended    LABS:                        10.2   8.39  )-----------( 237      ( 16 Sep 2022 05:52 )             31.1     09-16    129<L>  |  97  |  59<H>  ----------------------------<  80  4.7   |  24  |  1.46<H>    Ca    9.2      16 Sep 2022 05:52                  RADIOLOGY & ADDITIONAL TESTS:  Results Reviewed:   Imaging Personally Reviewed:  Electrocardiogram Personally Reviewed:    COORDINATION OF CARE:  Care Discussed with Consultants/Other Providers [Y/N]:  Prior or Outpatient Records Reviewed [Y/N]:

## 2022-09-16 NOTE — PROGRESS NOTE ADULT - PROBLEM SELECTOR PLAN 11
CT shows mild bilateral hydroureteronephrosis, has hx of chronic hydronephrosis  - check bladder scan to r/o retention    CT also showed a compression deformity of the L4 superior endplate, new since   1/9/2018    - PT consult --> ROS @ [facility]

## 2022-09-16 NOTE — PROGRESS NOTE ADULT - PROBLEM SELECTOR PLAN 2
- patient noted to have hyponatremia during admission in the setting of pericardial effusion and significant hypothyroidism  - Improving  - Continue with Urena  - continue fluid restrictions  - nephro following, f/u recs  - monitor BMP daily  - Salt tab 1 g TID

## 2022-09-16 NOTE — PROGRESS NOTE ADULT - PROBLEM SELECTOR PLAN 6
- continue with synthroid 112 mcg   - repeat TSH in 6-8 weeks  - Per prior documentation likely non-compliant

## 2022-09-16 NOTE — PROGRESS NOTE ADULT - PROBLEM SELECTOR PLAN 4
- patient presented due to pericardial effusion  - s/p pericardiocentesis 9/7 with drain placed and now removed   - no effusion noted on limited ECHO from 9/11  - Pending AFB and fungal culture

## 2022-09-16 NOTE — PROGRESS NOTE ADULT - REASON FOR ADMISSION
Pericardial Effusion
Pericardial Effusion
Tamponade
Pericardial effusion
Tamponade
Tamponade
pericardial effusion
tamponade
pericardial effusion

## 2022-09-16 NOTE — PROGRESS NOTE ADULT - PROBLEM SELECTOR PLAN 2
Pt with h/o stage 3 CKD in the setting of chronic BL hydronephrosis. Upon review of [facility], baseline SCr is ~1.4-1.6. SCr is currently 1.46. Renal US showing interval improvement in BL hydronephrosis. UA with trace proteinuria and trace LE. Monitor labs and urine output. Avoid nephrotoxins. Dose medications as per eGFR.

## 2022-09-17 LAB
ANION GAP SERPL CALC-SCNC: 9 MMOL/L — SIGNIFICANT CHANGE UP (ref 5–17)
BUN SERPL-MCNC: 48 MG/DL — HIGH (ref 7–23)
CALCIUM SERPL-MCNC: 8.9 MG/DL — SIGNIFICANT CHANGE UP (ref 8.4–10.5)
CHLORIDE SERPL-SCNC: 100 MMOL/L — SIGNIFICANT CHANGE UP (ref 96–108)
CO2 SERPL-SCNC: 24 MMOL/L — SIGNIFICANT CHANGE UP (ref 22–31)
CREAT SERPL-MCNC: 1.42 MG/DL — HIGH (ref 0.5–1.3)
EGFR: 50 ML/MIN/1.73M2 — LOW
GLUCOSE SERPL-MCNC: 74 MG/DL — SIGNIFICANT CHANGE UP (ref 70–99)
HCT VFR BLD CALC: 31.2 % — LOW (ref 39–50)
HGB BLD-MCNC: 10.1 G/DL — LOW (ref 13–17)
MCHC RBC-ENTMCNC: 30 PG — SIGNIFICANT CHANGE UP (ref 27–34)
MCHC RBC-ENTMCNC: 32.4 GM/DL — SIGNIFICANT CHANGE UP (ref 32–36)
MCV RBC AUTO: 92.6 FL — SIGNIFICANT CHANGE UP (ref 80–100)
NRBC # BLD: 0 /100 WBCS — SIGNIFICANT CHANGE UP (ref 0–0)
OSMOLALITY SERPL: 284 MOSMOL/KG — SIGNIFICANT CHANGE UP (ref 280–301)
PLATELET # BLD AUTO: 246 K/UL — SIGNIFICANT CHANGE UP (ref 150–400)
POTASSIUM SERPL-MCNC: 4.5 MMOL/L — SIGNIFICANT CHANGE UP (ref 3.5–5.3)
POTASSIUM SERPL-SCNC: 4.5 MMOL/L — SIGNIFICANT CHANGE UP (ref 3.5–5.3)
RBC # BLD: 3.37 M/UL — LOW (ref 4.2–5.8)
RBC # FLD: 17.6 % — HIGH (ref 10.3–14.5)
SODIUM SERPL-SCNC: 133 MMOL/L — LOW (ref 135–145)
WBC # BLD: 7.51 K/UL — SIGNIFICANT CHANGE UP (ref 3.8–10.5)
WBC # FLD AUTO: 7.51 K/UL — SIGNIFICANT CHANGE UP (ref 3.8–10.5)

## 2022-09-17 PROCEDURE — 99232 SBSQ HOSP IP/OBS MODERATE 35: CPT

## 2022-09-17 RX ADMIN — Medication 1 PATCH: at 19:54

## 2022-09-17 RX ADMIN — Medication 112 MICROGRAM(S): at 05:52

## 2022-09-17 RX ADMIN — Medication 1 PATCH: at 06:47

## 2022-09-17 RX ADMIN — LOSARTAN POTASSIUM 100 MILLIGRAM(S): 100 TABLET, FILM COATED ORAL at 05:52

## 2022-09-17 RX ADMIN — SODIUM CHLORIDE 1 GRAM(S): 9 INJECTION INTRAMUSCULAR; INTRAVENOUS; SUBCUTANEOUS at 21:28

## 2022-09-17 RX ADMIN — Medication 50 MILLIGRAM(S): at 21:29

## 2022-09-17 RX ADMIN — SODIUM CHLORIDE 1 GRAM(S): 9 INJECTION INTRAMUSCULAR; INTRAVENOUS; SUBCUTANEOUS at 05:52

## 2022-09-17 RX ADMIN — Medication 1 PATCH: at 12:23

## 2022-09-17 RX ADMIN — SODIUM CHLORIDE 1 GRAM(S): 9 INJECTION INTRAMUSCULAR; INTRAVENOUS; SUBCUTANEOUS at 14:10

## 2022-09-17 RX ADMIN — Medication 1 PATCH: at 12:24

## 2022-09-17 RX ADMIN — MUPIROCIN 1 APPLICATION(S): 20 OINTMENT TOPICAL at 05:53

## 2022-09-17 RX ADMIN — SIMVASTATIN 20 MILLIGRAM(S): 20 TABLET, FILM COATED ORAL at 21:28

## 2022-09-17 RX ADMIN — Medication 50 MILLIGRAM(S): at 14:11

## 2022-09-17 RX ADMIN — CHLORHEXIDINE GLUCONATE 1 APPLICATION(S): 213 SOLUTION TOPICAL at 05:53

## 2022-09-17 RX ADMIN — Medication 50 MILLIGRAM(S): at 05:53

## 2022-09-17 RX ADMIN — TAMSULOSIN HYDROCHLORIDE 0.4 MILLIGRAM(S): 0.4 CAPSULE ORAL at 21:28

## 2022-09-17 NOTE — PROGRESS NOTE ADULT - PROBLEM SELECTOR PLAN 3
- patient was noted to have an episode of BRBPR as per nursing, no known episodes noted previously   - Monitor H&H  - will hold heparin SQ for now  - Defer endoscopic evaluation as does not seem significant

## 2022-09-17 NOTE — PROGRESS NOTE ADULT - SUBJECTIVE AND OBJECTIVE BOX
INCOMPLETE NOTE    Alejandro Adams M.D.  Division of Hospital Medicine  Available on TEAMS    Patient is a 77y old  Male who presents with a chief complaint of pericardial effusion (16 Sep 2022 13:07)    SUBJECTIVE / OVERNIGHT EVENTS: Patient seen and examined. No acute overnight events, no subjective complaints.    OBJECTIVE:  Vital Signs Last 24 Hrs  T(F): 98.2 (17 Sep 2022 04:00), Max: 98.2 (17 Sep 2022 04:00)  HR: 68 (17 Sep 2022 04:00) (60 - 68)  BP: 154/78 (17 Sep 2022 04:00) (131/63 - 160/78)  RR: 18 (17 Sep 2022 04:00) (18 - 19)  SpO2: 99% (17 Sep 2022 04:00) (99% - 99%)    Parameters below as of 17 Sep 2022 04:00  Patient On (Oxygen Delivery Method): room air    I&O's Summary  16 Sep 2022 07:01  -  17 Sep 2022 07:00  --------------------------------------------------------  IN: 940 mL / OUT: 0 mL / NET: 940 mL    Physical Examination:  GEN: thin man, laying in stretcher in NAD  PSYCH: A&Ox3, mood and affect appear appropriate   SKIN: intact, no e/o rash  NEURO: no focal neurologic deficits appreciated; CN II-XII intact, sensation intact B/L, motor 5/5 in all mm groups  EYES: PERRL, anicteric, EOMI, no vertical/horizontal nystagmus  HEAD: NC, AT  NECK: supple  RESPI: no accessory muscle use, B/L air entry, CTAB   CARDIO: regular rate/rhythm, no LE edema B/L  ABD: soft, NT, ND, +BS  EXT: patient able to move all extremities spontaneously  VASC: peripheral pulses palpated    Labs:                      10.1   7.51  )-----------( 246      ( 17 Sep 2022 07:23 )             31.2     09-17  133<L>  |  100  |  48<H>  ----------------------------<  74  4.5   |  24  |  1.42<H>    Ca    8.9      17 Sep 2022 07:18    Consultant(s) Notes Reviewed: Nephro, Gen Surg  Care Discussed with Consultants/Other Providers: KARRI Santana    MEDICATIONS  (STANDING):  chlorhexidine 2% Cloths 1 Application(s) Topical <User Schedule>  hydrALAZINE 50 milliGRAM(s) Oral three times a day  levothyroxine 112 MICROGram(s) Oral daily  losartan 100 milliGRAM(s) Oral daily  mupirocin 2% Nasal 1 Application(s) Both Nostrils two times a day  nicotine -  14 mG/24Hr(s) Patch 1 Patch Transdermal daily  senna 2 Tablet(s) Oral at bedtime  simvastatin 20 milliGRAM(s) Oral at bedtime  sodium chloride 1 Gram(s) Oral three times a day  tamsulosin 0.4 milliGRAM(s) Oral at bedtime    MEDICATIONS  (PRN):  albuterol/ipratropium for Nebulization 3 milliLiter(s) Nebulizer every 6 hours PRN Shortness of Breath and/or Wheezing  simethicone 80 milliGRAM(s) Chew two times a day PRN Gas Alejandro Adams M.D.  Division of Hospital Medicine  Available on TEAMS    Patient is a 77y old  Male who presents with a chief complaint of pericardial effusion (16 Sep 2022 13:07)    SUBJECTIVE / OVERNIGHT EVENTS: Patient seen and examined. No acute overnight events, no subjective complaints.    OBJECTIVE:  Vital Signs Last 24 Hrs  T(F): 98.2 (17 Sep 2022 04:00), Max: 98.2 (17 Sep 2022 04:00)  HR: 68 (17 Sep 2022 04:00) (60 - 68)  BP: 154/78 (17 Sep 2022 04:00) (131/63 - 160/78)  RR: 18 (17 Sep 2022 04:00) (18 - 19)  SpO2: 99% (17 Sep 2022 04:00) (99% - 99%)    Parameters below as of 17 Sep 2022 04:00  Patient On (Oxygen Delivery Method): room air    I&O's Summary  16 Sep 2022 07:01  -  17 Sep 2022 07:00  --------------------------------------------------------  IN: 940 mL / OUT: 0 mL / NET: 940 mL    Physical Examination:  GEN: elderly man, laying in bed in NAD  PSYCH: A&Ox3, mood and affect appear appropriate   NEURO: no focal neurologic deficits appreciated  NECK: supple, no e/o elevated JVP  RESPI: no accessory muscle use, B/L air entry, CTAB   CARDIO: regular rate/rhythm, no LE edema B/L  ABD: soft, NT, ND  EXT: patient able to move all extremities spontaneously  VASC: peripheral pulses palpated    Labs:                      10.1   7.51  )-----------( 246      ( 17 Sep 2022 07:23 )             31.2     09-17  133<L>  |  100  |  48<H>  ----------------------------<  74  4.5   |  24  |  1.42<H>    Ca    8.9      17 Sep 2022 07:18    Consultant(s) Notes Reviewed: Nephro, Gen Surg  Care Discussed with Consultants/Other Providers: KARRI Santana    MEDICATIONS  (STANDING):  chlorhexidine 2% Cloths 1 Application(s) Topical <User Schedule>  hydrALAZINE 50 milliGRAM(s) Oral three times a day  levothyroxine 112 MICROGram(s) Oral daily  losartan 100 milliGRAM(s) Oral daily  mupirocin 2% Nasal 1 Application(s) Both Nostrils two times a day  nicotine -  14 mG/24Hr(s) Patch 1 Patch Transdermal daily  senna 2 Tablet(s) Oral at bedtime  simvastatin 20 milliGRAM(s) Oral at bedtime  sodium chloride 1 Gram(s) Oral three times a day  tamsulosin 0.4 milliGRAM(s) Oral at bedtime    MEDICATIONS  (PRN):  albuterol/ipratropium for Nebulization 3 milliLiter(s) Nebulizer every 6 hours PRN Shortness of Breath and/or Wheezing  simethicone 80 milliGRAM(s) Chew two times a day PRN Gas

## 2022-09-17 NOTE — PROGRESS NOTE ADULT - PROBLEM SELECTOR PLAN 2
- Patient noted to have hyponatremia during admission in the setting of pericardial effusion and significant hypothyroidism  - Improved  - nephro following, f/u recs (s/p HTS)  - monitor BMP daily  - Salt tab 1g TID -- will discuss w/ nephro whether this should be continued on D/C

## 2022-09-17 NOTE — PROGRESS NOTE ADULT - ASSESSMENT
77yoM w/ PMHx of HTN, HLD, BPH, CKD (creat 1.42 today), chronic b/l hydronephrosis (x2 years), prostate ca (radiation 10 years ago), seizure disorder ( last seizure 2015), ETOH use ( sober since 2016), hypothyroidism, and mild demenia; presents to [facility] on 9/3/2022 with chief complaint of SOB and CP x3 days. Noted to have large pericardial effusion and transferred to [facility] for pericardiocentesis. S/p pericardiocentesis and downgraded to medicine service for further management including for management of hyponatremia, which has improved. Dispo planning to [facility].

## 2022-09-18 LAB
ANION GAP SERPL CALC-SCNC: 10 MMOL/L — SIGNIFICANT CHANGE UP (ref 5–17)
BUN SERPL-MCNC: 31 MG/DL — HIGH (ref 7–23)
CALCIUM SERPL-MCNC: 8.6 MG/DL — SIGNIFICANT CHANGE UP (ref 8.4–10.5)
CHLORIDE SERPL-SCNC: 101 MMOL/L — SIGNIFICANT CHANGE UP (ref 96–108)
CO2 SERPL-SCNC: 22 MMOL/L — SIGNIFICANT CHANGE UP (ref 22–31)
CREAT SERPL-MCNC: 1.33 MG/DL — HIGH (ref 0.5–1.3)
EGFR: 54 ML/MIN/1.73M2 — LOW
GLUCOSE SERPL-MCNC: 77 MG/DL — SIGNIFICANT CHANGE UP (ref 70–99)
HCT VFR BLD CALC: 29.7 % — LOW (ref 39–50)
HGB BLD-MCNC: 9.6 G/DL — LOW (ref 13–17)
MAGNESIUM SERPL-MCNC: 2 MG/DL — SIGNIFICANT CHANGE UP (ref 1.6–2.6)
MCHC RBC-ENTMCNC: 30.3 PG — SIGNIFICANT CHANGE UP (ref 27–34)
MCHC RBC-ENTMCNC: 32.3 GM/DL — SIGNIFICANT CHANGE UP (ref 32–36)
MCV RBC AUTO: 93.7 FL — SIGNIFICANT CHANGE UP (ref 80–100)
NRBC # BLD: 0 /100 WBCS — SIGNIFICANT CHANGE UP (ref 0–0)
PLATELET # BLD AUTO: 235 K/UL — SIGNIFICANT CHANGE UP (ref 150–400)
POTASSIUM SERPL-MCNC: 4.2 MMOL/L — SIGNIFICANT CHANGE UP (ref 3.5–5.3)
POTASSIUM SERPL-SCNC: 4.2 MMOL/L — SIGNIFICANT CHANGE UP (ref 3.5–5.3)
RBC # BLD: 3.17 M/UL — LOW (ref 4.2–5.8)
RBC # FLD: 17.6 % — HIGH (ref 10.3–14.5)
SARS-COV-2 RNA SPEC QL NAA+PROBE: SIGNIFICANT CHANGE UP
SODIUM SERPL-SCNC: 133 MMOL/L — LOW (ref 135–145)
WBC # BLD: 12.14 K/UL — HIGH (ref 3.8–10.5)
WBC # FLD AUTO: 12.14 K/UL — HIGH (ref 3.8–10.5)

## 2022-09-18 PROCEDURE — 99232 SBSQ HOSP IP/OBS MODERATE 35: CPT

## 2022-09-18 RX ADMIN — Medication 1 PATCH: at 13:56

## 2022-09-18 RX ADMIN — Medication 1 PATCH: at 19:29

## 2022-09-18 RX ADMIN — Medication 1 PATCH: at 07:26

## 2022-09-18 RX ADMIN — MUPIROCIN 1 APPLICATION(S): 20 OINTMENT TOPICAL at 05:57

## 2022-09-18 RX ADMIN — MUPIROCIN 1 APPLICATION(S): 20 OINTMENT TOPICAL at 17:26

## 2022-09-18 RX ADMIN — Medication 112 MICROGRAM(S): at 05:55

## 2022-09-18 RX ADMIN — Medication 50 MILLIGRAM(S): at 22:32

## 2022-09-18 RX ADMIN — Medication 1 PATCH: at 12:30

## 2022-09-18 RX ADMIN — Medication 50 MILLIGRAM(S): at 05:55

## 2022-09-18 RX ADMIN — CHLORHEXIDINE GLUCONATE 1 APPLICATION(S): 213 SOLUTION TOPICAL at 05:57

## 2022-09-18 RX ADMIN — TAMSULOSIN HYDROCHLORIDE 0.4 MILLIGRAM(S): 0.4 CAPSULE ORAL at 22:32

## 2022-09-18 RX ADMIN — SODIUM CHLORIDE 1 GRAM(S): 9 INJECTION INTRAMUSCULAR; INTRAVENOUS; SUBCUTANEOUS at 05:55

## 2022-09-18 RX ADMIN — SODIUM CHLORIDE 1 GRAM(S): 9 INJECTION INTRAMUSCULAR; INTRAVENOUS; SUBCUTANEOUS at 14:00

## 2022-09-18 RX ADMIN — SIMVASTATIN 20 MILLIGRAM(S): 20 TABLET, FILM COATED ORAL at 22:32

## 2022-09-18 RX ADMIN — SODIUM CHLORIDE 1 GRAM(S): 9 INJECTION INTRAMUSCULAR; INTRAVENOUS; SUBCUTANEOUS at 22:32

## 2022-09-18 RX ADMIN — Medication 50 MILLIGRAM(S): at 13:58

## 2022-09-18 RX ADMIN — LOSARTAN POTASSIUM 100 MILLIGRAM(S): 100 TABLET, FILM COATED ORAL at 05:54

## 2022-09-18 NOTE — PROGRESS NOTE ADULT - ASSESSMENT
77yoM w/ PMHx of HTN, HLD, BPH, CKD (creat 1.42 today), chronic b/l hydronephrosis (x2 years), prostate ca (radiation 10 years ago), seizure disorder ( last seizure 2015), ETOH use ( sober since 2016), hypothyroidism, and mild demenia; presents to [facility] on 9/3/2022 with chief complaint of SOB and CP x3 days. Noted to have large pericardial effusion and transferred to [facility] for pericardiocentesis. S/p pericardiocentesis and downgraded to medicine service for further management including for management of hyponatremia, which has improved. Dispo planning to [facility].

## 2022-09-18 NOTE — PROGRESS NOTE ADULT - SUBJECTIVE AND OBJECTIVE BOX
INCOMPLETE NOTE    Alejandro Adams M.D.  Division of Hospital Medicine  Available on TEAMS    Patient is a 77y old  Male who presents with a chief complaint of pericardial effusion (16 Sep 2022 13:07)    SUBJECTIVE / OVERNIGHT EVENTS: Patient seen and examined. No acute overnight events, no subjective complaints.    OBJECTIVE:  Vital Signs Last 24 Hrs  T(F): 97.9 (18 Sep 2022 04:58), Max: 98.1 (17 Sep 2022 12:16)  HR: 65 (18 Sep 2022 04:58) (60 - 65)  BP: 147/79 (18 Sep 2022 04:58) (145/76 - 158/82)  RR: 19 (18 Sep 2022 04:58) (18 - 19)  SpO2: 97% (18 Sep 2022 04:58) (96% - 98%)    Parameters below as of 18 Sep 2022 04:58  Patient On (Oxygen Delivery Method): room air    Physical Examination:  GEN: elderly man, laying in bed in NAD  PSYCH: A&Ox3, mood and affect appear appropriate   NEURO: no focal neurologic deficits appreciated  NECK: supple, no e/o elevated JVP  RESPI: no accessory muscle use, B/L air entry, CTAB   CARDIO: regular rate/rhythm, no LE edema B/L  ABD: soft, NT, ND  EXT: patient able to move all extremities spontaneously  VASC: peripheral pulses palpated    Labs:                     9.6    12.14 )-----------( 235      ( 18 Sep 2022 07:25 )             29.7     09-18    133<L>  |  101  |  31<H>  ----------------------------<  77  4.2   |  22  |  1.33<H>    Ca    8.6      18 Sep 2022 07:24  Mg     2.0     09-18    Care Discussed with Consultants/Other Providers: KARRI Santana    MEDICATIONS  (STANDING):  chlorhexidine 2% Cloths 1 Application(s) Topical <User Schedule>  hydrALAZINE 50 milliGRAM(s) Oral three times a day  levothyroxine 112 MICROGram(s) Oral daily  losartan 100 milliGRAM(s) Oral daily  mupirocin 2% Nasal 1 Application(s) Both Nostrils two times a day  nicotine -  14 mG/24Hr(s) Patch 1 Patch Transdermal daily  senna 2 Tablet(s) Oral at bedtime  simvastatin 20 milliGRAM(s) Oral at bedtime  sodium chloride 1 Gram(s) Oral three times a day  tamsulosin 0.4 milliGRAM(s) Oral at bedtime    MEDICATIONS  (PRN):  albuterol/ipratropium for Nebulization 3 milliLiter(s) Nebulizer every 6 hours PRN Shortness of Breath and/or Wheezing  simethicone 80 milliGRAM(s) Chew two times a day PRN Gas Alejandro Adams M.D.  Division of Hospital Medicine  Available on TEAMS    Patient is a 77y old  Male who presents with a chief complaint of pericardial effusion (16 Sep 2022 13:07)    SUBJECTIVE / OVERNIGHT EVENTS: Patient seen and examined. No acute overnight events, no subjective complaints.    OBJECTIVE:  Vital Signs Last 24 Hrs  T(F): 97.9 (18 Sep 2022 04:58), Max: 98.1 (17 Sep 2022 12:16)  HR: 65 (18 Sep 2022 04:58) (60 - 65)  BP: 147/79 (18 Sep 2022 04:58) (145/76 - 158/82)  RR: 19 (18 Sep 2022 04:58) (18 - 19)  SpO2: 97% (18 Sep 2022 04:58) (96% - 98%)    Parameters below as of 18 Sep 2022 04:58  Patient On (Oxygen Delivery Method): room air    Physical Examination:  GEN: elderly man, laying in bed in NAD  PSYCH: A&Ox3, mood and affect appear appropriate   NEURO: no focal neurologic deficits appreciated  NECK: supple, no e/o elevated JVP  RESPI: no accessory muscle use, B/L air entry, CTAB   CARDIO: regular rate/rhythm, no LE edema B/L  ABD: soft, NT, ND  EXT: patient able to move all extremities spontaneously  VASC: peripheral pulses palpated    Labs:                     9.6    12.14 )-----------( 235      ( 18 Sep 2022 07:25 )             29.7     09-18    133<L>  |  101  |  31<H>  ----------------------------<  77  4.2   |  22  |  1.33<H>    Ca    8.6      18 Sep 2022 07:24  Mg     2.0     09-18    Care Discussed with Consultants/Other Providers: KARRI Santana    MEDICATIONS  (STANDING):  chlorhexidine 2% Cloths 1 Application(s) Topical <User Schedule>  hydrALAZINE 50 milliGRAM(s) Oral three times a day  levothyroxine 112 MICROGram(s) Oral daily  losartan 100 milliGRAM(s) Oral daily  mupirocin 2% Nasal 1 Application(s) Both Nostrils two times a day  nicotine -  14 mG/24Hr(s) Patch 1 Patch Transdermal daily  senna 2 Tablet(s) Oral at bedtime  simvastatin 20 milliGRAM(s) Oral at bedtime  sodium chloride 1 Gram(s) Oral three times a day  tamsulosin 0.4 milliGRAM(s) Oral at bedtime    MEDICATIONS  (PRN):  albuterol/ipratropium for Nebulization 3 milliLiter(s) Nebulizer every 6 hours PRN Shortness of Breath and/or Wheezing  simethicone 80 milliGRAM(s) Chew two times a day PRN Gas

## 2022-09-19 LAB
ANION GAP SERPL CALC-SCNC: 10 MMOL/L — SIGNIFICANT CHANGE UP (ref 5–17)
BASOPHILS # BLD AUTO: 0.02 K/UL — SIGNIFICANT CHANGE UP (ref 0–0.2)
BASOPHILS NFR BLD AUTO: 0.2 % — SIGNIFICANT CHANGE UP (ref 0–2)
BUN SERPL-MCNC: 26 MG/DL — HIGH (ref 7–23)
CALCIUM SERPL-MCNC: 9 MG/DL — SIGNIFICANT CHANGE UP (ref 8.4–10.5)
CHLORIDE SERPL-SCNC: 99 MMOL/L — SIGNIFICANT CHANGE UP (ref 96–108)
CO2 SERPL-SCNC: 24 MMOL/L — SIGNIFICANT CHANGE UP (ref 22–31)
CREAT SERPL-MCNC: 1.41 MG/DL — HIGH (ref 0.5–1.3)
EGFR: 51 ML/MIN/1.73M2 — LOW
EOSINOPHIL # BLD AUTO: 0.04 K/UL — SIGNIFICANT CHANGE UP (ref 0–0.5)
EOSINOPHIL NFR BLD AUTO: 0.3 % — SIGNIFICANT CHANGE UP (ref 0–6)
GLUCOSE SERPL-MCNC: 72 MG/DL — SIGNIFICANT CHANGE UP (ref 70–99)
HCT VFR BLD CALC: 30.3 % — LOW (ref 39–50)
HGB BLD-MCNC: 9.9 G/DL — LOW (ref 13–17)
IMM GRANULOCYTES NFR BLD AUTO: 1 % — HIGH (ref 0–0.9)
LYMPHOCYTES # BLD AUTO: 0.68 K/UL — LOW (ref 1–3.3)
LYMPHOCYTES # BLD AUTO: 5.7 % — LOW (ref 13–44)
MCHC RBC-ENTMCNC: 29.7 PG — SIGNIFICANT CHANGE UP (ref 27–34)
MCHC RBC-ENTMCNC: 32.7 GM/DL — SIGNIFICANT CHANGE UP (ref 32–36)
MCV RBC AUTO: 91 FL — SIGNIFICANT CHANGE UP (ref 80–100)
MONOCYTES # BLD AUTO: 0.72 K/UL — SIGNIFICANT CHANGE UP (ref 0–0.9)
MONOCYTES NFR BLD AUTO: 6 % — SIGNIFICANT CHANGE UP (ref 2–14)
NEUTROPHILS # BLD AUTO: 10.37 K/UL — HIGH (ref 1.8–7.4)
NEUTROPHILS NFR BLD AUTO: 86.8 % — HIGH (ref 43–77)
NRBC # BLD: 0 /100 WBCS — SIGNIFICANT CHANGE UP (ref 0–0)
PLATELET # BLD AUTO: 223 K/UL — SIGNIFICANT CHANGE UP (ref 150–400)
POTASSIUM SERPL-MCNC: 4.2 MMOL/L — SIGNIFICANT CHANGE UP (ref 3.5–5.3)
POTASSIUM SERPL-SCNC: 4.2 MMOL/L — SIGNIFICANT CHANGE UP (ref 3.5–5.3)
RBC # BLD: 3.33 M/UL — LOW (ref 4.2–5.8)
RBC # FLD: 17.3 % — HIGH (ref 10.3–14.5)
SODIUM SERPL-SCNC: 133 MMOL/L — LOW (ref 135–145)
WBC # BLD: 11.95 K/UL — HIGH (ref 3.8–10.5)
WBC # FLD AUTO: 11.95 K/UL — HIGH (ref 3.8–10.5)

## 2022-09-19 PROCEDURE — 99232 SBSQ HOSP IP/OBS MODERATE 35: CPT

## 2022-09-19 RX ADMIN — SODIUM CHLORIDE 1 GRAM(S): 9 INJECTION INTRAMUSCULAR; INTRAVENOUS; SUBCUTANEOUS at 23:13

## 2022-09-19 RX ADMIN — CHLORHEXIDINE GLUCONATE 1 APPLICATION(S): 213 SOLUTION TOPICAL at 06:40

## 2022-09-19 RX ADMIN — SODIUM CHLORIDE 1 GRAM(S): 9 INJECTION INTRAMUSCULAR; INTRAVENOUS; SUBCUTANEOUS at 13:48

## 2022-09-19 RX ADMIN — Medication 1 PATCH: at 11:28

## 2022-09-19 RX ADMIN — Medication 50 MILLIGRAM(S): at 13:48

## 2022-09-19 RX ADMIN — TAMSULOSIN HYDROCHLORIDE 0.4 MILLIGRAM(S): 0.4 CAPSULE ORAL at 22:12

## 2022-09-19 RX ADMIN — SIMVASTATIN 20 MILLIGRAM(S): 20 TABLET, FILM COATED ORAL at 23:12

## 2022-09-19 RX ADMIN — Medication 1 PATCH: at 19:47

## 2022-09-19 RX ADMIN — MUPIROCIN 1 APPLICATION(S): 20 OINTMENT TOPICAL at 05:58

## 2022-09-19 RX ADMIN — Medication 50 MILLIGRAM(S): at 22:13

## 2022-09-19 RX ADMIN — Medication 1 PATCH: at 07:45

## 2022-09-19 RX ADMIN — Medication 50 MILLIGRAM(S): at 05:57

## 2022-09-19 RX ADMIN — LOSARTAN POTASSIUM 100 MILLIGRAM(S): 100 TABLET, FILM COATED ORAL at 05:58

## 2022-09-19 RX ADMIN — MUPIROCIN 1 APPLICATION(S): 20 OINTMENT TOPICAL at 17:15

## 2022-09-19 RX ADMIN — SODIUM CHLORIDE 1 GRAM(S): 9 INJECTION INTRAMUSCULAR; INTRAVENOUS; SUBCUTANEOUS at 05:57

## 2022-09-19 RX ADMIN — Medication 112 MICROGRAM(S): at 05:57

## 2022-09-19 RX ADMIN — Medication 1 PATCH: at 11:29

## 2022-09-19 NOTE — PROGRESS NOTE ADULT - ASSESSMENT
77yoM w/ PMHx of HTN, HLD, BPH, CKD (creat 1.42 today), chronic b/l hydronephrosis (x2 years), prostate ca (radiation 10 years ago), seizure disorder ( last seizure 2015), ETOH use ( sober since 2016), hypothyroidism, and mild demenia; presents to [facility] on 9/3/2022 with chief complaint of SOB and CP x3 days. Noted to have large pericardial effusion and transferred to [facility] for pericardiocentesis. S/p pericardiocentesis and downgraded to medicine service for further management including for management of hyponatremia, which has improved. Dispo planning to [facility].

## 2022-09-19 NOTE — PROGRESS NOTE ADULT - PROBLEM SELECTOR PLAN 2
- Patient noted to have hyponatremia during admission in the setting of pericardial effusion and significant hypothyroidism  - Improved  - nephro following, f/u recs (s/p HTS)  - monitor BMP daily  - Salt tab 1g TID -- will dc salt tabs upon discharge

## 2022-09-20 ENCOUNTER — TRANSCRIPTION ENCOUNTER (OUTPATIENT)
Age: 77
End: 2022-09-20

## 2022-09-20 VITALS
HEART RATE: 76 BPM | OXYGEN SATURATION: 98 % | DIASTOLIC BLOOD PRESSURE: 81 MMHG | TEMPERATURE: 98 F | SYSTOLIC BLOOD PRESSURE: 163 MMHG | RESPIRATION RATE: 18 BRPM

## 2022-09-20 LAB
ANION GAP SERPL CALC-SCNC: 7 MMOL/L — SIGNIFICANT CHANGE UP (ref 5–17)
BUN SERPL-MCNC: 24 MG/DL — HIGH (ref 7–23)
CALCIUM SERPL-MCNC: 8.2 MG/DL — LOW (ref 8.4–10.5)
CHLORIDE SERPL-SCNC: 102 MMOL/L — SIGNIFICANT CHANGE UP (ref 96–108)
CO2 SERPL-SCNC: 24 MMOL/L — SIGNIFICANT CHANGE UP (ref 22–31)
CREAT SERPL-MCNC: 1.37 MG/DL — HIGH (ref 0.5–1.3)
EGFR: 52 ML/MIN/1.73M2 — LOW
GLUCOSE SERPL-MCNC: 82 MG/DL — SIGNIFICANT CHANGE UP (ref 70–99)
HCT VFR BLD CALC: 26.7 % — LOW (ref 39–50)
HGB BLD-MCNC: 8.9 G/DL — LOW (ref 13–17)
MCHC RBC-ENTMCNC: 30.9 PG — SIGNIFICANT CHANGE UP (ref 27–34)
MCHC RBC-ENTMCNC: 33.3 GM/DL — SIGNIFICANT CHANGE UP (ref 32–36)
MCV RBC AUTO: 92.7 FL — SIGNIFICANT CHANGE UP (ref 80–100)
NRBC # BLD: 0 /100 WBCS — SIGNIFICANT CHANGE UP (ref 0–0)
PLATELET # BLD AUTO: 214 K/UL — SIGNIFICANT CHANGE UP (ref 150–400)
POTASSIUM SERPL-MCNC: 3.9 MMOL/L — SIGNIFICANT CHANGE UP (ref 3.5–5.3)
POTASSIUM SERPL-SCNC: 3.9 MMOL/L — SIGNIFICANT CHANGE UP (ref 3.5–5.3)
RBC # BLD: 2.88 M/UL — LOW (ref 4.2–5.8)
RBC # FLD: 17.5 % — HIGH (ref 10.3–14.5)
SODIUM SERPL-SCNC: 133 MMOL/L — LOW (ref 135–145)
WBC # BLD: 8.12 K/UL — SIGNIFICANT CHANGE UP (ref 3.8–10.5)
WBC # FLD AUTO: 8.12 K/UL — SIGNIFICANT CHANGE UP (ref 3.8–10.5)

## 2022-09-20 PROCEDURE — 87015 SPECIMEN INFECT AGNT CONCNTJ: CPT

## 2022-09-20 PROCEDURE — 87640 STAPH A DNA AMP PROBE: CPT

## 2022-09-20 PROCEDURE — 97161 PT EVAL LOW COMPLEX 20 MIN: CPT

## 2022-09-20 PROCEDURE — 93005 ELECTROCARDIOGRAM TRACING: CPT

## 2022-09-20 PROCEDURE — 86850 RBC ANTIBODY SCREEN: CPT

## 2022-09-20 PROCEDURE — 85027 COMPLETE CBC AUTOMATED: CPT

## 2022-09-20 PROCEDURE — 85520 HEPARIN ASSAY: CPT

## 2022-09-20 PROCEDURE — 83935 ASSAY OF URINE OSMOLALITY: CPT

## 2022-09-20 PROCEDURE — 76775 US EXAM ABDO BACK WALL LIM: CPT

## 2022-09-20 PROCEDURE — 93321 DOPPLER ECHO F-UP/LMTD STD: CPT

## 2022-09-20 PROCEDURE — 82570 ASSAY OF URINE CREATININE: CPT

## 2022-09-20 PROCEDURE — 85610 PROTHROMBIN TIME: CPT

## 2022-09-20 PROCEDURE — 36415 COLL VENOUS BLD VENIPUNCTURE: CPT

## 2022-09-20 PROCEDURE — 97110 THERAPEUTIC EXERCISES: CPT

## 2022-09-20 PROCEDURE — 81001 URINALYSIS AUTO W/SCOPE: CPT

## 2022-09-20 PROCEDURE — 87641 MR-STAPH DNA AMP PROBE: CPT

## 2022-09-20 PROCEDURE — 87205 SMEAR GRAM STAIN: CPT

## 2022-09-20 PROCEDURE — 82042 OTHER SOURCE ALBUMIN QUAN EA: CPT

## 2022-09-20 PROCEDURE — 84481 FREE ASSAY (FT-3): CPT

## 2022-09-20 PROCEDURE — 97116 GAIT TRAINING THERAPY: CPT

## 2022-09-20 PROCEDURE — 93308 TTE F-UP OR LMTD: CPT

## 2022-09-20 PROCEDURE — 86901 BLOOD TYPING SEROLOGIC RH(D): CPT

## 2022-09-20 PROCEDURE — 85730 THROMBOPLASTIN TIME PARTIAL: CPT

## 2022-09-20 PROCEDURE — 83615 LACTATE (LD) (LDH) ENZYME: CPT

## 2022-09-20 PROCEDURE — 99232 SBSQ HOSP IP/OBS MODERATE 35: CPT | Mod: GC

## 2022-09-20 PROCEDURE — U0003: CPT

## 2022-09-20 PROCEDURE — 80048 BASIC METABOLIC PNL TOTAL CA: CPT

## 2022-09-20 PROCEDURE — 84157 ASSAY OF PROTEIN OTHER: CPT

## 2022-09-20 PROCEDURE — 89051 BODY FLUID CELL COUNT: CPT

## 2022-09-20 PROCEDURE — 93306 TTE W/DOPPLER COMPLETE: CPT

## 2022-09-20 PROCEDURE — C1729: CPT

## 2022-09-20 PROCEDURE — 84540 ASSAY OF URINE/UREA-N: CPT

## 2022-09-20 PROCEDURE — 84300 ASSAY OF URINE SODIUM: CPT

## 2022-09-20 PROCEDURE — 87070 CULTURE OTHR SPECIMN AEROBIC: CPT

## 2022-09-20 PROCEDURE — 99239 HOSP IP/OBS DSCHRG MGMT >30: CPT

## 2022-09-20 PROCEDURE — 82945 GLUCOSE OTHER FLUID: CPT

## 2022-09-20 PROCEDURE — U0005: CPT

## 2022-09-20 PROCEDURE — 82150 ASSAY OF AMYLASE: CPT

## 2022-09-20 PROCEDURE — 80053 COMPREHEN METABOLIC PANEL: CPT

## 2022-09-20 PROCEDURE — 83930 ASSAY OF BLOOD OSMOLALITY: CPT

## 2022-09-20 PROCEDURE — 84100 ASSAY OF PHOSPHORUS: CPT

## 2022-09-20 PROCEDURE — 84439 ASSAY OF FREE THYROXINE: CPT

## 2022-09-20 PROCEDURE — 97530 THERAPEUTIC ACTIVITIES: CPT

## 2022-09-20 PROCEDURE — 80061 LIPID PANEL: CPT

## 2022-09-20 PROCEDURE — 88112 CYTOPATH CELL ENHANCE TECH: CPT

## 2022-09-20 PROCEDURE — 83735 ASSAY OF MAGNESIUM: CPT

## 2022-09-20 PROCEDURE — 87116 MYCOBACTERIA CULTURE: CPT

## 2022-09-20 PROCEDURE — 88305 TISSUE EXAM BY PATHOLOGIST: CPT

## 2022-09-20 PROCEDURE — 87102 FUNGUS ISOLATION CULTURE: CPT

## 2022-09-20 PROCEDURE — 74018 RADEX ABDOMEN 1 VIEW: CPT

## 2022-09-20 PROCEDURE — 83036 HEMOGLOBIN GLYCOSYLATED A1C: CPT

## 2022-09-20 PROCEDURE — 87075 CULTR BACTERIA EXCEPT BLOOD: CPT

## 2022-09-20 PROCEDURE — 85025 COMPLETE CBC W/AUTO DIFF WBC: CPT

## 2022-09-20 PROCEDURE — 33016 PERICARDIOCENTESIS W/IMAGING: CPT

## 2022-09-20 PROCEDURE — 87206 SMEAR FLUORESCENT/ACID STAI: CPT

## 2022-09-20 PROCEDURE — 74176 CT ABD & PELVIS W/O CONTRAST: CPT

## 2022-09-20 PROCEDURE — 86900 BLOOD TYPING SEROLOGIC ABO: CPT

## 2022-09-20 PROCEDURE — 87252 VIRUS INOCULATION TISSUE: CPT

## 2022-09-20 RX ORDER — SENNA PLUS 8.6 MG/1
2 TABLET ORAL
Qty: 0 | Refills: 0 | DISCHARGE
Start: 2022-09-20

## 2022-09-20 RX ORDER — HYDRALAZINE HCL 50 MG
1.5 TABLET ORAL
Qty: 0 | Refills: 0 | DISCHARGE

## 2022-09-20 RX ORDER — LEVOTHYROXINE SODIUM 125 MCG
1 TABLET ORAL
Qty: 0 | Refills: 0 | DISCHARGE
Start: 2022-09-20

## 2022-09-20 RX ORDER — HYDRALAZINE HCL 50 MG
1 TABLET ORAL
Qty: 0 | Refills: 0 | DISCHARGE
Start: 2022-09-20

## 2022-09-20 RX ORDER — ALBUTEROL 90 UG/1
2 AEROSOL, METERED ORAL
Qty: 0 | Refills: 0 | DISCHARGE

## 2022-09-20 RX ORDER — IPRATROPIUM/ALBUTEROL SULFATE 18-103MCG
3 AEROSOL WITH ADAPTER (GRAM) INHALATION
Qty: 0 | Refills: 0 | DISCHARGE
Start: 2022-09-20

## 2022-09-20 RX ORDER — TAMSULOSIN HYDROCHLORIDE 0.4 MG/1
1 CAPSULE ORAL
Qty: 0 | Refills: 0 | DISCHARGE
Start: 2022-09-20

## 2022-09-20 RX ORDER — LOSARTAN POTASSIUM 100 MG/1
1 TABLET, FILM COATED ORAL
Qty: 0 | Refills: 0 | DISCHARGE
Start: 2022-09-20

## 2022-09-20 RX ORDER — LEVOTHYROXINE SODIUM 125 MCG
1 TABLET ORAL
Qty: 0 | Refills: 0 | DISCHARGE

## 2022-09-20 RX ORDER — NICOTINE POLACRILEX 2 MG
1 GUM BUCCAL
Qty: 0 | Refills: 0 | DISCHARGE
Start: 2022-09-20

## 2022-09-20 RX ORDER — TAMSULOSIN HYDROCHLORIDE 0.4 MG/1
1 CAPSULE ORAL
Qty: 0 | Refills: 0 | DISCHARGE

## 2022-09-20 RX ORDER — IPRATROPIUM/ALBUTEROL SULFATE 18-103MCG
3 AEROSOL WITH ADAPTER (GRAM) INHALATION
Qty: 0 | Refills: 0 | DISCHARGE

## 2022-09-20 RX ORDER — SODIUM CHLORIDE 9 MG/ML
1 INJECTION INTRAMUSCULAR; INTRAVENOUS; SUBCUTANEOUS
Qty: 0 | Refills: 0 | DISCHARGE
Start: 2022-09-20

## 2022-09-20 RX ORDER — SIMVASTATIN 20 MG/1
1 TABLET, FILM COATED ORAL
Qty: 0 | Refills: 0 | DISCHARGE
Start: 2022-09-20

## 2022-09-20 RX ORDER — LOSARTAN POTASSIUM 100 MG/1
1 TABLET, FILM COATED ORAL
Qty: 0 | Refills: 0 | DISCHARGE

## 2022-09-20 RX ORDER — SIMETHICONE 80 MG/1
1 TABLET, CHEWABLE ORAL
Qty: 0 | Refills: 0 | DISCHARGE
Start: 2022-09-20

## 2022-09-20 RX ORDER — SODIUM CHLORIDE 9 MG/ML
2 INJECTION INTRAMUSCULAR; INTRAVENOUS; SUBCUTANEOUS
Qty: 0 | Refills: 0 | DISCHARGE

## 2022-09-20 RX ORDER — SIMVASTATIN 20 MG/1
1 TABLET, FILM COATED ORAL
Qty: 0 | Refills: 0 | DISCHARGE

## 2022-09-20 RX ORDER — HYDRALAZINE HCL 50 MG
1 TABLET ORAL
Qty: 0 | Refills: 0 | DISCHARGE

## 2022-09-20 RX ORDER — DOXAZOSIN MESYLATE 4 MG
1 TABLET ORAL
Qty: 0 | Refills: 0 | DISCHARGE

## 2022-09-20 RX ORDER — BUDESONIDE AND FORMOTEROL FUMARATE DIHYDRATE 160; 4.5 UG/1; UG/1
2 AEROSOL RESPIRATORY (INHALATION)
Qty: 0 | Refills: 0 | DISCHARGE

## 2022-09-20 RX ADMIN — MUPIROCIN 1 APPLICATION(S): 20 OINTMENT TOPICAL at 05:47

## 2022-09-20 RX ADMIN — Medication 50 MILLIGRAM(S): at 13:58

## 2022-09-20 RX ADMIN — Medication 1 PATCH: at 07:56

## 2022-09-20 RX ADMIN — CHLORHEXIDINE GLUCONATE 1 APPLICATION(S): 213 SOLUTION TOPICAL at 06:30

## 2022-09-20 RX ADMIN — LOSARTAN POTASSIUM 100 MILLIGRAM(S): 100 TABLET, FILM COATED ORAL at 05:45

## 2022-09-20 RX ADMIN — Medication 50 MILLIGRAM(S): at 05:46

## 2022-09-20 RX ADMIN — SODIUM CHLORIDE 1 GRAM(S): 9 INJECTION INTRAMUSCULAR; INTRAVENOUS; SUBCUTANEOUS at 13:58

## 2022-09-20 RX ADMIN — Medication 1 PATCH: at 11:56

## 2022-09-20 RX ADMIN — Medication 112 MICROGRAM(S): at 05:45

## 2022-09-20 RX ADMIN — Medication 1 PATCH: at 11:58

## 2022-09-20 RX ADMIN — SODIUM CHLORIDE 1 GRAM(S): 9 INJECTION INTRAMUSCULAR; INTRAVENOUS; SUBCUTANEOUS at 05:46

## 2022-09-20 NOTE — DISCHARGE NOTE NURSING/CASE MANAGEMENT/SOCIAL WORK - PATIENT PORTAL LINK FT
You can access the FollowMyHealth Patient Portal offered by [facility] by registering at the following website: [email or web address]. By joining [facility]’s FollowMyHealth portal, you will also be able to view your health information using other applications (apps) compatible with our system.

## 2022-09-20 NOTE — DISCHARGE NOTE PROVIDER - NSDCMRMEDTOKEN_GEN_ALL_CORE_FT
hydrALAZINE 50 mg oral tablet: 1 tab(s) orally 3 times a day  ipratropium-albuterol 0.5 mg-2.5 mg/3 mL inhalation solution: 3 milliliter(s) inhaled every 6 hours, As needed, Shortness of Breath and/or Wheezing  levothyroxine 112 mcg (0.112 mg) oral tablet: 1 tab(s) orally once a day  losartan 100 mg oral tablet: 1 tab(s) orally once a day  nicotine 14 mg/24 hr transdermal film, extended release: 1 patch transdermal once a day  senna leaf extract oral tablet: 2 tab(s) orally once a day (at bedtime)  simethicone 80 mg oral tablet, chewable: 1 tab(s) orally 2 times a day, As needed, Gas  simvastatin 20 mg oral tablet: 1 tab(s) orally once a day (at bedtime)  sodium chloride 1 g oral tablet: 1 tab(s) orally 3 times a day  tamsulosin 0.4 mg oral capsule: 1 cap(s) orally once a day (at bedtime)   hydrALAZINE 50 mg oral tablet: 1 tab(s) orally 3 times a day  ipratropium-albuterol 0.5 mg-2.5 mg/3 mL inhalation solution: 3 milliliter(s) inhaled every 6 hours, As needed, Shortness of Breath and/or Wheezing  levothyroxine 112 mcg (0.112 mg) oral tablet: 1 tab(s) orally once a day  losartan 100 mg oral tablet: 1 tab(s) orally once a day  nicotine 14 mg/24 hr transdermal film, extended release: 1 patch transdermal once a day  senna leaf extract oral tablet: 2 tab(s) orally once a day (at bedtime)  simethicone 80 mg oral tablet, chewable: 1 tab(s) orally 2 times a day, As needed, Gas  simvastatin 20 mg oral tablet: 1 tab(s) orally once a day (at bedtime)  sodium chloride 1 g oral tablet: 2 tab(s) orally 3 times a day  tamsulosin 0.4 mg oral capsule: 1 cap(s) orally once a day (at bedtime)

## 2022-09-20 NOTE — PROGRESS NOTE ADULT - PROBLEM SELECTOR PLAN 1
Pt with euvolemic hyponatremia. Upon review of [place] HIE, SNa was 143 on 3/10/22. SNa was 125 on 9/3, and has remained low but stable at 125 (9/14). Currently on urena 15 gm BID. Pt with ADH-dependent hyponatremia. Continue salt tabs. May discontinue UreNa. Recommend to administer 2% hypertonic saline at 30 cc/hr for a total of 4 hours today. Recheck BMP. Recommend water restriction to <1L/day. Monitor SNa.
Pt with euvolemic hyponatremia. Upon review of [place] HIE, SNa was 143 on 3/10/22. SNa was 125 on 9/3, and has remained low but stable at 133. Currently on sodium chloride tabs 1 gm tid. Pt with ADH-dependent hyponatremia. Recommend to increase salt tabs to 2 gm tid. Recommend water restriction to <1L/day. Monitor SNa.
Pt with euvolemic hyponatremia in the setting of pericardial effusion and significant hypothyroidism. Upon review of [place], SNa was 143 on 3/10/22. SNa was 125 on 9/3, and has remained low but stable at 124 on9/9.  Hyponatremia likely ADH mediated and/or due to hypothyroidism. Requires further workup. Obtain UA, serum osm, urine osm, and urine sodium.     Recommend water restriction to <1L/day and increase urena 15 gm BID. Monitor SNa. Goal for SNa increase is 6-8 mEq/L within the first 24 hours and 12-14 mEq/L within the first 48 hours.
Pt with euvolemic hyponatremia in the setting of pericardial effusion and significant hypothyroidism. [facility] [place]a [facility] on [facility]10/22. SNa was 125 on 9/3.     Sodium has trended down even with urena 15 BID. Hesitant to increase the dose at BUN will also increase and patient can't have salt tabs due to cardiac issues. Will discuss during rounds. Please send another BMP with serum osm, and obtain urine Na, Urine osm today. Hyponatremia likely ADH mediated and/or due to hypothyroidism. Requires further workup. Obtain UA, serum osm, urine osm, and urine sodium.     Recommend water restriction to <1L/day and c/w urena 15 BID for now. Monitor SNa.
Pt with euvolemic hyponatremia in the setting of pericardial effusion and significant hypothyroidism. Upon review of [place], SNa was 143 on 3/10/22. SNa was 125 on 9/3, and has remained low but stable at 125 (9/14). Currently on urena 15 gm BID. Pt with ADH-dependent hyponatremia. Recommend to administer 2% hypertonic saline at 40 cc/hr for a total of 4 hours. Recheck BMP and urine studies upon completion of hypertonic saline infusion. Continue with Urena. Recommend water restriction to <1L/day. Monitor SNa.
Pt with significant abdominal distention  - abdominal xray shows distended small bowel loops to 3.3 cm and distended large bowel   with descending colon measuring up to 7.1 cm. this may represent an ileus versus distal obstruction  - CT shows multiple distended proximal large bowel loops with normal caliber of the left colon, likely representing ileus. No small bowel obstruction.  - surgery consulted & no intervention  - Bowel regimen  - monitor leukocytosis
Patient has significant abdominal distention  - abdominal xray shows distended small bowel loops to 3.3 cm and distended large bowel   with descending colon measuring up to 7.1 cm. this may represent an ileus versus distal obstruction  - CT shows multiple distended proximal large bowel loops with normal caliber of the left colon, likely representing ileus. No small bowel obstruction.  - surgery consulted & no intervention  - Bowel regimen
Pt with significant abdominal distention  - abdominal xray shows distended small bowel loops to 3.3 cm and distended large bowel   with descending colon measuring up to 7.1 cm. this may represent an ileus versus distal obstruction  - CT shows multiple distended proximal large bowel loops with normal caliber of the left colon, likely representing ileus. No small bowel obstruction.  - surgery consulted & no intervention  - Bowel regimen
Pt with significant abdominal distention  - abdominal xray shows distended small bowel loops to 3.3 cm and distended large bowel   with descending colon measuring up to 7.1 cm. this may represent an ileus versus distal obstruction  - CT shows multiple distended proximal large bowel loops with normal caliber of the left colon, likely representing ileus. No small bowel obstruction.  - surgery consulted & no intervention  - Bowel regimen  - monitor leukocytosis
- patient noted to have hyponatremia during admission in the setting of pericardial effusion and significant hypothyroidism  - hyponatremia worsened to 125 today  - nephrology recs appreciated: 2% hypertonic saline at 40 cc/hr for a total of 4 hours. Recheck BMP and urine studies upon completion of hypertonic saline infusion  - Continue with Urena  - continue fluid restrictions  - continue ureNA  - nephro following  - monitor BMP daily
Patient has significant abdominal distention  - abdominal xray shows distended small bowel loops to 3.3 cm and distended large bowel   with descending colon measuring up to 7.1 cm. this may represent an ileus versus distal obstruction  - CT shows multiple distended proximal large bowel loops with normal caliber of the left colon, likely representing ileus. No small bowel obstruction.  - surgery consulted, monitor for now
- patient noted to have hyponatremia during admission   - currently improving and is 127   - continue fluid restrictions  - continue ureNA  - nephro following  - monitor BMP daily

## 2022-09-20 NOTE — DISCHARGE NOTE PROVIDER - HOSPITAL COURSE
77yoM w/ PMHx of HTN, HLD, BPH, CKD (creat 1.42 today), chronic b/l hydronephrosis (x2 years), prostate ca (radiation 10 years ago), seizure disorder ( last seizure 2015), ETOH use ( sober since 2016), hypothyroidism, and mild demenia; presents to [facility] on 9/3/2022 with chief complaint of SOB and CP x3 days. Noted to have large pericardial effusion and transferred to [facility] for pericardiocentesis. S/p pericardiocentesis and downgraded to medicine service for further management including for management of hyponatremia, which has improved. Dispo planning to [facility].     Problem/Plan - 1:  ·  Problem: Ileus.   ·  Plan: Pt with significant abdominal distention  - abdominal xray shows distended small bowel loops to 3.3 cm and distended large bowel   with descending colon measuring up to 7.1 cm. this may represent an ileus versus distal obstruction  - CT shows multiple distended proximal large bowel loops with normal caliber of the left colon, likely representing ileus. No small bowel obstruction.  - surgery consulted & no intervention  - Bowel regimen  - monitor leukocytosis.     Problem/Plan - 2:  ·  Problem: Hyponatremia.   ·  Plan: - Patient noted to have hyponatremia during admission in the setting of pericardial effusion and significant hypothyroidism  - Improved  - nephro following, f/u recs (s/p HTS)  - monitor BMP daily  - Salt tab 1g TID -- will dc salt tabs upon discharge.     Problem/Plan - 3:  ·  Problem: BRBPR (bright red blood per rectum).   ·  Plan: - patient was noted to have an episode of BRBPR as per nursing, no known episodes noted previously   - Monitor H&H  - will hold heparin SQ for now  - Defer endoscopic evaluation as does not seem significant.     Problem/Plan - 4:  ·  Problem: Pericardial effusion.   ·  Plan: - patient presented due to pericardial effusion  - s/p pericardiocentesis 9/7 with drain placed and now removed   - no effusion noted on limited ECHO from 9/11  - Pending AFB and fungal culture.     Problem/Plan - 5:  ·  Problem: Anemia.   ·  Plan: - monitor for any signs of bleed.     Problem/Plan - 6:  ·  Problem: Hypothyroidism.   ·  Plan: - continue with synthroid 112 mcg   - repeat TSH in 6-8 weeks  - Per prior documentation likely non-compliant.     Problem/Plan - 7:  ·  Problem: Stage 3 chronic kidney disease.   ·  Plan: - creatinine currently stable  - monitor BMP.     Problem/Plan - 8:  ·  Problem: HTN (hypertension).   ·  Plan: - patient on losartan and hydralazine at home  - continue with losartan   - restarted hydralazine, can increase to home dose if bp tolerates  - monitor BP and adjust meds as needed.     Problem/Plan - 9:  ·  Problem: HLD (hyperlipidemia).   ·  Plan: - continue with simvastatin.     Problem/Plan - 10:  ·  Problem: BPH (benign prostatic hyperplasia).   ·  Plan; - continue with Flomax.     Problem/Plan - 11:  ·  Problem: Hydronephrosis.   ·  Plan: CT shows mild bilateral hydroureteronephrosis, has hx of chronic hydronephrosis  - check bladder scan to r/o retention     77yoM w/ PMHx of HTN, HLD, BPH, CKD (creat 1.42 today), chronic b/l hydronephrosis (x2 years), prostate ca (radiation 10 years ago), seizure disorder ( last seizure 2015), ETOH use ( sober since 2016), hypothyroidism, and mild demenia; presents to [facility] on 9/3/2022 with chief complaint of SOB and CP x3 days. Noted to have large pericardial effusion and transferred to [facility] for pericardiocentesis. S/p pericardiocentesis and downgraded to medicine service for further management including for management of hyponatremia, which has improved. Dispo planning to [facility].     Problem/Plan - 1:  ·  Problem: Ileus.   ·  Plan: Pt with significant abdominal distention  - abdominal xray shows distended small bowel loops to 3.3 cm and distended large bowel   with descending colon measuring up to 7.1 cm. this may represent an ileus versus distal obstruction  - CT shows multiple distended proximal large bowel loops with normal caliber of the left colon, likely representing ileus. No small bowel obstruction.  - surgery consulted & no intervention  - Bowel regimen  - monitor leukocytosis.     Problem/Plan - 2:  ·  Problem: Hyponatremia.   ·  Plan: - Patient noted to have hyponatremia during admission in the setting of pericardial effusion and significant hypothyroidism  - Improved  - nephro following, f/u recs (s/p HTS)  - monitor BMP daily  - Continue Salt tab 2 gm TID on dc     Problem/Plan - 3:  ·  Problem: BRBPR (bright red blood per rectum).   ·  Plan: - patient was noted to have an episode of BRBPR as per nursing, no known episodes noted previously   - Monitor H&H  - will hold heparin SQ for now  - Defer endoscopic evaluation as does not seem significant.     Problem/Plan - 4:  ·  Problem: Pericardial effusion.   ·  Plan: - patient presented due to pericardial effusion  - s/p pericardiocentesis 9/7 with drain placed and now removed   - no effusion noted on limited ECHO from 9/11  - Pending AFB and fungal culture.     Problem/Plan - 5:  ·  Problem: Anemia.   ·  Plan: - monitor for any signs of bleed.     Problem/Plan - 6:  ·  Problem: Hypothyroidism.   ·  Plan: - continue with synthroid 112 mcg   - repeat TSH in 6-8 weeks  - Per prior documentation likely non-compliant.     Problem/Plan - 7:  ·  Problem: Stage 3 chronic kidney disease.   ·  Plan: - creatinine currently stable  - monitor BMP.     Problem/Plan - 8:  ·  Problem: HTN (hypertension).   ·  Plan: - patient on losartan and hydralazine at home  - continue with losartan   - restarted hydralazine, can increase to home dose if bp tolerates  - monitor BP and adjust meds as needed.     Problem/Plan - 9:  ·  Problem: HLD (hyperlipidemia).   ·  Plan: - continue with simvastatin.     Problem/Plan - 10:  ·  Problem: BPH (benign prostatic hyperplasia).   ·  Plan; - continue with Flomax.     Problem/Plan - 11:  ·  Problem: Hydronephrosis.   ·  Plan: CT shows mild bilateral hydroureteronephrosis, has hx of chronic hydronephrosis  - check bladder scan to r/o retention

## 2022-09-20 NOTE — DISCHARGE NOTE NURSING/CASE MANAGEMENT/SOCIAL WORK - NSDCPEEMAIL_GEN_ALL_CORE
[facility] for Tobacco Control email [email or web address]

## 2022-09-20 NOTE — DISCHARGE NOTE PROVIDER - CARE PROVIDER_API CALL
Bobby Shaw)  Emergency Medicine; Internal Medicine  [address] [place] [address]  Phone: [phone]  Fax: [phone]  Follow Up Time: 2 weeks

## 2022-09-20 NOTE — DISCHARGE NOTE NURSING/CASE MANAGEMENT/SOCIAL WORK - TOBACCO CESSATION EDUCATION/COUNSELLING(PROVIDED IF TOBACCO USED IN THE PAST 30 DAYS) NON CORE MEASURE SITES
Obesity is worsening.  Discussed the patient's BMI.  The BMI is above average. The patient received dietary education and exercise education because they have an above normal BMI..  General weight loss/lifestyle modification strategies discussed (elicit support from others; identify saboteurs; non-food rewards, etc).   Offered and provided

## 2022-09-20 NOTE — CHART NOTE - NSCHARTNOTEFT_GEN_A_CORE
discharge to [facility] today- 4 pm pickup  1 hour spent in preparation of discharge    Sondra Bolden D.O.  available on MS teams

## 2022-09-20 NOTE — DISCHARGE NOTE NURSING/CASE MANAGEMENT/SOCIAL WORK - NSDCPEWEB_GEN_ALL_CORE
[facility] for Tobacco Control website --- [email or web address] website --- [email or web address]

## 2022-09-20 NOTE — DISCHARGE NOTE NURSING/CASE MANAGEMENT/SOCIAL WORK - NSDCPEFALRISK_GEN_ALL_CORE
For information on Fall & Injury Prevention, visit: [email or web address] OR  [email or web address] OR  https://www.cdc.gov/steadi/patient.html

## 2022-09-20 NOTE — CHART NOTE - NSCHARTNOTESELECT_GEN_ALL_CORE
ENDOCRINOLOGY/Event Note
Endocrinology/Event Note
Event Note
accept note/Transfer Note
Event Note
Event Note
Transfer Note

## 2022-09-20 NOTE — PROGRESS NOTE ADULT - ATTENDING COMMENTS
No new complaints.  Concur with F1 formulation  1.  Hyponatremia--adjusting salt tabs to get goal>133  2.  CKD3--non oliguric, no HD required.  Minimize nephrotoxins.  ARB OK in this setting  3. Hypertension--med optimization at goal    discussed with med team  Poncho Cole  contact me on TEAMS

## 2022-09-20 NOTE — PROGRESS NOTE ADULT - SUBJECTIVE AND OBJECTIVE BOX
[facility] Division of Kidney Diseases & Hypertension  FOLLOW UP NOTE  [phone]--------------------------------------------------------------------------------  Chief Complaint:Noninflammatory pericardial effusion        24 hour events/subjective: Pt seen and evaluated bedside this morning. Reports feeling well, endorses no complaints. Denies any headaches, nausea, vomiting, fevers/chills, chest pain, palpitations, SOB, abdominal pain, and leg swelling.         PAST HISTORY  --------------------------------------------------------------------------------  No significant changes to PMH, PSH, FHx, SHx, unless otherwise noted    ALLERGIES & MEDICATIONS  --------------------------------------------------------------------------------  Allergies    No Known Allergies    Intolerances      Standing Inpatient Medications  chlorhexidine 2% Cloths 1 Application(s) Topical <User Schedule>  hydrALAZINE 50 milliGRAM(s) Oral three times a day  levothyroxine 112 MICROGram(s) Oral daily  losartan 100 milliGRAM(s) Oral daily  nicotine -  14 mG/24Hr(s) Patch 1 Patch Transdermal daily  senna 2 Tablet(s) Oral at bedtime  simvastatin 20 milliGRAM(s) Oral at bedtime  sodium chloride 1 Gram(s) Oral three times a day  tamsulosin 0.4 milliGRAM(s) Oral at bedtime    PRN Inpatient Medications  albuterol/ipratropium for Nebulization 3 milliLiter(s) Nebulizer every 6 hours PRN  simethicone 80 milliGRAM(s) Chew two times a day PRN      REVIEW OF SYSTEMS  --------------------------------------------------------------------------------  Gen: No fevers/chills  Head/Eyes/Ears: No HA   Respiratory: No dyspnea, cough  CV: No chest pain  GI: No abdominal pain, diarrhea  : No dysuria, hematuria  MSK: No  edema  Skin: No rashes  Heme: No easy bruising or bleeding    All other systems were reviewed and are negative, except as noted.    VITALS/PHYSICAL EXAM  --------------------------------------------------------------------------------  T(C): 36.7 (09-20-22 @ 11:54), Max: 36.7 (09-20-22 @ 11:54)  HR: 68 (09-20-22 @ 11:54) (66 - 71)  BP: 189/94 (09-20-22 @ 11:54) (154/75 - 189/94)  RR: 18 (09-20-22 @ 11:54) (16 - 18)  SpO2: 99% (09-20-22 @ 11:54) (95% - 99%)  Wt(kg): --        09-19-22 @ 07:01  -  09-20-22 @ 07:00  --------------------------------------------------------  IN: 200 mL / OUT: 0 mL / NET: 200 mL    09-20-22 @ 07:01  -  09-20-22 @ 13:43  --------------------------------------------------------  IN: 100 mL / OUT: 0 mL / NET: 100 mL      Physical Exam:  Gen: Elderly male, in NAD  HEENT: MMM  Pulm: CTA B/L  CV: S1S2  Abd: Soft, +BS   Ext: No LE edema B/L  Neuro: Awake  Skin: warm and dry  Vascular access: Peripheral IV    LABS/STUDIES  --------------------------------------------------------------------------------              8.9    8.12  >-----------<  214      [09-20-22 @ 04:56]              26.7     133  |  102  |  24  ----------------------------<  82      [09-20-22 @ 04:55]  3.9   |  24  |  1.37        Ca     8.2     [09-20-22 @ 04:55]      Creatinine Trend:  SCr 1.37 [09-20 @ 04:55]  SCr 1.41 [09-19 @ 06:33]  SCr 1.33 [09-18 @ 07:24]  SCr 1.42 [09-17 @ 07:18]  SCr 1.46 [09-16 @ 05:52]      Urine Creatinine 43      [09-15-22 @ 14:33]  Urine Sodium 43      [09-15-22 @ 14:33]  Urine Urea Nitrogen 1294      [09-15-22 @ 14:33]  Urine Osmolality 575      [09-15-22 @ 14:33]

## 2022-09-20 NOTE — DISCHARGE NOTE PROVIDER - NSDCCPCAREPLAN_GEN_ALL_CORE_FT
PRINCIPAL DISCHARGE DIAGNOSIS  Diagnosis: Ileus  Assessment and Plan of Treatment:       SECONDARY DISCHARGE DIAGNOSES  Diagnosis: Hyponatremia  Assessment and Plan of Treatment: Only take medicines as directed by your caregiver. Many medicines can make hyponatremia worse. Discuss all your medicines with your caregiver.  Carefully follow any recommended diet, including any fluid restrictions.  You may be asked to repeat lab tests. Follow these directions.  Avoid alcohol and recreational drugs.  SEEK MEDICAL CARE IF:  You develop worsening nausea, fatigue, headache, confusion, or weakness.  Your original hyponatremia symptoms return.  You have problems following the recommended diet.   SEEK IMMEDIATE MEDICAL CARE IF:  You have a seizure.  You faint.  You have ongoing diarrhea or vomiting      Diagnosis: Pericardial effusion  Assessment and Plan of Treatment: s/p pericardiocentesis on 9/7  You had a repeat echo on 9/11 that showed the effusion had resolved.    Diagnosis: Hypothyroidism  Assessment and Plan of Treatment: Take synthroid as previously prescribed  Follow up with PMD for routine thyroid function labs.    Diagnosis: Anemia  Assessment and Plan of Treatment: Continue iron supplements  Eat iron and protein rich foods including: meat, dark leafy green vegetables, and beans.  Limit caffeine.  Notify your doctor if you experience heartburn, constipation, diarrhea, nausea/vomiting, dizziness or are feeling extremely tired.  Seek immediate care or call 911 if you experience:  shortness of breath even at rest, you have blood in your bowel movement or vomit, if you are too dizzy to stand up      Diagnosis: Stage 3 chronic kidney disease  Assessment and Plan of Treatment: Avoid taking (NSAIDs) - (ex: Ibuprofen, Advil, Celebrex, Naprosyn)  Avoid taking any nephrotoxic agents (can harm kidneys) - Intravenous contrast for diagnostic testing, combination cold medications.  Have all medications adjusted for your renal function by your Health Care Provider.  Blood pressure control is important.  Take all medication as prescribed.      Diagnosis: Hypertension  Assessment and Plan of Treatment: Low salt diet  Activity as tolerated.  Take all medication as prescribed.  Follow up with your medical doctor for routine blood pressure monitoring at your next visit.  Notify your doctor if you have any of the following symptoms:   Dizziness, Lightheadedness, Blurry vision, Headache, Chest pain, Shortness of breath      Diagnosis: Hydronephrosis  Assessment and Plan of Treatment:      PRINCIPAL DISCHARGE DIAGNOSIS  Diagnosis: Ileus  Assessment and Plan of Treatment: You had  abdominal bloating, now resolved  You were seen by the surgical team and no interventions were recommended.      SECONDARY DISCHARGE DIAGNOSES  Diagnosis: Hyponatremia  Assessment and Plan of Treatment: Repeat BMP in 2-3 days  Follow up with your PMD on discharge from rehab  Only take medicines as directed by your caregiver. Many medicines can make hyponatremia worse. Discuss all your medicines with your caregiver.  Carefully follow any recommended diet, including any fluid restrictions.  You may be asked to repeat lab tests. Follow these directions.  Avoid alcohol and recreational drugs.  SEEK MEDICAL CARE IF:  You develop worsening nausea, fatigue, headache, confusion, or weakness.  Your original hyponatremia symptoms return.  You have problems following the recommended diet.   SEEK IMMEDIATE MEDICAL CARE IF:  You have a seizure.  You faint.  You have ongoing diarrhea or vomiting      Diagnosis: Pericardial effusion  Assessment and Plan of Treatment: s/p pericardiocentesis on 9/7  You had a repeat echo on 9/11 that showed the effusion had resolved.    Diagnosis: Hypothyroidism  Assessment and Plan of Treatment: Take synthroid as previously prescribed  Follow up with PMD for routine thyroid function labs.    Diagnosis: Anemia  Assessment and Plan of Treatment: Continue iron supplements  Eat iron and protein rich foods including: meat, dark leafy green vegetables, and beans.  Limit caffeine.  Notify your doctor if you experience heartburn, constipation, diarrhea, nausea/vomiting, dizziness or are feeling extremely tired.  Seek immediate care or call 911 if you experience:  shortness of breath even at rest, you have blood in your bowel movement or vomit, if you are too dizzy to stand up      Diagnosis: Stage 3 chronic kidney disease  Assessment and Plan of Treatment: Avoid taking (NSAIDs) - (ex: Ibuprofen, Advil, Celebrex, Naprosyn)  Avoid taking any nephrotoxic agents (can harm kidneys) - Intravenous contrast for diagnostic testing, combination cold medications.  Have all medications adjusted for your renal function by your Health Care Provider.  Blood pressure control is important.  Take all medication as prescribed.      Diagnosis: Hypertension  Assessment and Plan of Treatment: Low salt diet  Activity as tolerated.  Take all medication as prescribed.  Follow up with your medical doctor for routine blood pressure monitoring at your next visit.  Notify your doctor if you have any of the following symptoms:   Dizziness, Lightheadedness, Blurry vision, Headache, Chest pain, Shortness of breath      Diagnosis: Hydronephrosis  Assessment and Plan of Treatment: chronic

## 2022-09-20 NOTE — PROGRESS NOTE ADULT - PROBLEM SELECTOR PLAN 2
Pt with h/o stage 3 CKD in the setting of chronic BL hydronephrosis. [facility] [place] is ~1.4-1.6. SCr is currently 1.37. Renal US showing interval improvement in BL hydronephrosis. UA with trace proteinuria and trace LE. Monitor labs and urine output. Avoid nephrotoxins. Dose medications as per eGFR.    If you have any questions, please feel free to contact me  Kaylee Rdz  Nephrology Fellow  [phone] / [employer] Teams(Preferred)  (After 5pm or on weekends please page the on-call fellow) Pt with h/o stage 3 CKD in the setting of chronic BL hydronephrosis. Upon review of [place], baseline SCr is ~1.4-1.6. SCr is currently 1.37. Renal US showing interval improvement in BL hydronephrosis. UA with trace proteinuria and trace LE. Monitor labs and urine output. Avoid nephrotoxins. Dose medications as per eGFR.    If pt is to be discharged, please ensure that he has outpatient nephrology follow up. Call [phone] or email [email or web address] if pt does not have a nephrologist.    If you have any questions, please feel free to contact me  Kaylee Rdz  Nephrology Fellow  [phone] / [employer] Teams(Preferred)  (After 5pm or on weekends please page the on-call fellow)

## 2022-09-20 NOTE — PROGRESS NOTE ADULT - PROBLEM SELECTOR PROBLEM 1
Ileus
Hyponatremia
Ileus
Hyponatremia
Ileus
Hyponatremia
Hyponatremia
Ileus
Ileus

## 2022-10-05 NOTE — ED ADULT NURSE NOTE - NS ED NOTE ABUSE RESPONSE YN
From: Jaylin Martinez  To: Amna Doan  Sent: 10/5/2022 11:58 AM CDT  Subject: Metoprolol dosage    You prescribed me metoprolol 25mg twice a day but my insurance will only cover 50mg tablets once a day. I was wondering if a new prescription could be sent over to [facility] with that dosage. Thank you    Yes

## 2022-10-08 LAB
CULTURE RESULTS: SIGNIFICANT CHANGE UP
SPECIMEN SOURCE: SIGNIFICANT CHANGE UP

## 2022-10-20 ENCOUNTER — APPOINTMENT (OUTPATIENT)
Dept: CARE COORDINATION | Facility: HOME HEALTH | Age: 77
End: 2022-10-20

## 2022-10-29 LAB
CULTURE RESULTS: SIGNIFICANT CHANGE UP
SPECIMEN SOURCE: SIGNIFICANT CHANGE UP

## 2022-11-07 ENCOUNTER — NON-APPOINTMENT (OUTPATIENT)
Age: 77
End: 2022-11-07

## 2023-02-14 ENCOUNTER — OUTPATIENT (OUTPATIENT)
Dept: OUTPATIENT SERVICES | Facility: HOSPITAL | Age: 78
LOS: 1 days | Discharge: HOME | End: 2023-02-14
Payer: MEDICARE

## 2023-02-14 ENCOUNTER — APPOINTMENT (OUTPATIENT)
Dept: ULTRASOUND IMAGING | Facility: HOSPITAL | Age: 78
End: 2023-02-14

## 2023-02-14 DIAGNOSIS — Z85.46 PERSONAL HISTORY OF MALIGNANT NEOPLASM OF PROSTATE: Chronic | ICD-10-CM

## 2023-02-14 DIAGNOSIS — C61 MALIGNANT NEOPLASM OF PROSTATE: ICD-10-CM

## 2023-02-14 DIAGNOSIS — N28.89 OTHER SPECIFIED DISORDERS OF KIDNEY AND URETER: ICD-10-CM

## 2023-02-14 DIAGNOSIS — Z98.890 OTHER SPECIFIED POSTPROCEDURAL STATES: Chronic | ICD-10-CM

## 2023-02-14 DIAGNOSIS — Z98.89 OTHER SPECIFIED POSTPROCEDURAL STATES: Chronic | ICD-10-CM

## 2023-02-14 DIAGNOSIS — Z98.49 CATARACT EXTRACTION STATUS, UNSPECIFIED EYE: Chronic | ICD-10-CM

## 2023-02-14 PROCEDURE — 76775 US EXAM ABDO BACK WALL LIM: CPT | Mod: 26

## 2023-05-03 NOTE — ASU PATIENT PROFILE, ADULT - NSALCOHOLUSECOMMENT_GEN_ALL_CORE_FT
· Bilateral carotic artery disease known   · Heterogeneous and irregular plaque bilaterally hx of ETOH abuse ~30 years, 1pint 3-4 days/week; reports sober since 2016

## 2024-03-12 NOTE — PROGRESS NOTE ADULT - PROBLEM/PLAN-10
DISPLAY PLAN FREE TEXT
You can access the FollowMyHealth Patient Portal offered by [facility] by registering at the following website: [email or web address]. By joining [employer]’s FollowMyHealth portal, you will also be able to view your health information using other applications (apps) compatible with our system.
DISPLAY PLAN FREE TEXT

## 2024-04-30 NOTE — PROGRESS NOTE ADULT - SUBJECTIVE AND OBJECTIVE BOX
No complaints.    GENERAL: No fevers, no chills.  EYES: No blurry vision,  No photophobia  ENT: No sore throat.  No dysphagia  Cardiovascular: No chest pain, palpitations, orthopnea  Pulmonary: No cough, no wheezing. No shortness of breath  Gastrointestinal: No abdominal pain, no diarrhea, no constipation.    Musculoskeletal: No weakness.  No myalgias.  Dermatology:  No rashes.  Neuro: No Headache.  No vertigo.  No dizziness.  Psych: No anxiety, no depression.  Denies suicidal thoughts.    MEDICATIONS  (STANDING):  chlorhexidine 2% Cloths 1 Application(s) Topical <User Schedule>  hydrALAZINE 50 milliGRAM(s) Oral three times a day  levothyroxine 112 MICROGram(s) Oral daily  losartan 100 milliGRAM(s) Oral daily  mupirocin 2% Nasal 1 Application(s) Both Nostrils two times a day  nicotine -  14 mG/24Hr(s) Patch 1 Patch Transdermal daily  senna 2 Tablet(s) Oral at bedtime  simvastatin 20 milliGRAM(s) Oral at bedtime  sodium chloride 1 Gram(s) Oral three times a day  tamsulosin 0.4 milliGRAM(s) Oral at bedtime    MEDICATIONS  (PRN):  albuterol/ipratropium for Nebulization 3 milliLiter(s) Nebulizer every 6 hours PRN Shortness of Breath and/or Wheezing  simethicone 80 milliGRAM(s) Chew two times a day PRN Gas    Vital Signs Last 24 Hrs  T(C): 36.1 (19 Sep 2022 11:23), Max: 36.9 (18 Sep 2022 20:00)  T(F): 97 (19 Sep 2022 11:23), Max: 98.4 (18 Sep 2022 20:00)  HR: 71 (19 Sep 2022 13:44) (64 - 72)  BP: 155/82 (19 Sep 2022 13:44) (155/82 - 166/85)  BP(mean): --  RR: 18 (19 Sep 2022 11:23) (18 - 18)  SpO2: 97% (19 Sep 2022 11:23) (97% - 99%)    Parameters below as of 19 Sep 2022 11:23  Patient On (Oxygen Delivery Method): room air    Physical Examination:  GEN: elderly man, laying in bed in NAD  PSYCH: A&Ox3, mood and affect appear appropriate   NEURO: no focal neurologic deficits appreciated  NECK: supple, no e/o elevated JVP  RESPI: no accessory muscle use, B/L air entry, CTAB   CARDIO: regular rate/rhythm, no LE edema B/L  ABD: soft, NT, ND  EXT: patient able to move all extremities spontaneously  VASC: peripheral pulses palpated    .  LABS:                         9.9    11.95 )-----------( 223      ( 19 Sep 2022 06:33 )             30.3     09-19    133<L>  |  99  |  26<H>  ----------------------------<  72  4.2   |  24  |  1.41<H>    Ca    9.0      19 Sep 2022 06:33  Mg     2.0     09-18                RADIOLOGY, EKG & ADDITIONAL TESTS: Reviewed.  139.9

## 2024-07-02 NOTE — ASU PREOPERATIVE ASSESSMENT, ADULT ([place]ARK ONLY) - FALL HARM RISK TYPE OF ASSESSMENT
breathing is unlabored without accessory muscle use, prominent sternum s/p fracture, on 2L continuous O2 Admission

## 2024-07-15 NOTE — DIETITIAN INITIAL EVALUATION ADULT - FLUID ACCUMULATION
What Type Of Note Output Would You Prefer (Optional)?: Standard Output Has Your Skin Lesion Been Treated?: not been treated Is This A New Presentation, Or A Follow-Up?: Skin Lesion no edema per flowsheets

## 2024-07-24 NOTE — H&P PST ADULT - PROBLEM SELECTOR PLAN 4
Spoke to patient they are needing records from [facility]. OR booking notified for STEPHANIE precautions

## 2024-09-05 NOTE — PHYSICAL THERAPY INITIAL EVALUATION ADULT - NS ASR RISK AREAS PT EVAL
fall
Exam  Vitals (Abnormal blood pressure noted) and nursing note reviewed.   Constitutional:       General: He is not in acute distress.     Appearance: Normal appearance. He is not ill-appearing, toxic-appearing or diaphoretic.      Interventions: He is not intubated.  HENT:      Head: Normocephalic and atraumatic.      Right Ear: External ear normal.      Left Ear: External ear normal.      Nose: Nose normal.      Mouth/Throat:      Mouth: Mucous membranes are moist.   Eyes:      Conjunctiva/sclera: Conjunctivae normal.      Pupils: Pupils are equal, round, and reactive to light.   Cardiovascular:      Rate and Rhythm: Normal rate and regular rhythm.      Pulses: Normal pulses.      Heart sounds: Normal heart sounds.   Pulmonary:      Effort: Pulmonary effort is normal. No tachypnea, bradypnea, accessory muscle usage, prolonged expiration, respiratory distress or retractions. He is not intubated.      Breath sounds: Normal breath sounds and air entry. No stridor, decreased air movement or transmitted upper airway sounds. No decreased breath sounds, wheezing, rhonchi or rales.   Chest:      Chest wall: Tenderness present. No mass, lacerations, deformity, swelling, crepitus or edema. There is no dullness to percussion.          Comments: Pt has tenderness to palpation over the left side of his chest. No erythema, swelling, bruising, abrasions, lacerations, crepitus, foreign body, bleeding, drainage, numbness or tingling to area.  Musculoskeletal:      Cervical back: Normal range of motion.   Skin:     General: Skin is warm and dry.   Neurological:      General: No focal deficit present.      Mental Status: He is alert and oriented to person, place, and time.   Psychiatric:         Mood and Affect: Mood normal.         Behavior: Behavior normal.         Thought Content: Thought content normal.         Judgment: Judgment normal.       PROCEDURES:  Unless otherwise noted below, none     Procedures    RESULTS:  No results

## 2024-10-08 NOTE — PATIENT PROFILE ADULT - FUNCTIONAL ASSESSMENT - DAILY ACTIVITY 4.
Anesthesia Pre Eval Note    Anesthesia ROS/Med Hx        Anesthetic Complication History:    Patient does not have a history of anesthetic complications      Pulmonary Review:  Patient does not have a pulmonary history      Neuro/Psych Review:       Positive for CVA    Cardiovascular Review:   Exercise tolerance: good (>4 METS)  Positive for CHF  Positive for hypertension    GI/HEPATIC/RENAL Review:     Positive for renal disease    End/Other Review:  Patient does not have an endo/other history    Additional Results:      ALLERGIES:   -- Aspirin -- Other (See Comments)    --  Urinates blood             Urinates blood             Urinates blood             Urinates blood   -- Ibuprofen -- RASH   Last Labs        Component                Value               Date/Time                  WBC                      6.5                 10/07/2024 0905            RBC                      3.86 (L)            10/07/2024 0905            HGB                      12.3 (L)            10/07/2024 0905            HCT                      37.0 (L)            10/07/2024 0905            MCV                      95.9                10/07/2024 0905            MCH                      31.9                10/07/2024 0905            MCHC                     33.2                10/07/2024 0905            RDW-CV                   12.0                10/07/2024 0905            Sodium                   140                 10/08/2024 0408            Potassium                3.8                 10/08/2024 0408            Chloride                 104                 10/08/2024 0408            Carbon Dioxide           23                  10/08/2024 0408            Glucose                  68 (L)              10/08/2024 0408            BUN                      39 (H)              10/08/2024 0408            Creatinine               3.56 (H)            10/08/2024 0408            Glomerular Filtrati*     17 (L)              10/08/2024 0408             Calcium                  9.7                 10/08/2024 0408            PLT                      206                 10/07/2024 0905        Past Medical History:  No date: Chronic kidney disease  No date: Essential (primary) hypertension  No date: Hernia, inguinal  No date: Stroke  (CMD)  Past Surgical History:  2013: Inguinal hernia repair; Left   Prior to Admission medications :  Medication metoPROLOL tartrate (LOPRESSOR) 50 MG tablet, Sig Take 1 tablet by mouth in the morning and 1 tablet in the evening., Start Date 9/25/24, End Date , Taking? Yes, Authorizing Provider Marley Corona,     Medication NIFEdipine CC (ADALAT CC) 90 MG 24 hr tablet, Sig Take 1 tablet by mouth daily., Start Date 9/25/24, End Date , Taking? Yes, Authorizing Provider Marley Corona,     Medication tamsulosin (Flomax) 0.4 MG Cap, Sig Take 1 capsule by mouth daily., Start Date 8/26/24, End Date , Taking? Yes, Authorizing Provider Marta Hernandez MD    Medication empagliflozin (JARDIANCE) 10 MG tablet, Sig Take 1 tablet by mouth daily., Start Date 8/26/24, End Date , Taking? Yes, Authorizing Provider Marta Hernandez MD    Medication lovastatin (MEVACOR) 20 MG tablet, Sig Take 1 tablet by mouth nightly., Start Date 8/26/24, End Date , Taking? Yes, Authorizing Provider Marta Hernandez MD    Medication finasteride (PROSCAR) 5 MG tablet, Sig Take 1 tablet by mouth daily., Start Date 8/26/24, End Date , Taking? Yes, Authorizing Provider Marta Hernandez MD    Medication furosemide (LASIX) 20 MG tablet, Sig Take 1 tablet by mouth daily., Start Date 11/28/23, End Date , Taking? Yes, Authorizing Provider Marta Hernandez MD    Medication docusate sodium (COLACE) 50 MG capsule, Sig Take 1 capsule by mouth in the morning and 1 capsule in the evening., Start Date 11/28/23, End Date , Taking? Yes, Authorizing Provider Marta Hernandez MD            Relevant Problems   Anesthesia Problems (within normal limits)       Physical Exam     Airway    Mallampati: II  TM Distance: >3 FB  Neck ROM: Full  Neck: Non-tender and Able to place in sniff position  TMJ Mobility: Good    Cardiovascular  Cardiovascular exam normal  Cardio Rhythm: Regular  Cardio Rate: Normal    Head Assessment  Head assessment: Normocephalic and Atraumatic    General Assessment  General Assessment: Alert and oriented and No acute distress    Dental Exam  Dental exam normal    Pulmonary Exam  Pulmonary exam normal  Breath sounds clear to auscultation:  Yes    Abdominal Exam  Abdominal exam normal      Anesthesia Plan:    ASA Status: 3  Anesthesia Type: General    Induction: Intravenous  Preferred Airway Type: ETT  Maintenance: Inhalational  Premedication: IV      Post-op Pain Management: Per Surgeon      Checklist  Reviewed: Lab Results, EKG, Nursing Notes, Beta Blocker Status, Patient Summary, Care Everywhere, DNR Status, Allergies, Medications, Problem list, NPO Status, Past Med History and Outside Records  Consent/Risks Discussed Statement:  The proposed anesthetic plan, including its risks and benefits, have been discussed with the Patient along with the risks and benefits of alternatives. Questions were encouraged and answered and the patient and/or representative understands and agrees to proceed.        I discussed with the patient (and/or patient's legal representative) the risks and benefits of the proposed anesthesia plan, General, which may include services performed by other anesthesia providers.    Alternative anesthesia plans, if available, were reviewed with the patient (and/or patient's legal representative). Discussion has been held with the patient (and/or patient's legal representative) regarding risks of anesthesia, which include Nausea, Vomiting, Sore Throat, Dental Injury, Post-op Intubation, Allergic Reaction, ICU Admit, Need for Blood Transfusion, Intra-operative Awareness, Aspiration, Hypotension and Emergence Delirium and emergent situations that may require change  in anesthesia plan.    The patient (and/or patient's legal representative) has indicated understanding, his/her questions have been answered, and he/she wishes to proceed with the planned anesthetic.    Comments  Plan Comments: RECORDS, EKG, XRAYS REVIEWED WHEN AVAILABLE     3 = A little assistance

## 2025-01-10 NOTE — CONSULT NOTE ADULT - SUBJECTIVE AND OBJECTIVE BOX
PT states painful cough, swollen glands, chills. States he took theraflu earlier.
Wound Surgery Consult Note:    HPI:  This is a 76yo old  Male with PMH of HTN, HLD,  BPH, CKD ( creat 1.42 today), chronic angelita hydronephrosis ( x2 years), prostate ca ( radiation 10 years ago), seizure disorder ( last seizure 2015), ETOH use ( sober since 2016), hypothyroidism, and mild demenia; presents to [facility] on 9/3/2022 with chief complaint of SOB and CP x3 days.  Pt has been a heavy smoker his whole life. Denies fever, chills, cough, or congestion. No sick contacts.    Admitted to tele for likely COPD exacerbation and CP.  CXR is clear. Started on Duonebs, Symbicort and Solumedrol. No prior cardiac stents. EKG on arrival to ED notes PRWP V 1-3, no ST changes. Trop high on arrival at 268 trending down 180.8 today, proBNP 763, CK 1090. TSH 69.600 ( stopped synthroid a few weeks ago), T3 <20 T4 0.9, Na 126.  CXR revealing New cardiomegaly and CT chest with Large pericardial effusion, Echo today with large pericardial effusion with early tamponade physiology. Transferred to [facility] for pericardiocentesis. On arrival to [facility] patient awake and verbal A&OX3, denies any chest pain, dyspnea, dizziness, palpitations, N&V, Ha, orthopnea, LE edema.  VS WNL, on 2 L nasal cannula in no respiratory distress.   (07 Sep 2022 13:09)    Request for wound care consult for sacrum and bilateral buttocks received from nursing. Clinical nurse suspected a healed injury which on exam was correct. Will recommend pressure injury prophylaxis as below.    PAST MEDICAL & SURGICAL HISTORY:  ETOH abuse, sober since 2016  Seizure disorder, last seizure 2015  BPH (benign prostatic hyperplasia)  HTN (hypertension)  Prostate CA, Dx&#x27;d 2008, s/p radiation  Hyperlipidemia  Poor historian  S/P hernia repair, right inguinal hernia  H/O prostate cancer, 10- yrs ago, s/p radiation therapy  Status post cataract extraction  H/O urethrotomy, June 2018    MEDICATIONS  (STANDING):  chlorhexidine 4% Liquid 1 Application(s) Topical <User Schedule>  hydrALAZINE 50 milliGRAM(s) Oral three times a day  lactulose Syrup 10 Gram(s) Oral once  levothyroxine 112 MICROGram(s) Oral daily  losartan 50 milliGRAM(s) Oral daily  nicotine -  14 mG/24Hr(s) Patch 1 Patch Transdermal daily  polyethylene glycol 3350 17 Gram(s) Oral daily  senna 2 Tablet(s) Oral at bedtime  simvastatin 20 milliGRAM(s) Oral at bedtime  tamsulosin 0.4 milliGRAM(s) Oral at bedtime    MEDICATIONS  (PRN):  albuterol/ipratropium for Nebulization 3 milliLiter(s) Nebulizer every 6 hours PRN Shortness of Breath and/or Wheezing  bisacodyl 5 milliGRAM(s) Oral every 12 hours PRN Constipation    Allergies    No Known Allergies    Intolerances    Vital Signs Last 24 Hrs  T(C): 36.3 (08 Sep 2022 12:00), Max: 36.4 (07 Sep 2022 20:00)  T(F): 97.4 (08 Sep 2022 12:00), Max: 97.6 (07 Sep 2022 20:00)  HR: 62 (08 Sep 2022 15:10) (56 - 76)  BP: 133/80 (08 Sep 2022 15:10) (90/63 - 180/98)  BP(mean): 102 (08 Sep 2022 15:10) (69 - 131)  RR: 20 (08 Sep 2022 15:10) (11 - 22)  SpO2: 94% (08 Sep 2022 15:10) (89% - 100%)    Parameters below as of 08 Sep 2022 15:10  Patient On (Oxygen Delivery Method): room air    Physical Exam:  General: Alert, weak  Respiratory: equal chest rise with respirations  Gastrointestinal: soft NT/ND  Neurology: verbal, following commands  Musculoskeletal: no contractures  Vascular: BLE edema equal  Skin:  Sacral/bilateral buttocks with central area of hypopigmented patches and surrounding hyperpigmentation, no breaks in skin or drainage  No odor, erythema, increased warmth, tenderness, induration, fluctuance    LABS:  09-08    124<L>  |  90<L>  |  21  ----------------------------<  107<H>  4.6   |  24  |  1.45<H>    Ca    8.5      08 Sep 2022 12:54  Phos  3.1     09-08  Mg     2.4     09-08    TPro  6.3  /  Alb  3.4  /  TBili  0.9  /  DBili  x   /  AST  44<H>  /  ALT  30  /  AlkPhos  71  09-08                          13.9   11.88 )-----------( 231      ( 08 Sep 2022 12:53 )             41.5     PT/INR - ( 08 Sep 2022 02:59 )   PT: 12.0 sec;   INR: 1.03 ratio             
GENERAL SURGERY CONSULT NOTE  Consulting surgical team: [facility]   Consulting attending: Dr. Cisse     HPI:  HPI:  This is a 78yo old  Male with PMH of HTN, HLD,  BPH, CKD ( creat 1.42 today), chronic angelita hydronephrosis ( x2 years), prostate ca ( radiation 10 years ago), seizure disorder ( last seizure 2015), ETOH use ( sober since 2016), hypothyroidism, and mild demenia; presents to [facility] on 9/3/2022 a/w pericardial effusion s/p pericardiocentesis. Surgery consulted for abdominal distention. Patient tolerating diet, no nausea/vomiting. Passing gas and had large liquid BM this morning.  CT scan shows dilated loops of cecum and right colon with normal caliber descending colon and rectum.         PAST MEDICAL HISTORY:  ETOH abuse    Seizure disorder    BPH (benign prostatic hyperplasia)    HTN (hypertension)    Prostate CA    Hyperlipidemia    Poor historian        PAST SURGICAL HISTORY:  No significant past surgical history    S/P hernia repair    H/O prostate cancer    Status post cataract extraction    H/O urethrotomy        SOCIAL HISTORY:  - Denies EtOH abuse, smoking, IVDA    MEDICATIONS:  albuterol/ipratropium for Nebulization 3 milliLiter(s) Nebulizer every 6 hours PRN  chlorhexidine 2% Cloths 1 Application(s) Topical <User Schedule>  hydrALAZINE 50 milliGRAM(s) Oral three times a day  levothyroxine 112 MICROGram(s) Oral daily  losartan 100 milliGRAM(s) Oral daily  mupirocin 2% Nasal 1 Application(s) Both Nostrils two times a day  nicotine -  14 mG/24Hr(s) Patch 1 Patch Transdermal daily  senna 2 Tablet(s) Oral at bedtime  simethicone 80 milliGRAM(s) Chew two times a day PRN  simvastatin 20 milliGRAM(s) Oral at bedtime  sodium chloride 2% . 500 milliLiter(s) IV Continuous <Continuous>  tamsulosin 0.4 milliGRAM(s) Oral at bedtime  urea Oral Powder 15 Gram(s) Oral two times a day      ALLERGIES:  No Known Allergies      VITALS & I/Os:  Vital Signs Last 24 Hrs  T(C): 36.6 (15 Sep 2022 12:22), Max: 36.6 (15 Sep 2022 04:00)  T(F): 97.9 (15 Sep 2022 12:22), Max: 97.9 (15 Sep 2022 12:22)  HR: 54 (15 Sep 2022 12:22) (54 - 62)  BP: 129/72 (15 Sep 2022 12:22) (114/73 - 129/72)  BP(mean): --  RR: 18 (15 Sep 2022 12:22) (17 - 18)  SpO2: 97% (15 Sep 2022 12:22) (96% - 100%)    Parameters below as of 15 Sep 2022 12:22  Patient On (Oxygen Delivery Method): room air        I&O's Summary    14 Sep 2022 07:01  -  15 Sep 2022 07:00  --------------------------------------------------------  IN: 860 mL / OUT: 0 mL / NET: 860 mL    15 Sep 2022 07:01  -  15 Sep 2022 12:35  --------------------------------------------------------  IN: 310 mL / OUT: 0 mL / NET: 310 mL        PHYSICAL EXAM:  General; NAD  Respiratory: nonlabored breathing  Abdomen: soft, distended, NT. No rebound or guarding  Extremities: [facility]     LABS:                        9.6    8.16  )-----------( 232      ( 15 Sep 2022 06:40 )             29.1     09-15    127<L>  |  94<L>  |  74<H>  ----------------------------<  91  4.3   |  25  |  1.40<H>    Ca    9.2      15 Sep 2022 06:41  Phos  2.5     09-14  Mg     2.2     09-14      Lactate:                  IMAGING:  < from: CT Abdomen and Pelvis No Cont (09.14.22 @ 21:35) >    FINDINGS:  LOWER CHEST: Trace pericardial effusion and coronary artery   calcifications. Small bilateral pleural effusions. Bilateral gynecomastia.    LIVER: Within normal limits.  BILE DUCTS: Normal caliber.  GALLBLADDER: Cholelithiasis.  SPLEEN: Within normal limits.  PANCREAS: Within normal limits.  ADRENALS: Within normal limits.  KIDNEYS/URETERS: Mild bilateral hydroureteronephrosis. No radiopaque   stone. Right renal cyst. Additional subcentimeter hypodensity in the   right interpolar region, likely a cyst.    BLADDER: Mild bladder wall thickening.  REPRODUCTIVE ORGANS: Prostate within normal limits.    BOWEL: Multiple distended large bowel loops involving the cecum,   ascending, and transverse colon. Distal colonic bowel loops involving the   descending colon tothe rectum are of normal caliber. No small bowel   obstruction. There is colonic diverticulosis without acute   diverticulitis. Appendix is normal. Small hiatal hernia.  PERITONEUM: No ascites or pneumoperitoneum.  VESSELS: Atherosclerotic changes.  RETROPERITONEUM/LYMPH NODES: No lymphadenopathy.  ABDOMINAL WALL: Subcutaneous soft tissue edema.  BONES: There is a new compression deformity at the L4 superior endplate.   Stable, old compression deformities of the L3 and L5 superior endplates.    IMPRESSION:  Multiple distended proximal large bowel loops with normal caliber of the   left colon, likely representing ileus. No small bowel obstruction.    Mild bilateral hydroureteronephrosis.    There is a compression deformity of the L4 superior endplate, new since   1/9/2018.    Trace pericardial effusion, decreased since CT chest 9/6/2022. Small   bilateral pleural effusions, also decreased in size.    < end of copied text >  
[facility] DIVISION OF KIDNEY DISEASES AND HYPERTENSION -- [phone]  -- INITIAL CONSULT NOTE  --------------------------------------------------------------------------------  HPI: 78 yo M with h/o CKD, chronic BL hydronephrosis, prostate cancer, BPH, HTN, HLD, hypothyroidism, and dementia was initially admitted to [facility] on 9/3 for suspected COPD exacerbation. Pt was found to have a large pericardial effusion w/ early tamponade physiology and was transferred to [facility] on 9/7 for pericardiocentesis. Nephrology consulted for hyponatremia. Upon review of [facility], SNa was 143 on 3/10/22. SNa was 125 on 9/3, and has remained low but stable at 124 today.    Pt seen and evaluated bedside in the CICU. Reports that SOB has improved significantly since the pericardiocentesis. Endorses good appetite. Is not following with a nephrologist for his CKD. Pt denies any prior h/o hyponatremia. Denies any recent NSAID use. No blood in urine or foamy urine. Denies any headaches, nausea, vomiting, fevers/chills, chest pain, palpitations, SOB, abdominal pain, and leg swelling.       PAST HISTORY  --------------------------------------------------------------------------------  PAST MEDICAL & SURGICAL HISTORY:  ETOH abuse  sober since 2016  Seizure disorder  last seizure 2015  BPH (benign prostatic hyperplasia)  HTN (hypertension)  Prostate CA  Dx&#x27;d 2008, s/p radiation  Hyperlipidemia  Poor historian  S/P hernia repair  right inguinal hernia  H/O prostate cancer  10- yrs ago, s/p radiation therapy    Status post cataract extraction  H/O urethrotomy  June 2018      FAMILY HISTORY:  Hypertension (Mother, Sibling)      PAST SOCIAL HISTORY: Denies ETOH or tobacco use.    ALLERGIES & MEDICATIONS  --------------------------------------------------------------------------------  Allergies    No Known Allergies    Intolerances      Standing Inpatient Medications  bisacodyl 5 milliGRAM(s) Oral every 12 hours  chlorhexidine 4% Liquid 1 Application(s) Topical <User Schedule>  heparin   Injectable 5000 Unit(s) SubCutaneous every 8 hours  hydrALAZINE 50 milliGRAM(s) Oral three times a day  levothyroxine 112 MICROGram(s) Oral daily  losartan 50 milliGRAM(s) Oral daily  nicotine -  14 mG/24Hr(s) Patch 1 Patch Transdermal daily  polyethylene glycol 3350 17 Gram(s) Oral two times a day  senna 2 Tablet(s) Oral at bedtime  simvastatin 20 milliGRAM(s) Oral at bedtime  tamsulosin 0.4 milliGRAM(s) Oral at bedtime    PRN Inpatient Medications  albuterol/ipratropium for Nebulization 3 milliLiter(s) Nebulizer every 6 hours PRN      REVIEW OF SYSTEMS  --------------------------------------------------------------------------------  Gen: No fevers/chills  Skin: No rashes  Head/Eyes/Ears: Normal hearing,   Respiratory: SOB improved  CV: No chest pain  GI: No abdominal pain, diarrhea  : No dysuria, hematuria  MSK: No  edema  Heme: No easy bruising or bleeding  Psych: No significant depression    All other systems were reviewed and are negative, except as noted.    VITALS/PHYSICAL EXAM  --------------------------------------------------------------------------------  T(C): 36.3 (09-09-22 @ 14:00), Max: 36.6 (09-09-22 @ 00:00)  HR: 65 (09-09-22 @ 16:00) (57 - 73)  BP: 115/62 (09-09-22 @ 16:00) (96/65 - 147/61)  RR: 15 (09-09-22 @ 16:00) (11 - 19)  SpO2: 91% (09-09-22 @ 16:00) (91% - 100%)  Wt(kg): --        09-08-22 @ 07:01  -  09-09-22 @ 07:00  --------------------------------------------------------  IN: 660 mL / OUT: 450 mL / NET: 210 mL    09-09-22 @ 07:01  -  09-09-22 @ 16:53  --------------------------------------------------------  IN: 80 mL / OUT: 0 mL / NET: 80 mL      Physical Exam:  Gen: Elderly male, in NAD  HEENT: MMM  Pulm: CTA B/L  CV: S1S2  Abd: Soft, +BS   Ext: No LE edema B/L  Neuro: Awake  Skin: pericardial drain noted  Vascular access: Peripheral IV    LABS/STUDIES  --------------------------------------------------------------------------------              12.9   11.71 >-----------<  210      [09-09-22 @ 04:09]              38.5     124  |  93  |  21  ----------------------------<  102      [09-09-22 @ 04:09]  4.5   |  23  |  1.50        Ca     8.2     [09-09-22 @ 04:09]      Mg     2.3     [09-09-22 @ 04:09]      Phos  2.5     [09-09-22 @ 04:09]    TPro  5.6  /  Alb  2.9  /  TBili  0.7  /  DBili  x   /  AST  28  /  ALT  22  /  AlkPhos  61  [09-09-22 @ 04:09]    PT/INR: PT 12.0 , INR 1.04       [09-09-22 @ 04:09]  PTT: 27.5       [09-09-22 @ 04:09]          [09-08-22 @ 02:59]  Serum Osmolality 259      [09-08-22 @ 12:54]    Creatinine Trend:  SCr 1.50 [09-09 @ 04:09]  SCr 1.45 [09-08 @ 12:54]  SCr 1.33 [09-08 @ 02:59]  SCr 1.42 [09-07 @ 06:45]  SCr 1.50 [09-06 @ 06:46]    Urinalysis - [07-19-20 @ 21:41]      Color Yellow / Appearance Slightly Turbid / SG 1.010 / pH 8.0      Gluc Negative / Ketone Negative  / Bili Negative / Urobili Negative       Blood Trace / Protein 100 / Leuk Est Moderate / Nitrite Negative      RBC 0-2 / WBC 0-2 / Hyaline  / Gran  / Sq Epi  / Non Sq Epi  / Bacteria Many    Urine Creatinine 53      [09-06-22 @ 12:00]  Urine Protein 7      [09-06-22 @ 12:00]    TSH 69.600      [09-06-22 @ 06:46]  Lipid: chol 159, TG 51, HDL 99, LDL --      [09-08-22 @ 02:59]

## 2025-06-16 NOTE — PATIENT PROFILE ADULT - NSPROMUTANXFEARADDRESSFT_GEN_A_NUR
What Type Of Note Output Would You Prefer (Optional)?: Standard Output
Have Your Spot(S) Been Treated In The Past?: has not been treated
Hpi Title: Evaluation of Skin Lesions
Additional History: Patient presents for a TBSE.
n/a